# Patient Record
Sex: FEMALE | Race: WHITE | NOT HISPANIC OR LATINO | Employment: OTHER | URBAN - METROPOLITAN AREA
[De-identification: names, ages, dates, MRNs, and addresses within clinical notes are randomized per-mention and may not be internally consistent; named-entity substitution may affect disease eponyms.]

---

## 2017-01-04 ENCOUNTER — HOSPITAL ENCOUNTER (OUTPATIENT)
Dept: RADIOLOGY | Facility: MEDICAL CENTER | Age: 67
End: 2017-01-04
Attending: INTERNAL MEDICINE
Payer: MEDICARE

## 2017-01-04 DIAGNOSIS — R91.8 PULMONARY NODULES: ICD-10-CM

## 2017-01-04 PROCEDURE — 71250 CT THORAX DX C-: CPT

## 2017-01-05 DIAGNOSIS — I48.0 PAF (PAROXYSMAL ATRIAL FIBRILLATION) (HCC): ICD-10-CM

## 2017-01-05 DIAGNOSIS — E78.1 HYPERTRIGLYCERIDEMIA: ICD-10-CM

## 2017-01-05 RX ORDER — ATORVASTATIN CALCIUM 20 MG/1
20 TABLET, FILM COATED ORAL
Qty: 90 TAB | Refills: 1
Start: 2017-01-05 | End: 2017-10-23 | Stop reason: SDUPTHER

## 2017-01-05 RX ORDER — FLECAINIDE ACETATE 50 MG/1
50-100 TABLET ORAL 2 TIMES DAILY
Qty: 360 TAB | Refills: 2
Start: 2017-01-05 | End: 2017-10-23 | Stop reason: SDUPTHER

## 2017-01-05 RX ORDER — FLECAINIDE ACETATE 50 MG/1
50-100 TABLET ORAL 2 TIMES DAILY
Qty: 360 TAB | Refills: 3 | Status: CANCELLED | OUTPATIENT
Start: 2017-01-05

## 2017-01-05 RX ORDER — ATORVASTATIN CALCIUM 20 MG/1
20 TABLET, FILM COATED ORAL
Qty: 90 TAB | Refills: 3 | Status: CANCELLED | OUTPATIENT
Start: 2017-01-05

## 2017-01-09 ENCOUNTER — OFFICE VISIT (OUTPATIENT)
Dept: MEDICAL GROUP | Facility: MEDICAL CENTER | Age: 67
End: 2017-01-09
Payer: MEDICARE

## 2017-01-09 VITALS
SYSTOLIC BLOOD PRESSURE: 112 MMHG | TEMPERATURE: 97.9 F | DIASTOLIC BLOOD PRESSURE: 78 MMHG | HEART RATE: 60 BPM | WEIGHT: 189 LBS | BODY MASS INDEX: 32.27 KG/M2 | HEIGHT: 64 IN | RESPIRATION RATE: 14 BRPM | OXYGEN SATURATION: 96 %

## 2017-01-09 DIAGNOSIS — J01.00 ACUTE NON-RECURRENT MAXILLARY SINUSITIS: ICD-10-CM

## 2017-01-09 DIAGNOSIS — R91.8 PULMONARY NODULES/LESIONS, MULTIPLE: ICD-10-CM

## 2017-01-09 DIAGNOSIS — M72.2 PLANTAR FASCIITIS: ICD-10-CM

## 2017-01-09 DIAGNOSIS — K80.20 CALCULUS OF GALLBLADDER WITHOUT CHOLECYSTITIS WITHOUT OBSTRUCTION: ICD-10-CM

## 2017-01-09 PROCEDURE — 99214 OFFICE O/P EST MOD 30 MIN: CPT | Performed by: INTERNAL MEDICINE

## 2017-01-09 RX ORDER — AMOXICILLIN AND CLAVULANATE POTASSIUM 875; 125 MG/1; MG/1
1 TABLET, FILM COATED ORAL 2 TIMES DAILY
Qty: 20 TAB | Refills: 0 | Status: SHIPPED | OUTPATIENT
Start: 2017-01-09 | End: 2017-04-04

## 2017-01-09 ASSESSMENT — PATIENT HEALTH QUESTIONNAIRE - PHQ9: CLINICAL INTERPRETATION OF PHQ2 SCORE: 0

## 2017-01-09 NOTE — PROGRESS NOTES
Lucía is a 66 y.o. female who is here today because    Chief Complaint   Patient presents with   • Follow-Up     CT Scan results       The patient's current problem list and medication list is:    Patient Active Problem List    Diagnosis Date Noted   • PAF (paroxysmal atrial fibrillation) ([facility]) 12/27/2013     Priority: High   • Takotsubo cardiomyopathy 11/19/2014     Priority: Medium   • Hyperlipidemia 11/19/2014     Priority: Low   • Calculus of gallbladder without cholecystitis 01/09/2017   • Osteoarthritis of hands, bilateral 12/02/2016   • Pulmonary nodules/lesions, multiple 07/06/2016   • Hypotension (arterial) 07/06/2016   • Chest pain at rest 07/05/2016   • Peripheral neuropathy (HCC) 06/30/2015   • PATEL positive 06/04/2015   • Osteopenia 03/08/2015   • Glaucoma 12/02/2014       Current Outpatient Prescriptions   Medication Sig Dispense Refill   • amoxicillin-clavulanate (AUGMENTIN) 875-125 MG Tab Take 1 Tab by mouth 2 times a day. 20 Tab 0   • flecainide (TAMBOCOR) 50 MG tablet Take 1-2 Tabs by mouth 2 times a day. 360 Tab 2   • metoprolol (LOPRESSOR) 25 MG Tab Take 0.5 Tabs by mouth 2 times a day. 90 Tab 1   • atorvastatin (LIPITOR) 20 MG Tab Take 1 Tab by mouth every day. 90 Tab 1   • Omega-3 Fatty Acids (FISH OIL) 1000 MG Cap capsule Take 1 Cap by mouth 3 times a day, with meals. 90 Cap 3   • prednisoLONE acetate (PRED FORTE) 1 % Suspension Place 1 Drop in right eye 2 Times a Day. Right Eye - Implant     • aspirin (ASA) 325 MG TABS Take 325 mg by mouth every bedtime.     • Calcium Carbonate-Vitamin D (CALCIUM + D PO) Take 1 Tab by mouth every day. **OTC**     • Lutein-Zeaxanthin (LUTEIN) 15-0.7 MG CAPS Take 1 Tab by mouth every bedtime.     • multivitamin (THERAGRAN) TABS Take 1 Tab by mouth every bedtime.     • Coenzyme Q10 100 MG Tab Take 1 Tab by mouth every day.       No current facility-administered medications for this visit.     HPI  uLcía returns for 1 months follow up. Diagnoses of Calculus of  "gallbladder without cholecystitis without obstruction, Plantar fasciitis, Pulmonary nodules/lesions, multiple, and Acute non-recurrent maxillary sinusitis were pertinent to this visit.    1. Calculus of gallbladder without cholecystitis without obstruction  Gallstone noted on CT, no clear symptoms of recurrent pain attacks or cholecystitis. Brother had gall stones removed.     2. Plantar fasciitis  For 4 months has had pain in the right heel and plantar area. Has similar symptoms 5 years ago, treated with a cortisone injection.No known trauma. Does not go barefoot. Her walking has been significantly curtailed. She is doing some stretching exercises and using a cold bottle to roll under her foot.     3. Pulmonary nodules/lesions, multiple  Recent CT Chest was read as no change. She is asymptomatic. No history of unusual respiratory infections. Grew up in [place], then lived in [place].     4. Acute non-recurrent maxillary sinusitis  Prescription requested for upcoming cruise. Sinus infections are her most common infectious issue.       ROS Denies chest pain, shortness of breath, changes in bowel and bladder function, lower extremity edema.    Allergies as of 01/09/2017 - [place] as Reviewed 01/09/2017   Allergen Reaction Noted   • Neosporin [neomycin-polymyxin b] Rash 07/05/2016      /78 mmHg  Pulse 60  Temp(Src) 36.6 °C (97.9 °F)  Resp 14  Ht 1.626 m (5' 4.02\")  Wt 85.73 kg (189 lb)  BMI 32.43 kg/m2  SpO2 96%  LMP 12/28/2003    PHYSICAL EXAMINATION  Gen:         Alert and oriented, No apparent distress.Looks well. Mild obesity.   Neck:       No Jugular venous distension, Lymphadenopathy, Thyromegaly, Bruits.  Lungs:     Respiration quiet and unlabored.  CV:          Regular rate and rhythm.  Abd:         Soft, non distended. No tenderness.                  Ext:          No clubbing, cyanosis, edema. Right foot appears normal on inspection, circulation intact.Some tenderness in the plantar faciia area " and lateral sides of the heel. Achilles tendon n  on-tender.       ASSESSMENT AND PLAN     66 y.o. female     1. Calculus of gallbladder without cholecystitis, without obstruction  Asymptomatic    2. Plantar fasciitis, recurrent  - REFERRAL TO PODIATRY    3. Pulmonary nodules/lesions, multiple  Stable, symptomatic, consider rechecking in 1 year    4. Acute non-recurrent maxillary sinusitis  - amoxicillin-clavulanate (AUGMENTIN) 875-125 MG Tab; Take 1 Tab by mouth 2 times a day.  Dispense: 20 Tab; Refill: 0      Followup: 3 months

## 2017-01-09 NOTE — MR AVS SNAPSHOT
"        Lucía Haywood Rodriguez   2017 10:00 AM   Office Visit   MRN: [id number]    Department:  [address] [place] [address]   Dept Phone:  [phone]    Description:  Female : 1950   Provider:  Yomi Pathak M.D.           Reason for Visit     Follow-Up CT Scan results      Allergies as of 2017     Allergen Noted Reactions    Neosporin [Neomycin-Polymyxin B] 2016   Rash    Rxn = 2016      You were diagnosed with     Calculus of gallbladder without cholecystitis without obstruction   [029870]       Plantar fasciitis   [745847]       Acute non-recurrent maxillary sinusitis   [9447349]         Vital Signs     Blood Pressure Pulse Temperature Respirations Height Weight    112/78 mmHg 60 36.6 °C (97.9 °F) 14 1.626 m (5' 4.02\") 85.73 kg (189 lb)    Body Mass Index Oxygen Saturation Last Menstrual Period Smoking Status          32.43 kg/m2 96% 2003 Never Smoker         Basic Information     Date Of Birth Sex Race Ethnicity Preferred Language    1950 Female White Non- English      Your appointments     2017 10:20 AM   NEW TO YOU with Harvinder Bell M.D.   [facility] [address]    [address] [facility] [address]  [facility] [address] [phone]              Problem List              ICD-10-CM Priority Class Noted - Resolved    PAF (paroxysmal atrial fibrillation) (HCC) I48.0 High  2013 - Present    Hyperlipidemia E78.5 Low  2014 - Present    Takotsubo cardiomyopathy I51.81 Medium  2014 - Present    Glaucoma H40.9   2014 - Present    Osteopenia M85.80   3/8/2015 - Present    PATEL positive R76.8   2015 - Present    Peripheral neuropathy (HCC) G62.9   2015 - Present    Chest pain at rest R07.9   2016 - Present    Pulmonary nodules/lesions, multiple R91.8   2016 - Present    Hypotension (arterial) I95.9   2016 - Present    Osteoarthritis of hands, bilateral M19.041, M19.042   2016 - Present    Calculus of " gallbladder without cholecystitis K80.20   1/9/2017 - Present      Health Maintenance        Date Due Completion Dates    IMM ZOSTER VACCINE 11/20/2009 ---    MAMMOGRAM 11/4/2017 11/4/2016, 11/2/2015, 10/27/2015    BONE DENSITY 12/9/2019 12/9/2014    COLONOSCOPY 2/3/2024 2/3/2014 (Done)    Override on 2/3/2014: Done    IMM DTaP/Tdap/Td Vaccine (2 - Td) 6/26/2025 6/26/2015            Current Immunizations     13-VALENT PCV PREVNAR 12/2/2016    Influenza TIV (IM) 10/2/2014, 10/1/2013, 10/19/2011    Influenza Vaccine Adult HD 9/19/2015    Influenza Vaccine Quad Inj (Pf) 9/16/2016    Pneumococcal polysaccharide vaccine (PPSV-23) 12/2/2014, 12/28/2011    Tdap Vaccine 6/26/2015    Tuberculin Skin Test 4/28/2015 11:20 AM      Below and/or attached are the medications your provider expects you to take. Review all of your home medications and newly ordered medications with your provider and/or pharmacist. Follow medication instructions as directed by your provider and/or pharmacist. Please keep your medication list with you and share with your provider. Update the information when medications are discontinued, doses are changed, or new medications (including over-the-counter products) are added; and carry medication information at all times in the event of emergency situations     Allergies:  NEOSPORIN - Rash               Medications  Valid as of: January 09, 2017 - 10:40 AM    Generic Name Brand Name Tablet Size Instructions for use    Amoxicillin-Pot Clavulanate (Tab) AUGMENTIN 875-125 MG Take 1 Tab by mouth 2 times a day.        Aspirin (Tab)  MG Take 325 mg by mouth every bedtime.        Atorvastatin Calcium (Tab) LIPITOR 20 MG Take 1 Tab by mouth every day.        Calcium Citrate-Vitamin D   Take 1 Tab by mouth every day. **OTC**        Coenzyme Q10 (Tab) Coenzyme Q10 100 MG Take 1 Tab by mouth every day.        Flecainide Acetate (Tab) TAMBOCOR 50 MG Take 1-2 Tabs by mouth 2 times a day.         Lutein-Zeaxanthin (Cap) Lutein-Zeaxanthin 15-0.7 MG Take 1 Tab by mouth every bedtime.        Metoprolol Tartrate (Tab) LOPRESSOR 25 MG Take 0.5 Tabs by mouth 2 times a day.        Multiple Vitamin (Tab) THERAGRAN  Take 1 Tab by mouth every bedtime.        Omega-3 Fatty Acids (Cap) fish oil 1000 MG Take 1 Cap by mouth 3 times a day, with meals.        PrednisoLONE Acetate (Suspension) PRED FORTE 1 % Place 1 Drop in right eye 2 Times a Day. Right Eye - Implant        .                 Medicines prescribed today were sent to:     [facility] [address] [place] [address] (), [place] [address]    [address] () [place] [address]    Phone: [phone] Fax: [phone]    Open 24 Hours?: No      Medication refill instructions:       If your prescription bottle indicates you have medication refills left, it is not necessary to call your provider’s office. Please contact your pharmacy and they will refill your medication.    If your prescription bottle indicates you do not have any refills left, you may request refills at any time through one of the following ways: The online [employer] system (except Urgent Care), by calling your provider’s office, or by asking your pharmacy to contact your provider’s office with a refill request. Medication refills are processed only during regular business hours and may not be available until the next business day. Your provider may request additional information or to have a follow-up visit with you prior to refilling your medication.   *Please Note: Medication refills are assigned a new Rx number when refilled electronically. Your pharmacy may indicate that no refills were authorized even though a new prescription for the same medication is available at the pharmacy. Please request the medicine by name with the pharmacy before contacting your provider for a refill.        Referral     A referral request has been sent to our patient care coordination department. Please allow 3-5  business days for us to process this request and contact you either by phone or mail. If you do not hear from us by the 5th business day, please call us at [phone].           Wik[employer] Access Code: Activation code not generated  Current Wikibon Status: Active

## 2017-04-04 ENCOUNTER — OFFICE VISIT (OUTPATIENT)
Dept: MEDICAL GROUP | Facility: MEDICAL CENTER | Age: 67
End: 2017-04-04
Payer: MEDICARE

## 2017-04-04 ENCOUNTER — HOSPITAL ENCOUNTER (OUTPATIENT)
Dept: LAB | Facility: MEDICAL CENTER | Age: 67
End: 2017-04-04
Attending: INTERNAL MEDICINE
Payer: MEDICARE

## 2017-04-04 VITALS
DIASTOLIC BLOOD PRESSURE: 72 MMHG | HEART RATE: 78 BPM | HEIGHT: 64 IN | WEIGHT: 190 LBS | TEMPERATURE: 97.3 F | OXYGEN SATURATION: 94 % | SYSTOLIC BLOOD PRESSURE: 122 MMHG | RESPIRATION RATE: 14 BRPM | BODY MASS INDEX: 32.44 KG/M2

## 2017-04-04 DIAGNOSIS — M85.80 OSTEOPENIA: ICD-10-CM

## 2017-04-04 DIAGNOSIS — I48.0 PAF (PAROXYSMAL ATRIAL FIBRILLATION) (HCC): ICD-10-CM

## 2017-04-04 DIAGNOSIS — I51.81 TAKOTSUBO CARDIOMYOPATHY: ICD-10-CM

## 2017-04-04 DIAGNOSIS — E66.9 OBESITY (BMI 30-39.9): ICD-10-CM

## 2017-04-04 DIAGNOSIS — R91.8 PULMONARY NODULES/LESIONS, MULTIPLE: ICD-10-CM

## 2017-04-04 DIAGNOSIS — M19.042 PRIMARY OSTEOARTHRITIS OF BOTH HANDS: ICD-10-CM

## 2017-04-04 DIAGNOSIS — G62.89 OTHER POLYNEUROPATHY: ICD-10-CM

## 2017-04-04 DIAGNOSIS — E78.49 OTHER HYPERLIPIDEMIA: ICD-10-CM

## 2017-04-04 DIAGNOSIS — M19.041 PRIMARY OSTEOARTHRITIS OF BOTH HANDS: ICD-10-CM

## 2017-04-04 PROBLEM — K80.20 CALCULUS OF GALLBLADDER WITHOUT CHOLECYSTITIS: Status: RESOLVED | Noted: 2017-01-09 | Resolved: 2017-04-04

## 2017-04-04 LAB
ALBUMIN SERPL BCP-MCNC: 4.3 G/DL (ref 3.2–4.9)
ALBUMIN/GLOB SERPL: 1.3 G/DL
ALP SERPL-CCNC: 102 U/L (ref 30–99)
ALT SERPL-CCNC: 25 U/L (ref 2–50)
ANION GAP SERPL CALC-SCNC: 10 MMOL/L (ref 0–11.9)
AST SERPL-CCNC: 22 U/L (ref 12–45)
BILIRUB SERPL-MCNC: 0.6 MG/DL (ref 0.1–1.5)
BUN SERPL-MCNC: 16 MG/DL (ref 8–22)
CALCIUM SERPL-MCNC: 9.8 MG/DL (ref 8.5–10.5)
CHLORIDE SERPL-SCNC: 104 MMOL/L (ref 96–112)
CHOLEST SERPL-MCNC: 127 MG/DL (ref 100–199)
CO2 SERPL-SCNC: 24 MMOL/L (ref 20–33)
CREAT SERPL-MCNC: 0.93 MG/DL (ref 0.5–1.4)
GFR SERPL CREATININE-BSD FRML MDRD: >60 ML/MIN/1.73 M 2
GLOBULIN SER CALC-MCNC: 3.2 G/DL (ref 1.9–3.5)
GLUCOSE SERPL-MCNC: 100 MG/DL (ref 65–99)
HDLC SERPL-MCNC: 40 MG/DL
LDLC SERPL CALC-MCNC: 48 MG/DL
POTASSIUM SERPL-SCNC: 4.4 MMOL/L (ref 3.6–5.5)
PROT SERPL-MCNC: 7.5 G/DL (ref 6–8.2)
SODIUM SERPL-SCNC: 138 MMOL/L (ref 135–145)
TRIGL SERPL-MCNC: 197 MG/DL (ref 0–149)

## 2017-04-04 PROCEDURE — 4040F PNEUMOC VAC/ADMIN/RCVD: CPT | Performed by: FAMILY MEDICINE

## 2017-04-04 PROCEDURE — G8432 DEP SCR NOT DOC, RNG: HCPCS | Performed by: FAMILY MEDICINE

## 2017-04-04 PROCEDURE — 80061 LIPID PANEL: CPT

## 2017-04-04 PROCEDURE — 80053 COMPREHEN METABOLIC PANEL: CPT

## 2017-04-04 PROCEDURE — 1101F PT FALLS ASSESS-DOCD LE1/YR: CPT | Performed by: FAMILY MEDICINE

## 2017-04-04 PROCEDURE — G8419 CALC BMI OUT NRM PARAM NOF/U: HCPCS | Performed by: FAMILY MEDICINE

## 2017-04-04 PROCEDURE — 36415 COLL VENOUS BLD VENIPUNCTURE: CPT

## 2017-04-04 PROCEDURE — 3014F SCREEN MAMMO DOC REV: CPT | Performed by: FAMILY MEDICINE

## 2017-04-04 PROCEDURE — 1036F TOBACCO NON-USER: CPT | Performed by: FAMILY MEDICINE

## 2017-04-04 PROCEDURE — 99214 OFFICE O/P EST MOD 30 MIN: CPT | Performed by: FAMILY MEDICINE

## 2017-04-04 PROCEDURE — 3017F COLORECTAL CA SCREEN DOC REV: CPT | Performed by: FAMILY MEDICINE

## 2017-04-04 RX ORDER — ALENDRONATE SODIUM 35 MG/1
35 TABLET ORAL
Qty: 12 TAB | Refills: 3 | Status: SHIPPED | OUTPATIENT
Start: 2017-04-04 | End: 2018-11-12

## 2017-04-04 NOTE — MR AVS SNAPSHOT
"        Lucía Haywood Michael   2017 10:20 AM   Office Visit   MRN: [id number]    Department:  [address] [place] [address]   Dept Phone:  [phone]    Description:  Female : 1950   Provider:  Harvinder Bell M.D.           Reason for Visit     Hyperlipidemia           Allergies as of 2017     Allergen Noted Reactions    Neosporin [Neomycin-Polymyxin B] 2016   Rash    Rxn = 2016      You were diagnosed with     PAF (paroxysmal atrial fibrillation) ([facility])   [239695]       Takotsubo cardiomyopathy   [433287]       Other polyneuropathy (CMS-[facility])   [8020578]       Pulmonary nodules/lesions, multiple   [070996]       Other hyperlipidemia   [0209257]       Primary osteoarthritis of both hands   [2252742]       Osteopenia   [217241]         Vital Signs     Blood Pressure Pulse Temperature Respirations Height Weight    122/72 mmHg 78 36.3 °C (97.3 °F) 14 1.626 m (5' 4\") 86.183 kg (190 lb)    Body Mass Index Oxygen Saturation Last Menstrual Period Smoking Status          32.60 kg/m2 94% 2003 Never Smoker         Basic Information     Date Of Birth Sex Race Ethnicity Preferred Language    1950 Female White Non- English      Your appointments     2017  1:20 PM   FOLLOW UP with Ghulam Cook M.D.   [facility] (--)    [address] [facility] [facility] [address]  [place] [address]   [phone]            Oct 26, 2017 10:00 AM   Established Patient with Harvinder Bell M.D.   [facility] [address]    [address] [facility] [address]  [place] [address] [phone]           You will be receiving a confirmation call a few days before your appointment from our automated call confirmation system.              Problem List              ICD-10-CM Priority Class Noted - Resolved    PAF (paroxysmal atrial fibrillation) (CMS-[facility]) I48.0 High  2013 - Present    Hyperlipidemia E78.5 Low  2014 - Present    Takotsubo cardiomyopathy " I51.81 Medium  11/19/2014 - Present    Glaucoma H40.9   12/2/2014 - Present    Osteopenia M85.80   3/8/2015 - Present    PATEL positive R76.8   6/4/2015 - Present    Pulmonary nodules/lesions, multiple R91.8   7/6/2016 - Present    Osteoarthritis of hands, bilateral M19.041, M19.042   12/2/2016 - Present      Health Maintenance        Date Due Completion Dates    IMM ZOSTER VACCINE 11/20/2009 ---    MAMMOGRAM 11/4/2017 11/4/2016, 11/2/2015, 10/27/2015    BONE DENSITY 12/9/2019 12/9/2014    COLONOSCOPY 2/3/2024 2/3/2014 (Done)    Override on 2/3/2014: Done    IMM DTaP/Tdap/Td Vaccine (2 - Td) 6/26/2025 6/26/2015            Current Immunizations     13-VALENT PCV PREVNAR 12/2/2016    Influenza TIV (IM) 10/2/2014, 10/1/2013, 10/19/2011    Influenza Vaccine Adult HD 9/19/2015    Influenza Vaccine Quad Inj (Pf) 9/16/2016    Pneumococcal polysaccharide vaccine (PPSV-23) 12/2/2014, 12/28/2011    Tdap Vaccine 6/26/2015    Tuberculin Skin Test 4/28/2015 11:20 AM      Below and/or attached are the medications your provider expects you to take. Review all of your home medications and newly ordered medications with your provider and/or pharmacist. Follow medication instructions as directed by your provider and/or pharmacist. Please keep your medication list with you and share with your provider. Update the information when medications are discontinued, doses are changed, or new medications (including over-the-counter products) are added; and carry medication information at all times in the event of emergency situations     Allergies:  NEOSPORIN - Rash               Medications  Valid as of: April 04, 2017 - 10:44 AM    Generic Name Brand Name Tablet Size Instructions for use    Alendronate Sodium (Tab) FOSAMAX 35 MG Take 1 Tab by mouth every 7 days. Indications: prevention of osteoporosis        Aspirin (Tab)  MG Take 325 mg by mouth every bedtime.        Atorvastatin Calcium (Tab) LIPITOR 20 MG Take 1 Tab by mouth every  day.        Calcium Citrate-Vitamin D   Take 1 Tab by mouth every day. **OTC**        Flecainide Acetate (Tab) TAMBOCOR 50 MG Take 1-2 Tabs by mouth 2 times a day.        Lutein-Zeaxanthin (Cap) Lutein-Zeaxanthin 15-0.7 MG Take 1 Tab by mouth every bedtime.        Metoprolol Tartrate (Tab) LOPRESSOR 25 MG Take 0.5 Tabs by mouth 2 times a day.        Multiple Vitamin (Tab) THERAGRAN  Take 1 Tab by mouth every bedtime.        Omega-3 Fatty Acids (Cap) fish oil 1000 MG Take 1 Cap by mouth 3 times a day, with meals.        PrednisoLONE Acetate (Suspension) PRED FORTE 1 % Place 1 Drop in right eye 2 Times a Day. Right Eye - Implant        .                 Medicines prescribed today were sent to:     [facility] [address] [place] [address] (), [place] [id number] () [place] [address]    Phone: [phone] Fax: [phone]    Open 24 Hours?: No      Medication refill instructions:       If your prescription bottle indicates you have medication refills left, it is not necessary to call your provider’s office. Please contact your pharmacy and they will refill your medication.    If your prescription bottle indicates you do not have any refills left, you may request refills at any time through one of the following ways: The online [employer] system (except Urgent Care), by calling your provider’s office, or by asking your pharmacy to contact your provider’s office with a refill request. Medication refills are processed only during regular business hours and may not be available until the next business day. Your provider may request additional information or to have a follow-up visit with you prior to refilling your medication.   *Please Note: Medication refills are assigned a new Rx number when refilled electronically. Your pharmacy may indicate that no refills were authorized even though a new prescription for the same medication is available at the pharmacy. Please request the medicine by name with the  pharmacy before contacting your provider for a refill.           MyChart Access Code: Activation code not generated  Current MyChart Status: Active

## 2017-04-04 NOTE — PROGRESS NOTES
cc:  afib    Subjective:     Lucía Rodriguez is a 66 y.o. female presenting to establish care and to discuss the followin. Paroxysmal afib, takotsubo cardiomyopathy, HLD: is followed by cardiology. Is on metoprolol, flecainide, fish oil, lipitor, aspirin 325mg daily.  No issues.     2. Osteopenia: had dexa in . Taking calcium and vitamin d.  No falls. Hx of hysterectomy many years ago.    3. Peripheral neuropathy: has been present for years, is a burning sensation on bottoms of feet.    4. Pulm nodules: was found incidentally in 2016, repeat CT 2017 with stable nodules. Is asymptomatic. No cough, shortness of breath. Never a smoker.    Review of systems:  See above.          Current outpatient prescriptions:   •  alendronate (FOSAMAX) 35 MG tablet, Take 1 Tab by mouth every 7 days. Indications: prevention of osteoporosis, Disp: 12 Tab, Rfl: 3  •  flecainide (TAMBOCOR) 50 MG tablet, Take 1-2 Tabs by mouth 2 times a day., Disp: 360 Tab, Rfl: 2  •  metoprolol (LOPRESSOR) 25 MG Tab, Take 0.5 Tabs by mouth 2 times a day., Disp: 90 Tab, Rfl: 1  •  atorvastatin (LIPITOR) 20 MG Tab, Take 1 Tab by mouth every day., Disp: 90 Tab, Rfl: 1  •  Omega-3 Fatty Acids (FISH OIL) 1000 MG Cap capsule, Take 1 Cap by mouth 3 times a day, with meals. (Patient taking differently: Take 1,000 mg by mouth every day.), Disp: 90 Cap, Rfl: 3  •  prednisoLONE acetate (PRED FORTE) 1 % Suspension, Place 1 Drop in right eye 2 Times a Day. Right Eye - Implant, Disp: , Rfl:   •  aspirin (ASA) 325 MG TABS, Take 325 mg by mouth every bedtime., Disp: , Rfl:   •  Calcium Carbonate-Vitamin D (CALCIUM + D PO), Take 1 Tab by mouth every day. **OTC**, Disp: , Rfl:   •  Lutein-Zeaxanthin (LUTEIN) 15-0.7 MG CAPS, Take 1 Tab by mouth every bedtime., Disp: , Rfl:   •  multivitamin (THERAGRAN) TABS, Take 1 Tab by mouth every bedtime., Disp: , Rfl:     Allergies   Allergen Reactions   • Neosporin [Neomycin-Polymyxin B] Rash     Rxn = 2016  "      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 2011   • Glaucoma      right eye   • CATARACT      left eye   • Arrhythmia      Atrial fibrillation   • Hyperlipidemia    • Calculus of gallbladder without cholecystitis 1/9/2017     Past Surgical History   Procedure Laterality Date   • Gyn surgery       HYSTERECTOMY   • Other       eye surgery, right eye   • Other       cataract surgery, left eye     Family History   Problem Relation Age of Onset   • Heart Disease Father      triple bypass   • Cancer Father    • Cancer Mother      Social History     Social History   • Marital Status:      Spouse Name: N/A   • Number of Children: N/A   • Years of Education: N/A     Occupational History   • Not on file.     Social History Main Topics   • Smoking status: Never Smoker    • Smokeless tobacco: Never Used   • Alcohol Use: No   • Drug Use: No   • Sexual Activity:     Partners: Male     Other Topics Concern   • Not on file     Social History Narrative       Objective:     Vitals: /72 mmHg  Pulse 78  Temp(Src) 36.3 °C (97.3 °F)  Resp 14  Ht 1.626 m (5' 4\")  Wt 86.183 kg (190 lb)  BMI 32.60 kg/m2  SpO2 94%  LMP 12/28/2003  General: Alert, pleasant, NAD  HEENT: Normocephalic.  PERRL, EOMI, no icterus or pallor.  Conjunctivae and lids normal. External ears normal.  Neck supple.  No thyromegaly or masses palpated. No cervical or supraclavicular lymphadenopathy.  Heart: Regular rate and rhythm.  S1 and S2 normal.  No murmurs appreciated.  Respiratory: Normal respiratory effort.  Clear to auscultation bilaterally.    Abdomen: Non-distended, soft  Extremities: No leg edema.    Psych:  Affect/mood is normal, judgement is good, memory is intact, grooming is appropriate.    Assessment/Plan:     Lucía was seen today for hyperlipidemia.    Diagnoses and all orders for this visit:    PAF (paroxysmal atrial fibrillation) [facility])  Takotsubo " cardiomyopathy  Other hyperlipidemia  Stable, followed by cardiology. Continue meds. Is doing labs for her cardiologist today    Osteopenia  Discussed results of her dexa.  Her 10-yr risk of a major osteoporotic fx is 19.4%, very close to 20%. discussed pros/cons of medication options. We decided to try weekly fosamax, and then repeat dexa in 3 years and re-assess.  If she gets any side effects, will just stop meds and repeat dexa in 2019.  -     alendronate (FOSAMAX) 35 MG tablet; Take 1 Tab by mouth every 7 days. Indications: prevention of osteoporosis    Other polyneuropathy (CMS-HCC)  Stable.    Pulmonary nodules/lesions, multiple  Stable, will repeat CT 1/2018    Primary osteoarthritis of both hands  Stable    Obesity (BMI 30-39.9)  -     Patient identified as having weight management issue.  Appropriate orders and counseling given.      Return in about 6 months (around 10/4/2017) for Med check.

## 2017-04-18 ENCOUNTER — OFFICE VISIT (OUTPATIENT)
Dept: CARDIOLOGY | Facility: MEDICAL CENTER | Age: 67
End: 2017-04-18
Payer: MEDICARE

## 2017-04-18 VITALS
DIASTOLIC BLOOD PRESSURE: 54 MMHG | BODY MASS INDEX: 31.92 KG/M2 | HEART RATE: 70 BPM | WEIGHT: 187 LBS | HEIGHT: 64 IN | SYSTOLIC BLOOD PRESSURE: 110 MMHG | OXYGEN SATURATION: 94 %

## 2017-04-18 DIAGNOSIS — I48.0 PAF (PAROXYSMAL ATRIAL FIBRILLATION) (HCC): ICD-10-CM

## 2017-04-18 PROCEDURE — G8432 DEP SCR NOT DOC, RNG: HCPCS | Performed by: INTERNAL MEDICINE

## 2017-04-18 PROCEDURE — 1101F PT FALLS ASSESS-DOCD LE1/YR: CPT | Performed by: INTERNAL MEDICINE

## 2017-04-18 PROCEDURE — 1036F TOBACCO NON-USER: CPT | Performed by: INTERNAL MEDICINE

## 2017-04-18 PROCEDURE — 93000 ELECTROCARDIOGRAM COMPLETE: CPT | Performed by: INTERNAL MEDICINE

## 2017-04-18 PROCEDURE — 99214 OFFICE O/P EST MOD 30 MIN: CPT | Performed by: INTERNAL MEDICINE

## 2017-04-18 PROCEDURE — 3014F SCREEN MAMMO DOC REV: CPT | Performed by: INTERNAL MEDICINE

## 2017-04-18 PROCEDURE — 3017F COLORECTAL CA SCREEN DOC REV: CPT | Performed by: INTERNAL MEDICINE

## 2017-04-18 PROCEDURE — G8419 CALC BMI OUT NRM PARAM NOF/U: HCPCS | Performed by: INTERNAL MEDICINE

## 2017-04-18 PROCEDURE — 4040F PNEUMOC VAC/ADMIN/RCVD: CPT | Performed by: INTERNAL MEDICINE

## 2017-04-18 ASSESSMENT — ENCOUNTER SYMPTOMS: PALPITATIONS: 0

## 2017-04-18 NOTE — MR AVS SNAPSHOT
"        Lucía Haywood Rodriguez   2017 1:20 PM   Office Visit   MRN: [id number]    Department:  [facility]   Dept Phone:  [phone]    Description:  Female : 1950   Provider:  Ghulam Cook M.D.           Reason for Visit     Follow-Up           Allergies as of 2017     Allergen Noted Reactions    Neosporin [Neomycin-Polymyxin B] 2016   Rash    Rxn = 2016      You were diagnosed with     PAF (paroxysmal atrial fibrillation) (CMS-HCC)   [381720]         Vital Signs     Blood Pressure Pulse Height Weight Body Mass Index Oxygen Saturation    110/54 mmHg 70 1.626 m (5' 4.02\") 84.823 kg (187 lb) 32.08 kg/m2 94%    Last Menstrual Period Smoking Status                2003 Never Smoker           Basic Information     Date Of Birth Sex Race Ethnicity Preferred Language    1950 Female White Non- English      Your appointments     Oct 26, 2017 10:00 AM   Established Patient with Harvinder Bell M.D.   [facility] [address]    [address]  [address]  [facility] [address]   [phone]           You will be receiving a confirmation call a few days before your appointment from our automated call confirmation system.              Problem List              ICD-10-CM Priority Class Noted - Resolved    PAF (paroxysmal atrial fibrillation) (CMS-HCC) I48.0 High  2013 - Present    Hyperlipidemia E78.5 Low  2014 - Present    Takotsubo cardiomyopathy I51.81 Medium  2014 - Present    Glaucoma H40.9   2014 - Present    Osteopenia M85.80   3/8/2015 - Present    PATEL positive R76.8   2015 - Present    Pulmonary nodules/lesions, multiple R91.8   2016 - Present    Osteoarthritis of hands, bilateral M19.041, M19.042   2016 - Present    Obesity (BMI 30-39.9) E66.9   2017 - Present      Health Maintenance        Date Due Completion Dates    IMM ZOSTER VACCINE 2009 ---    MAMMOGRAM 2017, 10/27/2015    BONE " DENSITY 12/9/2019 12/9/2014    COLONOSCOPY 2/3/2024 2/3/2014 (Done)    Override on 2/3/2014: Done    IMM DTaP/Tdap/Td Vaccine (2 - Td) 6/26/2025 6/26/2015            Results       Current Immunizations     13-VALENT PCV PREVNAR 12/2/2016    Influenza TIV (IM) 10/2/2014, 10/1/2013, 10/19/2011    Influenza Vaccine Adult HD 9/19/2015    Influenza Vaccine Quad Inj (Pf) 9/16/2016    Pneumococcal polysaccharide vaccine (PPSV-23) 12/2/2014, 12/28/2011    Tdap Vaccine 6/26/2015    Tuberculin Skin Test 4/28/2015 11:20 AM      Below and/or attached are the medications your provider expects you to take. Review all of your home medications and newly ordered medications with your provider and/or pharmacist. Follow medication instructions as directed by your provider and/or pharmacist. Please keep your medication list with you and share with your provider. Update the information when medications are discontinued, doses are changed, or new medications (including over-the-counter products) are added; and carry medication information at all times in the event of emergency situations     Allergies:  NEOSPORIN - Rash               Medications  Valid as of: April 18, 2017 -  1:51 PM    Generic Name Brand Name Tablet Size Instructions for use    Alendronate Sodium (Tab) FOSAMAX 35 MG Take 1 Tab by mouth every 7 days. Indications: prevention of osteoporosis        Aspirin (Tab)  MG Take 325 mg by mouth every bedtime.        Atorvastatin Calcium (Tab) LIPITOR 20 MG Take 1 Tab by mouth every day.        Calcium Citrate-Vitamin D   Take 1 Tab by mouth every day. **OTC**        Flecainide Acetate (Tab) TAMBOCOR 50 MG Take 1-2 Tabs by mouth 2 times a day.        Lutein-Zeaxanthin (Cap) Lutein-Zeaxanthin 15-0.7 MG Take 1 Tab by mouth every bedtime.        Metoprolol Tartrate (Tab) LOPRESSOR 25 MG Take 0.5 Tabs by mouth 2 times a day.        Multiple Vitamin (Tab) THERAGRAN  Take 1 Tab by mouth every bedtime.        Omega-3 Fatty Acids  (Cap) fish oil 1000 MG Take 1 Cap by mouth 3 times a day, with meals.        PrednisoLONE Acetate (Suspension) PRED FORTE 1 % Place 1 Drop in right eye 2 Times a Day. Right Eye - Implant        .                 Medicines prescribed today were sent to:     [facility] [address] [place] [address] (), [place] - [address]    [address] () [place] [address]    Phone: [phone] Fax: [phone]    Open 24 Hours?: No      Medication refill instructions:       If your prescription bottle indicates you have medication refills left, it is not necessary to call your provider’s office. Please contact your pharmacy and they will refill your medication.    If your prescription bottle indicates you do not have any refills left, you may request refills at any time through one of the following ways: The online [employer] system (except Urgent Care), by calling your provider’s office, or by asking your pharmacy to contact your provider’s office with a refill request. Medication refills are processed only during regular business hours and may not be available until the next business day. Your provider may request additional information or to have a follow-up visit with you prior to refilling your medication.   *Please Note: Medication refills are assigned a new Rx number when refilled electronically. Your pharmacy may indicate that no refills were authorized even though a new prescription for the same medication is available at the pharmacy. Please request the medicine by name with the pharmacy before contacting your provider for a refill.           [employer] Access Code: Activation code not generated  Current [employer] Status: Active

## 2017-04-18 NOTE — PROGRESS NOTES
Subjective:   Lucía Rodriguez is a 66 y.o. female who presents today for follow up of atrial arrhythmia on flecainide    50 am 100 pm.  No visual changes or side effects of flecainde.  No dizzy spells.      No arrhythmia felt at all.      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 2011   • Glaucoma      right eye   • CATARACT      left eye   • Arrhythmia      Atrial fibrillation   • Hyperlipidemia    • Calculus of gallbladder without cholecystitis 1/9/2017     Past Surgical History   Procedure Laterality Date   • Gyn surgery       HYSTERECTOMY   • Other       eye surgery, right eye   • Other       cataract surgery, left eye     Family History   Problem Relation Age of Onset   • Heart Disease Father      triple bypass   • Cancer Father    • Cancer Mother      History   Smoking status   • Never Smoker    Smokeless tobacco   • Never Used     Allergies   Allergen Reactions   • Neosporin [Neomycin-Polymyxin B] Rash     Rxn = 6/2016     Outpatient Encounter Prescriptions as of 4/18/2017   Medication Sig Dispense Refill   • alendronate (FOSAMAX) 35 MG tablet Take 1 Tab by mouth every 7 days. Indications: prevention of osteoporosis 12 Tab 3   • flecainide (TAMBOCOR) 50 MG tablet Take 1-2 Tabs by mouth 2 times a day. 360 Tab 2   • metoprolol (LOPRESSOR) 25 MG Tab Take 0.5 Tabs by mouth 2 times a day. 90 Tab 1   • atorvastatin (LIPITOR) 20 MG Tab Take 1 Tab by mouth every day. 90 Tab 1   • Omega-3 Fatty Acids (FISH OIL) 1000 MG Cap capsule Take 1 Cap by mouth 3 times a day, with meals. (Patient taking differently: Take 1,000 mg by mouth every day.) 90 Cap 3   • prednisoLONE acetate (PRED FORTE) 1 % Suspension Place 1 Drop in right eye 2 Times a Day. Right Eye - Implant     • aspirin (ASA) 325 MG TABS Take 325 mg by mouth every bedtime.     • Calcium Carbonate-Vitamin D (CALCIUM + D PO) Take 1 Tab by mouth every day. **OTC**     • Lutein-Zeaxanthin  "(LUTEIN) 15-0.7 MG CAPS Take 1 Tab by mouth every bedtime.     • multivitamin (THERAGRAN) TABS Take 1 Tab by mouth every bedtime.       No facility-administered encounter medications on file as of 4/18/2017.     Review of Systems   Cardiovascular: Negative for palpitations.        Objective:   /54 mmHg  Pulse 70  Ht 1.626 m (5' 4.02\")  Wt 84.823 kg (187 lb)  BMI 32.08 kg/m2  SpO2 94%  LMP 12/28/2003    Physical Exam   Constitutional: She is oriented to person, place, and time. She appears well-developed and well-nourished. No distress.   HENT:   Head: Normocephalic and atraumatic.   Right Ear: External ear normal.   Left Ear: External ear normal.   Mouth/Throat: Oropharynx is clear and moist.   Eyes: Conjunctivae and EOM are normal. Right eye exhibits no discharge. Left eye exhibits no discharge. No scleral icterus.   Neck: Normal range of motion. Neck supple. No JVD present. No tracheal deviation present. No thyromegaly present.   Cardiovascular: Normal rate, regular rhythm, normal heart sounds and intact distal pulses.  Exam reveals no gallop and no friction rub.    No murmur heard.  Pulmonary/Chest: Effort normal and breath sounds normal. No stridor. No respiratory distress. She has no wheezes. She has no rales.   Abdominal: Soft. She exhibits no distension.   Musculoskeletal: She exhibits no edema or tenderness.   Neurological: She is alert and oriented to person, place, and time. No cranial nerve deficit. Coordination normal.   Skin: Skin is warm and dry. No rash noted. She is not diaphoretic. No erythema. No pallor.   Psychiatric: She has a normal mood and affect. Her behavior is normal. Judgment and thought content normal.   Vitals reviewed.      Assessment:     1. PAF (paroxysmal atrial fibrillation) (CMS-[facility])  EKG       Medical Decision Making:  Today's Assessment / Status / Plan:     paf- doing well on flecainide.    We discussed oac but she still prefers asa for now.    No evidence of " conduction delay on ecg or side effects.

## 2017-04-19 LAB — EKG IMPRESSION: NORMAL

## 2017-08-16 PROBLEM — D23.11 OTHER BENIGN NEOPLASM OF SKIN OF RIGHT EYELID, INCLUDING CANTHUS: Status: ACTIVE | Noted: 2017-08-16

## 2017-08-22 ENCOUNTER — HOSPITAL ENCOUNTER (OUTPATIENT)
Dept: RADIOLOGY | Facility: MEDICAL CENTER | Age: 67
End: 2017-08-22
Attending: NURSE PRACTITIONER
Payer: MEDICARE

## 2017-08-22 ENCOUNTER — OFFICE VISIT (OUTPATIENT)
Dept: URGENT CARE | Facility: CLINIC | Age: 67
End: 2017-08-22
Payer: MEDICARE

## 2017-08-22 VITALS
HEIGHT: 64 IN | HEART RATE: 76 BPM | DIASTOLIC BLOOD PRESSURE: 80 MMHG | SYSTOLIC BLOOD PRESSURE: 128 MMHG | TEMPERATURE: 98.8 F | OXYGEN SATURATION: 94 % | RESPIRATION RATE: 14 BRPM | BODY MASS INDEX: 31.58 KG/M2 | WEIGHT: 185 LBS

## 2017-08-22 DIAGNOSIS — M71.21 POPLITEAL CYST, UNRUPTURED, RIGHT: ICD-10-CM

## 2017-08-22 DIAGNOSIS — M79.661 RIGHT CALF PAIN: ICD-10-CM

## 2017-08-22 DIAGNOSIS — M79.671 RIGHT FOOT PAIN: ICD-10-CM

## 2017-08-22 PROCEDURE — 99214 OFFICE O/P EST MOD 30 MIN: CPT | Performed by: NURSE PRACTITIONER

## 2017-08-22 PROCEDURE — 93971 EXTREMITY STUDY: CPT | Mod: RT

## 2017-08-22 RX ORDER — IBUPROFEN 200 MG
200 TABLET ORAL EVERY 6 HOURS PRN
COMMUNITY
End: 2018-11-12

## 2017-08-22 ASSESSMENT — ENCOUNTER SYMPTOMS
HEADACHES: 0
COUGH: 0
CHILLS: 0
VOMITING: 0
SHORTNESS OF BREATH: 0
MYALGIAS: 0
NAUSEA: 0
FEVER: 0

## 2017-08-22 NOTE — MR AVS SNAPSHOT
"        Lucía Haywood Michael   2017 3:45 PM   Office Visit   MRN: [id number]    Department:  [facility]   Dept Phone:  [phone]    Description:  Female : 1950   Provider:  DAISY Kat           Reason for Visit     Foot Problem pt worried her plantar fasciatis is acting up on r leg x 1 month .      Allergies as of 2017     Allergen Noted Reactions    Neosporin [Neomycin-Polymyxin B] 2016   Rash    Rxn = 2016      You were diagnosed with     Right calf pain   [0710776]       Right foot pain   [[id number]]         Vital Signs     Blood Pressure Pulse Temperature Respirations Height Weight    128/80 mmHg 76 37.1 °C (98.8 °F) 14 1.626 m (5' 4\") 83.915 kg (185 lb)    Body Mass Index Oxygen Saturation Last Menstrual Period Smoking Status          31.74 kg/m2 94% 2003 Never Smoker         Basic Information     Date Of Birth Sex Race Ethnicity Preferred Language    1950 Female White Non- English      Your appointments     Aug 22, 2017  6:00 PM   US EXTREMITY (30) A with VISTA US 2   IMAGING VISTA ([place])    [address] [facility] [address] [phone]            Oct 26, 2017 10:00 AM   Established Patient with Harvinder Bell M.D.   [facility] [address]    [address]  [address]  [place] [address] [phone]           You will be receiving a confirmation call a few days before your appointment from our automated call confirmation system.            2017 12:40 PM   FOLLOW UP with Ghulam Cook M.D.   [facility] (--)    [address]  [place] [address] [phone]              Problem List              ICD-10-CM Priority Class Noted - Resolved    PAF (paroxysmal atrial fibrillation) (CMS-HCC) I48.0 High  2013 - Present    Hyperlipidemia E78.5 Low  2014 - Present    Takotsubo cardiomyopathy I51.81 Medium  2014 - Present    Glaucoma H40.9   " 12/2/2014 - Present    Osteopenia M85.80   3/8/2015 - Present    PATEL positive R76.8   6/4/2015 - Present    Pulmonary nodules/lesions, multiple R91.8   7/6/2016 - Present    Osteoarthritis of hands, bilateral M19.041, M19.042   12/2/2016 - Present    Obesity (BMI 30-39.9) E66.9   4/4/2017 - Present      Health Maintenance        Date Due Completion Dates    IMM ZOSTER VACCINE 11/20/2009 ---    IMM INFLUENZA (1) 9/1/2017 9/16/2016, 9/19/2015, 10/2/2014, 10/1/2013, 10/19/2011    MAMMOGRAM 11/4/2017 11/4/2016, 10/27/2015    BONE DENSITY 12/9/2019 12/9/2014    COLONOSCOPY 2/3/2024 2/3/2014 (Done)    Override on 2/3/2014: Done    IMM DTaP/Tdap/Td Vaccine (2 - Td) 6/26/2025 6/26/2015            Current Immunizations     13-VALENT PCV PREVNAR 12/2/2016    Influenza TIV (IM) 10/2/2014, 10/1/2013, 10/19/2011    Influenza Vaccine Adult HD 9/19/2015    Influenza Vaccine Quad Inj (Pf) 9/16/2016    Pneumococcal polysaccharide vaccine (PPSV-23) 12/2/2014, 12/28/2011    Tdap Vaccine 6/26/2015    Tuberculin Skin Test 4/28/2015 11:20 AM      Below and/or attached are the medications your provider expects you to take. Review all of your home medications and newly ordered medications with your provider and/or pharmacist. Follow medication instructions as directed by your provider and/or pharmacist. Please keep your medication list with you and share with your provider. Update the information when medications are discontinued, doses are changed, or new medications (including over-the-counter products) are added; and carry medication information at all times in the event of emergency situations     Allergies:  NEOSPORIN - Rash               Medications  Valid as of: August 22, 2017 -  5:02 PM    Generic Name Brand Name Tablet Size Instructions for use    Alendronate Sodium (Tab) FOSAMAX 35 MG Take 1 Tab by mouth every 7 days. Indications: prevention of osteoporosis        Aspirin (Tab)  MG Take 325 mg by mouth every bedtime.          Atorvastatin Calcium (Tab) LIPITOR 20 MG Take 1 Tab by mouth every day.        Calcium Citrate-Vitamin D   Take 1 Tab by mouth every day. **OTC**        Flecainide Acetate (Tab) TAMBOCOR 50 MG Take 1-2 Tabs by mouth 2 times a day.        Ibuprofen (Tab) MOTRIN 200 MG Take 200 mg by mouth every 6 hours as needed.        Lutein-Zeaxanthin (Cap) Lutein-Zeaxanthin 15-0.7 MG Take 1 Tab by mouth every bedtime.        Metoprolol Tartrate (Tab) LOPRESSOR 25 MG Take 0.5 Tabs by mouth 2 times a day.        Multiple Vitamin (Tab) THERAGRAN  Take 1 Tab by mouth every bedtime.        Omega-3 Fatty Acids (Cap) fish oil 1000 MG Take 1 Cap by mouth 3 times a day, with meals.        PrednisoLONE Acetate (Suspension) PRED FORTE 1 % Place 1 Drop in right eye 2 Times a Day. Right Eye - Implant        .                 Medicines prescribed today were sent to:     [address]), [place]  [address] () [place] [address]    Phone: [phone] Fax: [phone]    Open 24 Hours?: No      Medication refill instructions:       If your prescription bottle indicates you have medication refills left, it is not necessary to call your provider’s office. Please contact your pharmacy and they will refill your medication.    If your prescription bottle indicates you do not have any refills left, you may request refills at any time through one of the following ways: The online [employer] system (except Urgent Care), by calling your provider’s office, or by asking your pharmacy to contact your provider’s office with a refill request. Medication refills are processed only during regular business hours and may not be available until the next business day. Your provider may request additional information or to have a follow-up visit with you prior to refilling your medication.   *Please Note: Medication refills are assigned a new Rx number when refilled electronically. Your pharmacy may indicate that no  refills were authorized even though a new prescription for the same medication is available at the pharmacy. Please request the medicine by name with the pharmacy before contacting your provider for a refill.        Your To Do List     Future Labs/Procedures Complete By Expires    US-EXTREMITY VENOUS UNILATERAL-LOWER RIGHT  As directed 8/22/2018         [employer] Access Code: Activation code not generated  Current [employer] Status: Active

## 2017-08-22 NOTE — PROGRESS NOTES
"Subjective:      Lucía Rodriguez is a 66 y.o. female who presents with Foot Problem            HPI Comments: Medications, Allergies and Prior Medical Hx reviewed and updated in [facility].with patient/family today     Pt states onset of right lateral foot pain radiating up her calf to her knee. She denies any trauma or precipitating incident. She has a hx of plantar fascitis but states this pain is different.     Foot Problem  This is a new problem. The current episode started more than 1 month ago. The problem occurs constantly. The problem has been gradually worsening. Pertinent negatives include no chest pain, chills, congestion, coughing, fever, headaches, myalgias, nausea, rash or vomiting. The symptoms are aggravated by walking and bending. She has tried nothing for the symptoms. The treatment provided no relief.       Review of Systems   Constitutional: Negative for fever and chills.   HENT: Negative for congestion.    Respiratory: Negative for cough and shortness of breath.    Cardiovascular: Negative for chest pain.   Gastrointestinal: Negative for nausea and vomiting.   Musculoskeletal: Positive for joint pain. Negative for myalgias.   Skin: Negative for rash.   Neurological: Negative for headaches.          Objective:     /80 mmHg  Pulse 76  Temp(Src) 37.1 °C (98.8 °F)  Resp 14  Ht 1.626 m (5' 4\")  Wt 83.915 kg (185 lb)  BMI 31.74 kg/m2  SpO2 94%  LMP 12/28/2003     Physical Exam   Constitutional: She appears well-developed and well-nourished. No distress.   HENT:   Head: Normocephalic and atraumatic.   Eyes: Conjunctivae are normal. Pupils are equal, round, and reactive to light.   Neck: Neck supple.   Cardiovascular: Normal rate.    Pulmonary/Chest: Effort normal. No respiratory distress.   Musculoskeletal:        Right lower leg: She exhibits tenderness and swelling. She exhibits no bony tenderness, no edema, no deformity and no laceration.        Right foot: There is tenderness and bony " tenderness. There is normal range of motion, no swelling, normal capillary refill, no crepitus, no deformity and no laceration.        Feet:    Slight swelling, and firmness, no significant erythema.    Neurological: She is alert.   Awake, alert, answering questions appropriately, moving all extremeties   Skin: Skin is warm and dry. No erythema.   Psychiatric: She has a normal mood and affect. Her behavior is normal.   Vitals reviewed.              Assessment/Plan:     1. Right calf pain  US-EXTREMITY VENOUS UNILATERAL-LOWER RIGHT   2. Right foot pain     3. Popliteal cyst, unruptured, right           US Ext venous lower right   1.  No evidence of Right  lower extremity deep venous thrombosis.    2.  There is a small right popliteal cyst.         Pt called with results.   Pt will go to the ER or return here for worsening or changing symptoms as discussed,   Follow-up with your podiatrist at the next available appt  Discharge instructions discussed with pt/family who verbalize understanding and agreement with poc

## 2017-08-30 PROBLEM — D49.2 NEOPLASM OF UNSPECIFIED BEHAVIOR OF BONE, SOFT TISSUE, AND SKIN: Status: RESOLVED | Noted: 2017-08-16 | Resolved: 2017-08-30

## 2017-10-23 DIAGNOSIS — E78.1 HYPERTRIGLYCERIDEMIA: ICD-10-CM

## 2017-10-23 DIAGNOSIS — I48.0 PAF (PAROXYSMAL ATRIAL FIBRILLATION) (HCC): ICD-10-CM

## 2017-10-23 RX ORDER — FLECAINIDE ACETATE 50 MG/1
50-100 TABLET ORAL 2 TIMES DAILY
Qty: 360 TAB | Refills: 3 | OUTPATIENT
Start: 2017-10-23 | End: 2018-01-10 | Stop reason: SDUPTHER

## 2017-10-23 RX ORDER — ATORVASTATIN CALCIUM 20 MG/1
20 TABLET, FILM COATED ORAL
Qty: 90 TAB | Refills: 3 | OUTPATIENT
Start: 2017-10-23 | End: 2018-09-17 | Stop reason: SDUPTHER

## 2017-10-26 ENCOUNTER — HOSPITAL ENCOUNTER (OUTPATIENT)
Dept: RADIOLOGY | Facility: MEDICAL CENTER | Age: 67
End: 2017-10-26
Attending: FAMILY MEDICINE
Payer: MEDICARE

## 2017-10-26 ENCOUNTER — OFFICE VISIT (OUTPATIENT)
Dept: MEDICAL GROUP | Facility: MEDICAL CENTER | Age: 67
End: 2017-10-26
Payer: MEDICARE

## 2017-10-26 VITALS
HEART RATE: 74 BPM | DIASTOLIC BLOOD PRESSURE: 74 MMHG | SYSTOLIC BLOOD PRESSURE: 126 MMHG | BODY MASS INDEX: 31.58 KG/M2 | HEIGHT: 64 IN | WEIGHT: 185 LBS | RESPIRATION RATE: 16 BRPM | OXYGEN SATURATION: 95 % | TEMPERATURE: 98.6 F

## 2017-10-26 DIAGNOSIS — I48.0 PAF (PAROXYSMAL ATRIAL FIBRILLATION) (HCC): ICD-10-CM

## 2017-10-26 DIAGNOSIS — Z12.31 ENCOUNTER FOR SCREENING MAMMOGRAM FOR MALIGNANT NEOPLASM OF BREAST: ICD-10-CM

## 2017-10-26 DIAGNOSIS — M85.80 OSTEOPENIA, UNSPECIFIED LOCATION: ICD-10-CM

## 2017-10-26 DIAGNOSIS — M71.21 SYNOVIAL CYST OF RIGHT POPLITEAL SPACE: ICD-10-CM

## 2017-10-26 DIAGNOSIS — R91.8 PULMONARY NODULES/LESIONS, MULTIPLE: ICD-10-CM

## 2017-10-26 DIAGNOSIS — M25.561 ACUTE PAIN OF RIGHT KNEE: ICD-10-CM

## 2017-10-26 DIAGNOSIS — F41.9 ANXIETY: ICD-10-CM

## 2017-10-26 DIAGNOSIS — K80.20 CALCULUS OF GALLBLADDER WITHOUT CHOLECYSTITIS WITHOUT OBSTRUCTION: ICD-10-CM

## 2017-10-26 PROCEDURE — 73565 X-RAY EXAM OF KNEES: CPT

## 2017-10-26 PROCEDURE — 73562 X-RAY EXAM OF KNEE 3: CPT | Mod: RT

## 2017-10-26 PROCEDURE — 99214 OFFICE O/P EST MOD 30 MIN: CPT | Performed by: FAMILY MEDICINE

## 2017-10-26 RX ORDER — ALPRAZOLAM 0.25 MG/1
0.25 TABLET ORAL
Qty: 30 TAB | Refills: 0 | Status: SHIPPED | OUTPATIENT
Start: 2017-10-26 | End: 2018-11-12

## 2017-10-26 NOTE — PROGRESS NOTES
cc:  Knee pain    Subjective:     Lucía Rodriguez is a 66 y.o. female presenting for a follow up:      1. Paroxysmal afib, takotsubo cardiomyopathy, HLD: is followed by cardiology. Is on metoprolol, flecainide, lipitor, aspirin 325mg daily.  No issues. Last CMP and lipids were April 2017.     2. Osteopenia: had dexa in 2014. Taking calcium and vitamin d.  No falls. Hx of hysterectomy many years ago. Started on Fosamax several months ago. Denies any side effects.     3. Peripheral neuropathy: has been present for years, is a burning sensation on bottoms of feet. Has been stable     4. Pulm nodules: was found incidentally in 7/2016, repeat CT 1/2017 with stable nodules. Is asymptomatic. No cough, shortness of breath. Never a smoker.    5. Knee pain: Has been having great knee pain for the past 2 months. Has a dull ache that is constant and is occasionally sharp. Does not radiate. Also feels somewhat unstable, and notices popping and swelling occasionally. Recently had an ultrasound that showed a synovial cyst. Denies any numbness, tingling, weakness, redness. Has been using ice, Tylenol, Advil, rest. Seems to be improving with resting. She recently went on a 30 day cruise with lots of walking, seemed be worse then.      Review of systems:  See above.        Current Outpatient Prescriptions:   •  alprazolam (XANAX) 0.25 MG Tab, Take 1 Tab by mouth 1 time daily as needed for Anxiety (30 minutes prior to plane trip)., Disp: 30 Tab, Rfl: 0  •  Diclofenac Sodium (VOLTAREN) 1 % Gel, Apply 4 g of 1% gel to affected area 4 times daily as needed (maximum: 16 g per joint per day) for pain, Disp: 100 g, Rfl: 3  •  flecainide (TAMBOCOR) 50 MG tablet, Take 1-2 Tabs by mouth 2 times a day., Disp: 360 Tab, Rfl: 3  •  atorvastatin (LIPITOR) 20 MG Tab, Take 1 Tab by mouth every day., Disp: 90 Tab, Rfl: 3  •  metoprolol (LOPRESSOR) 25 MG Tab, Take 0.5 Tabs by mouth 2 times a day., Disp: 90 Tab, Rfl: 3  •  ibuprofen (MOTRIN) 200 MG  Tab, Take 200 mg by mouth every 6 hours as needed., Disp: , Rfl:   •  alendronate (FOSAMAX) 35 MG tablet, Take 1 Tab by mouth every 7 days. Indications: prevention of osteoporosis, Disp: 12 Tab, Rfl: 3  •  prednisoLONE acetate (PRED FORTE) 1 % Suspension, Place 1 Drop in right eye 2 Times a Day. Right Eye - Implant, Disp: , Rfl:   •  aspirin (ASA) 325 MG TABS, Take 325 mg by mouth every bedtime., Disp: , Rfl:   •  Calcium Carbonate-Vitamin D (CALCIUM + D PO), Take 1 Tab by mouth every day. **OTC**, Disp: , Rfl:   •  Lutein-Zeaxanthin (LUTEIN) 15-0.7 MG CAPS, Take 1 Tab by mouth every bedtime., Disp: , Rfl:   •  multivitamin (THERAGRAN) TABS, Take 1 Tab by mouth every bedtime., Disp: , Rfl:     Allergies   Allergen Reactions   • Neosporin [Neomycin-Polymyxin B] Rash     Rxn = 6/2016       Past Medical History:   Diagnosis Date   • Arrhythmia     Atrial fibrillation   • Broken heart syndrome August 2011   • Calculus of gallbladder without cholecystitis 1/9/2017   • CATARACT     left eye   • Glaucoma     right eye   • Hyperlipidemia    • Macular cyst, hole, or pseudohole of retina      Past Surgical History:   Procedure Laterality Date   • GYN SURGERY      HYSTERECTOMY   • OTHER      eye surgery, right eye   • OTHER      cataract surgery, left eye     Family History   Problem Relation Age of Onset   • Heart Disease Father      triple bypass   • Cancer Father    • Cancer Mother      Social History     Social History   • Marital status:      Spouse name: N/A   • Number of children: N/A   • Years of education: N/A     Occupational History   • Not on file.     Social History Main Topics   • Smoking status: Never Smoker   • Smokeless tobacco: Never Used   • Alcohol use No   • Drug use: No   • Sexual activity: Yes     Partners: Male     Other Topics Concern   • Not on file     Social History Narrative   • No narrative on file       Objective:     Vitals: /74   Pulse 74   Temp 37 °C (98.6 °F)   Resp 16   Ht  "1.626 m (5' 4\")   Wt 83.9 kg (185 lb)   LMP 12/28/2003   SpO2 95%   BMI 31.76 kg/m²   General: Alert, pleasant, NAD  HEENT: Normocephalic.   Psych:  Affect/mood is normal, judgement is good, memory is intact, grooming is appropriate.    Assessment/Plan:     Lucía was seen today for follow-up.    Diagnoses and all orders for this visit:    Acute pain of right knee  Synovial cyst of right popliteal space  Will check x-rays, possible arthritis. She can try Voltaren gel to the area as needed. Will let patient on my chart regarding results. She will schedule the ultrasound for a month from now if pain persists. If the synovial cyst has increased, will refer to interventional radiology for drainage  -     DX-KNEES-AP BILATERAL STANDING; Future  -     DX-KNEE 3 VIEWS RIGHT; Future  -     US-EXTREMITY VENOUS UNILATERAL-LOWER RIGHT; Future  -     Diclofenac Sodium (VOLTAREN) 1 % Gel; Apply 4 g of 1% gel to affected area 4 times daily as needed (maximum: 16 g per joint per day) for pain    Pulmonary nodules/lesions, multiple  Will be due for a CT scan in January.  -     CT-CHEST (THORAX) W/O; Future    PAF (paroxysmal atrial fibrillation) (CMS-HCC)  Stable, followed by cardiology, continue current medications    Osteopenia, unspecified location  Stable, continue Fosamax    Calculus of gallbladder without cholecystitis without obstruction  Stable, patient declines referral to general surgery at this time.    Encounter for screening mammogram for malignant neoplasm of breast  -     MA-MAMMO SCREENING BILAT W/HOWARD W/CAD; Future    Anxiety  Gets anxiety before flights, xanax seems to help  -     alprazolam (XANAX) 0.25 MG Tab; Take 1 Tab by mouth 1 time daily as needed for Anxiety (30 minutes prior to plane trip).        Return in about 6 months (around 4/26/2018) for routine follow up.  "

## 2017-11-07 ENCOUNTER — OFFICE VISIT (OUTPATIENT)
Dept: CARDIOLOGY | Facility: MEDICAL CENTER | Age: 67
End: 2017-11-07
Payer: MEDICARE

## 2017-11-07 VITALS
SYSTOLIC BLOOD PRESSURE: 118 MMHG | BODY MASS INDEX: 31.58 KG/M2 | OXYGEN SATURATION: 96 % | DIASTOLIC BLOOD PRESSURE: 72 MMHG | HEART RATE: 66 BPM | WEIGHT: 185 LBS | HEIGHT: 64 IN

## 2017-11-07 DIAGNOSIS — I48.0 PAF (PAROXYSMAL ATRIAL FIBRILLATION) (HCC): ICD-10-CM

## 2017-11-07 LAB — EKG IMPRESSION: NORMAL

## 2017-11-07 PROCEDURE — 99214 OFFICE O/P EST MOD 30 MIN: CPT | Performed by: INTERNAL MEDICINE

## 2017-11-07 PROCEDURE — 93000 ELECTROCARDIOGRAM COMPLETE: CPT | Performed by: INTERNAL MEDICINE

## 2017-11-07 ASSESSMENT — ENCOUNTER SYMPTOMS
LOSS OF CONSCIOUSNESS: 0
PALPITATIONS: 0
CHILLS: 0
MYALGIAS: 0
WHEEZING: 0
DEPRESSION: 0
FEVER: 0
EYE DISCHARGE: 0
ABDOMINAL PAIN: 0
SPEECH CHANGE: 0
HEMOPTYSIS: 0
EYE PAIN: 0
NERVOUS/ANXIOUS: 0
BRUISES/BLEEDS EASILY: 0
BLURRED VISION: 0
COUGH: 0
NAUSEA: 0
VOMITING: 0

## 2017-11-07 NOTE — PROGRESS NOTES
Subjective:   Lucía Rodriguez is a 66 y.o. female who presents today for follow up of atrial arrhythmia on flecainide    No arrhythmia  No side effects of flecainide.  No dizzy or lh.    Did a vacation in [place] and did walking but aggravated plantar fasciitis and baker cyst.        Past Medical History:   Diagnosis Date   • Arrhythmia     Atrial fibrillation   • Broken heart syndrome August 2011   • Calculus of gallbladder without cholecystitis 1/9/2017   • CATARACT     left eye   • Glaucoma     right eye   • Hyperlipidemia    • Macular cyst, hole, or pseudohole of retina      Past Surgical History:   Procedure Laterality Date   • GYN SURGERY      HYSTERECTOMY   • OTHER      eye surgery, right eye   • OTHER      cataract surgery, left eye     Family History   Problem Relation Age of Onset   • Heart Disease Father      triple bypass   • Cancer Father    • Cancer Mother      History   Smoking Status   • Never Smoker   Smokeless Tobacco   • Never Used     Allergies   Allergen Reactions   • Neosporin [Neomycin-Polymyxin B] Rash     Rxn = 6/2016     Outpatient Encounter Prescriptions as of 11/7/2017   Medication Sig Dispense Refill   • alprazolam (XANAX) 0.25 MG Tab Take 1 Tab by mouth 1 time daily as needed for Anxiety (30 minutes prior to plane trip). 30 Tab 0   • Diclofenac Sodium (VOLTAREN) 1 % Gel Apply 4 g of 1% gel to affected area 4 times daily as needed (maximum: 16 g per joint per day) for pain 100 g 3   • flecainide (TAMBOCOR) 50 MG tablet Take 1-2 Tabs by mouth 2 times a day. 360 Tab 3   • atorvastatin (LIPITOR) 20 MG Tab Take 1 Tab by mouth every day. 90 Tab 3   • metoprolol (LOPRESSOR) 25 MG Tab Take 0.5 Tabs by mouth 2 times a day. 90 Tab 3   • ibuprofen (MOTRIN) 200 MG Tab Take 200 mg by mouth every 6 hours as needed.     • alendronate (FOSAMAX) 35 MG tablet Take 1 Tab by mouth every 7 days. Indications: prevention of osteoporosis 12 Tab 3   • prednisoLONE acetate (PRED FORTE) 1 % Suspension Place 1  "Drop in right eye 2 Times a Day. Right Eye - Implant     • aspirin (ASA) 325 MG TABS Take 325 mg by mouth every bedtime.     • Calcium Carbonate-Vitamin D (CALCIUM + D PO) Take 1 Tab by mouth every day. **OTC**     • Lutein-Zeaxanthin (LUTEIN) 15-0.7 MG CAPS Take 1 Tab by mouth every bedtime.     • multivitamin (THERAGRAN) TABS Take 1 Tab by mouth every bedtime.       No facility-administered encounter medications on file as of 11/7/2017.      Review of Systems   Constitutional: Negative for chills and fever.   HENT: Negative for congestion.    Eyes: Negative for blurred vision, pain and discharge.   Respiratory: Negative for cough, hemoptysis and wheezing.    Cardiovascular: Negative for chest pain and palpitations.   Gastrointestinal: Negative for abdominal pain, nausea and vomiting.   Musculoskeletal: Positive for joint pain. Negative for myalgias.        Plantar fasciitis and bakers cyst   Skin: Negative for itching and rash.   Neurological: Negative for speech change and loss of consciousness.   Endo/Heme/Allergies: Does not bruise/bleed easily.   Psychiatric/Behavioral: Negative for depression. The patient is not nervous/anxious.    All other systems reviewed and are negative.       Objective:   /72   Pulse 66   Ht 1.626 m (5' 4.02\")   Wt 83.9 kg (185 lb)   LMP 12/28/2003   SpO2 96%   BMI 31.74 kg/m²     Physical Exam   Constitutional: She is oriented to person, place, and time. She appears well-developed and well-nourished. No distress.   HENT:   Head: Normocephalic and atraumatic.   Right Ear: External ear normal.   Left Ear: External ear normal.   Mouth/Throat: Oropharynx is clear and moist.   Eyes: Conjunctivae and EOM are normal. Right eye exhibits no discharge. Left eye exhibits no discharge. No scleral icterus.   Neck: Normal range of motion. Neck supple. No JVD present. No tracheal deviation present. No thyromegaly present.   Cardiovascular: Normal rate, regular rhythm, normal heart sounds " and intact distal pulses.  Exam reveals no gallop and no friction rub.    No murmur heard.  Pulmonary/Chest: Effort normal and breath sounds normal. No stridor. No respiratory distress. She has no wheezes. She has no rales.   Abdominal: Soft. She exhibits no distension.   Musculoskeletal: She exhibits no edema or tenderness.   Neurological: She is alert and oriented to person, place, and time. No cranial nerve deficit. Coordination normal.   Skin: Skin is warm and dry. No rash noted. She is not diaphoretic. No erythema. No pallor.   Psychiatric: She has a normal mood and affect. Her behavior is normal. Judgment and thought content normal.   Vitals reviewed.      Assessment:     1. PAF (paroxysmal atrial fibrillation) (CMS-HCC)  EKG       Medical Decision Making:  Today's Assessment / Status / Plan:   paf well controlled on flecainide.  She prefers asa to oac still.  chads 0 but chadsvasc 2 with age and gender.    No conduction delay on the flec  She is doing well.  Will have see ep garcía in 6 months and me in a year.

## 2017-11-28 ENCOUNTER — HOSPITAL ENCOUNTER (OUTPATIENT)
Dept: RADIOLOGY | Facility: MEDICAL CENTER | Age: 67
End: 2017-11-28
Attending: FAMILY MEDICINE
Payer: MEDICARE

## 2017-11-28 DIAGNOSIS — R91.8 PULMONARY NODULES/LESIONS, MULTIPLE: ICD-10-CM

## 2017-11-28 DIAGNOSIS — M71.21 SYNOVIAL CYST OF RIGHT POPLITEAL SPACE: ICD-10-CM

## 2017-11-28 DIAGNOSIS — M25.561 ACUTE PAIN OF RIGHT KNEE: ICD-10-CM

## 2017-11-28 PROCEDURE — 71250 CT THORAX DX C-: CPT

## 2017-11-28 PROCEDURE — 93971 EXTREMITY STUDY: CPT | Mod: RT

## 2018-03-01 ENCOUNTER — HOSPITAL ENCOUNTER (OUTPATIENT)
Dept: HOSPITAL 8 - CFH | Age: 68
Discharge: HOME | End: 2018-03-01
Attending: INTERNAL MEDICINE
Payer: MEDICARE

## 2018-03-01 DIAGNOSIS — M23.241: Primary | ICD-10-CM

## 2018-03-01 DIAGNOSIS — M25.461: ICD-10-CM

## 2018-03-01 DIAGNOSIS — M70.41: ICD-10-CM

## 2018-07-11 ENCOUNTER — APPOINTMENT (RX ONLY)
Dept: URBAN - METROPOLITAN AREA CLINIC 4 | Facility: CLINIC | Age: 68
Setting detail: DERMATOLOGY
End: 2018-07-11

## 2018-07-11 DIAGNOSIS — L82.1 OTHER SEBORRHEIC KERATOSIS: ICD-10-CM

## 2018-07-11 DIAGNOSIS — D18.0 HEMANGIOMA: ICD-10-CM

## 2018-07-11 DIAGNOSIS — D22 MELANOCYTIC NEVI: ICD-10-CM

## 2018-07-11 DIAGNOSIS — L57.0 ACTINIC KERATOSIS: ICD-10-CM

## 2018-07-11 DIAGNOSIS — Z71.89 OTHER SPECIFIED COUNSELING: ICD-10-CM

## 2018-07-11 DIAGNOSIS — L81.4 OTHER MELANIN HYPERPIGMENTATION: ICD-10-CM

## 2018-07-11 PROBLEM — D22.72 MELANOCYTIC NEVI OF LEFT LOWER LIMB, INCLUDING HIP: Status: ACTIVE | Noted: 2018-07-11

## 2018-07-11 PROBLEM — D22.5 MELANOCYTIC NEVI OF TRUNK: Status: ACTIVE | Noted: 2018-07-11

## 2018-07-11 PROBLEM — D48.5 NEOPLASM OF UNCERTAIN BEHAVIOR OF SKIN: Status: ACTIVE | Noted: 2018-07-11

## 2018-07-11 PROBLEM — D18.01 HEMANGIOMA OF SKIN AND SUBCUTANEOUS TISSUE: Status: ACTIVE | Noted: 2018-07-11

## 2018-07-11 PROCEDURE — 17000 DESTRUCT PREMALG LESION: CPT

## 2018-07-11 PROCEDURE — 11101: CPT

## 2018-07-11 PROCEDURE — ? LIQUID NITROGEN

## 2018-07-11 PROCEDURE — ? OBSERVATION AND MEASURE

## 2018-07-11 PROCEDURE — 17003 DESTRUCT PREMALG LES 2-14: CPT

## 2018-07-11 PROCEDURE — 99213 OFFICE O/P EST LOW 20 MIN: CPT | Mod: 25

## 2018-07-11 PROCEDURE — ? BIOPSY BY SHAVE METHOD

## 2018-07-11 PROCEDURE — 11100: CPT | Mod: 59

## 2018-07-11 PROCEDURE — ? COUNSELING

## 2018-07-11 ASSESSMENT — LOCATION ZONE DERM
LOCATION ZONE: TRUNK
LOCATION ZONE: LEG
LOCATION ZONE: SCALP
LOCATION ZONE: TOE

## 2018-07-11 ASSESSMENT — LOCATION SIMPLE DESCRIPTION DERM
LOCATION SIMPLE: LEFT UPPER BACK
LOCATION SIMPLE: LEFT 4TH TOE
LOCATION SIMPLE: ABDOMEN
LOCATION SIMPLE: RIGHT SCALP
LOCATION SIMPLE: RIGHT PRETIBIAL REGION

## 2018-07-11 ASSESSMENT — LOCATION DETAILED DESCRIPTION DERM
LOCATION DETAILED: LEFT RIB CAGE
LOCATION DETAILED: LEFT LATERAL 4TH TOE
LOCATION DETAILED: LEFT MEDIAL UPPER BACK
LOCATION DETAILED: EPIGASTRIC SKIN
LOCATION DETAILED: RIGHT MEDIAL FRONTAL SCALP
LOCATION DETAILED: RIGHT PROXIMAL PRETIBIAL REGION

## 2018-07-11 NOTE — HPI: FULL BODY SKIN EXAMINATION
What Is The Reason For Today's Visit?: Full Body Skin Examination
What Is The Reason For Today's Visit? (Being Monitored For X): concerning skin lesions on an annual basis
Additional History: Patient has not noticed any changes with her moles. She has not noticed any tender or bleeding spots.

## 2018-07-11 NOTE — PROCEDURE: BIOPSY BY SHAVE METHOD
Depth Of Biopsy: dermis
Consent: Written consent was obtained and risks were reviewed including but not limited to scarring, infection, bleeding, scabbing, incomplete removal, nerve damage and allergy to anesthesia.
Biopsy Method: Personna blade
Silver Nitrate Text: The wound bed was treated with silver nitrate after the biopsy was performed.
Biopsy Type: H and E
Anesthesia Type: 1% lidocaine with epinephrine
Electrodesiccation Text: The wound bed was treated with electrodesiccation after the biopsy was performed.
Render Post-Care Instructions In Note?: yes
Additional Anesthesia Volume In Cc (Will Not Render If 0): 0
Post-Care Instructions: I reviewed with the patient in detail post-care instructions. Patient is to keep the biopsy site dry overnight, and then apply vaseline twice daily until healed.
Lab Facility: 
Detail Level: Detailed
Size Of Lesion In Cm: 0.6
Billing Type: Third-Party Bill
Hemostasis: Drysol and Electrocautery
Type Of Destruction Used: Curettage
Anesthesia Volume In Cc: 0.5
Cryotherapy Text: The wound bed was treated with cryotherapy after the biopsy was performed.
Notification Instructions: Patient will be notified of biopsy results. However, patient instructed to call the office if not contacted within 2 weeks.
Destruction After The Procedure: No
Wound Care: Vaseline
Electrodesiccation And Curettage Text: The wound bed was treated with electrodesiccation and curettage after the biopsy was performed.
Dressing: bandage
Curettage Text: The wound bed was treated with curettage after the biopsy was performed.
Lab: [id number]

## 2018-07-11 NOTE — HPI: SKIN LESION (BASAL CELL CARCINOMA)
Is This A New Presentation, Or A Follow-Up?: Follow Up Basal Cell Carcinoma
Additional History: Was tx in 2016 by 
How Severe Is Your Skin Cancer?: mild
Is This A New Presentation, Or A Follow-Up?: Non-healing Lesion
Additional History: Per pt think it has gotten better in the last month.

## 2018-08-08 ENCOUNTER — APPOINTMENT (RX ONLY)
Dept: URBAN - METROPOLITAN AREA CLINIC 4 | Facility: CLINIC | Age: 68
Setting detail: DERMATOLOGY
End: 2018-08-08

## 2018-08-08 PROBLEM — C44.712 BASAL CELL CARCINOMA OF SKIN OF RIGHT LOWER LIMB, INCLUDING HIP: Status: ACTIVE | Noted: 2018-08-08

## 2018-08-08 PROCEDURE — 17262 DSTRJ MAL LES T/A/L 1.1-2.0: CPT

## 2018-08-08 PROCEDURE — ? CURETTAGE AND DESTRUCTION

## 2018-08-08 NOTE — PROCEDURE: CURETTAGE AND DESTRUCTION
Add Intralesional Injection: No
Post-Care Instructions: I reviewed with the patient in detail post-care instructions. Patient is to keep the area dry for 48 hours, and not to engage in any swimming until the area is healed. Should the patient develop any fevers, chills, bleeding, severe pain patient will contact the office immediately.
Additional Information: (Optional): The wound was cleaned, and a pressure dressing was applied.  The patient received detailed post-op instructions.
Size Of Lesion In Cm: 0.6
Cautery Type: electrodesiccation
Anesthesia Type: 1% lidocaine with epinephrine
Detail Level: Detailed
Consent was obtained from the patient. The risks, benefits and alternatives to therapy were discussed in detail. Specifically, the risks of infection, scarring, bleeding, prolonged wound healing, nerve injury, incomplete removal, allergy to anesthesia and recurrence were addressed. Alternatives to ED&C, such as: surgical removal and XRT were also discussed.  Prior to the procedure, the treatment site was clearly identified and confirmed by the patient. All components of Universal Protocol/PAUSE Rule completed.
Number Of Curettages: 3
Total Volume (Ccs): 1
Render Post-Care Instructions In Note?: yes
What Was Performed First?: Curettage
Bill As A Line Item Or As Units: Line Item
Size Of Lesion After Curettage: 1.2
Anesthesia Volume In Cc: 0.2

## 2018-08-09 ENCOUNTER — TELEPHONE (OUTPATIENT)
Dept: CARDIOLOGY | Facility: MEDICAL CENTER | Age: 68
End: 2018-08-09

## 2018-09-06 ENCOUNTER — HOSPITAL ENCOUNTER (OUTPATIENT)
Dept: RADIOLOGY | Facility: MEDICAL CENTER | Age: 68
End: 2018-09-06
Attending: NURSE PRACTITIONER
Payer: MEDICARE

## 2018-09-06 DIAGNOSIS — M79.671 RIGHT FOOT PAIN: ICD-10-CM

## 2018-09-06 DIAGNOSIS — M12.871 ARTHROPATHY, TRANSIENT, ANKLE OR FOOT, RIGHT: ICD-10-CM

## 2018-09-06 PROCEDURE — 700117 HCHG RX CONTRAST REV CODE 255: Performed by: NURSE PRACTITIONER

## 2018-09-06 PROCEDURE — 700111 HCHG RX REV CODE 636 W/ 250 OVERRIDE (IP): Mod: JG | Performed by: NURSE PRACTITIONER

## 2018-09-06 PROCEDURE — 77002 NEEDLE LOCALIZATION BY XRAY: CPT

## 2018-09-06 PROCEDURE — 700101 HCHG RX REV CODE 250: Performed by: NURSE PRACTITIONER

## 2018-09-06 PROCEDURE — 20605 DRAIN/INJ JOINT/BURSA W/O US: CPT | Mod: RT

## 2018-09-06 RX ORDER — BUPIVACAINE HYDROCHLORIDE 5 MG/ML
10 INJECTION, SOLUTION EPIDURAL; INTRACAUDAL ONCE
Status: COMPLETED | OUTPATIENT
Start: 2018-09-06 | End: 2018-09-06

## 2018-09-06 RX ORDER — LIDOCAINE HYDROCHLORIDE 10 MG/ML
20 INJECTION, SOLUTION INFILTRATION; PERINEURAL ONCE
Status: COMPLETED | OUTPATIENT
Start: 2018-09-06 | End: 2018-09-06

## 2018-09-06 RX ORDER — TRIAMCINOLONE ACETONIDE 40 MG/ML
40 INJECTION, SUSPENSION INTRA-ARTICULAR; INTRAMUSCULAR ONCE
Status: COMPLETED | OUTPATIENT
Start: 2018-09-06 | End: 2018-09-06

## 2018-09-06 RX ADMIN — IOHEXOL 5 ML: 300 INJECTION, SOLUTION INTRAVENOUS at 13:35

## 2018-09-06 RX ADMIN — TRIAMCINOLONE ACETONIDE 40 MG: 40 INJECTION, SUSPENSION INTRA-ARTICULAR; INTRAMUSCULAR at 13:35

## 2018-09-06 RX ADMIN — LIDOCAINE HYDROCHLORIDE 1 ML: 10 INJECTION, SOLUTION INFILTRATION; PERINEURAL at 13:35

## 2018-09-06 RX ADMIN — BUPIVACAINE HYDROCHLORIDE 1 ML: 5 INJECTION, SOLUTION EPIDURAL; INTRACAUDAL; PERINEURAL at 13:35

## 2018-09-17 DIAGNOSIS — E78.1 HYPERTRIGLYCERIDEMIA: ICD-10-CM

## 2018-09-17 DIAGNOSIS — I48.0 PAF (PAROXYSMAL ATRIAL FIBRILLATION) (HCC): ICD-10-CM

## 2018-09-17 RX ORDER — ATORVASTATIN CALCIUM 20 MG/1
TABLET, FILM COATED ORAL
Qty: 90 TAB | Refills: 0 | Status: SHIPPED | OUTPATIENT
Start: 2018-09-17 | End: 2019-03-13 | Stop reason: SDUPTHER

## 2018-11-12 ENCOUNTER — OFFICE VISIT (OUTPATIENT)
Dept: MEDICAL GROUP | Facility: MEDICAL CENTER | Age: 68
End: 2018-11-12
Payer: MEDICARE

## 2018-11-12 VITALS
BODY MASS INDEX: 30.53 KG/M2 | HEIGHT: 66 IN | HEART RATE: 74 BPM | RESPIRATION RATE: 16 BRPM | SYSTOLIC BLOOD PRESSURE: 118 MMHG | DIASTOLIC BLOOD PRESSURE: 70 MMHG | TEMPERATURE: 98.6 F | OXYGEN SATURATION: 94 % | WEIGHT: 190 LBS

## 2018-11-12 DIAGNOSIS — I51.81 TAKOTSUBO CARDIOMYOPATHY: ICD-10-CM

## 2018-11-12 DIAGNOSIS — Z12.31 ENCOUNTER FOR SCREENING MAMMOGRAM FOR MALIGNANT NEOPLASM OF BREAST: ICD-10-CM

## 2018-11-12 DIAGNOSIS — M79.645 PAIN OF FINGER OF LEFT HAND: ICD-10-CM

## 2018-11-12 DIAGNOSIS — E78.49 OTHER HYPERLIPIDEMIA: ICD-10-CM

## 2018-11-12 DIAGNOSIS — I48.0 PAF (PAROXYSMAL ATRIAL FIBRILLATION) (HCC): ICD-10-CM

## 2018-11-12 DIAGNOSIS — Z78.0 MENOPAUSE: ICD-10-CM

## 2018-11-12 DIAGNOSIS — Z11.59 NEED FOR HEPATITIS C SCREENING TEST: ICD-10-CM

## 2018-11-12 DIAGNOSIS — E66.9 OBESITY (BMI 30-39.9): ICD-10-CM

## 2018-11-12 DIAGNOSIS — M85.80 OSTEOPENIA, UNSPECIFIED LOCATION: ICD-10-CM

## 2018-11-12 PROBLEM — K80.20 CHOLELITHIASIS: Status: RESOLVED | Noted: 2017-10-26 | Resolved: 2018-11-12

## 2018-11-12 PROCEDURE — 99214 OFFICE O/P EST MOD 30 MIN: CPT | Performed by: FAMILY MEDICINE

## 2018-11-12 ASSESSMENT — PATIENT HEALTH QUESTIONNAIRE - PHQ9: CLINICAL INTERPRETATION OF PHQ2 SCORE: 0

## 2018-11-12 NOTE — PROGRESS NOTES
"cc: Osteopenia    Subjective:     Lucía Rodriguez is a 67 y.o. female presenting for:    1.  Osteopenia: had dexa in 2014 with high risk of fractures. Taking calcium and vitamin d.  fosamax was started 4/2017 but she stopped it in August 2018 for no particular reason.  No recent falls    2.  Finger pain: Has a growth on her left index finger that has been stable for the past several years.  Is occasionally painful and pink.  Denies any swelling, fevers, lesions elsewhere.  She was told it was arthritis in the past.  Is wondering if she can do anything for this    3. Paroxysmal afib, takotsubo cardiomyopathy, HLD: is followed by cardiology. Is on metoprolol, flecainide, lipitor, aspirin 325mg daily.  No issues.  Denies any chest pain, shortness of breath, lightheadedness, dizziness, leg swelling.       Review of systems:  See above.       Current Outpatient Prescriptions:   •  metoprolol (LOPRESSOR) 25 MG Tab, TAKE 1/2 TAB BY MOUTH TWICE A DAY, Disp: 45 Tab, Rfl: 0  •  atorvastatin (LIPITOR) 20 MG Tab, TAKE 1 TAB BY MOUTH AT BEDTIME, Disp: 90 Tab, Rfl: 0  •  flecainide (TAMBOCOR) 50 MG tablet, TAKE 1 TO 2 TABLETS BY MOUTH TWICE DAILY, Disp: 360 Tab, Rfl: 2  •  prednisoLONE acetate (PRED FORTE) 1 % Suspension, Place 1 Drop in right eye 2 Times a Day. Right Eye - Implant, Disp: , Rfl:   •  aspirin (ASA) 325 MG TABS, Take 325 mg by mouth every bedtime., Disp: , Rfl:   •  Calcium Carbonate-Vitamin D (CALCIUM + D PO), Take 1 Tab by mouth every day. **OTC**, Disp: , Rfl:   •  Lutein-Zeaxanthin (LUTEIN) 15-0.7 MG CAPS, Take 1 Tab by mouth every bedtime., Disp: , Rfl:   •  multivitamin (THERAGRAN) TABS, Take 1 Tab by mouth every bedtime., Disp: , Rfl:     Allergies, past medical history, past surgical history, family history, social history reviewed and updated    Objective:     Vitals: /70 (BP Location: Right arm, Patient Position: Sitting)   Pulse 74   Temp 37 °C (98.6 °F)   Resp 16   Ht 1.676 m (5' 6\")   Wt " 86.2 kg (190 lb)   LMP 12/28/2003   SpO2 94%   BMI 30.67 kg/m²   General: Alert, pleasant, NAD  HEENT: Normocephalic.  EOMI, no icterus or pallor.  Conjunctivae and lids normal. External ears normal. Oropharynx non-erythematous, mucous membranes moist.  Neck supple.  No thyromegaly or masses palpated. No cervical or supraclavicular lymphadenopathy.  Heart: Regular rate and rhythm.  S1 and S2 normal.  No murmurs appreciated.  Respiratory: Normal respiratory effort.  Clear to auscultation bilaterally.  Abdomen: Non-distended, soft  Skin: Warm, dry, no rashes.  Musculoskeletal: Gait is normal.  Moves all extremities well.  Extremities: No leg edema.  Cyst at the DIP joint of the left second digit on the radial/dorsal aspect  Psych:  Affect/mood is normal, judgement is good, memory is intact, grooming is appropriate.    Assessment/Plan:     Diagnoses and all orders for this visit:    Osteopenia, unspecified location  Menopause  Presumably stable.  Will recheck a DEXA as she declines taking a bisphosphonate for now.  We will also check the following labs.  -     CBC WITHOUT DIFFERENTIAL; Future  -     BASIC METABOLIC PANEL; Future  -     MAGNESIUM; Future  -     VITAMIN D,25 HYDROXY; Future  -     TSH WITH REFLEX TO FT4; Future  -     DS-BONE DENSITY STUDY (DEXA); Future    Pain of finger of left hand  Discussed likely arthritis.  Recommended topical over-the-counter products, Tylenol or ibuprofen as needed.  We will also check finger x-rays.  Offered referral to hand/Orth O, she declined for now.  -     DX-FINGER(S) 2+ LEFT; Future    PAF (paroxysmal atrial fibrillation) (HCC)  Takotsubo cardiomyopathy  Stable, followed by cardiology.  Continue current medications.  -     CBC WITHOUT DIFFERENTIAL; Future  -     BASIC METABOLIC PANEL; Future  -     MAGNESIUM; Future  -     TSH WITH REFLEX TO FT4; Future    Other hyperlipidemia  Stable, continue atorvastatin  -     Lipid Profile; Future    Encounter for screening  mammogram for malignant neoplasm of breast  Patient already has a mammogram scheduled at [facility].  -     MA-SCREENING MAMMO BILAT W/TOMOSYNTHESIS W/CAD; Future    Obesity (BMI 30-39.9)  -     Patient identified as having weight management issue.  Appropriate orders and counseling given.    Need for hepatitis C screening test  -     HEP C VIRUS ANTIBODY; Future        Return in about 1 year (around 11/12/2019) for routine follow up.

## 2018-11-17 ENCOUNTER — HOSPITAL ENCOUNTER (OUTPATIENT)
Dept: LAB | Facility: MEDICAL CENTER | Age: 68
End: 2018-11-17
Attending: FAMILY MEDICINE
Payer: MEDICARE

## 2018-11-17 ENCOUNTER — HOSPITAL ENCOUNTER (OUTPATIENT)
Dept: RADIOLOGY | Facility: MEDICAL CENTER | Age: 68
End: 2018-11-17
Attending: FAMILY MEDICINE
Payer: MEDICARE

## 2018-11-17 DIAGNOSIS — E78.49 OTHER HYPERLIPIDEMIA: ICD-10-CM

## 2018-11-17 DIAGNOSIS — M79.645 PAIN OF FINGER OF LEFT HAND: ICD-10-CM

## 2018-11-17 DIAGNOSIS — I48.0 PAF (PAROXYSMAL ATRIAL FIBRILLATION) (HCC): ICD-10-CM

## 2018-11-17 DIAGNOSIS — Z11.59 NEED FOR HEPATITIS C SCREENING TEST: ICD-10-CM

## 2018-11-17 DIAGNOSIS — M85.80 OSTEOPENIA, UNSPECIFIED LOCATION: ICD-10-CM

## 2018-11-17 LAB
25(OH)D3 SERPL-MCNC: 20 NG/ML (ref 30–100)
ANION GAP SERPL CALC-SCNC: 11 MMOL/L (ref 0–11.9)
BUN SERPL-MCNC: 22 MG/DL (ref 8–22)
CALCIUM SERPL-MCNC: 9.7 MG/DL (ref 8.5–10.5)
CHLORIDE SERPL-SCNC: 106 MMOL/L (ref 96–112)
CHOLEST SERPL-MCNC: 128 MG/DL (ref 100–199)
CO2 SERPL-SCNC: 25 MMOL/L (ref 20–33)
CREAT SERPL-MCNC: 0.97 MG/DL (ref 0.5–1.4)
ERYTHROCYTE [DISTWIDTH] IN BLOOD BY AUTOMATED COUNT: 51.2 FL (ref 35.9–50)
FASTING STATUS PATIENT QL REPORTED: NORMAL
GLUCOSE SERPL-MCNC: 92 MG/DL (ref 65–99)
HCT VFR BLD AUTO: 43.6 % (ref 37–47)
HCV AB SER QL: NEGATIVE
HDLC SERPL-MCNC: 44 MG/DL
HGB BLD-MCNC: 13.7 G/DL (ref 12–16)
LDLC SERPL CALC-MCNC: 42 MG/DL
MAGNESIUM SERPL-MCNC: 2.2 MG/DL (ref 1.5–2.5)
MCH RBC QN AUTO: 30.6 PG (ref 27–33)
MCHC RBC AUTO-ENTMCNC: 31.4 G/DL (ref 33.6–35)
MCV RBC AUTO: 97.5 FL (ref 81.4–97.8)
PLATELET # BLD AUTO: 249 K/UL (ref 164–446)
PMV BLD AUTO: 11.4 FL (ref 9–12.9)
POTASSIUM SERPL-SCNC: 4.1 MMOL/L (ref 3.6–5.5)
RBC # BLD AUTO: 4.47 M/UL (ref 4.2–5.4)
SODIUM SERPL-SCNC: 142 MMOL/L (ref 135–145)
TRIGL SERPL-MCNC: 209 MG/DL (ref 0–149)
TSH SERPL DL<=0.005 MIU/L-ACNC: 1.82 UIU/ML (ref 0.38–5.33)
WBC # BLD AUTO: 6.8 K/UL (ref 4.8–10.8)

## 2018-11-17 PROCEDURE — 85027 COMPLETE CBC AUTOMATED: CPT

## 2018-11-17 PROCEDURE — 73140 X-RAY EXAM OF FINGER(S): CPT | Mod: LT

## 2018-11-17 PROCEDURE — 80048 BASIC METABOLIC PNL TOTAL CA: CPT

## 2018-11-17 PROCEDURE — 82306 VITAMIN D 25 HYDROXY: CPT

## 2018-11-17 PROCEDURE — 86803 HEPATITIS C AB TEST: CPT

## 2018-11-17 PROCEDURE — 80061 LIPID PANEL: CPT

## 2018-11-17 PROCEDURE — 83735 ASSAY OF MAGNESIUM: CPT

## 2018-11-17 PROCEDURE — 84443 ASSAY THYROID STIM HORMONE: CPT

## 2018-11-17 PROCEDURE — 36415 COLL VENOUS BLD VENIPUNCTURE: CPT

## 2018-11-26 ENCOUNTER — HOSPITAL ENCOUNTER (OUTPATIENT)
Dept: RADIOLOGY | Facility: MEDICAL CENTER | Age: 68
End: 2018-11-26
Attending: FAMILY MEDICINE
Payer: MEDICARE

## 2018-11-26 DIAGNOSIS — Z78.0 MENOPAUSE: ICD-10-CM

## 2018-11-26 DIAGNOSIS — M85.80 OSTEOPENIA, UNSPECIFIED LOCATION: ICD-10-CM

## 2018-11-26 PROCEDURE — 77080 DXA BONE DENSITY AXIAL: CPT

## 2018-12-04 ENCOUNTER — OFFICE VISIT (OUTPATIENT)
Dept: MEDICAL GROUP | Facility: MEDICAL CENTER | Age: 68
End: 2018-12-04
Payer: MEDICARE

## 2018-12-04 VITALS
SYSTOLIC BLOOD PRESSURE: 120 MMHG | HEIGHT: 66 IN | OXYGEN SATURATION: 96 % | HEART RATE: 76 BPM | TEMPERATURE: 98.6 F | RESPIRATION RATE: 14 BRPM | BODY MASS INDEX: 30.53 KG/M2 | DIASTOLIC BLOOD PRESSURE: 74 MMHG | WEIGHT: 190 LBS

## 2018-12-04 DIAGNOSIS — M19.041 PRIMARY OSTEOARTHRITIS OF BOTH HANDS: ICD-10-CM

## 2018-12-04 DIAGNOSIS — D48.5 NEOPLASM OF UNCERTAIN BEHAVIOR OF SKIN: ICD-10-CM

## 2018-12-04 DIAGNOSIS — M67.449 DIGITAL MUCINOUS CYST OF FINGER: ICD-10-CM

## 2018-12-04 DIAGNOSIS — M19.042 PRIMARY OSTEOARTHRITIS OF BOTH HANDS: ICD-10-CM

## 2018-12-04 PROCEDURE — 99213 OFFICE O/P EST LOW 20 MIN: CPT | Performed by: FAMILY MEDICINE

## 2018-12-04 NOTE — PROGRESS NOTES
"cc: Finger cyst    Subjective:     Lucía Rodriguez is a 68 y.o. female presenting to discuss her finger cyst/lesion.  Has a growth on her left index finger that has been present for years.  However, for the past several weeks, it has grown somewhat and is become quite tender.  Denies any redness, fevers, lesions elsewhere.  She had a finger x-ray that showed arthritis.  She has an appointment with her dermatologist in about a month and a half, plans to ask about removal if possible    Review of systems:  See above.       Current Outpatient Prescriptions:   •  metoprolol (LOPRESSOR) 25 MG Tab, TAKE 1/2 TAB BY MOUTH TWICE A DAY, Disp: 45 Tab, Rfl: 0  •  atorvastatin (LIPITOR) 20 MG Tab, TAKE 1 TAB BY MOUTH AT BEDTIME, Disp: 90 Tab, Rfl: 0  •  flecainide (TAMBOCOR) 50 MG tablet, TAKE 1 TO 2 TABLETS BY MOUTH TWICE DAILY, Disp: 360 Tab, Rfl: 2  •  prednisoLONE acetate (PRED FORTE) 1 % Suspension, Place 1 Drop in right eye 2 Times a Day. Right Eye - Implant, Disp: , Rfl:   •  aspirin (ASA) 325 MG TABS, Take 325 mg by mouth every bedtime., Disp: , Rfl:   •  Calcium Carbonate-Vitamin D (CALCIUM + D PO), Take 1 Tab by mouth every day. **OTC**, Disp: , Rfl:   •  Lutein-Zeaxanthin (LUTEIN) 15-0.7 MG CAPS, Take 1 Tab by mouth every bedtime., Disp: , Rfl:   •  multivitamin (THERAGRAN) TABS, Take 1 Tab by mouth every bedtime., Disp: , Rfl:     Allergies, past medical history, past surgical history, family history, social history reviewed and updated    Objective:     Vitals: /74 (BP Location: Right arm, Patient Position: Sitting)   Pulse 76   Temp 37 °C (98.6 °F) (Temporal)   Resp 14   Ht 1.676 m (5' 6\")   Wt 86.2 kg (190 lb)   LMP 12/28/2003   SpO2 96%   BMI 30.67 kg/m²   General: Alert, pleasant, NAD  HEENT: Normocephalic.   Skin: Warm, dry, no rashes.  Musculoskeletal: Gait is normal.  Moves all extremities well.  Extremities: Approximately 7 millimeter in diameter cyst/papule at the DIP joint of the left " second digit on the radial/dorsal aspect.  Psych:  Affect/mood is normal, judgement is good, memory is intact, grooming is appropriate.    Assessment/Plan:     Lucía was seen today for warts.    Diagnoses and all orders for this visit:    Digital mucinous cyst of finger  Neoplasm of uncertain behavior of skin  Primary osteoarthritis of both hands  She plans to ask her dermatologist to remove the lesion, but will also place a referral to hand surgery in case her dermatologist is unable to remove it.  -     REFERRAL TO HAND SURGERY

## 2018-12-18 DIAGNOSIS — I48.0 PAF (PAROXYSMAL ATRIAL FIBRILLATION) (HCC): ICD-10-CM

## 2018-12-18 RX ORDER — FLECAINIDE ACETATE 50 MG/1
TABLET ORAL
Qty: 360 TAB | Refills: 2 | Status: SHIPPED | OUTPATIENT
Start: 2018-12-18 | End: 2019-02-15 | Stop reason: SDUPTHER

## 2018-12-19 DIAGNOSIS — I48.0 PAF (PAROXYSMAL ATRIAL FIBRILLATION) (HCC): ICD-10-CM

## 2019-01-02 ENCOUNTER — OFFICE VISIT (OUTPATIENT)
Dept: CARDIOLOGY | Facility: MEDICAL CENTER | Age: 69
End: 2019-01-02
Payer: MEDICARE

## 2019-01-02 VITALS
OXYGEN SATURATION: 96 % | SYSTOLIC BLOOD PRESSURE: 120 MMHG | WEIGHT: 188 LBS | HEART RATE: 76 BPM | BODY MASS INDEX: 30.22 KG/M2 | DIASTOLIC BLOOD PRESSURE: 70 MMHG | HEIGHT: 66 IN

## 2019-01-02 DIAGNOSIS — E66.9 OBESITY (BMI 30-39.9): ICD-10-CM

## 2019-01-02 DIAGNOSIS — I48.0 PAF (PAROXYSMAL ATRIAL FIBRILLATION) (HCC): ICD-10-CM

## 2019-01-02 DIAGNOSIS — I48.91 ATRIAL FIBRILLATION, UNSPECIFIED TYPE (HCC): ICD-10-CM

## 2019-01-02 DIAGNOSIS — I51.81 TAKOTSUBO CARDIOMYOPATHY: ICD-10-CM

## 2019-01-02 LAB — EKG IMPRESSION: NORMAL

## 2019-01-02 PROCEDURE — 93000 ELECTROCARDIOGRAM COMPLETE: CPT | Performed by: INTERNAL MEDICINE

## 2019-01-02 PROCEDURE — 99214 OFFICE O/P EST MOD 30 MIN: CPT | Performed by: INTERNAL MEDICINE

## 2019-01-02 NOTE — LETTER
[facility]   [address] [place] [address]  [place] [address]  Phone: [phone]  Fax: [phone]              Lucía Rodriguez  1950    Encounter Date: 1/2/2019    Jose Thakkar M.D.          PROGRESS NOTE:  Chief Complaint   Patient presents with   • Atrial Fibrillation     follow up       Subjective:   Lucía Rodriguez is a 68 y.o. female who presents today with history of paroxysmal atrial for ablation currently on flecainide 50/100.  Minimal breakthrough one episode back in May when he was on a cruise with a flulike illness.  Patient's chads vasc score is 2.  Has not had a sleep study.  No alcohol minimal coffee.    Past Medical History:   Diagnosis Date   • Arrhythmia     Atrial fibrillation   • Broken heart syndrome August 2011   • Calculus of gallbladder without cholecystitis 1/9/2017   • CATARACT     left eye   • Glaucoma     right eye   • Hyperlipidemia    • Macular cyst, hole, or pseudohole of retina    • Pulmonary nodules/lesions, multiple 07/06/2016    stable on CTs, no further work up needed     Past Surgical History:   Procedure Laterality Date   • GYN SURGERY      HYSTERECTOMY   • MENISCUS REPAIR Right    • OTHER      eye surgery, right eye   • OTHER      cataract surgery, left eye     Family History   Problem Relation Age of Onset   • Heart Disease Father         triple bypass   • Cancer Father    • Cancer Mother      Social History     Social History   • Marital status:      Spouse name: N/A   • Number of children: N/A   • Years of education: N/A     Occupational History   • Not on file.     Social History Main Topics   • Smoking status: Never Smoker   • Smokeless tobacco: Never Used   • Alcohol use No   • Drug use: No   • Sexual activity: Yes     Partners: Male     Other Topics Concern   • Not on file     Social History Narrative   • No narrative on file     Allergies   Allergen Reactions   • Neosporin [Neomycin-Polymyxin B] Rash    "Rxn = 6/2016     Outpatient Encounter Prescriptions as of 1/2/2019   Medication Sig Dispense Refill   • metoprolol (LOPRESSOR) 25 MG Tab Take 0.5 Tabs by mouth 2 times a day. 90 Tab 0   • flecainide (TAMBOCOR) 50 MG tablet TAKE 1 TO 2 TABLETS BY MOUTH TWICE DAILY 360 Tab 2   • atorvastatin (LIPITOR) 20 MG Tab TAKE 1 TAB BY MOUTH AT BEDTIME 90 Tab 0   • prednisoLONE acetate (PRED FORTE) 1 % Suspension Place 1 Drop in right eye 2 Times a Day. Right Eye - Implant     • aspirin (ASA) 325 MG TABS Take 325 mg by mouth every bedtime.     • Calcium Carbonate-Vitamin D (CALCIUM + D PO) Take 1 Tab by mouth every day. **OTC**     • Lutein-Zeaxanthin (LUTEIN) 15-0.7 MG CAPS Take 1 Tab by mouth every bedtime.     • multivitamin (THERAGRAN) TABS Take 1 Tab by mouth every bedtime.       No facility-administered encounter medications on file as of 1/2/2019.      ROS     Objective:   /70 (BP Location: Left arm, Patient Position: Sitting, BP Cuff Size: Adult)   Pulse 76   Ht 1.676 m (5' 6\")   Wt 85.3 kg (188 lb)   LMP 12/28/2003   SpO2 96%   BMI 30.34 kg/m²      Physical Exam   Constitutional: She is oriented to person, place, and time. She appears well-developed and well-nourished.   HENT:   Head: Normocephalic and atraumatic.   Eyes: Pupils are equal, round, and reactive to light. EOM are normal.   Neck: Normal range of motion. Neck supple.   Cardiovascular: Normal rate, regular rhythm, normal heart sounds and intact distal pulses.  Exam reveals no gallop and no friction rub.    No murmur heard.  Pulmonary/Chest: Effort normal and breath sounds normal.   Abdominal: Soft. Bowel sounds are normal.   Musculoskeletal: Normal range of motion. She exhibits no edema.   Neurological: She is alert and oriented to person, place, and time.   Skin: Skin is warm.   Psychiatric: She has a normal mood and affect. Her behavior is normal. Judgment and thought content normal.       Assessment:     1. Atrial fibrillation, unspecified type " ([facility])  EKG   2. PAF (paroxysmal atrial fibrillation) ([facility])     3. Obesity (BMI 30-39.9)     4. Takotsubo cardiomyopathy         Medical Decision Making:  Today's Assessment / Status / Plan:   1.  Paroxysmal atrial fibrillation continue flecainide.  2.  Anticoagulation discussed indication.  Patient deferred.  3.  Offered a sleep study she deferred.  4.  Asked her to get a Kardia.  5.  Follow-up in 1 year.      Harvinder Bell M.D.  [address] [place] [id number]  VIA In Basket

## 2019-01-02 NOTE — PROGRESS NOTES
Chief Complaint   Patient presents with   • Atrial Fibrillation     follow up       Subjective:   Lucía Rodriguez is a 68 y.o. female who presents today with history of paroxysmal atrial for ablation currently on flecainide 50/100.  Minimal breakthrough one episode back in May when he was on a cruise with a flulike illness.  Patient's chads vasc score is 2.  Has not had a sleep study.  No alcohol minimal coffee.    Past Medical History:   Diagnosis Date   • Arrhythmia     Atrial fibrillation   • Broken heart syndrome August 2011   • Calculus of gallbladder without cholecystitis 1/9/2017   • CATARACT     left eye   • Glaucoma     right eye   • Hyperlipidemia    • Macular cyst, hole, or pseudohole of retina    • Pulmonary nodules/lesions, multiple 07/06/2016    stable on CTs, no further work up needed     Past Surgical History:   Procedure Laterality Date   • GYN SURGERY      HYSTERECTOMY   • MENISCUS REPAIR Right    • OTHER      eye surgery, right eye   • OTHER      cataract surgery, left eye     Family History   Problem Relation Age of Onset   • Heart Disease Father         triple bypass   • Cancer Father    • Cancer Mother      Social History     Social History   • Marital status:      Spouse name: N/A   • Number of children: N/A   • Years of education: N/A     Occupational History   • Not on file.     Social History Main Topics   • Smoking status: Never Smoker   • Smokeless tobacco: Never Used   • Alcohol use No   • Drug use: No   • Sexual activity: Yes     Partners: Male     Other Topics Concern   • Not on file     Social History Narrative   • No narrative on file     Allergies   Allergen Reactions   • Neosporin [Neomycin-Polymyxin B] Rash     Rxn = 6/2016     Outpatient Encounter Prescriptions as of 1/2/2019   Medication Sig Dispense Refill   • metoprolol (LOPRESSOR) 25 MG Tab Take 0.5 Tabs by mouth 2 times a day. 90 Tab 0   • flecainide (TAMBOCOR) 50 MG tablet TAKE 1 TO 2 TABLETS BY MOUTH TWICE DAILY  "360 Tab 2   • atorvastatin (LIPITOR) 20 MG Tab TAKE 1 TAB BY MOUTH AT BEDTIME 90 Tab 0   • prednisoLONE acetate (PRED FORTE) 1 % Suspension Place 1 Drop in right eye 2 Times a Day. Right Eye - Implant     • aspirin (ASA) 325 MG TABS Take 325 mg by mouth every bedtime.     • Calcium Carbonate-Vitamin D (CALCIUM + D PO) Take 1 Tab by mouth every day. **OTC**     • Lutein-Zeaxanthin (LUTEIN) 15-0.7 MG CAPS Take 1 Tab by mouth every bedtime.     • multivitamin (THERAGRAN) TABS Take 1 Tab by mouth every bedtime.       No facility-administered encounter medications on file as of 1/2/2019.      ROS     Objective:   /70 (BP Location: Left arm, Patient Position: Sitting, BP Cuff Size: Adult)   Pulse 76   Ht 1.676 m (5' 6\")   Wt 85.3 kg (188 lb)   LMP 12/28/2003   SpO2 96%   BMI 30.34 kg/m²     Physical Exam   Constitutional: She is oriented to person, place, and time. She appears well-developed and well-nourished.   HENT:   Head: Normocephalic and atraumatic.   Eyes: Pupils are equal, round, and reactive to light. EOM are normal.   Neck: Normal range of motion. Neck supple.   Cardiovascular: Normal rate, regular rhythm, normal heart sounds and intact distal pulses.  Exam reveals no gallop and no friction rub.    No murmur heard.  Pulmonary/Chest: Effort normal and breath sounds normal.   Abdominal: Soft. Bowel sounds are normal.   Musculoskeletal: Normal range of motion. She exhibits no edema.   Neurological: She is alert and oriented to person, place, and time.   Skin: Skin is warm.   Psychiatric: She has a normal mood and affect. Her behavior is normal. Judgment and thought content normal.       Assessment:     1. Atrial fibrillation, unspecified type ([facility])  EKG   2. PAF (paroxysmal atrial fibrillation) ([facility])     3. Obesity (BMI 30-39.9)     4. Takotsubo cardiomyopathy         Medical Decision Making:  Today's Assessment / Status / Plan:   1.  Paroxysmal atrial fibrillation continue flecainide.  2.  " Anticoagulation discussed indication.  Patient deferred.  3.  Offered a sleep study she deferred.  4.  Asked her to get a Kardia.  5.  Follow-up in 1 year.

## 2019-01-14 ENCOUNTER — APPOINTMENT (OUTPATIENT)
Dept: RADIOLOGY | Facility: MEDICAL CENTER | Age: 69
End: 2019-01-14
Attending: EMERGENCY MEDICINE
Payer: MEDICARE

## 2019-01-14 ENCOUNTER — TELEPHONE (OUTPATIENT)
Dept: CARDIOLOGY | Facility: MEDICAL CENTER | Age: 69
End: 2019-01-14

## 2019-01-14 ENCOUNTER — HOSPITAL ENCOUNTER (EMERGENCY)
Facility: MEDICAL CENTER | Age: 69
End: 2019-01-14
Attending: EMERGENCY MEDICINE
Payer: MEDICARE

## 2019-01-14 ENCOUNTER — OFFICE VISIT (OUTPATIENT)
Dept: URGENT CARE | Facility: CLINIC | Age: 69
End: 2019-01-14
Payer: MEDICARE

## 2019-01-14 VITALS
RESPIRATION RATE: 16 BRPM | DIASTOLIC BLOOD PRESSURE: 64 MMHG | TEMPERATURE: 97.2 F | BODY MASS INDEX: 30.7 KG/M2 | WEIGHT: 191 LBS | OXYGEN SATURATION: 98 % | SYSTOLIC BLOOD PRESSURE: 82 MMHG | HEART RATE: 62 BPM | HEIGHT: 66 IN

## 2019-01-14 VITALS
DIASTOLIC BLOOD PRESSURE: 60 MMHG | SYSTOLIC BLOOD PRESSURE: 97 MMHG | OXYGEN SATURATION: 93 % | BODY MASS INDEX: 30.58 KG/M2 | RESPIRATION RATE: 17 BRPM | TEMPERATURE: 97.4 F | HEIGHT: 66 IN | HEART RATE: 63 BPM | WEIGHT: 190.26 LBS

## 2019-01-14 DIAGNOSIS — R42 DIZZINESS: ICD-10-CM

## 2019-01-14 DIAGNOSIS — I95.9 HYPOTENSION, UNSPECIFIED HYPOTENSION TYPE: Primary | ICD-10-CM

## 2019-01-14 DIAGNOSIS — I95.89 OTHER SPECIFIED HYPOTENSION: ICD-10-CM

## 2019-01-14 DIAGNOSIS — Z86.79 HISTORY OF ATRIAL FIBRILLATION: ICD-10-CM

## 2019-01-14 DIAGNOSIS — R42 LIGHTHEADEDNESS: ICD-10-CM

## 2019-01-14 LAB
ALBUMIN SERPL BCP-MCNC: 4.3 G/DL (ref 3.2–4.9)
ALBUMIN/GLOB SERPL: 1.7 G/DL
ALP SERPL-CCNC: 89 U/L (ref 30–99)
ALT SERPL-CCNC: 20 U/L (ref 2–50)
ANION GAP SERPL CALC-SCNC: 7 MMOL/L (ref 0–11.9)
APPEARANCE UR: CLEAR
APTT PPP: 28.1 SEC (ref 24.7–36)
AST SERPL-CCNC: 15 U/L (ref 12–45)
BASOPHILS # BLD AUTO: 0.4 % (ref 0–1.8)
BASOPHILS # BLD: 0.06 K/UL (ref 0–0.12)
BILIRUB SERPL-MCNC: 0.7 MG/DL (ref 0.1–1.5)
BILIRUB UR QL STRIP.AUTO: NEGATIVE
BNP SERPL-MCNC: 62 PG/ML (ref 0–100)
BUN SERPL-MCNC: 23 MG/DL (ref 8–22)
CALCIUM SERPL-MCNC: 10 MG/DL (ref 8.5–10.5)
CHLORIDE SERPL-SCNC: 102 MMOL/L (ref 96–112)
CO2 SERPL-SCNC: 26 MMOL/L (ref 20–33)
COLOR UR: YELLOW
CREAT SERPL-MCNC: 1.18 MG/DL (ref 0.5–1.4)
EKG IMPRESSION: NORMAL
EOSINOPHIL # BLD AUTO: 0.04 K/UL (ref 0–0.51)
EOSINOPHIL NFR BLD: 0.3 % (ref 0–6.9)
ERYTHROCYTE [DISTWIDTH] IN BLOOD BY AUTOMATED COUNT: 46.5 FL (ref 35.9–50)
FLUAV RNA SPEC QL NAA+PROBE: NEGATIVE
FLUBV RNA SPEC QL NAA+PROBE: NEGATIVE
GLOBULIN SER CALC-MCNC: 2.5 G/DL (ref 1.9–3.5)
GLUCOSE SERPL-MCNC: 110 MG/DL (ref 65–99)
GLUCOSE UR STRIP.AUTO-MCNC: NEGATIVE MG/DL
HCT VFR BLD AUTO: 42.1 % (ref 37–47)
HGB BLD-MCNC: 13.8 G/DL (ref 12–16)
IMM GRANULOCYTES # BLD AUTO: 0.06 K/UL (ref 0–0.11)
IMM GRANULOCYTES NFR BLD AUTO: 0.4 % (ref 0–0.9)
INR PPP: 1.03 (ref 0.87–1.13)
KETONES UR STRIP.AUTO-MCNC: NEGATIVE MG/DL
LEUKOCYTE ESTERASE UR QL STRIP.AUTO: NEGATIVE
LIPASE SERPL-CCNC: 15 U/L (ref 11–82)
LYMPHOCYTES # BLD AUTO: 2.55 K/UL (ref 1–4.8)
LYMPHOCYTES NFR BLD: 16.3 % (ref 22–41)
MCH RBC QN AUTO: 30.6 PG (ref 27–33)
MCHC RBC AUTO-ENTMCNC: 32.8 G/DL (ref 33.6–35)
MCV RBC AUTO: 93.3 FL (ref 81.4–97.8)
MICRO URNS: NORMAL
MONOCYTES # BLD AUTO: 0.77 K/UL (ref 0–0.85)
MONOCYTES NFR BLD AUTO: 4.9 % (ref 0–13.4)
NEUTROPHILS # BLD AUTO: 12.18 K/UL (ref 2–7.15)
NEUTROPHILS NFR BLD: 77.7 % (ref 44–72)
NITRITE UR QL STRIP.AUTO: NEGATIVE
NRBC # BLD AUTO: 0 K/UL
NRBC BLD-RTO: 0 /100 WBC
PH UR STRIP.AUTO: 6 [PH]
PLATELET # BLD AUTO: 233 K/UL (ref 164–446)
PMV BLD AUTO: 10.8 FL (ref 9–12.9)
POTASSIUM SERPL-SCNC: 4.2 MMOL/L (ref 3.6–5.5)
PROT SERPL-MCNC: 6.8 G/DL (ref 6–8.2)
PROT UR QL STRIP: NEGATIVE MG/DL
PROTHROMBIN TIME: 13.6 SEC (ref 12–14.6)
RBC # BLD AUTO: 4.51 M/UL (ref 4.2–5.4)
RBC UR QL AUTO: NEGATIVE
SODIUM SERPL-SCNC: 135 MMOL/L (ref 135–145)
SP GR UR STRIP.AUTO: 1.01
TROPONIN I SERPL-MCNC: <0.01 NG/ML (ref 0–0.04)
UROBILINOGEN UR STRIP.AUTO-MCNC: 0.2 MG/DL
WBC # BLD AUTO: 15.7 K/UL (ref 4.8–10.8)

## 2019-01-14 PROCEDURE — 83880 ASSAY OF NATRIURETIC PEPTIDE: CPT

## 2019-01-14 PROCEDURE — 93005 ELECTROCARDIOGRAM TRACING: CPT | Performed by: EMERGENCY MEDICINE

## 2019-01-14 PROCEDURE — 700102 HCHG RX REV CODE 250 W/ 637 OVERRIDE(OP): Performed by: EMERGENCY MEDICINE

## 2019-01-14 PROCEDURE — 99214 OFFICE O/P EST MOD 30 MIN: CPT | Performed by: FAMILY MEDICINE

## 2019-01-14 PROCEDURE — A9270 NON-COVERED ITEM OR SERVICE: HCPCS | Performed by: EMERGENCY MEDICINE

## 2019-01-14 PROCEDURE — 700117 HCHG RX CONTRAST REV CODE 255: Performed by: EMERGENCY MEDICINE

## 2019-01-14 PROCEDURE — 80053 COMPREHEN METABOLIC PANEL: CPT

## 2019-01-14 PROCEDURE — 71045 X-RAY EXAM CHEST 1 VIEW: CPT

## 2019-01-14 PROCEDURE — 81003 URINALYSIS AUTO W/O SCOPE: CPT

## 2019-01-14 PROCEDURE — 85730 THROMBOPLASTIN TIME PARTIAL: CPT

## 2019-01-14 PROCEDURE — 36415 COLL VENOUS BLD VENIPUNCTURE: CPT

## 2019-01-14 PROCEDURE — 71275 CT ANGIOGRAPHY CHEST: CPT

## 2019-01-14 PROCEDURE — 85610 PROTHROMBIN TIME: CPT

## 2019-01-14 PROCEDURE — 99285 EMERGENCY DEPT VISIT HI MDM: CPT

## 2019-01-14 PROCEDURE — 83690 ASSAY OF LIPASE: CPT

## 2019-01-14 PROCEDURE — 85025 COMPLETE CBC W/AUTO DIFF WBC: CPT

## 2019-01-14 PROCEDURE — 87502 INFLUENZA DNA AMP PROBE: CPT

## 2019-01-14 PROCEDURE — 93005 ELECTROCARDIOGRAM TRACING: CPT

## 2019-01-14 PROCEDURE — 84484 ASSAY OF TROPONIN QUANT: CPT

## 2019-01-14 RX ORDER — GABAPENTIN 300 MG/1
300 CAPSULE ORAL
Refills: 2 | COMMUNITY
Start: 2018-12-18 | End: 2020-09-21

## 2019-01-14 RX ORDER — DIGOXIN 250 MCG
125 TABLET ORAL ONCE
Status: COMPLETED | OUTPATIENT
Start: 2019-01-14 | End: 2019-01-14

## 2019-01-14 RX ORDER — DIGOXIN 125 MCG
125 TABLET ORAL DAILY
Qty: 30 TAB | Refills: 0 | Status: SHIPPED | OUTPATIENT
Start: 2019-01-14 | End: 2019-01-14

## 2019-01-14 RX ORDER — DIGOXIN 125 MCG
125 TABLET ORAL DAILY
Qty: 30 TAB | Refills: 0 | Status: SHIPPED | OUTPATIENT
Start: 2019-01-14 | End: 2019-01-31

## 2019-01-14 RX ADMIN — IOHEXOL 100 ML: 350 INJECTION, SOLUTION INTRAVENOUS at 16:57

## 2019-01-14 RX ADMIN — DIGOXIN 125 MCG: 250 TABLET ORAL at 18:20

## 2019-01-14 ASSESSMENT — ENCOUNTER SYMPTOMS
WEAKNESS: 1
EYES NEGATIVE: 1
FOCAL WEAKNESS: 0
SENSORY CHANGE: 0
MYALGIAS: 1
CARDIOVASCULAR NEGATIVE: 1
PALPITATIONS: 0
DIZZINESS: 1
BLURRED VISION: 0
VISUAL CHANGE: 0
SHORTNESS OF BREATH: 0
PSYCHIATRIC NEGATIVE: 1
GASTROINTESTINAL NEGATIVE: 1
TINGLING: 0
COUGH: 0
FEVER: 0

## 2019-01-14 NOTE — ED TRIAGE NOTES
Pt sent from . Pt c/o low bp at home systolic 70's. Pt c/o intermittent dizziness and SOB. ekg paged.

## 2019-01-14 NOTE — PROGRESS NOTES
Subjective:     Lucía Rodriguez is a 68 y.o. female who presents for Blood Pressure Problem (x 1 wk, low blood pressure, dizziness, this am BP was 72/54)       Dizziness   This is a new problem. Episode onset: 1 week ago. Progression since onset: occurred once 1 week ago, went away, then occurred again last night. Associated symptoms include myalgias and weakness. Pertinent negatives include no chest pain, coughing, fever or visual change. Associated symptoms comments: Denies SOB.   Pt reports history of afib. Currently on metorpolol and flecainide. Recently saw cardiologist. Pt denies recent changes in medications. Pt states her BP at home today was 72/54. She states her BP normally runs low but systolic is usually around 109.    PMH:  has a past medical history of Arrhythmia; Broken heart syndrome (August 2011); Calculus of gallbladder without cholecystitis (1/9/2017); CATARACT; Glaucoma; Hyperlipidemia; Macular cyst, hole, or pseudohole of retina; and Pulmonary nodules/lesions, multiple (07/06/2016).    MEDS:   Current Outpatient Prescriptions:   •  BABY ASPIRIN PO, Take  by mouth., Disp: , Rfl:   •  metoprolol (LOPRESSOR) 25 MG Tab, Take 0.5 Tabs by mouth 2 times a day., Disp: 90 Tab, Rfl: 0  •  flecainide (TAMBOCOR) 50 MG tablet, TAKE 1 TO 2 TABLETS BY MOUTH TWICE DAILY, Disp: 360 Tab, Rfl: 2  •  atorvastatin (LIPITOR) 20 MG Tab, TAKE 1 TAB BY MOUTH AT BEDTIME, Disp: 90 Tab, Rfl: 0  •  prednisoLONE acetate (PRED FORTE) 1 % Suspension, Place 1 Drop in right eye 2 Times a Day. Right Eye - Implant, Disp: , Rfl:   •  Calcium Carbonate-Vitamin D (CALCIUM + D PO), Take 1 Tab by mouth every day. **OTC**, Disp: , Rfl:   •  Lutein-Zeaxanthin (LUTEIN) 15-0.7 MG CAPS, Take 1 Tab by mouth every bedtime., Disp: , Rfl:   •  multivitamin (THERAGRAN) TABS, Take 1 Tab by mouth every bedtime., Disp: , Rfl:   •  gabapentin (NEURONTIN) 300 MG Cap, Take 300 mg by mouth., Disp: , Rfl: 2  •  aspirin (ASA) 325 MG TABS, Take 325 mg  "by mouth every bedtime., Disp: , Rfl:     ALLERGIES:   Allergies   Allergen Reactions   • Neosporin [Neomycin-Polymyxin B] Rash     Rxn = 6/2016     SURGHX:   Past Surgical History:   Procedure Laterality Date   • GYN SURGERY      HYSTERECTOMY   • MENISCUS REPAIR Right    • OTHER      eye surgery, right eye   • OTHER      cataract surgery, left eye     SOCHX:  reports that she has never smoked. She has never used smokeless tobacco. She reports that she does not drink alcohol or use drugs.     FH: Reviewed with patient, not pertinent to this visit.     Review of Systems   Constitutional: Negative for fever and malaise/fatigue.   HENT: Negative.    Eyes: Negative.  Negative for blurred vision.   Respiratory: Negative for cough and shortness of breath.    Cardiovascular: Negative.  Negative for chest pain and palpitations.   Gastrointestinal: Negative.    Genitourinary: Negative.    Musculoskeletal: Positive for myalgias.   Skin: Negative.    Neurological: Positive for dizziness and weakness. Negative for tingling, sensory change and focal weakness.   Psychiatric/Behavioral: Negative.    All other systems reviewed and are negative.    Objective:     BP (!) 82/64 (BP Location: Left arm, Patient Position: Sitting, BP Cuff Size: Large adult)   Pulse 62   Temp 36.2 °C (97.2 °F) (Temporal)   Resp 16   Ht 1.676 m (5' 6\")   Wt 86.6 kg (191 lb)   LMP 12/28/2003   SpO2 98%   BMI 30.83 kg/m²     Physical Exam   Constitutional: She is oriented to person, place, and time. She appears well-developed and well-nourished. She is cooperative. No distress.   HENT:   Head: Normocephalic.   Right Ear: External ear normal.   Left Ear: External ear normal.   Nose: Nose normal.   Eyes: Conjunctivae and EOM are normal. Right pupil is not round and not reactive (hx of cataract surgery).   Neck: Normal range of motion.   Cardiovascular: Normal rate, regular rhythm, normal heart sounds and normal pulses.    Pulmonary/Chest: Effort normal " and breath sounds normal. No respiratory distress.   Abdominal: Soft. Bowel sounds are normal.   Musculoskeletal: Normal range of motion. She exhibits no deformity.   Neurological: She is alert and oriented to person, place, and time. She has normal strength. No sensory deficit.   Skin: Skin is warm and dry. Capillary refill takes less than 2 seconds.   Psychiatric: She has a normal mood and affect.   Vitals reviewed.       Assessment/Plan:     1. Hypotension, unspecified hypotension type    2. Dizziness    3. History of atrial fibrillation    Pt advised to go the ER for further evaluation of low blood pressures and dizziness. Pt will be taken to the [facility] ER by her . Telephone report given to ER physician there.    Patient advised to: Return for 1) Symptoms don't improve or worsen, or go to ER, 2) Follow up with primary care in 7-10 days.    Differential diagnosis, natural history, supportive care, and indications for immediate follow-up discussed. All questions answered. Patient agrees with the plan of care.  The case was discussed and reviewed with Dr Ramos during Bello ARMENDARIZ's training period.

## 2019-01-14 NOTE — ED PROVIDER NOTES
ED Provider Note    Scribed for Antoine Holloway D.O. by Zeferino Canales. 1/14/2019  2:07 PM    Primary care provider: Harvinder Bell M.D.  Means of arrival: Walk in  History obtained from: Patient  History limited by: None    CHIEF COMPLAINT  Chief Complaint   Patient presents with   • Dizziness   • Shortness of Breath       HPI  Lucía Rodriguez is a 68 y.o. female who presents to the Emergency Department complaining of dizziness and low blood pressure that first began last week and occurred again last night. A week ago patient woke up in the middle of the night with chills and dizziness. She manually checked her blood pressure and it was lower than usual. Her usual systolic blood pressure is around 105. Those symptoms went away and then returned again this morning. She had chills, dizziness, and had weakness in her upper extremities. She is also complaining of a new headache that she describes as a pressure. She took another manual blood pressure this morning which was 72/58. Patient denies any fever, cough, dysuria, rash, hematochezia, melena, abdominal pain, loss of sensation, or sore throat. She also had rhinorrhea this morning. Lucía did get the flu shot this year. She has a history of atrial fibrillation, which she takes 25mg metoprolol daily for. She does not take anticoagulants besides aspirin. Her cardiologist is Dr. Mike Thakkar.      REVIEW OF SYSTEMS  Pertinent positives include chills, dizziness, weakness, low blood pressure, headache, rhinorrhea. Pertinent negatives include no  fever, cough, dysuria, rash, hematochezia, melena, abdominal pain, loss of sensation, or sore throat.  All other systems reviewed and negative.    PAST MEDICAL HISTORY  Past Medical History:   Diagnosis Date   • Arrhythmia     Atrial fibrillation   • Broken heart syndrome August 2011   • Calculus of gallbladder without cholecystitis 1/9/2017   • CATARACT     left eye   • Glaucoma     right eye   • Hyperlipidemia   "  • Macular cyst, hole, or pseudohole of retina    • Pulmonary nodules/lesions, multiple 07/06/2016    stable on CTs, no further work up needed       SURGICAL HISTORY  Past Surgical History:   Procedure Laterality Date   • GYN SURGERY      HYSTERECTOMY   • MENISCUS REPAIR Right    • OTHER      eye surgery, right eye   • OTHER      cataract surgery, left eye        SOCIAL HISTORY  Social History   Substance Use Topics   • Smoking status: Never Smoker   • Smokeless tobacco: Never Used   • Alcohol use No      History   Drug Use No       FAMILY HISTORY  Family History   Problem Relation Age of Onset   • Heart Disease Father         triple bypass   • Cancer Father    • Cancer Mother        CURRENT MEDICATIONS  No current facility-administered medications on file prior to encounter.      Current Outpatient Prescriptions on File Prior to Encounter   Medication Sig Dispense Refill   • BABY ASPIRIN PO Take  by mouth.     • gabapentin (NEURONTIN) 300 MG Cap Take 300 mg by mouth.  2   • metoprolol (LOPRESSOR) 25 MG Tab Take 0.5 Tabs by mouth 2 times a day. 90 Tab 0   • flecainide (TAMBOCOR) 50 MG tablet TAKE 1 TO 2 TABLETS BY MOUTH TWICE DAILY 360 Tab 2   • atorvastatin (LIPITOR) 20 MG Tab TAKE 1 TAB BY MOUTH AT BEDTIME 90 Tab 0   • prednisoLONE acetate (PRED FORTE) 1 % Suspension Place 1 Drop in right eye 2 Times a Day. Right Eye - Implant     • aspirin (ASA) 325 MG TABS Take 325 mg by mouth every bedtime.     • Calcium Carbonate-Vitamin D (CALCIUM + D PO) Take 1 Tab by mouth every day. **OTC**     • Lutein-Zeaxanthin (LUTEIN) 15-0.7 MG CAPS Take 1 Tab by mouth every bedtime.     • multivitamin (THERAGRAN) TABS Take 1 Tab by mouth every bedtime.        ALLERGIES  Allergies   Allergen Reactions   • Neosporin [Neomycin-Polymyxin B] Rash     Rxn = 6/2016       PHYSICAL EXAM  VITAL SIGNS: BP (!) 97/60   Pulse 64   Temp 36.1 °C (97 °F) (Temporal)   Resp 16   Ht 1.676 m (5' 6\")   Wt 86.3 kg (190 lb 4.1 oz)   LMP 12/28/2003   " SpO2 95%   BMI 30.71 kg/m²     Nursing notes and vitals reviewed.  Constitutional: Well developed, Well nourished, No acute distress, Non-toxic appearance.   Eyes: PERRLA, EOMI, Conjunctiva normal, No discharge.   Cardiovascular: Normal heart rate, Normal rhythm, No murmurs, No rubs, No gallops.   Thorax & Lungs: No respiratory distress, No rales, No rhonchi, No wheezing, No chest tenderness.   Abdomen: Bowel sounds normal, Soft, No tenderness, No guarding, No rebound, No masses, No pulsatile masses.   Skin: Warm, Dry, No erythema, No rash.   Musculoskeletal: Intact distal pulses, No edema, No cyanosis, No clubbing. Good range of motion in all major joints. No tenderness to palpation or major deformities noted, no CVA tenderness, no midline back tenderness.   Neurologic: Alert & oriented to month and age, Normal cognition, Cranial nerves II-XII are intact, No slurred speech, Negative finger to nose bilaterally, No pronator drift bilaterally,   strength 5/5 bilaterally, Leg raise strength 5/5 bilaterally, Plantarflexion strength 5/5 bilaterally, Dorsiflexion strength 5/5 bilaterally, Deep tendon reflexes 2/4 upper and lower extremities bilaterally, Sensation intact throughout, No Nystagmus.  Psychiatric: Affect normal for clinical presentation.      DIAGNOSTIC STUDIES/PROCEDURES    LABS  Results for orders placed or performed during the hospital encounter of 01/14/19   Troponin   Result Value Ref Range    Troponin I <0.01 0.00 - 0.04 ng/mL   Btype Natriuretic Peptide   Result Value Ref Range    B Natriuretic Peptide 62 0 - 100 pg/mL   CBC with Differential   Result Value Ref Range    WBC 15.7 (H) 4.8 - 10.8 K/uL    RBC 4.51 4.20 - 5.40 M/uL    Hemoglobin 13.8 12.0 - 16.0 g/dL    Hematocrit 42.1 37.0 - 47.0 %    MCV 93.3 81.4 - 97.8 fL    MCH 30.6 27.0 - 33.0 pg    MCHC 32.8 (L) 33.6 - 35.0 g/dL    RDW 46.5 35.9 - 50.0 fL    Platelet Count 233 164 - 446 K/uL    MPV 10.8 9.0 - 12.9 fL    Neutrophils-Polys 77.70  (H) 44.00 - 72.00 %    Lymphocytes 16.30 (L) 22.00 - 41.00 %    Monocytes 4.90 0.00 - 13.40 %    Eosinophils 0.30 0.00 - 6.90 %    Basophils 0.40 0.00 - 1.80 %    Immature Granulocytes 0.40 0.00 - 0.90 %    Nucleated RBC 0.00 /100 WBC    Neutrophils (Absolute) 12.18 (H) 2.00 - 7.15 K/uL    Lymphs (Absolute) 2.55 1.00 - 4.80 K/uL    Monos (Absolute) 0.77 0.00 - 0.85 K/uL    Eos (Absolute) 0.04 0.00 - 0.51 K/uL    Baso (Absolute) 0.06 0.00 - 0.12 K/uL    Immature Granulocytes (abs) 0.06 0.00 - 0.11 K/uL    NRBC (Absolute) 0.00 K/uL   Complete Metabolic Panel (CMP)   Result Value Ref Range    Sodium 135 135 - 145 mmol/L    Potassium 4.2 3.6 - 5.5 mmol/L    Chloride 102 96 - 112 mmol/L    Co2 26 20 - 33 mmol/L    Anion Gap 7.0 0.0 - 11.9    Glucose 110 (H) 65 - 99 mg/dL    Bun 23 (H) 8 - 22 mg/dL    Creatinine 1.18 0.50 - 1.40 mg/dL    Calcium 10.0 8.5 - 10.5 mg/dL    AST(SGOT) 15 12 - 45 U/L    ALT(SGPT) 20 2 - 50 U/L    Alkaline Phosphatase 89 30 - 99 U/L    Total Bilirubin 0.7 0.1 - 1.5 mg/dL    Albumin 4.3 3.2 - 4.9 g/dL    Total Protein 6.8 6.0 - 8.2 g/dL    Globulin 2.5 1.9 - 3.5 g/dL    A-G Ratio 1.7 g/dL   Prothrombin Time   Result Value Ref Range    PT 13.6 12.0 - 14.6 sec    INR 1.03 0.87 - 1.13   APTT   Result Value Ref Range    APTT 28.1 24.7 - 36.0 sec   Lipase   Result Value Ref Range    Lipase 15 11 - 82 U/L   Influenza A/B By PCR   Result Value Ref Range    Influenza virus A RNA Negative Negative    Influenza virus B, PCR Negative Negative   URINALYSIS,CULTURE IF INDICATED   Result Value Ref Range    Color Yellow     Character Clear     Specific Gravity 1.008 <1.035    Ph 6.0 5.0 - 8.0    Glucose Negative Negative mg/dL    Ketones Negative Negative mg/dL    Protein Negative Negative mg/dL    Bilirubin Negative Negative    Urobilinogen, Urine 0.2 Negative    Nitrite Negative Negative    Leukocyte Esterase Negative Negative    Occult Blood Negative Negative    Micro Urine Req see below    ESTIMATED GFR    Result Value Ref Range    GFR If  55 (A) >60 mL/min/1.73 m 2    GFR If Non African American 45 (A) >60 mL/min/1.73 m 2   EKG   Result Value Ref Range    Report       [facility] Emergency Dept.    Test Date:  2019  Pt Name:    JOSEPH SRIVASTAVA                Department: ER  MRN:        [id number]                      Room:  Gender:     Female                       Technician: [id number]  :        [id number]                   Requested By:ER TRIAGE PROTOCOL  Order #:    [id number]                    Reading MD: GABINO HERNANDEZ DO    Measurements  Intervals                                Axis  Rate:       60                           P:          52  MI:         236                          QRS:        -20  QRSD:       92                           T:          14  QT:         480  QTc:        480    Interpretive Statements  SINUS RHYTHM  FIRST DEGREE AV BLOCK  BORDERLINE LEFT AXIS DEVIATION  BASELINE WANDER IN LEAD(S) II,III,aVR,aVL,aVF  Compared to ECG 2019 13:20:19  T-wave abnormality no longer present    Electronically Signed On 2019 14:18:50 PST by GABINO HERNANDEZ DO        All labs reviewed by me.        RADIOLOGY  CT-CTA CHEST PULMONARY ARTERY W/ RECONS   Final Result      No evidence pulmonary emboli.   Multiple small nodules within both lungs appear unchanged. A 2 mm nodule right lower lobe image 134 appears to be new.   Dependent atelectasis both lungs. No pleural effusions.   Hepatic steatosis.   Possible gallstones.         Multiple nodules less than 6 mm:   Low Risk: No routine follow-up      High Risk: Optional CT at 12 months      Comments: Use most suspicious nodule as guide to management. Follow-up intervals may vary according to size and risk.      Low Risk - Minimal or absent history of smoking and of other known risk factors.      High Risk - History of smoking or of other known risk factors.      Note: These recommendations do not apply to lung  cancer screening, patients with immunosuppression, or patients with known primary cancer.      Fleischner Society 2017 Guidelines for Management of Incidentally Detected Pulmonary Nodules in Adults      DX-CHEST-LIMITED (1 VIEW)   Final Result      Right basilar atelectasis, mild        The radiologist's interpretation of all radiological studies have been reviewed by me.    COURSE & MEDICAL DECISION MAKING  Pertinent Labs & Imaging studies reviewed. (See chart for details)    2:07 PM - Patient seen and examined at bedside. Ordered chest xray, influenza AB, UA, troponin, BNP, CBC, CMP, INR, APTT, lipase, estimated GFR, EKG to evaluate her symptoms.     5:45 PM - Paged cardiology.    5:50 PM - Spoke with Dr. Mirza, Cardiology, who advises to switch her blood pressure and arrhythmia medication to digoxin.    6:10 PM - Patient reevaluated at bedside. I informed her that all of her lab work and imaging did not show any acute processes at this time and that she is stable enough to be discharged. I informed her that we will be switching her medication to digoxin, and I answered her other questions. Patient understands and agrees to the discharge plan.    This is a charming 68 y.o. female that presents with hypotension and dizziness.  Here in the emergency department, she has no evidence of infection, negative urinalysis for infection.  She does have a slight leukocytosis of 15,000, influenza is negative, chest x-ray was equivocal of possible atelectasis.  This reason, CT pulmonary angiogram was completed for possible pulmonary embolism or occult pneumonia.  CT pulmonary gram was negative for these above things.  Reevaluation, the patient is not hypotensive, she has a normal blood pressure, she is amply without difficulty, and acting appropriately.  I did discuss her situation/condition with Dr. Mirza and agrees with the patient to go home off metoprolol and to change to digoxin.  She received 1 dose of digoxin 125  mcg here in the emergency department prescription for the same once daily at home.  She is to follow-up with cardiology for further evaluation and strict return precautions have been given..    The patient will return for new or worsening symptoms and is stable at the time of discharge.      DISPOSITION:  Patient will be discharged home in stable condition.    FOLLOW UP:  [facility], Emergency Dept  [address]  [facility] [id number] [phone]    If symptoms worsen    Jose Thakkar M.D.  [address]  [address]  [facility] [address]  [phone]    Schedule an appointment as soon as possible for a visit in 1 week        OUTPATIENT MEDICATIONS:  Discharge Medication List as of 1/14/2019  6:22 PM            FINAL IMPRESSION  1. Dizziness Active   2. Lightheadedness Active   3. Other specified hypotension Active         Zeferino HOWELL (Scribe), am scribing for, and in the presence of, Antoine Holloway D.O    Electronically signed by: Zeferino Canales (Scribe), 1/14/2019    IAntoine D.O. personally performed the services described in this documentation, as scribed by Zeferino Canales in my presence, and it is both accurate and complete. C.    The note accurately reflects work and decisions made by me.  Antoine Holloway  1/14/2019  10:19 PM

## 2019-01-15 ENCOUNTER — TELEPHONE (OUTPATIENT)
Dept: CARDIOLOGY | Facility: MEDICAL CENTER | Age: 69
End: 2019-01-15

## 2019-01-15 NOTE — DISCHARGE INSTRUCTIONS
Please hold off on taking your Metoporolol and start the Digoxin. Return to the ER if you have increasing symptoms.

## 2019-01-15 NOTE — TELEPHONE ENCOUNTER
I spoke with the emergency room physician who evaluated this patient when presented a bit hypotensive in the setting of low-dose metoprolol use in conjunction with flecainide for rhythm control strategy of her atrial fibrillation.  Her blood pressure was 70 mmHg systolic approximately, and the emergency room physician proposed holding her beta-blocker, with which I agree.  I did recommend initiation of digoxin 125 mcg p.o. daily.  Workup demonstrated a small elevation in white count though negative urinalysis, no no pneumonia on chest x-ray or CT chest.  There is no evidence of occult infection by a reasonable workup.  I do think outpatient follow-up is appropriate in this situation.    I will route this note back to our clinic staff to establish hospital follow-up.    Jose Roberto Mirza MD  Cardiologist, [facility]

## 2019-01-15 NOTE — TELEPHONE ENCOUNTER
----- Message from Delmy Collins sent at 1/15/2019  3:01 PM PST -----  Regarding: Problem with rapid heart beats after change in medication  VENICE/Елена    Patient went to [facility] ER yesterday with Hypotension. She was taken off Metoprolol and put on Digoxin and about 30 minutes ago, she started having rapid heart beats. She wants a call back at [phone] to find out what she should do.

## 2019-01-15 NOTE — TELEPHONE ENCOUNTER
Pt went to ER yesterday for low BP 78/54. Metop Dc'd and dig started. She is due to take dig tonight as she got it at 5pm yesterday. Extra palps today so she took an extra flec 25mg. BP today 139/79.     Please advise if reg dosing changes needed. To DS

## 2019-01-15 NOTE — ED NOTES
Pt discharged home with s/o pt to follow up with cardiology. Given prescription x 1 with indication. Pt understands to return to ed with any worsening symptoms cp and syncope.

## 2019-01-16 ENCOUNTER — APPOINTMENT (RX ONLY)
Dept: URBAN - METROPOLITAN AREA CLINIC 4 | Facility: CLINIC | Age: 69
Setting detail: DERMATOLOGY
End: 2019-01-16

## 2019-01-16 DIAGNOSIS — Z87.2 PERSONAL HISTORY OF DISEASES OF THE SKIN AND SUBCUTANEOUS TISSUE: ICD-10-CM

## 2019-01-16 DIAGNOSIS — Z86.007 PERSONAL HISTORY OF IN-SITU NEOPLASM OF SKIN: ICD-10-CM

## 2019-01-16 DIAGNOSIS — M71 OTHER BURSOPATHIES: ICD-10-CM

## 2019-01-16 PROBLEM — L57.0 ACTINIC KERATOSIS: Status: ACTIVE | Noted: 2019-01-16

## 2019-01-16 PROBLEM — M71.342 OTHER BURSAL CYST, LEFT HAND: Status: ACTIVE | Noted: 2019-01-16

## 2019-01-16 PROBLEM — D48.5 NEOPLASM OF UNCERTAIN BEHAVIOR OF SKIN: Status: ACTIVE | Noted: 2019-01-16

## 2019-01-16 PROBLEM — Z85.828 PERSONAL HISTORY OF OTHER MALIGNANT NEOPLASM OF SKIN: Status: ACTIVE | Noted: 2019-01-16

## 2019-01-16 PROCEDURE — ? COUNSELING

## 2019-01-16 PROCEDURE — ? OBSERVATION AND MEASURE

## 2019-01-16 PROCEDURE — ? ADDITIONAL NOTES

## 2019-01-16 PROCEDURE — 99213 OFFICE O/P EST LOW 20 MIN: CPT | Mod: 25

## 2019-01-16 PROCEDURE — 11102 TANGNTL BX SKIN SINGLE LES: CPT

## 2019-01-16 PROCEDURE — ? BIOPSY BY SHAVE METHOD

## 2019-01-16 ASSESSMENT — LOCATION DETAILED DESCRIPTION DERM
LOCATION DETAILED: LEFT DISTAL DORSAL INDEX FINGER
LOCATION DETAILED: RIGHT DISTAL PRETIBIAL REGION
LOCATION DETAILED: RIGHT PROXIMAL PRETIBIAL REGION

## 2019-01-16 ASSESSMENT — LOCATION SIMPLE DESCRIPTION DERM
LOCATION SIMPLE: RIGHT PRETIBIAL REGION
LOCATION SIMPLE: LEFT INDEX FINGER

## 2019-01-16 ASSESSMENT — LOCATION ZONE DERM
LOCATION ZONE: FINGER
LOCATION ZONE: LEG

## 2019-01-16 NOTE — TELEPHONE ENCOUNTER
Can go back on low dose beta blockers and stop dig if bp has improved. Otherwise just stay on the low dose dig

## 2019-01-16 NOTE — PROCEDURE: BIOPSY BY SHAVE METHOD
Lab: [id number]
Curettage Text: The wound bed was treated with curettage after the biopsy was performed.
Depth Of Biopsy: dermis
Consent: Written consent was obtained and risks were reviewed including but not limited to scarring, infection, bleeding, scabbing, incomplete removal, nerve damage and allergy to anesthesia.
Destruction After The Procedure: No
X Size Of Lesion In Cm: 0
Anesthesia Type: 1% lidocaine with epinephrine
Cryotherapy Text: The wound bed was treated with cryotherapy after the biopsy was performed.
Biopsy Type: H and E
Render Post-Care Instructions In Note?: yes
Lab Facility: 
Wound Care: Vaseline
Electrodesiccation Text: The wound bed was treated with electrodesiccation after the biopsy was performed.
Billing Type: Third-Party Bill
Type Of Destruction Used: Curettage
Anesthesia Volume In Cc: 0.5
Biopsy Method: Personna blade
Dressing: bandage
Electrodesiccation And Curettage Text: The wound bed was treated with electrodesiccation and curettage after the biopsy was performed.
Detail Level: Detailed
Post-Care Instructions: I reviewed with the patient in detail post-care instructions. Patient is to keep the biopsy site dry overnight, and then apply vaseline twice daily until healed.
Hemostasis: Drysol and Electrocautery
Silver Nitrate Text: The wound bed was treated with silver nitrate after the biopsy was performed.
Notification Instructions: Patient will be notified of biopsy results. However, patient instructed to call the office if not contacted within 2 weeks.

## 2019-01-16 NOTE — TELEPHONE ENCOUNTER
Pt given these options. She will try dig in am first, if no help, she will try switching back to metop if no help she will call for long acting metop succ, she will also hydrate. She will let us know what works.

## 2019-01-16 NOTE — PROCEDURE: ADDITIONAL NOTES
Additional Notes: Recommended for the patient to see a hand surgeon, she already has an appt.
Detail Level: Simple

## 2019-01-23 ENCOUNTER — APPOINTMENT (RX ONLY)
Dept: URBAN - METROPOLITAN AREA CLINIC 31 | Facility: CLINIC | Age: 69
Setting detail: DERMATOLOGY
End: 2019-01-23

## 2019-01-23 PROBLEM — C44.91 BASAL CELL CARCINOMA OF SKIN, UNSPECIFIED: Status: ACTIVE | Noted: 2019-01-23

## 2019-01-23 PROCEDURE — ? MOHS SURGERY PHONE CONSULTATION

## 2019-01-23 NOTE — PROCEDURE: MOHS SURGERY PHONE CONSULTATION
Does The Patient Have A Living Will (Optional)?: No
Has The Pathology Report Been Received?: Yes
Pathology Report Dx If Different Than Diagnosis Selected: BASAL CELL CARCINOMA, SUPERFICIAL, MULTIFOCAL
Detail Level: Simple
Patient's Insurance: Medicare
Additional Information?: Allergic to neosporin and bacitracin
Referring Provider: Amy Schoening
If Yes- What Blood Thinners Do They Take?: Aspirin 81mg daily
Patient Reported Location: Right Medial Frontal Scalp
Pathology Accession #: [id number]
Date Of Mohs Surgery: 02/25/2019
Which Antibiotic Do They Take For Surgical Prophylaxis?: Amoxicillin (2 grams)
Office Location Of Mohs Surgery: [facility]

## 2019-01-31 ENCOUNTER — OFFICE VISIT (OUTPATIENT)
Dept: CARDIOLOGY | Facility: MEDICAL CENTER | Age: 69
End: 2019-01-31
Payer: MEDICARE

## 2019-01-31 VITALS
OXYGEN SATURATION: 93 % | WEIGHT: 192 LBS | BODY MASS INDEX: 30.86 KG/M2 | HEART RATE: 65 BPM | HEIGHT: 66 IN | DIASTOLIC BLOOD PRESSURE: 76 MMHG | SYSTOLIC BLOOD PRESSURE: 124 MMHG

## 2019-01-31 DIAGNOSIS — I48.0 PAF (PAROXYSMAL ATRIAL FIBRILLATION) (HCC): ICD-10-CM

## 2019-01-31 DIAGNOSIS — I51.81 TAKOTSUBO CARDIOMYOPATHY: ICD-10-CM

## 2019-01-31 DIAGNOSIS — E78.49 OTHER HYPERLIPIDEMIA: ICD-10-CM

## 2019-01-31 PROCEDURE — 99214 OFFICE O/P EST MOD 30 MIN: CPT | Performed by: NURSE PRACTITIONER

## 2019-01-31 PROCEDURE — 93000 ELECTROCARDIOGRAM COMPLETE: CPT | Performed by: INTERNAL MEDICINE

## 2019-01-31 RX ORDER — FLUTICASONE PROPIONATE 50 MCG
SPRAY, SUSPENSION (ML) NASAL
Refills: 0 | COMMUNITY
Start: 2019-01-17 | End: 2019-02-07

## 2019-01-31 ASSESSMENT — ENCOUNTER SYMPTOMS
MYALGIAS: 0
FEVER: 0
FOCAL WEAKNESS: 0
DOUBLE VISION: 0
BLURRED VISION: 0
BLOOD IN STOOL: 0
CHILLS: 0
SORE THROAT: 0
BRUISES/BLEEDS EASILY: 0
HEARTBURN: 0
LOSS OF CONSCIOUSNESS: 0
PSYCHIATRIC NEGATIVE: 1
SPEECH CHANGE: 0
PND: 0
HEADACHES: 1
PALPITATIONS: 0
COUGH: 0
DIZZINESS: 1
SHORTNESS OF BREATH: 0
NAUSEA: 0
CLAUDICATION: 0
WHEEZING: 0
ORTHOPNEA: 0
VOMITING: 0
ABDOMINAL PAIN: 0

## 2019-01-31 NOTE — PROGRESS NOTES
Chief Complaint   Patient presents with   • Atrial Fibrillation     PP DX:PAF       Subjective:   Lucía Rodriguez is a 68 y.o. female who presents today for review of her cardiac status.  She was seen on 1/14/19 with the following HPI:  Lucía Rodriguez is a 68 y.o. female who presents to the Emergency Department complaining of dizziness and low blood pressure that first began last week and occurred again last night. A week ago patient woke up in the middle of the night with chills and dizziness. She manually checked her blood pressure and it was lower than usual. Her usual systolic blood pressure is around 105. Those symptoms went away and then returned again this morning. She had chills, dizziness, and had weakness in her upper extremities. She is also complaining of a new headache that she describes as a pressure. She took another manual blood pressure this morning which was 72/58. Patient denies any fever, cough, dysuria, rash, hematochezia, melena, abdominal pain, loss of sensation, or sore throat. She also had rhinorrhea this morning.  Interestingly, her WBC was at 15.9 with a left shift. UA negative.  CT of her chest revealed known multiple nodules with one new 2mm nodule RLL.  Known gallstones and Hepatic steatosis.  She has had no further shaking chills since the ER visit.  ROS positive only for headache on the top of her head and Post nasal drip.  Her BP has normalized and with recurrence of palpitations she had restarted her Metoprolol and stopped her digoxin. Arrhythmias have improved.  Etiology of her chills and hypotension are ill defined.  Past Medical History:   Diagnosis Date   • Arrhythmia     Atrial fibrillation   • Broken heart syndrome August 2011   • Calculus of gallbladder without cholecystitis 1/9/2017   • CATARACT     left eye   • Glaucoma     right eye   • Hyperlipidemia    • Macular cyst, hole, or pseudohole of retina    • Pulmonary nodules/lesions, multiple 07/06/2016    stable on  CTs, no further work up needed     Past Surgical History:   Procedure Laterality Date   • GYN SURGERY      HYSTERECTOMY   • MENISCUS REPAIR Right    • OTHER      eye surgery, right eye   • OTHER      cataract surgery, left eye     Family History   Problem Relation Age of Onset   • Heart Disease Father         triple bypass   • Cancer Father    • Cancer Mother      Social History     Social History   • Marital status:      Spouse name: N/A   • Number of children: N/A   • Years of education: N/A     Occupational History   • Not on file.     Social History Main Topics   • Smoking status: Never Smoker   • Smokeless tobacco: Never Used   • Alcohol use No   • Drug use: No   • Sexual activity: Yes     Partners: Male     Other Topics Concern   • Not on file     Social History Narrative   • No narrative on file     Allergies   Allergen Reactions   • Neosporin [Neomycin-Polymyxin B] Rash     Rxn = 6/2016     Outpatient Encounter Prescriptions as of 1/31/2019   Medication Sig Dispense Refill   • fluticasone (FLONASE) 50 MCG/ACT nasal spray SPRAY 1 - 2 SPRAY BY INTRANASAL ROUTE EVERY DAY IN EACH NOSTRIL AS NEEDED  0   • BABY ASPIRIN PO Take  by mouth.     • gabapentin (NEURONTIN) 300 MG Cap Take 300 mg by mouth.  2   • metoprolol (LOPRESSOR) 25 MG Tab Take 0.5 Tabs by mouth 2 times a day. 90 Tab 0   • flecainide (TAMBOCOR) 50 MG tablet TAKE 1 TO 2 TABLETS BY MOUTH TWICE DAILY 360 Tab 2   • atorvastatin (LIPITOR) 20 MG Tab TAKE 1 TAB BY MOUTH AT BEDTIME 90 Tab 0   • prednisoLONE acetate (PRED FORTE) 1 % Suspension Place 1 Drop in right eye 2 Times a Day. Right Eye - Implant     • Calcium Carbonate-Vitamin D (CALCIUM + D PO) Take 1 Tab by mouth every day. **OTC**     • Lutein-Zeaxanthin (LUTEIN) 15-0.7 MG CAPS Take 1 Tab by mouth every bedtime.     • multivitamin (THERAGRAN) TABS Take 1 Tab by mouth every bedtime.     • [DISCONTINUED] digoxin (LANOXIN) 125 MCG Tab Take 1 Tab by mouth every day. (Patient not taking:  "Reported on 1/31/2019) 30 Tab 0   • [DISCONTINUED] aspirin (ASA) 325 MG TABS Take 325 mg by mouth every bedtime.       No facility-administered encounter medications on file as of 1/31/2019.      Review of Systems   Constitutional: Negative for chills and fever.   HENT: Negative for sore throat.         No difficulty swallowing   Eyes: Negative for blurred vision and double vision.   Respiratory: Negative for cough, shortness of breath and wheezing.    Cardiovascular: Negative for chest pain, palpitations, orthopnea, claudication, leg swelling and PND.   Gastrointestinal: Negative for abdominal pain, blood in stool, heartburn, nausea and vomiting.   Genitourinary: Negative for dysuria, frequency, hematuria and urgency.   Musculoskeletal: Negative for myalgias.   Skin: Negative.    Neurological: Positive for dizziness (resolved.) and headaches (top of head). Negative for speech change, focal weakness and loss of consciousness.   Endo/Heme/Allergies: Does not bruise/bleed easily.   Psychiatric/Behavioral: Negative.         Objective:   /76 (BP Location: Left arm, Patient Position: Sitting, BP Cuff Size: Adult)   Pulse 65   Ht 1.676 m (5' 6\")   Wt 87.1 kg (192 lb)   LMP 12/28/2003   SpO2 93%   BMI 30.99 kg/m²     Physical Exam   Constitutional: She is oriented to person, place, and time. She appears well-developed and well-nourished.   HENT:   Head: Normocephalic and atraumatic.   Throat and nares atraumatic. No overt drainage noted.   Eyes: Pupils are equal, round, and reactive to light.   Neck: Normal range of motion. Neck supple.   Cardiovascular: Normal rate and regular rhythm.    Pulmonary/Chest: Effort normal and breath sounds normal.   Abdominal: Soft. Bowel sounds are normal.   Musculoskeletal: Normal range of motion.   Neurological: She is alert and oriented to person, place, and time.   Skin: Skin is warm and dry.   Psychiatric: She has a normal mood and affect.       Assessment:     1. PAF " (paroxysmal atrial fibrillation) (HCC)  EKG   2. Takotsubo cardiomyopathy     3. Other hyperlipidemia         Medical Decision Making:  Today's Assessment / Status / Plan:     1. PAF improved control back on combination of Flecainide and Metoprolol.  2. CM resolved.  3. Hyperlipidemia on statin.  RTC as planned in one year.    Collaborating MD  Field

## 2019-02-04 LAB — EKG IMPRESSION: NORMAL

## 2019-02-07 ENCOUNTER — OFFICE VISIT (OUTPATIENT)
Dept: MEDICAL GROUP | Facility: MEDICAL CENTER | Age: 69
End: 2019-02-07
Payer: MEDICARE

## 2019-02-07 ENCOUNTER — HOSPITAL ENCOUNTER (OUTPATIENT)
Dept: LAB | Facility: MEDICAL CENTER | Age: 69
End: 2019-02-07
Attending: FAMILY MEDICINE
Payer: MEDICARE

## 2019-02-07 VITALS
HEIGHT: 66 IN | BODY MASS INDEX: 30.75 KG/M2 | RESPIRATION RATE: 16 BRPM | SYSTOLIC BLOOD PRESSURE: 122 MMHG | DIASTOLIC BLOOD PRESSURE: 72 MMHG | OXYGEN SATURATION: 95 % | TEMPERATURE: 97.2 F | WEIGHT: 191.36 LBS | HEART RATE: 64 BPM

## 2019-02-07 DIAGNOSIS — I95.9 HYPOTENSION, UNSPECIFIED HYPOTENSION TYPE: ICD-10-CM

## 2019-02-07 DIAGNOSIS — I48.0 PAF (PAROXYSMAL ATRIAL FIBRILLATION) (HCC): ICD-10-CM

## 2019-02-07 DIAGNOSIS — D72.829 LEUKOCYTOSIS, UNSPECIFIED TYPE: ICD-10-CM

## 2019-02-07 DIAGNOSIS — R91.1 PULMONARY NODULE: ICD-10-CM

## 2019-02-07 LAB
BASOPHILS # BLD AUTO: 0.9 % (ref 0–1.8)
BASOPHILS # BLD: 0.08 K/UL (ref 0–0.12)
EOSINOPHIL # BLD AUTO: 0.17 K/UL (ref 0–0.51)
EOSINOPHIL NFR BLD: 2 % (ref 0–6.9)
ERYTHROCYTE [DISTWIDTH] IN BLOOD BY AUTOMATED COUNT: 48.9 FL (ref 35.9–50)
ERYTHROCYTE [SEDIMENTATION RATE] IN BLOOD BY WESTERGREN METHOD: 16 MM/HOUR (ref 0–30)
HCT VFR BLD AUTO: 46.5 % (ref 37–47)
HGB BLD-MCNC: 14.6 G/DL (ref 12–16)
IMM GRANULOCYTES # BLD AUTO: 0.02 K/UL (ref 0–0.11)
IMM GRANULOCYTES NFR BLD AUTO: 0.2 % (ref 0–0.9)
LYMPHOCYTES # BLD AUTO: 2.57 K/UL (ref 1–4.8)
LYMPHOCYTES NFR BLD: 30.3 % (ref 22–41)
MCH RBC QN AUTO: 30.5 PG (ref 27–33)
MCHC RBC AUTO-ENTMCNC: 31.4 G/DL (ref 33.6–35)
MCV RBC AUTO: 97.1 FL (ref 81.4–97.8)
MONOCYTES # BLD AUTO: 0.62 K/UL (ref 0–0.85)
MONOCYTES NFR BLD AUTO: 7.3 % (ref 0–13.4)
NEUTROPHILS # BLD AUTO: 5.01 K/UL (ref 2–7.15)
NEUTROPHILS NFR BLD: 59.3 % (ref 44–72)
NRBC # BLD AUTO: 0 K/UL
NRBC BLD-RTO: 0 /100 WBC
PLATELET # BLD AUTO: 246 K/UL (ref 164–446)
PMV BLD AUTO: 11.6 FL (ref 9–12.9)
RBC # BLD AUTO: 4.79 M/UL (ref 4.2–5.4)
WBC # BLD AUTO: 8.5 K/UL (ref 4.8–10.8)

## 2019-02-07 PROCEDURE — 85025 COMPLETE CBC W/AUTO DIFF WBC: CPT

## 2019-02-07 PROCEDURE — 85652 RBC SED RATE AUTOMATED: CPT

## 2019-02-07 PROCEDURE — 36415 COLL VENOUS BLD VENIPUNCTURE: CPT

## 2019-02-07 PROCEDURE — 99214 OFFICE O/P EST MOD 30 MIN: CPT | Performed by: FAMILY MEDICINE

## 2019-02-07 ASSESSMENT — PATIENT HEALTH QUESTIONNAIRE - PHQ9: CLINICAL INTERPRETATION OF PHQ2 SCORE: 0

## 2019-02-07 NOTE — PROGRESS NOTES
cc: Hypertension    Subjective:     Lucía Rodriguez is a 68 y.o. female presenting for follow-up of an ER visit for hypotension.  About 3 weeks ago, she suddenly developed chills and hypotension.  Associated with mild headache.  Denies having any fevers or other symptoms.  Symptoms resolved after the next day, but returned a week later.  She went to the ED 1/14.  Workup was notable for leukocytosis and a new pulmonary nodule.  her symptoms resolved that day and has been asymptomatic since.  She saw her cardiologist.  She continues to take flecainide 50 mg in the morning, 100 mg night, atorvastatin 20 mg daily, metoprolol 12.5 mg twice a day, baby aspirin.  She denies any more hypotensive episodes, lightheadedness, dizziness, chills, fevers, nausea, vomiting, abdominal pain, diarrhea, constipation, chest pain, palpitations, shortness of breath, cough, blood in the urine or stools, urinary symptoms, rashes.  She does have mild head pressure and ear feels muffled, minimal nasal drainage.  She continues to have a cyst on her left second digit, will be seeing a hand surgeon in the near future.  Denies any recent swelling, pain or redness in the finger.    Review of systems:  See above.       Current Outpatient Prescriptions:   •  BABY ASPIRIN PO, Take  by mouth., Disp: , Rfl:   •  gabapentin (NEURONTIN) 300 MG Cap, Take 300 mg by mouth., Disp: , Rfl: 2  •  metoprolol (LOPRESSOR) 25 MG Tab, Take 0.5 Tabs by mouth 2 times a day., Disp: 90 Tab, Rfl: 0  •  flecainide (TAMBOCOR) 50 MG tablet, TAKE 1 TO 2 TABLETS BY MOUTH TWICE DAILY, Disp: 360 Tab, Rfl: 2  •  atorvastatin (LIPITOR) 20 MG Tab, TAKE 1 TAB BY MOUTH AT BEDTIME, Disp: 90 Tab, Rfl: 0  •  prednisoLONE acetate (PRED FORTE) 1 % Suspension, Place 1 Drop in right eye 2 Times a Day. Right Eye - Implant, Disp: , Rfl:   •  Calcium Carbonate-Vitamin D (CALCIUM + D PO), Take 1 Tab by mouth every day. **OTC**, Disp: , Rfl:   •  Lutein-Zeaxanthin (LUTEIN) 15-0.7 MG CAPS,  "Take 1 Tab by mouth every bedtime., Disp: , Rfl:   •  multivitamin (THERAGRAN) TABS, Take 1 Tab by mouth every bedtime., Disp: , Rfl:     Allergies, past medical history, past surgical history, family history, social history reviewed and updated    Objective:     Vitals: /72 (BP Location: Right arm, Patient Position: Sitting, BP Cuff Size: Adult)   Pulse 64   Temp 36.2 °C (97.2 °F) (Temporal)   Resp 16   Ht 1.676 m (5' 6\")   Wt 86.8 kg (191 lb 5.8 oz)   LMP 12/28/2003   SpO2 95%   BMI 30.89 kg/m²   General: Alert, pleasant, NAD  HEENT: Normocephalic.  EOMI, no icterus or pallor.  Conjunctivae and lids normal. External ears normal. Oropharynx non-erythematous, mucous membranes moist.  Neck supple.  No thyromegaly or masses palpated. No cervical or supraclavicular lymphadenopathy.  Heart: Regular rate and rhythm.  S1 and S2 normal.  No murmurs appreciated.  Respiratory: Normal respiratory effort.  Clear to auscultation bilaterally.  Abdomen: Non-distended, soft  Skin: Warm, dry, no rashes.  Cyst at left second digit at PIP joint  Musculoskeletal: Gait is normal.  Moves all extremities well.  Extremities: No leg edema.    Psych:  Affect/mood is normal, judgement is good, memory is intact, grooming is appropriate.    Assessment/Plan:     Lucía was seen today for follow-up.    Diagnoses and all orders for this visit:    Hypotension, unspecified hypotension type  Appears to resolved.  Discussed monitoring for now, reasons to go to the emergency department    PAF (paroxysmal atrial fibrillation) (HCC)  Stable, managed by cardiology.  Continue current medications.    Leukocytosis, unspecified type  New issue, will recheck the following labs  -     CBC WITH DIFFERENTIAL; Future  -     WESTERGREN SED RATE; Future    Pulmonary nodule  In reviewing her chart, her CT showed a new 2 mm nodule.  Her previous nodules are unchanged.  As she is low risk and given the size of these nodules, no further workup " needed.      Return if symptoms worsen or fail to improve.

## 2019-02-15 DIAGNOSIS — I48.0 PAF (PAROXYSMAL ATRIAL FIBRILLATION) (HCC): ICD-10-CM

## 2019-02-19 RX ORDER — FLECAINIDE ACETATE 50 MG/1
TABLET ORAL
Qty: 360 TAB | Refills: 2 | Status: SHIPPED | OUTPATIENT
Start: 2019-02-19 | End: 2020-02-24

## 2019-02-25 ENCOUNTER — APPOINTMENT (RX ONLY)
Dept: URBAN - METROPOLITAN AREA CLINIC 36 | Facility: CLINIC | Age: 69
Setting detail: DERMATOLOGY
End: 2019-02-25

## 2019-02-25 PROBLEM — C44.329 SQUAMOUS CELL CARCINOMA OF SKIN OF OTHER PARTS OF FACE: Status: ACTIVE | Noted: 2019-02-25

## 2019-02-25 PROCEDURE — 13132 CMPLX RPR F/C/C/M/N/AX/G/H/F: CPT

## 2019-02-25 PROCEDURE — 17311 MOHS 1 STAGE H/N/HF/G: CPT

## 2019-02-25 PROCEDURE — ? MOHS SURGERY

## 2019-02-25 NOTE — PROCEDURE: MOHS SURGERY
Show Additional Anesthesia Variables In The Stage Tabs: Yes
Surgical Defect Length In Cm (Optional): 1.2
Quadrants Reporting?: 0
Ear Star Wedge Flap Text: The defect edges were debeveled with a #15 blade scalpel.  Given the location of the defect and the proximity to free margins (helical rim) an ear star wedge flap was deemed most appropriate.  Using a sterile surgical marker, the appropriate flap was drawn incorporating the defect and placing the expected incisions between the helical rim and antihelix where possible.  The area thus outlined was incised through and through with a #15 scalpel blade.
Dressing (No Sutures): dry sterile dressing
Tissue Cultured Epidermal Autograft Text: The defect edges were debeveled with a #15 scalpel blade.  Given the location of the defect, shape of the defect and the proximity to free margins a tissue cultured epidermal autograft was deemed most appropriate.  The graft was then trimmed to fit the size of the defect.  The graft was then placed in the primary defect and oriented appropriately.
Lazy S Complex Repair Preamble Text (Leave Blank If You Do Not Want): Extensive wide undermining was performed at least 2 cm in all directions.
H Plasty Text: Given the location of the defect, shape of the defect and the proximity to free margins a H-plasty was deemed most appropriate for repair.  Using a sterile surgical marker, the appropriate advancement arms of the H-plasty were drawn incorporating the defect and placing the expected incisions within the relaxed skin tension lines where possible. The area thus outlined was incised deep to adipose tissue with a #15 scalpel blade. The skin margins were undermined to an appropriate distance in all directions utilizing iris scissors.  The opposing advancement arms were then advanced into place in opposite direction and anchored with interrupted buried subcutaneous sutures.
Anesthesia Volume In Cc: 6
Unique Flap 4 Text: The defect edges were debeveled with a #15 scalpel blade.  Given the location of the defect and the proximity to free margins a Banner transposition flap was deemed most appropriate.  Using a sterile surgical marker, an appropriate Banner transposition flap was drawn incorporating the defect.    The area thus outlined was incised deep to adipose tissue with a #15 scalpel blade.  The skin margins were undermined to an appropriate distance in all directions utilizing iris scissors.
Stage 8: Additional Anesthesia Type: 1% lidocaine with epinephrine
A-T Advancement Flap Text: The defect edges were debeveled with a #15 scalpel blade.  Given the location of the defect, shape of the defect and the proximity to free margins an A-T advancement flap was deemed most appropriate.  Using a sterile surgical marker, an appropriate advancement flap was drawn incorporating the defect and placing the expected incisions within the relaxed skin tension lines where possible.    The area thus outlined was incised deep to adipose tissue with a #15 scalpel blade.  The skin margins were undermined to an appropriate distance in all directions utilizing iris scissors.
Rhomboid Transposition Flap Text: The defect edges were debeveled with a #15 scalpel blade.  Given the location of the defect and the proximity to free margins a rhomboid transposition flap was deemed most appropriate.  Using a sterile surgical marker, an appropriate rhomboid flap was drawn incorporating the defect.    The area thus outlined was incised deep to adipose tissue with a #15 scalpel blade.  The skin margins were undermined to an appropriate distance in all directions utilizing iris scissors.
Display The Frozen Section And Histology As A Separate Paragraph: No
Consent (Scalp)/Introductory Paragraph: The rationale for Mohs was explained to the patient and consent was obtained. The risks, benefits and alternatives to therapy were discussed in detail. Specifically, the risks of changes in hair growth pattern secondary to repair, infection, scarring, bleeding, prolonged wound healing, incomplete removal, allergy to anesthesia, nerve injury and recurrence were addressed. Prior to the procedure, the treatment site was clearly identified and confirmed by the patient. All components of Universal Protocol/PAUSE Rule completed.
Composite Graft Text: The defect edges were debeveled with a #15 scalpel blade.  Given the location of the defect, shape of the defect, the proximity to free margins and the fact the defect was full thickness a composite graft was deemed most appropriate.  The defect was outline and then transferred to the donor site.  A full thickness graft was then excised from the donor site. The graft was then placed in the primary defect, oriented appropriately and then sutured into place.  The secondary defect was then repaired using a primary closure.
Mid-Level Procedure Text (F): After obtaining clear surgical margins the patient was sent to a mid-level provider for surgical repair.  The patient understands they will receive post-surgical care and follow-up from the mid-level provider.
Initial Size Of Lesion: 0.5
Lazy S Intermediate Repair Preamble Text (Leave Blank If You Do Not Want): Undermining was performed with blunt dissection.
O-Z Plasty Text: The defect edges were debeveled with a #15 scalpel blade.  Given the location of the defect, shape of the defect and the proximity to free margins an O-Z plasty (double transposition flap) was deemed most appropriate.  Using a sterile surgical marker, the appropriate transposition flaps were drawn incorporating the defect and placing the expected incisions within the relaxed skin tension lines where possible.    The area thus outlined was incised deep to adipose tissue with a #15 scalpel blade.  The skin margins were undermined to an appropriate distance in all directions utilizing iris scissors.  Hemostasis was achieved with electrocautery.  The flaps were then transposed into place, one clockwise and the other counterclockwise, and anchored with interrupted buried subcutaneous sutures.
Unique Flap 4 Name: Banner Flap
Plastic Surgeon Procedure Text (C): After obtaining clear surgical margins the patient was sent to plastics for surgical repair.  The patient understands they will receive post-surgical care and follow-up from the referring physician's office.
Oculoplastic Surgeon Procedure Text (C): After obtaining clear surgical margins the patient was sent to oculoplastics for surgical repair.  The patient understands they will receive post-surgical care and follow-up from the referring physician's office.
Advancement-Rotation Flap Text: The defect edges were debeveled with a #15 scalpel blade.  Given the location of the defect, shape of the defect and the proximity to free margins an advancement-rotation flap was deemed most appropriate.  Using a sterile surgical marker, an appropriate flap was drawn incorporating the defect and placing the expected incisions within the relaxed skin tension lines where possible. The area thus outlined was incised deep to adipose tissue with a #15 scalpel blade.  The skin margins were undermined to an appropriate distance in all directions utilizing iris scissors.
Area M Indication Text: Tumors in this location are included in Area M (cheek, forehead, scalp, neck, jawline and pretibial skin).  Mohs surgery is indicated for tumors in these anatomic locations.
Stage 3: Additional Anesthesia Type: 1% lidocaine with 1:100,000 epinephrine and 408mcg clindamycin/ml and a 1:10 solution of 8.4% sodium bicarbonate
Consent (Near Eyelid Margin)/Introductory Paragraph: The rationale for Mohs was explained to the patient and consent was obtained. The risks, benefits and alternatives to therapy were discussed in detail. Specifically, the risks of ectropion or eyelid deformity, infection, scarring, bleeding, prolonged wound healing, incomplete removal, allergy to anesthesia, nerve injury and recurrence were addressed. Prior to the procedure, the treatment site was clearly identified and confirmed by the patient. All components of Universal Protocol/PAUSE Rule completed.
Full Thickness Lip Wedge Repair (Flap) Text: Given the location of the defect and the proximity to free margins a full thickness wedge repair was deemed most appropriate.  Using a sterile surgical marker, the appropriate repair was drawn incorporating the defect and placing the expected incisions perpendicular to the vermilion border.  The vermilion border was also meticulously outlined to ensure appropriate reapproximation during the repair.  The area thus outlined was incised through and through with a #15 scalpel blade.  The muscularis and dermis were reaproximated with deep sutures following hemostasis. Care was taken to realign the vermilion border before proceeding with the superficial closure.  Once the vermilion was realigned the superfical and mucosal closure was finished.
Bcc Histology Text: There were numerous aggregates of basaloid cells.
Asc Procedure Text (D): After obtaining clear surgical margins the patient was sent to an ASC for surgical repair.  The patient understands they will receive post-surgical care and follow-up from the ASC physician.
Alar Island Pedicle Flap Text: The defect edges were debeveled with a #15 scalpel blade.  Given the location of the defect, shape of the defect and the proximity to the alar rim an island pedicle advancement flap was deemed most appropriate.  Using a sterile surgical marker, an appropriate advancement flap was drawn incorporating the defect, outlining the appropriate donor tissue and placing the expected incisions within the nasal ala running parallel to the alar rim. The area thus outlined was incised with a #15 scalpel blade.  The skin margins were undermined minimally to an appropriate distance in all directions around the primary defect and laterally outward around the island pedicle utilizing iris scissors.  There was minimal undermining beneath the pedicle flap.
Complex Repair And Flap Additional Text (Will Appearing After The Standard Complex Repair Text): The complex repair was not sufficient to completely close the primary defect. The remaining additional defect was repaired with the flap mentioned below.
Secondary Intention Text (Leave Blank If You Do Not Want): The defect will heal with secondary intention.
Rotation Flap Text: The defect edges were debeveled with a #15 scalpel blade.  Given the location of the defect, shape of the defect and the proximity to free margins a rotation flap was deemed most appropriate.  Using a sterile surgical marker, an appropriate rotation flap was drawn incorporating the defect and placing the expected incisions within the relaxed skin tension lines where possible.    The area thus outlined was incised deep to adipose tissue with a #15 scalpel blade.  The skin margins were undermined to an appropriate distance in all directions utilizing iris scissors.
Mohs Histo Method Verbiage: Each section was then chromacoded and processed in the Mohs lab using the Mohs protocol and submitted for frozen section.
Posterior Auricular Interpolation Flap Text: A decision was made to reconstruct the defect utilizing an interpolation axial flap and a staged reconstruction.  A telfa template was made of the defect.  This telfa template was then used to outline the posterior auricular interpolation flap.  The donor area for the pedicle flap was then injected with anesthesia.  The flap was excised through the skin and subcutaneous tissue down to the layer of the underlying musculature.  The pedicle flap was carefully excised within this deep plane to maintain its blood supply.  The edges of the donor site were undermined.   The donor site was closed in a primary fashion.  The pedicle was then rotated into position and sutured.  Once the tube was sutured into place, adequate blood supply was confirmed with blanching and refill.  The pedicle was then wrapped with xeroform gauze and dressed appropriately with a telfa and gauze bandage to ensure continued blood supply and protect the attached pedicle.
Consent 2/Introductory Paragraph: Mohs surgery was explained to the patient and consent was obtained. The risks, benefits and alternatives to therapy were discussed in detail. Specifically, the risks of infection, scarring, bleeding, prolonged wound healing, incomplete removal, allergy to anesthesia, nerve injury and recurrence were addressed. Prior to the procedure, the treatment site was clearly identified and confirmed by the patient. All components of Universal Protocol/PAUSE Rule completed.
Unna Boot Text: An Unna boot was placed to help immobilize the limb and facilitate more rapid healing.
Provider Procedure Text (A): After obtaining clear surgical margins the defect was repaired by another provider.
Otolaryngologist Procedure Text (E): After obtaining clear surgical margins the patient was sent to otolaryngology for surgical repair.  The patient understands they will receive post-surgical care and follow-up from the referring physician's office.
Closure 4 Information: This tab is for additional flaps and grafts above and beyond our usual structured repairs.  Please note if you enter information here it will not currently bill and you will need to add the billing information manually.
Trilobed Flap Text: The defect edges were debeveled with a #15 scalpel blade.  Given the location of the defect and the proximity to free margins a trilobed flap was deemed most appropriate.  Using a sterile surgical marker, an appropriate trilobed flap drawn around the defect.    The area thus outlined was incised deep to adipose tissue with a #15 scalpel blade.  The skin margins were undermined to an appropriate distance in all directions utilizing iris scissors.
Partial Purse String (Simple) Text: Given the location of the defect and the characteristics of the surrounding skin a simple purse string closure was deemed most appropriate.  Undermining was performed circumfirentially around the surgical defect.  A purse string suture was then placed and tightened. Wound tension only allowed a partial closure of the circular defect.
Postop Diagnosis: same
Muscle Hinge Flap Text: The defect edges were debeveled with a #15 scalpel blade.  Given the size, depth and location of the defect and the proximity to free margins a muscle hinge flap was deemed most appropriate.  Using a sterile surgical marker, an appropriate hinge flap was drawn incorporating the defect. The area thus outlined was incised with a #15 scalpel blade.  The skin margins were undermined to an appropriate distance in all directions utilizing iris scissors.
Mohs Rapid Report Verbiage: The area of clinically evident tumor was marked with skin marking ink and appropriately hatched.  The initial incision was made following the Mohs approach through the skin.  The specimen was taken to the lab, divided into the necessary number of pieces, chromacoded and processed according to the Mohs protocol.  This was repeated in successive stages until a tumor free defect was achieved.
Post-Care Instructions: I reviewed with the patient in detail post-care instructions. Patient is not to engage in any heavy lifting, exercise, or swimming for the next 14 days. Should the patient develop any fevers, chills, bleeding, severe pain patient will contact the office immediately.
Cheek-To-Nose Interpolation Flap Text: A decision was made to reconstruct the defect utilizing an interpolation axial flap and a staged reconstruction.  A telfa template was made of the defect.  This telfa template was then used to outline the Cheek-To-Nose Interpolation flap.  The donor area for the pedicle flap was then injected with anesthesia.  The flap was excised through the skin and subcutaneous tissue down to the layer of the underlying musculature.  The interpolation flap was carefully excised within this deep plane to maintain its blood supply.  The edges of the donor site were undermined.   The donor site was closed in a primary fashion.  The pedicle was then rotated into position and sutured.  Once the tube was sutured into place, adequate blood supply was confirmed with blanching and refill.  The pedicle was then wrapped with xeroform gauze and dressed appropriately with a telfa and gauze bandage to ensure continued blood supply and protect the attached pedicle.
Hemostasis: Electrocautery
Mohs Case Number: 
Advancement Flap (Single) Text: The defect edges were debeveled with a #15 scalpel blade.  Given the location of the defect and the proximity to free margins a single advancement flap was deemed most appropriate.  Using a sterile surgical marker, an appropriate advancement flap was drawn incorporating the defect and placing the expected incisions within the relaxed skin tension lines where possible.    The area thus outlined was incised deep to adipose tissue with a #15 scalpel blade.  The skin margins were undermined to an appropriate distance in all directions utilizing iris scissors.
Repair Type: Complex Repair
Advancement Flap (Double) Text: The defect edges were debeveled with a #15 scalpel blade.  Given the location of the defect and the proximity to free margins a double advancement flap was deemed most appropriate.  Using a sterile surgical marker, the appropriate advancement flaps were drawn incorporating the defect and placing the expected incisions within the relaxed skin tension lines where possible.    The area thus outlined was incised deep to adipose tissue with a #15 scalpel blade.  The skin margins were undermined to an appropriate distance in all directions utilizing iris scissors.
W Plasty Text: The lesion was extirpated to the level of the fat with a #15 scalpel blade.  Given the location of the defect, shape of the defect and the proximity to free margins a W-plasty was deemed most appropriate for repair.  Using a sterile surgical marker, the appropriate transposition arms of the W-plasty were drawn incorporating the defect and placing the expected incisions within the relaxed skin tension lines where possible.    The area thus outlined was incised deep to adipose tissue with a #15 scalpel blade.  The skin margins were undermined to an appropriate distance in all directions utilizing iris scissors.  The opposing transposition arms were then transposed into place in opposite direction and anchored with interrupted buried subcutaneous sutures.
Manual Repair Warning Statement: We plan on removing the manually selected variable below in favor of our much easier automatic structured text blocks found in the previous tab. We decided to do this to help make the flow better and give you the full power of structured data. Manual selection is never going to be ideal in our platform and I would encourage you to avoid using manual selection from this point on, especially since I will be sunsetting this feature. It is important that you do one of two things with the customized text below. First, you can save all of the text in a word file so you can have it for future reference. Second, transfer the text to the appropriate area in the Library tab. Lastly, if there is a flap or graft type which we do not have you need to let us know right away so I can add it in before the variable is hidden. No need to panic, we plan to give you roughly 6 months to make the change.
Graft Basting Suture (Optional): 5-0 Fast Absorbing Gut
Stage 1: Number Of Blocks?: 1
M-Plasty Complex Repair Preamble Text (Leave Blank If You Do Not Want): Extensive wide undermining was performed.
Mauc Instructions: By selecting yes to the question below the MAUC number will be added into the note.  This will be calculated automatically based on the diagnosis chosen, the size entered, the body zone selected (H,M,L) and the specific indications you chose. You will also have the option to override the Mohs AUC if you disagree with the automatically calculated number and this option is found in the Case Summary tab.
Bi-Rhombic Flap Text: The defect edges were debeveled with a #15 scalpel blade.  Given the location of the defect and the proximity to free margins a bi-rhombic flap was deemed most appropriate.  Using a sterile surgical marker, an appropriate rhombic flap was drawn incorporating the defect. The area thus outlined was incised deep to adipose tissue with a #15 scalpel blade.  The skin margins were undermined to an appropriate distance in all directions utilizing iris scissors.
Detail Level: Detailed
Epidermal Autograft Text: The defect edges were debeveled with a #15 scalpel blade.  Given the location of the defect, shape of the defect and the proximity to free margins an epidermal autograft was deemed most appropriate.  Using a sterile surgical marker, the primary defect shape was transferred to the donor site. The epidermal graft was then harvested.  The skin graft was then placed in the primary defect and oriented appropriately.
Deep Sutures: 5-0 Vicryl
X Size Of Lesion In Cm (Optional): 0.4
O-T Advancement Flap Text: The defect edges were debeveled with a #15 scalpel blade.  Given the location of the defect, shape of the defect and the proximity to free margins an O-T advancement flap was deemed most appropriate.  Using a sterile surgical marker, an appropriate advancement flap was drawn incorporating the defect and placing the expected incisions within the relaxed skin tension lines where possible.    The area thus outlined was incised deep to adipose tissue with a #15 scalpel blade.  The skin margins were undermined to an appropriate distance in all directions utilizing iris scissors.
Double O-Z Plasty Text: The defect edges were debeveled with a #15 scalpel blade.  Given the location of the defect, shape of the defect and the proximity to free margins a Double O-Z plasty (double transposition flap) was deemed most appropriate.  Using a sterile surgical marker, the appropriate transposition flaps were drawn incorporating the defect and placing the expected incisions within the relaxed skin tension lines where possible. The area thus outlined was incised deep to adipose tissue with a #15 scalpel blade.  The skin margins were undermined to an appropriate distance in all directions utilizing iris scissors.  Hemostasis was achieved with electrocautery.  The flaps were then transposed into place, one clockwise and the other counterclockwise, and anchored with interrupted buried subcutaneous sutures.
Unique Flap 1 Text: A decision was made to reconstruct the defect utilizing a myocutaneous Island pedicle Flap based on the levator labii superioris muscle.  A telfa template was made of the defect.  This telfa template was then used to outline the myocutaneous flap, based along the meilolabial fold.  The donor area for the pedicle flap was then injected with anesthesia.  The flap was excised through the skin and subcutaneous tissue down to the layer of the underlying musculature.  The myocutaneous flap was carefully excised within this deep plane to maintain its blood supply. Based on the muscle. The edges of the donor site were undermined.   The donor site was closed in a primary fashion to the point of transposition.  The pedicle was then transposed into position and sutured.  Once the flap was sutured into place, adequate blood supply was confirmed with blanching and refill.
Information: Selecting Yes will display possible errors in your note based on the variables you have selected. This validation is only offered as a suggestion for you. PLEASE NOTE THAT THE VALIDATION TEXT WILL BE REMOVED WHEN YOU FINALIZE YOUR NOTE. IF YOU WANT TO FAX A PRELIMINARY NOTE YOU WILL NEED TO TOGGLE THIS TO 'NO' IF YOU DO NOT WANT IT IN YOUR FAXED NOTE.
Consent (Ear)/Introductory Paragraph: The rationale for Mohs was explained to the patient and consent was obtained. The risks, benefits and alternatives to therapy were discussed in detail. Specifically, the risks of ear deformity, infection, scarring, bleeding, prolonged wound healing, incomplete removal, allergy to anesthesia, nerve injury and recurrence were addressed. Prior to the procedure, the treatment site was clearly identified and confirmed by the patient. All components of Universal Protocol/PAUSE Rule completed.
Ftsg Text: The defect edges were debeveled with a #15 scalpel blade.  Given the location of the defect, shape of the defect and the proximity to free margins a full thickness skin graft was deemed most appropriate.  Using a sterile surgical marker, the primary defect shape was transferred to the donor site. The area thus outlined was incised deep to adipose tissue with a #15 scalpel blade.  The harvested graft was then trimmed of adipose tissue until only dermis and epidermis was left.  The skin margins of the secondary defect were undermined to an appropriate distance in all directions utilizing iris scissors.  The secondary defect was closed with interrupted buried subcutaneous sutures.  The skin edges were then re-apposed with running  sutures.  The skin graft was then placed in the primary defect and oriented appropriately.
Bcc Infiltrative Histology Text: There were numerous aggregates of basaloid cells demonstrating an infiltrative pattern.
Closure 2 Information: This tab is for additional flaps and grafts, including complex repair and grafts and complex repair and flaps. You can also specify a different location for the additional defect, if the location is the same you do not need to select a new one. We will insert the automated text for the repair you select below just as we do for solitary flaps and grafts. Please note that at this time if you select a location with a different insurance zone you will need to override the ICD10 and CPT if appropriate.
Mercedes Flap Text: The defect edges were debeveled with a #15 scalpel blade.  Given the location of the defect, shape of the defect and the proximity to free margins a Mercedes flap was deemed most appropriate.  Using a sterile surgical marker, an appropriate advancement flap was drawn incorporating the defect and placing the expected incisions within the relaxed skin tension lines where possible. The area thus outlined was incised deep to adipose tissue with a #15 scalpel blade.  The skin margins were undermined to an appropriate distance in all directions utilizing iris scissors.
Island Pedicle Flap-Requiring Vessel Identification Text: The defect edges were debeveled with a #15 scalpel blade.  Given the location of the defect, shape of the defect and the proximity to free margins an island pedicle advancement flap was deemed most appropriate.  Using a sterile surgical marker, an appropriate advancement flap was drawn, based on the axial vessel mentioned above, incorporating the defect, outlining the appropriate donor tissue and placing the expected incisions within the relaxed skin tension lines where possible.    The area thus outlined was incised deep to adipose tissue with a #15 scalpel blade.  The skin margins were undermined to an appropriate distance in all directions around the primary defect and laterally outward around the island pedicle utilizing iris scissors.  There was minimal undermining beneath the pedicle flap.
Area L Indication Text: Tumors in this location are included in Area L (trunk and extremities).  Mohs surgery is indicated for larger tumors, or tumors with aggressive histologic features, in these anatomic locations.
Unique Flap 1 Name: Myocutaneous Island pedicle Flap
Modified Advancement Flap Text: The defect edges were debeveled with a #15 scalpel blade.  Given the location of the defect, shape of the defect and the proximity to free margins a modified advancement flap was deemed most appropriate.  Using a sterile surgical marker, an appropriate advancement flap was drawn incorporating the defect and placing the expected incisions within the relaxed skin tension lines where possible.    The area thus outlined was incised deep to adipose tissue with a #15 scalpel blade.  The skin margins were undermined to an appropriate distance in all directions utilizing iris scissors.
Double Island Pedicle Flap Text: The defect edges were debeveled with a #15 scalpel blade.  Given the location of the defect, shape of the defect and the proximity to free margins a double island pedicle advancement flap was deemed most appropriate.  Using a sterile surgical marker, an appropriate advancement flap was drawn incorporating the defect, outlining the appropriate donor tissue and placing the expected incisions within the relaxed skin tension lines where possible.    The area thus outlined was incised deep to adipose tissue with a #15 scalpel blade.  The skin margins were undermined to an appropriate distance in all directions around the primary defect and laterally outward around the island pedicle utilizing iris scissors.  There was minimal undermining beneath the pedicle flap.
Eye Protection Verbiage: Before proceeding with the stage, a plastic scleral shield was inserted. The globe was anesthetized with a few drops of 1% lidocaine with 1:100,000 epinephrine. Then, an appropriate sized scleral shield was chosen and coated with lacrilube ointment. The shield was gently inserted and left in place for the duration of each stage. After the stage was completed, the shield was gently removed.
Complex Repair And Graft Additional Text (Will Appearing After The Standard Complex Repair Text): The complex repair was not sufficient to completely close the primary defect. The remaining additional defect was repaired with the graft mentioned below.
Epidermal Closure: running cuticular
No Repair - Repaired With Adjacent Surgical Defect Text (Leave Blank If You Do Not Want): After obtaining clear surgical margins the defect was repaired concurrently with another surgical defect which was in close approximation.
Consent 3/Introductory Paragraph: I gave the patient a chance to ask questions they had about the procedure.  Following this I explained the Mohs procedure and consent was obtained. The risks, benefits and alternatives to therapy were discussed in detail. Specifically, the risks of infection, scarring, bleeding, prolonged wound healing, incomplete removal, allergy to anesthesia, nerve injury and recurrence were addressed. Prior to the procedure, the treatment site was clearly identified and confirmed by the patient. All components of Universal Protocol/PAUSE Rule completed.
Paramedian Forehead Flap Text: A decision was made to reconstruct the defect utilizing an interpolation axial flap and a staged reconstruction.  A telfa template was made of the defect.  This telfa template was then used to outline the paramedian forehead pedicle flap.  The donor area for the pedicle flap was then injected with anesthesia.  The flap was excised through the skin and subcutaneous tissue down to the layer of the underlying musculature.  The pedicle flap was carefully excised within this deep plane to maintain its blood supply.  The edges of the donor site were undermined.   The donor site was closed in a primary fashion.  The pedicle was then rotated into position and sutured.  Once the tube was sutured into place, adequate blood supply was confirmed with blanching and refill.  The pedicle was then wrapped with xeroform gauze and dressed appropriately with a telfa and gauze bandage to ensure continued blood supply and protect the attached pedicle.
Wound Care: Aquaphor
Epidermal Closure Graft Donor Site (Optional): simple interrupted
Spiral Flap Text: The defect edges were debeveled with a #15 scalpel blade.  Given the location of the defect, shape of the defect and the proximity to free margins a spiral flap was deemed most appropriate.  Using a sterile surgical marker, an appropriate rotation flap was drawn incorporating the defect and placing the expected incisions within the relaxed skin tension lines where possible. The area thus outlined was incised deep to adipose tissue with a #15 scalpel blade.  The skin margins were undermined to an appropriate distance in all directions utilizing iris scissors.
Dorsal Nasal Flap Text: The defect edges were debeveled with a #15 scalpel blade.  Given the location of the defect and the proximity to free margins a dorsal nasal flap,based upon the glabellar folds, was deemed most appropriate.  Using a sterile surgical marker, an appropriate dorsal nasal flap was drawn around the defect.    The area thus outlined was incised deep to adipose tissue with a #15 scalpel blade.  The skin margins were undermined to an appropriate distance in all directions utilizing iris scissors.
Partial Purse String (Intermediate) Text: Given the location of the defect and the characteristics of the surrounding skin an intermediate purse string closure was deemed most appropriate.  Undermining was performed circumfirentially around the surgical defect.  A purse string suture was then placed and tightened. Wound tension only allowed a partial closure of the circular defect.
Home Suture Removal Text: Patient was provided instructions on removing sutures and will remove their sutures at home.  If they have any questions or difficulties they will call the office.
Location Indication Override (Is Already Calculated Based On Selected Body Location): Area M
Interpolation Flap Text: A decision was made to reconstruct the defect utilizing an interpolation axial flap and a staged reconstruction.  A telfa template was made of the defect.  This telfa template was then used to outline the interpolation flap.  The donor area for the pedicle flap was then injected with anesthesia.  The flap was excised through the skin and subcutaneous tissue down to the layer of the underlying musculature.  The interpolation flap was carefully excised within this deep plane to maintain its blood supply.  The edges of the donor site were undermined.   The donor site was closed in a primary fashion.  The pedicle was then rotated into position and sutured.  Once the tube was sutured into place, adequate blood supply was confirmed with blanching and refill.  The pedicle was then wrapped with xeroform gauze and dressed appropriately with a telfa and gauze bandage to ensure continued blood supply and protect the attached pedicle.
Medical Necessity Statement: Based on my medical judgement, Mohs surgery is the most appropriate treatment for this cancer compared to other treatments.
Same Histology In Subsequent Stages Text: The pattern and morphology of the tumor is as described in the first stage.
Melolabial Transposition Flap Text: The defect edges were debeveled with a #15 scalpel blade.  Given the location of the defect and the proximity to free margins a melolabial flap was deemed most appropriate.  Using a sterile surgical marker, an appropriate melolabial transposition flap was drawn incorporating the defect.    The area thus outlined was incised deep to adipose tissue with a #15 scalpel blade.  The skin margins were undermined to an appropriate distance in all directions utilizing iris scissors.
Banner Transposition Flap Text: The defect edges were debeveled with a #15 scalpel blade.  Given the location of the defect and the proximity to free margins a Banner transposition flap was deemed most appropriate.  Using a sterile surgical marker, an appropriate flap drawn around the defect. The area thus outlined was incised deep to adipose tissue with a #15 scalpel blade.  The skin margins were undermined to an appropriate distance in all directions utilizing iris scissors.
Xenograft Text: The defect edges were debeveled with a #15 scalpel blade.  Given the location of the defect, shape of the defect and the proximity to free margins a xenograft was deemed most appropriate.  The graft was then trimmed to fit the size of the defect.  The graft was then placed in the primary defect and oriented appropriately.
Z Plasty Text: The lesion was extirpated to the level of the fat with a #15 scalpel blade.  Given the location of the defect, shape of the defect and the proximity to free margins a Z-plasty was deemed most appropriate for repair.  Using a sterile surgical marker, the appropriate transposition arms of the Z-plasty were drawn incorporating the defect and placing the expected incisions within the relaxed skin tension lines where possible.    The area thus outlined was incised deep to adipose tissue with a #15 scalpel blade.  The skin margins were undermined to an appropriate distance in all directions utilizing iris scissors.  The opposing transposition arms were then transposed into place in opposite direction and anchored with interrupted buried subcutaneous sutures.
Burow's Advancement Flap Text: The defect edges were debeveled with a #15 scalpel blade.  Given the location of the defect and the proximity to free margins a Burow's advancement flap was deemed most appropriate.  Using a sterile surgical marker, the appropriate advancement flap was drawn incorporating the defect and placing the expected incisions within the relaxed skin tension lines where possible.    The area thus outlined was incised deep to adipose tissue with a #15 scalpel blade.  The skin margins were undermined to an appropriate distance in all directions utilizing iris scissors.
Helical Rim Advancement Flap Text: The defect edges were debeveled with a #15 blade scalpel.  Given the location of the defect and the proximity to free margins (helical rim) a double helical rim advancement flap was deemed most appropriate.  Using a sterile surgical marker, the appropriate advancement flaps were drawn incorporating the defect and placing the expected incisions between the helical rim and antihelix where possible.  The area thus outlined was incised through and through with a #15 scalpel blade.  With a skin hook and iris scissors, the flaps were gently and sharply undermined and freed up.
Dermal Autograft Text: The defect edges were debeveled with a #15 scalpel blade.  Given the location of the defect, shape of the defect and the proximity to free margins a dermal autograft was deemed most appropriate.  Using a sterile surgical marker, the primary defect shape was transferred to the donor site. The area thus outlined was incised deep to adipose tissue with a #15 scalpel blade.  The harvested graft was then trimmed of adipose and epidermal tissue until only dermis was left.  The skin graft was then placed in the primary defect and oriented appropriately.
S Plasty Text: Given the location and shape of the defect, and the orientation of relaxed skin tension lines, an S-plasty was deemed most appropriate for repair.  Using a sterile surgical marker, the appropriate outline of the S-plasty was drawn, incorporating the defect and placing the expected incisions within the relaxed skin tension lines where possible.  The area thus outlined was incised deep to adipose tissue with a #15 scalpel blade.  The skin margins were undermined to an appropriate distance in all directions utilizing iris scissors. The skin flaps were advanced over the defect.  The opposing margins were then approximated with interrupted buried subcutaneous sutures.
Unique Flap 2 Text: A decision was made to reconstruct the defect utilizing a Peng Flap (Bilateral Advancement Rotation Flap). Given the location of the defect and the proximity to free margins, this flap was deemed most appropriate.  Using a sterile surgical marker, the appropriate rotation flaps were drawn incorporating the defect and placing the expected incisions within the relaxed skin tension lines where possible.    The area thus outlined was incised deep to adipose tissue with a #15 scalpel blade.  The skin margins were undermined to an appropriate distance in all directions utilizing iris scissors.
Anesthesia Type: 1% lidocaine with 1:100,000 epinephrine and a 1:10 solution of 8.4% sodium bicarbonate
Donor Site Anesthesia Type: same as repair anesthesia
O-L Flap Text: The defect edges were debeveled with a #15 scalpel blade.  Given the location of the defect, shape of the defect and the proximity to free margins an O-L flap was deemed most appropriate.  Using a sterile surgical marker, an appropriate advancement flap was drawn incorporating the defect and placing the expected incisions within the relaxed skin tension lines where possible.    The area thus outlined was incised deep to adipose tissue with a #15 scalpel blade.  The skin margins were undermined to an appropriate distance in all directions utilizing iris scissors.
Split-Thickness Skin Graft Text: The defect edges were debeveled with a #15 scalpel blade.  Given the location of the defect, shape of the defect and the proximity to free margins a split thickness skin graft was deemed most appropriate.  Using a sterile surgical marker, the primary defect shape was transferred to the donor site. The split thickness graft was then harvested.  The skin graft was then placed in the primary defect and oriented appropriately.
Consent (Nose)/Introductory Paragraph: The rationale for Mohs was explained to the patient and consent was obtained. The risks, benefits and alternatives to therapy were discussed in detail. Specifically, the risks of nasal deformity, changes in the flow of air through the nose, infection, scarring, bleeding, prolonged wound healing, incomplete removal, allergy to anesthesia, nerve injury and recurrence were addressed. Prior to the procedure, the treatment site was clearly identified and confirmed by the patient. All components of Universal Protocol/PAUSE Rule completed.
Keystone Flap Text: The defect edges were debeveled with a #15 scalpel blade.  Given the location of the defect, shape of the defect a keystone flap was deemed most appropriate.  Using a sterile surgical marker, an appropriate keystone flap was drawn incorporating the defect, outlining the appropriate donor tissue and placing the expected incisions within the relaxed skin tension lines where possible. The area thus outlined was incised deep to adipose tissue with a #15 scalpel blade.  The skin margins were undermined to an appropriate distance in all directions around the primary defect and laterally outward around the flap utilizing iris scissors.
Unique Flap 2 Name: Peng Flap
Mucosal Advancement Flap Text: Given the location of the defect, shape of the defect and the proximity to free margins a mucosal advancement flap was deemed most appropriate. Incisions were made with a 15 blade scalpel in the appropriate fashion along the cutaneous vermilion border and the mucosal lip. The remaining actinically damaged mucosal tissue was excised.  The mucosal advancement flap was then elevated to the gingival sulcus with care taken to preserve the neurovascular structures and advanced into the primary defect. Care was taken to ensure that precise realignment of the vermilion border was achieved.
Mohs Method Verbiage: An incision at a 45 degree angle following the standard Mohs approach was done and the specimen was harvested as a microscopic controlled layer.
Consent (Temporal Branch)/Introductory Paragraph: The rationale for Mohs was explained to the patient and consent was obtained. The risks, benefits and alternatives to therapy were discussed in detail. Specifically, the risks of damage to the temporal branch of the facial nerve, infection, scarring, bleeding, prolonged wound healing, incomplete removal, allergy to anesthesia, and recurrence were addressed. Prior to the procedure, the treatment site was clearly identified and confirmed by the patient. All components of Universal Protocol/PAUSE Rule completed.
Cheiloplasty (Less Than 50%) Text: A decision was made to reconstruct the defect with a  cheiloplasty.  The defect was undermined extensively.  Additional obicularis oris muscle was excised with a 15 blade scalpel.  The defect was converted into a full thickness wedge, of less than 50% of the vertical height of the lip, to facilite a better cosmetic result.  Small vessels were then tied off with 5-0 monocyrl. The obicularis oris, superficial fascia, adipose and dermis were then reapproximated.  After the deeper layers were approximated the epidermis was reapproximated with particular care given to realign the vermilion border.
Island Pedicle Flap Text: The defect edges were debeveled with a #15 scalpel blade.  Given the location of the defect, shape of the defect and the proximity to free margins an island pedicle advancement flap was deemed most appropriate.  Using a sterile surgical marker, an appropriate advancement flap was drawn incorporating the defect, outlining the appropriate donor tissue and placing the expected incisions within the relaxed skin tension lines where possible.    The area thus outlined was incised deep to adipose tissue with a #15 scalpel blade.  The skin margins were undermined to an appropriate distance in all directions around the primary defect and laterally outward around the island pedicle utilizing iris scissors.  There was minimal undermining beneath the pedicle flap.
Consent Type: Consent 1 (Standard)
Localized Dermabrasion With Wire Brush Text: The patient was draped in routine manner.  Localized dermabrasion using 3 x 17 mm wire brush was performed in routine manner to papillary dermis. This spot dermabrasion is being performed to complete skin cancer reconstruction. It also will eliminate the other sun damaged precancerous cells that are known to be part of the regional effect of a lifetime's worth of sun exposure. This localized dermabrasion is therapeutic and should not be considered cosmetic in any regard.
Star Wedge Flap Text: The defect edges were debeveled with a #15 scalpel blade.  Given the location of the defect, shape of the defect and the proximity to free margins a star wedge flap was deemed most appropriate.  Using a sterile surgical marker, an appropriate rotation flap was drawn incorporating the defect and placing the expected incisions within the relaxed skin tension lines where possible. The area thus outlined was incised deep to adipose tissue with a #15 scalpel blade.  The skin margins were undermined to an appropriate distance in all directions utilizing iris scissors.
Melolabial Interpolation Flap Text: A decision was made to reconstruct the defect utilizing an interpolation axial flap and a staged reconstruction.  A telfa template was made of the defect.  This telfa template was then used to outline the melolabial interpolation flap.  The donor area for the pedicle flap was then injected with anesthesia.  The flap was excised through the skin and subcutaneous tissue down to the layer of the underlying musculature.  The pedicle flap was carefully excised within this deep plane to maintain its blood supply.  The edges of the donor site were undermined.   The donor site was closed in a primary fashion.  The pedicle was then rotated into position and sutured.  Once the tube was sutured into place, adequate blood supply was confirmed with blanching and refill.  The pedicle was then wrapped with xeroform gauze and dressed appropriately with a telfa and gauze bandage to ensure continued blood supply and protect the attached pedicle.
Alternatives Discussed Intro (Do Not Add Period): I discussed alternative treatments to Mohs surgery and specifically discussed the risks and benefits of
Estimated Blood Loss (Cc): less than 5 cc
No Residual Tumor Seen Histology Text: There were no malignant cells seen in the sections examined.
Previous Accession (Optional): [id number]
Bilobed Flap Text: The defect edges were debeveled with a #15 scalpel blade.  Given the location of the defect and the proximity to free margins a bilobe flap was deemed most appropriate.  Using a sterile surgical marker, an appropriate bilobe flap drawn around the defect.    The area thus outlined was incised deep to adipose tissue with a #15 scalpel blade.  The skin margins were undermined to an appropriate distance in all directions utilizing iris scissors.
Purse String (Simple) Text: Given the location of the defect and the characteristics of the surrounding skin a purse string closure was deemed most appropriate.  Undermining was performed circumfirentially around the surgical defect.  A purse string suture was then placed and tightened.
Wound Care (No Sutures): Petrolatum
Rhombic Flap Text: The defect edges were debeveled with a #15 scalpel blade.  Given the location of the defect and the proximity to free margins a rhombic flap was deemed most appropriate.  Using a sterile surgical marker, an appropriate rhombic flap was drawn incorporating the defect.    The area thus outlined was incised deep to adipose tissue with a #15 scalpel blade.  The skin margins were undermined to an appropriate distance in all directions utilizing iris scissors.
Bilateral Helical Rim Advancement Flap Text: The defect edges were debeveled with a #15 blade scalpel.  Given the location of the defect and the proximity to free margins (helical rim) a bilateral helical rim advancement flap was deemed most appropriate.  Using a sterile surgical marker, the appropriate advancement flaps were drawn incorporating the defect and placing the expected incisions between the helical rim and antihelix where possible.  The area thus outlined was incised through and through with a #15 scalpel blade.  With a skin hook and iris scissors, the flaps were gently and sharply undermined and freed up.
Skin Substitute Text: The defect edges were debeveled with a #15 scalpel blade.  Given the location of the defect, shape of the defect and the proximity to free margins a skin substitute graft was deemed most appropriate.  The graft material was trimmed to fit the size of the defect. The graft was then placed in the primary defect and oriented appropriately.
Crescentic Advancement Flap Text: The defect edges were debeveled with a #15 scalpel blade.  Given the location of the defect and the proximity to free margins a crescentic advancement flap was deemed most appropriate.  Using a sterile surgical marker, the appropriate advancement flap was drawn incorporating the defect and placing the expected incisions within the relaxed skin tension lines where possible.    The area thus outlined was incised deep to adipose tissue with a #15 scalpel blade.  The skin margins were undermined to an appropriate distance in all directions utilizing iris scissors.
V-Y Plasty Text: The defect edges were debeveled with a #15 scalpel blade.  Given the location of the defect, shape of the defect and the proximity to free margins an V-Y advancement flap was deemed most appropriate.  Using a sterile surgical marker, an appropriate advancement flap was drawn incorporating the defect and placing the expected incisions within the relaxed skin tension lines where possible.    The area thus outlined was incised deep to adipose tissue with a #15 scalpel blade.  The skin margins were undermined to an appropriate distance in all directions utilizing iris scissors.
Unique Flap 3 Text: The defect edges were debeveled with a #15 scalpel blade.  Given the location of the defect, shape of the defect and the proximity to free margins a Mercedes (double advancement flap) was deemed most appropriate.  Using a sterile surgical marker, the appropriate transposition flaps were drawn incorporating the defect and placing the expected incisions within the relaxed skin tension lines where possible.    The area thus outlined was incised deep to adipose tissue with a #15 scalpel blade.  The skin margins were undermined to an appropriate distance in all directions utilizing iris scissors.  Hemostasis was achieved with electrocautery.  The flaps were then advanced into the defect and anchored with interrupted buried subcutaneous sutures.
Consent (Lip)/Introductory Paragraph: The rationale for Mohs was explained to the patient and consent was obtained. The risks, benefits and alternatives to therapy were discussed in detail. Specifically, the risks of lip deformity, changes in the oral aperture, infection, scarring, bleeding, prolonged wound healing, incomplete removal, allergy to anesthesia, nerve injury and recurrence were addressed. Prior to the procedure, the treatment site was clearly identified and confirmed by the patient. All components of Universal Protocol/PAUSE Rule completed.
Cartilage Graft Text: The defect edges were debeveled with a #15 scalpel blade.  Given the location of the defect, shape of the defect, the fact the defect involved a full thickness cartilage defect a cartilage graft was deemed most appropriate.  An appropriate donor site was identified, cleansed, and anesthetized. The cartilage graft was then harvested and transferred to the recipient site, oriented appropriately and then sutured into place.  The secondary defect was then repaired using a primary closure.
Surgeon Performing Repair (Optional): Anastasiia
V-Y Flap Text: The defect edges were debeveled with a #15 scalpel blade.  Given the location of the defect, shape of the defect and the proximity to free margins a V-Y flap was deemed most appropriate.  Using a sterile surgical marker, an appropriate advancement flap was drawn incorporating the defect and placing the expected incisions within the relaxed skin tension lines where possible.    The area thus outlined was incised deep to adipose tissue with a #15 scalpel blade.  The skin margins were undermined to an appropriate distance in all directions utilizing iris scissors.
O-T Plasty Text: The defect edges were debeveled with a #15 scalpel blade.  Given the location of the defect, shape of the defect and the proximity to free margins an O-T plasty was deemed most appropriate.  Using a sterile surgical marker, an appropriate O-T plasty was drawn incorporating the defect and placing the expected incisions within the relaxed skin tension lines where possible.    The area thus outlined was incised deep to adipose tissue with a #15 scalpel blade.  The skin margins were undermined to an appropriate distance in all directions utilizing iris scissors.
Area H Indication Text: Tumors in this location are included in Area H (eyelids, eyebrows, nose, lips, chin, ear, pre-auricular, post-auricular, temple, genitalia, hands, feet, ankles and areola).  Tissue conservation is critical in these anatomic locations.
Unique Flap 3 Name: Mercedes Flap
Suture Removal: 7 days
Non-Graft Cartilage Fenestration Text: The cartilage was fenestrated with a 2mm punch biopsy to help facilitate healing.
Consent (Spinal Accessory)/Introductory Paragraph: The rationale for Mohs was explained to the patient and consent was obtained. The risks, benefits and alternatives to therapy were discussed in detail. Specifically, the risks of damage to the spinal accessory nerve, infection, scarring, bleeding, prolonged wound healing, incomplete removal, allergy to anesthesia, and recurrence were addressed. Prior to the procedure, the treatment site was clearly identified and confirmed by the patient. All components of Universal Protocol/PAUSE Rule completed.
Ear Wedge Repair Text: A wedge excision was completed by carrying down an excision through the full thickness of the ear and cartilage with an inward facing Burow's triangle. The wound was then closed in a layered fashion.
Graft Cartilage Fenestration Text: The cartilage was fenestrated with a 2mm punch biopsy to help facilitate graft survival and healing.
Cheiloplasty (Complex) Text: A decision was made to reconstruct the defect with a  cheiloplasty.  The defect was undermined extensively.  Additional obicularis oris muscle was excised with a 15 blade scalpel.  The defect was converted into a full thickness wedge to facilite a better cosmetic result.  Small vessels were then tied off with 5-0 monocyrl. The obicularis oris, superficial fascia, adipose and dermis were then reapproximated.  After the deeper layers were approximated the epidermis was reapproximated with particular care given to realign the vermilion border.
Consent (Marginal Mandibular)/Introductory Paragraph: The rationale for Mohs was explained to the patient and consent was obtained. The risks, benefits and alternatives to therapy were discussed in detail. Specifically, the risks of damage to the marginal mandibular branch of the facial nerve, infection, scarring, bleeding, prolonged wound healing, incomplete removal, allergy to anesthesia, and recurrence were addressed. Prior to the procedure, the treatment site was clearly identified and confirmed by the patient. All components of Universal Protocol/PAUSE Rule completed.
Graft Donor Site Bandage (Optional-Leave Blank If You Don't Want In Note): Aquaphor and telefa placed on wound. Pressure dressing applied to donor site
Island Pedicle Flap With Canthal Suspension Text: The defect edges were debeveled with a #15 scalpel blade.  Given the location of the defect, shape of the defect and the proximity to free margins an island pedicle advancement flap was deemed most appropriate.  Using a sterile surgical marker, an appropriate advancement flap was drawn incorporating the defect, outlining the appropriate donor tissue and placing the expected incisions within the relaxed skin tension lines where possible. The area thus outlined was incised deep to adipose tissue with a #15 scalpel blade.  The skin margins were undermined to an appropriate distance in all directions around the primary defect and laterally outward around the island pedicle utilizing iris scissors.  There was minimal undermining beneath the pedicle flap. A suspension suture was placed in the canthal tendon to prevent tension and prevent ectropion.
Hatchet Flap Text: The defect edges were debeveled with a #15 scalpel blade.  Given the location of the defect, shape of the defect and the proximity to free margins a hatchet flap based from the glabella was deemed most appropriate.  Using a sterile surgical marker, an appropriate glabellar hatchet flap was drawn incorporating the defect and placing the expected incisions within the relaxed skin tension lines where possible.    The area thus outlined was incised deep to adipose tissue with a #15 scalpel blade.  The skin margins were undermined to an appropriate distance in all directions utilizing iris scissors.
Surgeon/Pathologist Verbiage (Will Incorporate Name Of Surgeon From Intro If Not Blank): operated in two distinct and integrated capacities as the surgeon and pathologist.
Tarsorrhaphy Text: A tarsorrhaphy was performed using Frost sutures.
Simple / Intermediate / Complex Repair - Final Wound Length In Cm: 3.4
Consent 1/Introductory Paragraph: The rationale for Mohs was explained to the patient and consent was obtained. The risks, benefits and alternatives to therapy were discussed in detail. Specifically, the risks of infection, scarring, bleeding, prolonged wound healing, incomplete removal, allergy to anesthesia, nerve injury and recurrence were addressed. Prior to the procedure, the treatment site was clearly identified and confirmed by the patient. All components of Universal Protocol/PAUSE Rule completed.
Mastoid Interpolation Flap Text: A decision was made to reconstruct the defect utilizing an interpolation axial flap and a staged reconstruction.  A telfa template was made of the defect.  This telfa template was then used to outline the mastoid interpolation flap.  The donor area for the pedicle flap was then injected with anesthesia.  The flap was excised through the skin and subcutaneous tissue down to the layer of the underlying musculature.  The pedicle flap was carefully excised within this deep plane to maintain its blood supply.  The edges of the donor site were undermined.   The donor site was closed in a primary fashion.  The pedicle was then rotated into position and sutured.  Once the tube was sutured into place, adequate blood supply was confirmed with blanching and refill.  The pedicle was then wrapped with xeroform gauze and dressed appropriately with a telfa and gauze bandage to ensure continued blood supply and protect the attached pedicle.
Inflammation Suggestive Of Cancer Camouflage Histology Text: There was a dense lymphocytic infiltrate which prevented adequate histologic evaluation of adjacent structures.
Repair Anesthesia Method: local infiltration
Graft Donor Site Epidermal Sutures (Optional): 5-0 Ethibond
Transposition Flap Text: The defect edges were debeveled with a #15 scalpel blade.  Given the location of the defect and the proximity to free margins a transposition flap was deemed most appropriate.  Using a sterile surgical marker, an appropriate transposition flap was drawn incorporating the defect.    The area thus outlined was incised deep to adipose tissue with a #15 scalpel blade.  The skin margins were undermined to an appropriate distance in all directions utilizing iris scissors.
Subsequent Stages Histo Method Verbiage: Using a similar technique to that described above, a thin layer of tissue was removed from all areas where tumor was visible on the previous stage.  The tissue was again oriented, mapped, dyed, and processed as above.
Purse String (Intermediate) Text: Given the location of the defect and the characteristics of the surrounding skin a purse string intermediate closure was deemed most appropriate.  Undermining was performed circumfirentially around the surgical defect.  A purse string suture was then placed and tightened.
Bilobed Transposition Flap Text: The defect edges were debeveled with a #15 scalpel blade.  Given the location of the defect and the proximity to free margins a bilobed transposition flap was deemed most appropriate.  Using a sterile surgical marker, an appropriate bilobe flap drawn around the defect.    The area thus outlined was incised deep to adipose tissue with a #15 scalpel blade.  The skin margins were undermined to an appropriate distance in all directions utilizing iris scissors.
Epidermal Sutures: 5-0 Ethilon
Cheek Interpolation Flap Text: A decision was made to reconstruct the defect utilizing an interpolation axial flap and a staged reconstruction.  A telfa template was made of the defect.  This telfa template was then used to outline the Cheek Interpolation flap.  The donor area for the pedicle flap was then injected with anesthesia.  The flap was excised through the skin and subcutaneous tissue down to the layer of the underlying musculature.  The interpolation flap was carefully excised within this deep plane to maintain its blood supply.  The edges of the donor site were undermined.   The donor site was closed in a primary fashion.  The pedicle was then rotated into position and sutured.  Once the tube was sutured into place, adequate blood supply was confirmed with blanching and refill.  The pedicle was then wrapped with xeroform gauze and dressed appropriately with a telfa and gauze bandage to ensure continued blood supply and protect the attached pedicle.

## 2019-03-04 ENCOUNTER — APPOINTMENT (RX ONLY)
Dept: URBAN - METROPOLITAN AREA CLINIC 36 | Facility: CLINIC | Age: 69
Setting detail: DERMATOLOGY
End: 2019-03-04

## 2019-03-04 DIAGNOSIS — Z48.02 ENCOUNTER FOR REMOVAL OF SUTURES: ICD-10-CM

## 2019-03-04 PROCEDURE — 99024 POSTOP FOLLOW-UP VISIT: CPT

## 2019-03-04 PROCEDURE — ? SUTURE REMOVAL (GLOBAL PERIOD)

## 2019-03-04 ASSESSMENT — LOCATION SIMPLE DESCRIPTION DERM: LOCATION SIMPLE: RIGHT FOREHEAD

## 2019-03-04 ASSESSMENT — LOCATION DETAILED DESCRIPTION DERM: LOCATION DETAILED: RIGHT SUPERIOR FOREHEAD

## 2019-03-04 ASSESSMENT — LOCATION ZONE DERM: LOCATION ZONE: FACE

## 2019-03-04 NOTE — PROCEDURE: SUTURE REMOVAL (GLOBAL PERIOD)
Add 23253 Cpt? (Important Note: In 2017 The Use Of 33029 Is Being Tracked By Cms To Determine Future Global Period Reimbursement For Global Periods): yes
Detail Level: Detailed

## 2019-03-13 DIAGNOSIS — E78.1 HYPERTRIGLYCERIDEMIA: ICD-10-CM

## 2019-03-13 RX ORDER — ATORVASTATIN CALCIUM 20 MG/1
TABLET, FILM COATED ORAL
Qty: 90 TAB | Refills: 1 | Status: SHIPPED | OUTPATIENT
Start: 2019-03-13 | End: 2019-08-23 | Stop reason: SDUPTHER

## 2019-03-25 DIAGNOSIS — I48.0 PAF (PAROXYSMAL ATRIAL FIBRILLATION) (HCC): ICD-10-CM

## 2019-05-06 ENCOUNTER — APPOINTMENT (RX ONLY)
Dept: URBAN - METROPOLITAN AREA CLINIC 36 | Facility: CLINIC | Age: 69
Setting detail: DERMATOLOGY
End: 2019-05-06

## 2019-05-06 DIAGNOSIS — L90.5 SCAR CONDITIONS AND FIBROSIS OF SKIN: ICD-10-CM

## 2019-05-06 PROCEDURE — ? ADDITIONAL NOTES

## 2019-05-06 PROCEDURE — 99024 POSTOP FOLLOW-UP VISIT: CPT

## 2019-05-06 NOTE — PROCEDURE: ADDITIONAL NOTES
Additional Notes: Subcision performed under scar to elevate depression using Nokor needle. 3 cc local placed prior. Tolerated well\\n2-3 mo for repeat prn

## 2019-08-13 ENCOUNTER — APPOINTMENT (RX ONLY)
Dept: URBAN - METROPOLITAN AREA CLINIC 31 | Facility: CLINIC | Age: 69
Setting detail: DERMATOLOGY
End: 2019-08-13

## 2019-08-13 DIAGNOSIS — L81.4 OTHER MELANIN HYPERPIGMENTATION: ICD-10-CM

## 2019-08-13 DIAGNOSIS — D18.0 HEMANGIOMA: ICD-10-CM

## 2019-08-13 DIAGNOSIS — Z71.89 OTHER SPECIFIED COUNSELING: ICD-10-CM

## 2019-08-13 DIAGNOSIS — L73.8 OTHER SPECIFIED FOLLICULAR DISORDERS: ICD-10-CM

## 2019-08-13 DIAGNOSIS — D22 MELANOCYTIC NEVI: ICD-10-CM

## 2019-08-13 DIAGNOSIS — Z86.007 PERSONAL HISTORY OF IN-SITU NEOPLASM OF SKIN: ICD-10-CM

## 2019-08-13 DIAGNOSIS — L72.0 EPIDERMAL CYST: ICD-10-CM

## 2019-08-13 DIAGNOSIS — L82.1 OTHER SEBORRHEIC KERATOSIS: ICD-10-CM

## 2019-08-13 PROBLEM — D22.5 MELANOCYTIC NEVI OF TRUNK: Status: ACTIVE | Noted: 2019-08-13

## 2019-08-13 PROBLEM — Z85.828 PERSONAL HISTORY OF OTHER MALIGNANT NEOPLASM OF SKIN: Status: ACTIVE | Noted: 2019-08-13

## 2019-08-13 PROBLEM — D18.01 HEMANGIOMA OF SKIN AND SUBCUTANEOUS TISSUE: Status: ACTIVE | Noted: 2019-08-13

## 2019-08-13 PROCEDURE — ? COUNSELING

## 2019-08-13 PROCEDURE — 99213 OFFICE O/P EST LOW 20 MIN: CPT

## 2019-08-13 PROCEDURE — ? OBSERVATION AND MEASURE

## 2019-08-13 ASSESSMENT — LOCATION DETAILED DESCRIPTION DERM
LOCATION DETAILED: LEFT RIB CAGE
LOCATION DETAILED: RIGHT DISTAL PRETIBIAL REGION
LOCATION DETAILED: RIGHT SUPERIOR FOREHEAD
LOCATION DETAILED: LEFT MEDIAL UPPER BACK
LOCATION DETAILED: EPIGASTRIC SKIN
LOCATION DETAILED: RIGHT CENTRAL TEMPLE
LOCATION DETAILED: RIGHT MEDIAL MALAR CHEEK

## 2019-08-13 ASSESSMENT — LOCATION SIMPLE DESCRIPTION DERM
LOCATION SIMPLE: ABDOMEN
LOCATION SIMPLE: RIGHT PRETIBIAL REGION
LOCATION SIMPLE: RIGHT FOREHEAD
LOCATION SIMPLE: RIGHT TEMPLE
LOCATION SIMPLE: RIGHT CHEEK
LOCATION SIMPLE: LEFT UPPER BACK

## 2019-08-13 ASSESSMENT — LOCATION ZONE DERM
LOCATION ZONE: FACE
LOCATION ZONE: LEG
LOCATION ZONE: TRUNK

## 2019-08-13 NOTE — HPI: FULL BODY SKIN EXAMINATION
How Severe Are Your Spot(S)?: mild
What Is The Reason For Today's Visit?: Full Body Skin Examination with No Concerns
What Is The Reason For Today's Visit? (Being Monitored For X): concerning skin lesions on an annual basis
Additional History: The patient has not noticed any changing moles or any tender or bleeding spots.

## 2019-08-23 DIAGNOSIS — E78.1 HYPERTRIGLYCERIDEMIA: ICD-10-CM

## 2019-08-23 RX ORDER — ATORVASTATIN CALCIUM 20 MG/1
20 TABLET, FILM COATED ORAL DAILY
Qty: 90 TAB | Refills: 3 | Status: SHIPPED | OUTPATIENT
Start: 2019-08-23 | End: 2020-08-14

## 2019-11-22 ENCOUNTER — HOSPITAL ENCOUNTER (OUTPATIENT)
Dept: RADIOLOGY | Facility: MEDICAL CENTER | Age: 69
End: 2019-11-22
Attending: NURSE PRACTITIONER
Payer: MEDICARE

## 2019-11-22 DIAGNOSIS — M79.671 RIGHT FOOT PAIN: ICD-10-CM

## 2019-11-22 PROCEDURE — 700101 HCHG RX REV CODE 250: Performed by: NURSE PRACTITIONER

## 2019-11-22 PROCEDURE — 700111 HCHG RX REV CODE 636 W/ 250 OVERRIDE (IP): Mod: JG | Performed by: NURSE PRACTITIONER

## 2019-11-22 PROCEDURE — 700117 HCHG RX CONTRAST REV CODE 255: Performed by: NURSE PRACTITIONER

## 2019-11-22 PROCEDURE — 20605 DRAIN/INJ JOINT/BURSA W/O US: CPT | Mod: RT

## 2019-11-22 PROCEDURE — 77002 NEEDLE LOCALIZATION BY XRAY: CPT

## 2019-11-22 RX ORDER — TRIAMCINOLONE ACETONIDE 40 MG/ML
40 INJECTION, SUSPENSION INTRA-ARTICULAR; INTRAMUSCULAR ONCE
Status: COMPLETED | OUTPATIENT
Start: 2019-11-22 | End: 2019-11-22

## 2019-11-22 RX ORDER — BUPIVACAINE HYDROCHLORIDE 5 MG/ML
10 INJECTION, SOLUTION EPIDURAL; INTRACAUDAL ONCE
Status: COMPLETED | OUTPATIENT
Start: 2019-11-22 | End: 2019-11-22

## 2019-11-22 RX ADMIN — LIDOCAINE HYDROCHLORIDE 1.5 ML: 10 INJECTION, SOLUTION INFILTRATION; PERINEURAL at 13:30

## 2019-11-22 RX ADMIN — BUPIVACAINE HYDROCHLORIDE 1.5 ML: 5 INJECTION, SOLUTION EPIDURAL; INTRACAUDAL; PERINEURAL at 13:30

## 2019-11-22 RX ADMIN — TRIAMCINOLONE ACETONIDE 40 MG: 40 INJECTION, SUSPENSION INTRA-ARTICULAR; INTRAMUSCULAR at 13:30

## 2019-11-22 RX ADMIN — IOHEXOL 3 ML: 300 INJECTION, SOLUTION INTRAVENOUS at 13:30

## 2019-12-04 ENCOUNTER — OFFICE VISIT (OUTPATIENT)
Dept: CARDIOLOGY | Facility: MEDICAL CENTER | Age: 69
End: 2019-12-04
Payer: MEDICARE

## 2019-12-04 VITALS
HEART RATE: 64 BPM | SYSTOLIC BLOOD PRESSURE: 116 MMHG | DIASTOLIC BLOOD PRESSURE: 74 MMHG | BODY MASS INDEX: 30.22 KG/M2 | HEIGHT: 66 IN | OXYGEN SATURATION: 98 % | WEIGHT: 188 LBS

## 2019-12-04 DIAGNOSIS — E78.2 MIXED HYPERLIPIDEMIA: ICD-10-CM

## 2019-12-04 DIAGNOSIS — I51.81 TAKOTSUBO CARDIOMYOPATHY: ICD-10-CM

## 2019-12-04 DIAGNOSIS — I48.0 PAF (PAROXYSMAL ATRIAL FIBRILLATION) (HCC): ICD-10-CM

## 2019-12-04 DIAGNOSIS — E66.9 OBESITY (BMI 30-39.9): ICD-10-CM

## 2019-12-04 LAB — EKG IMPRESSION: NORMAL

## 2019-12-04 PROCEDURE — 99214 OFFICE O/P EST MOD 30 MIN: CPT | Performed by: INTERNAL MEDICINE

## 2019-12-04 PROCEDURE — 93000 ELECTROCARDIOGRAM COMPLETE: CPT | Performed by: INTERNAL MEDICINE

## 2019-12-04 RX ORDER — ZOSTER VACCINE RECOMBINANT, ADJUVANTED 50 MCG/0.5
KIT INTRAMUSCULAR
COMMUNITY
Start: 2019-11-21 | End: 2019-12-04

## 2019-12-04 NOTE — PROGRESS NOTES
Chief Complaint   Patient presents with   • Atrial Fibrillation     F/V DX:PAF       Subjective:   Lucía Rodriguez is a 69 y.o. female who presents today with history of paroxysmal atrial fibrillation with good control on flecainide 50 mg / 100 mg.  Previous Takosubo resolved.  Denies palpitations.  Denies any chest pain or shortness of breath.  Chads Vasc 2.  Has not had a sleep study.  Hyperlipidemia on statins followed by primary    Past Medical History:   Diagnosis Date   • Arrhythmia     Atrial fibrillation   • Calculus of gallbladder without cholecystitis 1/9/2017   • CATARACT     left eye   • Glaucoma     right eye   • Hyperlipidemia    • Macular cyst, hole, or pseudohole of retina    • Pulmonary nodules/lesions, multiple 07/06/2016    stable on CTs, no further work up needed     Past Surgical History:   Procedure Laterality Date   • GYN SURGERY      HYSTERECTOMY   • MENISCUS REPAIR Right    • OTHER      eye surgery, right eye   • OTHER      cataract surgery, left eye     Family History   Problem Relation Age of Onset   • Heart Disease Father         triple bypass   • Cancer Father    • Cancer Mother      Social History     Socioeconomic History   • Marital status:      Spouse name: Not on file   • Number of children: Not on file   • Years of education: Not on file   • Highest education level: Not on file   Occupational History   • Not on file   Social Needs   • Financial resource strain: Not on file   • Food insecurity:     Worry: Not on file     Inability: Not on file   • Transportation needs:     Medical: Not on file     Non-medical: Not on file   Tobacco Use   • Smoking status: Never Smoker   • Smokeless tobacco: Never Used   Substance and Sexual Activity   • Alcohol use: No   • Drug use: No   • Sexual activity: Yes     Partners: Male   Lifestyle   • Physical activity:     Days per week: Not on file     Minutes per session: Not on file   • Stress: Not on file   Relationships   • Social  "connections:     Talks on phone: Not on file     Gets together: Not on file     Attends Restorationist service: Not on file     Active member of club or organization: Not on file     Attends meetings of clubs or organizations: Not on file     Relationship status: Not on file   • Intimate partner violence:     Fear of current or ex partner: Not on file     Emotionally abused: Not on file     Physically abused: Not on file     Forced sexual activity: Not on file   Other Topics Concern   • Not on file   Social History Narrative   • Not on file     Allergies   Allergen Reactions   • Neosporin [Neomycin-Polymyxin B] Rash     Rxn = 6/2016     Outpatient Encounter Medications as of 12/4/2019   Medication Sig Dispense Refill   • Multiple Vitamins-Minerals (MULTIVITAMIN ADULT PO) Take  by mouth.     • atorvastatin (LIPITOR) 20 MG Tab Take 1 Tab by mouth every day. 90 Tab 3   • metoprolol (LOPRESSOR) 25 MG Tab Take 0.5 Tabs by mouth 2 times a day. 90 Tab 3   • flecainide (TAMBOCOR) 50 MG tablet TAKE 1 TO 2 TABLETS BY MOUTH TWICE DAILY 360 Tab 2   • BABY ASPIRIN PO Take  by mouth.     • gabapentin (NEURONTIN) 300 MG Cap Take 300 mg by mouth.  2   • prednisoLONE acetate (PRED FORTE) 1 % Suspension Place 1 Drop in right eye 2 Times a Day. Right Eye - Implant     • Calcium Carbonate-Vitamin D (CALCIUM + D PO) Take 1 Tab by mouth every day. **OTC**     • Lutein-Zeaxanthin (LUTEIN) 15-0.7 MG CAPS Take 1 Tab by mouth every bedtime.     • [DISCONTINUED] SHINGRIX 50 MCG/0.5ML Recon Susp      • [DISCONTINUED] multivitamin (THERAGRAN) TABS Take 1 Tab by mouth every bedtime.       No facility-administered encounter medications on file as of 12/4/2019.      ROS     Objective:   /74 (BP Location: Left arm, Patient Position: Sitting, BP Cuff Size: Adult)   Pulse 64   Ht 1.676 m (5' 6\")   Wt 85.3 kg (188 lb)   LMP 12/28/2003   SpO2 98%   BMI 30.34 kg/m²     Physical Exam   Constitutional: She is oriented to person, place, and time. " She appears well-developed and well-nourished.   HENT:   Head: Normocephalic and atraumatic.   Eyes: Pupils are equal, round, and reactive to light. EOM are normal.   Neck: Normal range of motion. Neck supple.   Cardiovascular: Normal rate, regular rhythm, normal heart sounds and intact distal pulses. Exam reveals no gallop and no friction rub.   No murmur heard.  Pulmonary/Chest: Effort normal and breath sounds normal.   Abdominal: Soft. Bowel sounds are normal.   Musculoskeletal: Normal range of motion.         General: No edema.   Neurological: She is alert and oriented to person, place, and time. No cranial nerve deficit.   Skin: Skin is warm.   Psychiatric: She has a normal mood and affect. Her behavior is normal. Judgment and thought content normal.       Assessment:     1. PAF (paroxysmal atrial fibrillation) ([facility])  EKG   2. Mixed hyperlipidemia     3. Takotsubo cardiomyopathy     4. Obesity (BMI 30-39.9)         Medical Decision Making:  Today's Assessment / Status / Plan:   1.  Paroxysmal atrial fibrillation continue flecainide.  2.  Possible sleep apnea.  Patient did not want to have testing.  3.  Takotsubo cardiomyopathy resolved.  4.  Follow-up with me in 1 year.

## 2019-12-10 ENCOUNTER — OFFICE VISIT (OUTPATIENT)
Dept: MEDICAL GROUP | Facility: MEDICAL CENTER | Age: 69
End: 2019-12-10
Payer: MEDICARE

## 2019-12-10 VITALS
SYSTOLIC BLOOD PRESSURE: 126 MMHG | WEIGHT: 188 LBS | OXYGEN SATURATION: 97 % | TEMPERATURE: 98.7 F | HEART RATE: 62 BPM | BODY MASS INDEX: 28.49 KG/M2 | HEIGHT: 68 IN | RESPIRATION RATE: 16 BRPM | DIASTOLIC BLOOD PRESSURE: 74 MMHG

## 2019-12-10 DIAGNOSIS — I48.0 PAF (PAROXYSMAL ATRIAL FIBRILLATION) (HCC): ICD-10-CM

## 2019-12-10 DIAGNOSIS — R10.11 RUQ PAIN: ICD-10-CM

## 2019-12-10 DIAGNOSIS — E78.2 MIXED HYPERLIPIDEMIA: ICD-10-CM

## 2019-12-10 PROCEDURE — 99214 OFFICE O/P EST MOD 30 MIN: CPT | Performed by: FAMILY MEDICINE

## 2019-12-10 NOTE — PROGRESS NOTES
cc:  ruq pain    Subjective:     Lucía Rodriguez is a 69 y.o. female presenting for:    1.  Right upper quadrant pain: In October, she developed right upper quadrant pain that radiated to her backside.  It was quite sharp and painful, lasted for 4 days.  Symptoms have mostly resolved, but she continues to feel some tightness in the area.  She was feeling the pain mostly at night, occasionally during the day.  Symptoms were worse when she laid down.  No associated symptoms.  Denies any nausea, vomiting, fevers, diarrhea, constipation, urinary symptoms, blood in the urine or stools.  She tried nothing for this.  She was traveling at the time, was carrying heavy luggage, and attributed her symptoms to a muscle strain.    2. Paroxysmal afib, resolved takotsubo cardiomyopathy, HLD: is followed by cardiology. Is on metoprolol, flecainide, lipitor, aspirin 325mg daily.  No issues.  Denies any chest pain, shortness of breath, lightheadedness, dizziness, leg swelling.      Review of systems:  See above.       Current Outpatient Medications:   •  Multiple Vitamins-Minerals (MULTIVITAMIN ADULT PO), Take  by mouth., Disp: , Rfl:   •  atorvastatin (LIPITOR) 20 MG Tab, Take 1 Tab by mouth every day., Disp: 90 Tab, Rfl: 3  •  metoprolol (LOPRESSOR) 25 MG Tab, Take 0.5 Tabs by mouth 2 times a day., Disp: 90 Tab, Rfl: 3  •  flecainide (TAMBOCOR) 50 MG tablet, TAKE 1 TO 2 TABLETS BY MOUTH TWICE DAILY, Disp: 360 Tab, Rfl: 2  •  BABY ASPIRIN PO, Take  by mouth., Disp: , Rfl:   •  gabapentin (NEURONTIN) 300 MG Cap, Take 300 mg by mouth., Disp: , Rfl: 2  •  prednisoLONE acetate (PRED FORTE) 1 % Suspension, Place 1 Drop in right eye 2 Times a Day. Right Eye - Implant, Disp: , Rfl:   •  Calcium Carbonate-Vitamin D (CALCIUM + D PO), Take 1 Tab by mouth every day. **OTC**, Disp: , Rfl:   •  Lutein-Zeaxanthin (LUTEIN) 15-0.7 MG CAPS, Take 1 Tab by mouth every bedtime., Disp: , Rfl:     Allergies, past medical history, past surgical  "history, family history, social history reviewed and updated    Objective:     Vitals: /74 (BP Location: Left arm)   Pulse 62   Temp 37.1 °C (98.7 °F)   Resp 16   Ht 1.727 m (5' 8\")   Wt 85.3 kg (188 lb)   LMP 12/28/2003   SpO2 97%   BMI 28.59 kg/m²   General: Alert, pleasant, NAD  HEENT: Normocephalic.  EOMI, no icterus or pallor.  Conjunctivae and lids normal. External ears normal. Oropharynx non-erythematous, mucous membranes moist.  Neck supple.  No thyromegaly or masses palpated. No cervical or supraclavicular lymphadenopathy.  Heart: Regular rate and rhythm.  S1 and S2 normal.  No murmurs appreciated.  Respiratory: Normal respiratory effort.  Clear to auscultation bilaterally.  Abdomen: Non-distended, soft. non-tender, no guarding/rebound. Bowel sounds present.  No hepatosplenomegaly.  No hernias.  Skin: Warm, dry, no rashes.  Musculoskeletal: Gait is normal.  Moves all extremities well.  Extremities: No leg edema.  Psych:  Affect/mood is normal, judgement is good, memory is intact, grooming is appropriate.    Assessment/Plan:     Lucía was seen today for lab results.    Diagnoses and all orders for this visit:    RUQ pain  New problem, symptoms have mostly resolved.  Discussed potential etiologies including gallstone, gallbladder dysfunction, kidney stones, etc.  We will check the following labs, upper quadrant ultrasound as well.  If normal, we discussed ordering a HIDA scan in the future.  -     US-RUQ; Future  -     LIPASE; Future    Mixed hyperlipidemia  Stable, continue atorvastatin.  Is due for labs  -     Comp Metabolic Panel; Future  -     Lipid Profile; Future    PAF (paroxysmal atrial fibrillation) (HCC)  Stable, managed by cardiology.  Continue current medications.  Will check labs  -     CBC WITHOUT DIFFERENTIAL; Future  -     TSH WITH REFLEX TO FT4; Future      Return in about 1 year (around 12/10/2020) for routine follow up.    "

## 2019-12-17 ENCOUNTER — HOSPITAL ENCOUNTER (OUTPATIENT)
Dept: LAB | Facility: MEDICAL CENTER | Age: 69
End: 2019-12-17
Attending: FAMILY MEDICINE
Payer: MEDICARE

## 2019-12-17 ENCOUNTER — HOSPITAL ENCOUNTER (OUTPATIENT)
Dept: RADIOLOGY | Facility: MEDICAL CENTER | Age: 69
End: 2019-12-17
Attending: FAMILY MEDICINE
Payer: MEDICARE

## 2019-12-17 DIAGNOSIS — E78.2 MIXED HYPERLIPIDEMIA: ICD-10-CM

## 2019-12-17 DIAGNOSIS — R10.11 RUQ PAIN: ICD-10-CM

## 2019-12-17 DIAGNOSIS — K80.20 CALCULUS OF GALLBLADDER WITHOUT CHOLECYSTITIS WITHOUT OBSTRUCTION: ICD-10-CM

## 2019-12-17 DIAGNOSIS — I48.0 PAF (PAROXYSMAL ATRIAL FIBRILLATION) (HCC): ICD-10-CM

## 2019-12-17 LAB
ALBUMIN SERPL BCP-MCNC: 4.6 G/DL (ref 3.2–4.9)
ALBUMIN/GLOB SERPL: 2 G/DL
ALP SERPL-CCNC: 90 U/L (ref 30–99)
ALT SERPL-CCNC: 30 U/L (ref 2–50)
ANION GAP SERPL CALC-SCNC: 7 MMOL/L (ref 0–11.9)
AST SERPL-CCNC: 19 U/L (ref 12–45)
BILIRUB SERPL-MCNC: 0.6 MG/DL (ref 0.1–1.5)
BUN SERPL-MCNC: 23 MG/DL (ref 8–22)
CALCIUM SERPL-MCNC: 9.7 MG/DL (ref 8.5–10.5)
CHLORIDE SERPL-SCNC: 106 MMOL/L (ref 96–112)
CHOLEST SERPL-MCNC: 144 MG/DL (ref 100–199)
CO2 SERPL-SCNC: 29 MMOL/L (ref 20–33)
CREAT SERPL-MCNC: 0.98 MG/DL (ref 0.5–1.4)
ERYTHROCYTE [DISTWIDTH] IN BLOOD BY AUTOMATED COUNT: 51.7 FL (ref 35.9–50)
FASTING STATUS PATIENT QL REPORTED: NORMAL
GLOBULIN SER CALC-MCNC: 2.3 G/DL (ref 1.9–3.5)
GLUCOSE SERPL-MCNC: 92 MG/DL (ref 65–99)
HCT VFR BLD AUTO: 46.7 % (ref 37–47)
HDLC SERPL-MCNC: 59 MG/DL
HGB BLD-MCNC: 14.6 G/DL (ref 12–16)
LDLC SERPL CALC-MCNC: 48 MG/DL
LIPASE SERPL-CCNC: 22 U/L (ref 11–82)
MCH RBC QN AUTO: 30.5 PG (ref 27–33)
MCHC RBC AUTO-ENTMCNC: 31.3 G/DL (ref 33.6–35)
MCV RBC AUTO: 97.7 FL (ref 81.4–97.8)
PLATELET # BLD AUTO: 223 K/UL (ref 164–446)
PMV BLD AUTO: 11 FL (ref 9–12.9)
POTASSIUM SERPL-SCNC: 4.9 MMOL/L (ref 3.6–5.5)
PROT SERPL-MCNC: 6.9 G/DL (ref 6–8.2)
RBC # BLD AUTO: 4.78 M/UL (ref 4.2–5.4)
SODIUM SERPL-SCNC: 142 MMOL/L (ref 135–145)
TRIGL SERPL-MCNC: 184 MG/DL (ref 0–149)
TSH SERPL DL<=0.005 MIU/L-ACNC: 2.04 UIU/ML (ref 0.38–5.33)
WBC # BLD AUTO: 7.7 K/UL (ref 4.8–10.8)

## 2019-12-17 PROCEDURE — 85027 COMPLETE CBC AUTOMATED: CPT

## 2019-12-17 PROCEDURE — 80061 LIPID PANEL: CPT

## 2019-12-17 PROCEDURE — 76705 ECHO EXAM OF ABDOMEN: CPT

## 2019-12-17 PROCEDURE — 84443 ASSAY THYROID STIM HORMONE: CPT

## 2019-12-17 PROCEDURE — 36415 COLL VENOUS BLD VENIPUNCTURE: CPT

## 2019-12-17 PROCEDURE — 83690 ASSAY OF LIPASE: CPT

## 2019-12-17 PROCEDURE — 80053 COMPREHEN METABOLIC PANEL: CPT

## 2020-01-14 ENCOUNTER — OFFICE VISIT (OUTPATIENT)
Dept: MEDICAL GROUP | Facility: MEDICAL CENTER | Age: 70
End: 2020-01-14
Payer: MEDICARE

## 2020-01-14 VITALS
OXYGEN SATURATION: 92 % | WEIGHT: 189 LBS | DIASTOLIC BLOOD PRESSURE: 76 MMHG | BODY MASS INDEX: 28.64 KG/M2 | RESPIRATION RATE: 16 BRPM | HEART RATE: 77 BPM | TEMPERATURE: 98.7 F | SYSTOLIC BLOOD PRESSURE: 122 MMHG | HEIGHT: 68 IN

## 2020-01-14 DIAGNOSIS — F41.9 ANXIETY: ICD-10-CM

## 2020-01-14 DIAGNOSIS — J01.00 ACUTE NON-RECURRENT MAXILLARY SINUSITIS: ICD-10-CM

## 2020-01-14 PROCEDURE — 99214 OFFICE O/P EST MOD 30 MIN: CPT | Performed by: FAMILY MEDICINE

## 2020-01-14 RX ORDER — ALPRAZOLAM 0.25 MG/1
0.25 TABLET ORAL
Qty: 30 TAB | Refills: 0 | Status: SHIPPED | OUTPATIENT
Start: 2020-01-14 | End: 2020-02-13

## 2020-01-14 RX ORDER — AMOXICILLIN AND CLAVULANATE POTASSIUM 875; 125 MG/1; MG/1
1 TABLET, FILM COATED ORAL 2 TIMES DAILY
Qty: 14 TAB | Refills: 0 | Status: SHIPPED | OUTPATIENT
Start: 2020-01-14 | End: 2020-01-21

## 2020-01-14 NOTE — PROGRESS NOTES
cc:  congestion    Subjective:     Lucía Rodriguez is a 69 y.o. female presenting to discuss nasal congestion.  She woke up over a week ago with congestion, headaches, ear pain, diminished hearing in the left ear.  Denies any cough, sore throat, fevers, chills, chest pain, shortness of breath, abdominal pain, diarrhea, sick contacts, recent travel.  Has tried benadryl, flonase.  Symptoms have been overall stable.    Of note, she needs refills of her alprazolam.  She takes this when she goes on plane flights.  She will be traveling again in a couple months.  Denies any side effects.  Her last prescription for 30 tablets have lasted her almost 3 years    Review of systems:  See above.       Current Outpatient Medications:   •  amoxicillin-clavulanate (AUGMENTIN) 875-125 MG Tab, Take 1 Tab by mouth 2 times a day for 7 days., Disp: 14 Tab, Rfl: 0  •  ALPRAZolam (XANAX) 0.25 MG Tab, Take 1 Tab by mouth 1 time daily as needed for Anxiety (30 minutes prior to plane trip) for up to 30 days., Disp: 30 Tab, Rfl: 0  •  Multiple Vitamins-Minerals (MULTIVITAMIN ADULT PO), Take  by mouth., Disp: , Rfl:   •  atorvastatin (LIPITOR) 20 MG Tab, Take 1 Tab by mouth every day., Disp: 90 Tab, Rfl: 3  •  metoprolol (LOPRESSOR) 25 MG Tab, Take 0.5 Tabs by mouth 2 times a day., Disp: 90 Tab, Rfl: 3  •  flecainide (TAMBOCOR) 50 MG tablet, TAKE 1 TO 2 TABLETS BY MOUTH TWICE DAILY, Disp: 360 Tab, Rfl: 2  •  BABY ASPIRIN PO, Take  by mouth., Disp: , Rfl:   •  gabapentin (NEURONTIN) 300 MG Cap, Take 300 mg by mouth., Disp: , Rfl: 2  •  prednisoLONE acetate (PRED FORTE) 1 % Suspension, Place 1 Drop in right eye 2 Times a Day. Right Eye - Implant, Disp: , Rfl:   •  Calcium Carbonate-Vitamin D (CALCIUM + D PO), Take 1 Tab by mouth every day. **OTC**, Disp: , Rfl:   •  Lutein-Zeaxanthin (LUTEIN) 15-0.7 MG CAPS, Take 1 Tab by mouth every bedtime., Disp: , Rfl:     Allergies, past medical history, past surgical history, family history, social  "history reviewed and updated    Objective:     Vitals: /76 (BP Location: Left arm)   Pulse 77   Temp 37.1 °C (98.7 °F)   Resp 16   Ht 1.727 m (5' 8\")   Wt 85.7 kg (189 lb)   LMP 12/28/2003   SpO2 92%   BMI 28.74 kg/m²   General: Alert, pleasant, NAD  HEENT: Normocephalic.  EOMI, no icterus or pallor.  Conjunctivae and lids normal. External ears normal.  Tympanic membranes pearly, opaque bilaterally.  Nares patent, mucosa pink, drainage present.  Maxillary sinus tenderness. Oropharynx non-erythematous, mucous membranes moist.  Neck supple.  No thyromegaly or masses palpated. No cervical or supraclavicular lymphadenopathy.  Heart: Regular rate and rhythm.  S1 and S2 normal.  No murmurs appreciated.  Respiratory: Normal respiratory effort.  Clear to auscultation bilaterally.  Abdomen: Non-distended, soft  Skin: Warm, dry, no rashes.  Musculoskeletal: Gait is normal.  Moves all extremities well.  Extremities: No leg edema.  Psych:  Affect/mood is normal, judgement is good, memory is intact, grooming is appropriate.    Assessment/Plan:     Lucía was seen today for sinus problem.    Diagnoses and all orders for this visit:    Acute non-recurrent maxillary sinusitis  New problem, discussed possible sinusitis.  Recommended continue with the nasal sprays, over-the-counter products.  We will also send in a course of Augmentin.  Patient will let us know on MyChart if symptoms do not improve or if she has issues with the antibiotic  -     amoxicillin-clavulanate (AUGMENTIN) 875-125 MG Tab; Take 1 Tab by mouth 2 times a day for 7 days.    Anxiety  Stable, rare use.  [facility] checked.  Discussed side effects, risks.  -     ALPRAZolam (XANAX) 0.25 MG Tab; Take 1 Tab by mouth 1 time daily as needed for Anxiety (30 minutes prior to plane trip) for up to 30 days.  #30        Return in about 6 months (around 7/14/2020) for routine follow up.      "

## 2020-01-29 ENCOUNTER — HOSPITAL ENCOUNTER (EMERGENCY)
Facility: MEDICAL CENTER | Age: 70
End: 2020-01-29
Attending: EMERGENCY MEDICINE
Payer: MEDICARE

## 2020-01-29 ENCOUNTER — APPOINTMENT (OUTPATIENT)
Dept: RADIOLOGY | Facility: MEDICAL CENTER | Age: 70
End: 2020-01-29
Attending: EMERGENCY MEDICINE
Payer: MEDICARE

## 2020-01-29 VITALS
RESPIRATION RATE: 16 BRPM | SYSTOLIC BLOOD PRESSURE: 160 MMHG | HEIGHT: 66 IN | BODY MASS INDEX: 30.58 KG/M2 | TEMPERATURE: 97.5 F | OXYGEN SATURATION: 96 % | HEART RATE: 94 BPM | DIASTOLIC BLOOD PRESSURE: 83 MMHG | WEIGHT: 190.26 LBS

## 2020-01-29 DIAGNOSIS — M79.621 PAIN IN RIGHT UPPER ARM: ICD-10-CM

## 2020-01-29 PROCEDURE — 99284 EMERGENCY DEPT VISIT MOD MDM: CPT

## 2020-01-29 PROCEDURE — 700111 HCHG RX REV CODE 636 W/ 250 OVERRIDE (IP): Mod: JW | Performed by: EMERGENCY MEDICINE

## 2020-01-29 PROCEDURE — 700102 HCHG RX REV CODE 250 W/ 637 OVERRIDE(OP): Performed by: EMERGENCY MEDICINE

## 2020-01-29 PROCEDURE — 93971 EXTREMITY STUDY: CPT | Mod: RT

## 2020-01-29 PROCEDURE — 96372 THER/PROPH/DIAG INJ SC/IM: CPT

## 2020-01-29 PROCEDURE — A9270 NON-COVERED ITEM OR SERVICE: HCPCS | Performed by: EMERGENCY MEDICINE

## 2020-01-29 RX ORDER — HYDROCODONE BITARTRATE AND ACETAMINOPHEN 5; 325 MG/1; MG/1
1 TABLET ORAL ONCE
Status: COMPLETED | OUTPATIENT
Start: 2020-01-29 | End: 2020-01-29

## 2020-01-29 RX ORDER — KETOROLAC TROMETHAMINE 30 MG/ML
15 INJECTION, SOLUTION INTRAMUSCULAR; INTRAVENOUS ONCE
Status: COMPLETED | OUTPATIENT
Start: 2020-01-29 | End: 2020-01-29

## 2020-01-29 RX ADMIN — HYDROCODONE BITARTRATE AND ACETAMINOPHEN 1 TABLET: 5; 325 TABLET ORAL at 20:59

## 2020-01-29 RX ADMIN — KETOROLAC TROMETHAMINE 15 MG: 30 INJECTION, SOLUTION INTRAMUSCULAR; INTRAVENOUS at 22:59

## 2020-01-29 ASSESSMENT — PAIN DESCRIPTION - DESCRIPTORS: DESCRIPTORS: SHOOTING;THROBBING

## 2020-01-30 NOTE — ED PROVIDER NOTES
ED Provider Note    CHIEF COMPLAINT  Chief Complaint   Patient presents with   • Arm Pain     Pt reports pain in right bicep that started today, pain rating 10/10. Unable to move arm. Recent injury to right wrist/arm- last Friday when helping move family member.         [facility]    Primary care provider: Harvinder Bell M.D.   History obtained from: Patient and   History limited by: None     Lucía Rodriguez is a 69 y.o. female who presents to the ED with  complaining of pain in her right bicep area that started earlier today without any known injury or trauma.  She reports that she was helping to move some heavy boxes 5 days ago and fell backwards hitting her head but denies any other injuries.  She denies loss of consciousness.  She is not on any blood thinners.  She started having right knee pain after the fall but the pain resolved by the next day after applying heat and taking Aleve.  Then the following day she started having right wrist pain that also resolved by the next day after applying heat and taking Aleve.  Today she started having pain in the right bicep area without any particular inciting event.  The pain is worse with any movement.  She has not taken any medication for her pain today.  Patient is concerned about having a blood clot in her arm and is requesting ultrasound evaluation.  No prior history of blood clots.  She does have history of atrial fibrillation but is usually in sinus rhythm controlled by taking her medications.  She denies pain anywhere else or any other symptoms.  She is right-hand dominant.    REVIEW OF SYSTEMS  Please see HPI for pertinent positives/negatives.     PAST MEDICAL HISTORY  Past Medical History:   Diagnosis Date   • Calculus of gallbladder without cholecystitis 1/9/2017   • Pulmonary nodules/lesions, multiple 07/06/2016    stable on CTs, no further work up needed   • Arrhythmia     Atrial fibrillation   • Atrial fibrillation (HCC)    • CATARACT     left  "eye   • Glaucoma     right eye   • Hyperlipidemia    • Macular cyst, hole, or pseudohole of retina         SURGICAL HISTORY  Past Surgical History:   Procedure Laterality Date   • GYN SURGERY      HYSTERECTOMY   • MENISCUS REPAIR Right    • OTHER      eye surgery, right eye   • OTHER      cataract surgery, left eye        SOCIAL HISTORY  Social History     Tobacco Use   • Smoking status: Never Smoker   • Smokeless tobacco: Never Used   Substance and Sexual Activity   • Alcohol use: No   • Drug use: No   • Sexual activity: Yes     Partners: Male        FAMILY HISTORY  Family History   Problem Relation Age of Onset   • Heart Disease Father         triple bypass   • Cancer Father    • Cancer Mother         CURRENT MEDICATIONS  Home Medications    **Home medications have not yet been reviewed for this encounter**          ALLERGIES  Allergies   Allergen Reactions   • Neosporin [Neomycin-Polymyxin B] Rash     Rxn = 6/2016        PHYSICAL EXAM  VITAL SIGNS: /83   Pulse 94   Temp 36.4 °C (97.5 °F) (Oral)   Resp 16   Ht 1.676 m (5' 6\")   Wt 86.3 kg (190 lb 4.1 oz)   LMP 12/28/2003   SpO2 96%   BMI 30.71 kg/m²  @HANANE[[id number]::@     Pulse ox interpretation: 96% I interpret this pulse ox as normal     Constitutional: Well developed, well nourished, alert in no apparent distress, nontoxic appearance    HENT: No external signs of trauma, normocephalic, oropharynx moist and clear, nose normal    Eyes: PERRL, conjunctiva without erythema, no discharge, no icterus    Neck: Soft and supple, trachea midline, no stridor, no tenderness, no LAD, no JVD, good ROM    Cardiovascular: Regular rate and rhythm, no murmurs/rubs/gallops, strong distal pulses and good perfusion    Thorax & Lungs: No respiratory distress, CTAB   Abdomen: Soft, nontender, nondistended, no guarding, no rebound, normal BS    Back: No CVAT    Extremities: No cyanosis, no edema, no gross deformity, tenderness along right bicep with limited range of motion " due to pain, sensation intact to touch throughout, distal 5/5 strength, intact distal pulses with brisk cap refill    Skin: Warm, dry, no pallor/cyanosis, no rash noted    Lymphatic: No lymphadenopathy noted    Neuro: A/O times 3, no focal deficits noted    Psychiatric: Cooperative, slightly anxious      DIAGNOSTIC STUDIES / PROCEDURES        LABS  All labs reviewed by me.     Results for orders placed or performed during the hospital encounter of 12/17/19   TSH WITH REFLEX TO FT4   Result Value Ref Range    TSH 2.040 0.380 - 5.330 uIU/mL   LIPASE   Result Value Ref Range    Lipase 22 11 - 82 U/L   Lipid Profile   Result Value Ref Range    Cholesterol,Tot 144 100 - 199 mg/dL    Triglycerides 184 (H) 0 - 149 mg/dL    HDL 59 >=40 mg/dL    LDL 48 <100 mg/dL   Comp Metabolic Panel   Result Value Ref Range    Sodium 142 135 - 145 mmol/L    Potassium 4.9 3.6 - 5.5 mmol/L    Chloride 106 96 - 112 mmol/L    Co2 29 20 - 33 mmol/L    Anion Gap 7.0 0.0 - 11.9    Glucose 92 65 - 99 mg/dL    Bun 23 (H) 8 - 22 mg/dL    Creatinine 0.98 0.50 - 1.40 mg/dL    Calcium 9.7 8.5 - 10.5 mg/dL    AST(SGOT) 19 12 - 45 U/L    ALT(SGPT) 30 2 - 50 U/L    Alkaline Phosphatase 90 30 - 99 U/L    Total Bilirubin 0.6 0.1 - 1.5 mg/dL    Albumin 4.6 3.2 - 4.9 g/dL    Total Protein 6.9 6.0 - 8.2 g/dL    Globulin 2.3 1.9 - 3.5 g/dL    A-G Ratio 2.0 g/dL   CBC WITHOUT DIFFERENTIAL   Result Value Ref Range    WBC 7.7 4.8 - 10.8 K/uL    RBC 4.78 4.20 - 5.40 M/uL    Hemoglobin 14.6 12.0 - 16.0 g/dL    Hematocrit 46.7 37.0 - 47.0 %    MCV 97.7 81.4 - 97.8 fL    MCH 30.5 27.0 - 33.0 pg    MCHC 31.3 (L) 33.6 - 35.0 g/dL    RDW 51.7 (H) 35.9 - 50.0 fL    Platelet Count 223 164 - 446 K/uL    MPV 11.0 9.0 - 12.9 fL   FASTING STATUS   Result Value Ref Range    Fasting Status Fasting    ESTIMATED GFR   Result Value Ref Range    GFR If African American >60 >60 mL/min/1.73 m 2    GFR If Non  56 (A) >60 mL/min/1.73 m 2        RADIOLOGY  The  radiologist's interpretation of all radiological studies have been reviewed by me.     US-EXTREMITY VENOUS UPPER UNILAT RIGHT                COURSE & MEDICAL DECISION MAKING  Nursing notes, VS, PMSFHx reviewed in chart.     Review of past medical records shows the patient was last seen in this ED January 14, 2019 for dizziness and shortness of breath.      Differential diagnoses considered include but are not limited to: Arthritis, bursitis, tendonitis, DVT/vascular occlusion, strain/sprain       History and physical exam as above.  Ultrasound of right upper extremity without evidence for DVT.  Findings discussed with the patient and .  Unknown etiology at this time for her right upper arm pain.  Patient declined x-rays which I think is reasonable given the low clinical suspicion for acute bony injury.  No evidence for infectious etiology.  No findings for significant neurovascular injury.  Patient was treated with Norco and subsequently Toradol in the ED with slight improvement of her pain.  She was advised on outpatient follow-up for further evaluation and given return to ED precautions.  Patient also noted to have elevated blood pressure and will need outpatient follow-up for further management.  Patient and  verbalized understanding and agreed with plan of care with no further questions or concerns.      The patient is referred to a primary physician for blood pressure management, diabetic screening, and for all other preventative health concerns.       FINAL IMPRESSION  1. Pain in right upper arm Acute          DISPOSITION  Patient will be discharged home in stable condition.       FOLLOW UP  Harvinder Bell M.D.  [address]  [facility] [address]  [facility] [id number] [phone]    Call in 1 day      [facility], Emergency Dept  [address] [facility] [address] [phone]    If symptoms worsen         OUTPATIENT MEDICATIONS  Discharge Medication List as of 1/29/2020 10:39  PM             Electronically signed by: Darrick Townsend D.O., 1/29/2020 9:01 PM      Portions of this record were made with voice recognition software.  Despite my review, spelling/grammar/context errors may still remain.  Interpretation of this chart should be taken in this context.

## 2020-01-30 NOTE — ED TRIAGE NOTES
Lucía Haywood Rodriguez  69 y.o. female  Chief Complaint   Patient presents with   • Arm Pain     Pt reports pain in right bicep that started today, pain rating 10/10. Unable to move arm. Recent injury to right wrist/arm- last Friday when helping move family member.        Pt amb to triage with steady gait for above complaint, pt fell last Friday- initially right wrist pain that resolved, then arm pain (deltoid area) started today, pt guarding arm. No obvious deformity. CMS intact.   Pt is alert and oriented, speaking in full sentences, follows commands and responds appropriately to questions. Resp are even and unlabored. No behavioral indicators of pain.   Pt placed in lobby. Pt educated on triage process. Pt encouraged to alert staff for any changes.

## 2020-02-20 DIAGNOSIS — I48.0 PAF (PAROXYSMAL ATRIAL FIBRILLATION) (HCC): ICD-10-CM

## 2020-02-24 DIAGNOSIS — I48.0 PAF (PAROXYSMAL ATRIAL FIBRILLATION) (HCC): ICD-10-CM

## 2020-02-25 RX ORDER — FLECAINIDE ACETATE 50 MG/1
TABLET ORAL
Qty: 360 TAB | Refills: 3 | Status: ON HOLD | OUTPATIENT
Start: 2020-02-25 | End: 2021-08-05

## 2020-08-14 DIAGNOSIS — E78.1 HYPERTRIGLYCERIDEMIA: ICD-10-CM

## 2020-08-14 RX ORDER — ATORVASTATIN CALCIUM 20 MG/1
20 TABLET, FILM COATED ORAL DAILY
Qty: 90 TAB | Refills: 3 | Status: SHIPPED | OUTPATIENT
Start: 2020-08-14 | End: 2021-06-21

## 2020-09-21 ENCOUNTER — OFFICE VISIT (OUTPATIENT)
Dept: MEDICAL GROUP | Facility: MEDICAL CENTER | Age: 70
End: 2020-09-21
Payer: MEDICARE

## 2020-09-21 VITALS
HEART RATE: 64 BPM | SYSTOLIC BLOOD PRESSURE: 118 MMHG | TEMPERATURE: 97.6 F | WEIGHT: 183 LBS | BODY MASS INDEX: 27.74 KG/M2 | OXYGEN SATURATION: 94 % | HEIGHT: 68 IN | DIASTOLIC BLOOD PRESSURE: 76 MMHG | RESPIRATION RATE: 16 BRPM

## 2020-09-21 DIAGNOSIS — R10.2 VAGINAL PAIN: ICD-10-CM

## 2020-09-21 DIAGNOSIS — M25.552 LATERAL PAIN OF LEFT HIP: ICD-10-CM

## 2020-09-21 PROBLEM — N18.30 CKD (CHRONIC KIDNEY DISEASE) STAGE 3, GFR 30-59 ML/MIN: Status: ACTIVE | Noted: 2020-09-21

## 2020-09-21 PROCEDURE — 99213 OFFICE O/P EST LOW 20 MIN: CPT | Performed by: FAMILY MEDICINE

## 2020-09-21 RX ORDER — GABAPENTIN 300 MG/1
300 CAPSULE ORAL 2 TIMES DAILY
COMMUNITY
Start: 2020-09-21 | End: 2022-07-13

## 2020-09-21 ASSESSMENT — FIBROSIS 4 INDEX: FIB4 SCORE: 1.09

## 2020-09-21 ASSESSMENT — PATIENT HEALTH QUESTIONNAIRE - PHQ9: CLINICAL INTERPRETATION OF PHQ2 SCORE: 0

## 2020-09-21 NOTE — PROGRESS NOTES
"cc: Hip pain    Subjective:     Lucía Rodriguez is a 69 y.o. female presenting to discuss lateral hip pain on the left.  Started about 3 months ago.  Describes a dull, achy sensation that is quite constant.  Will often keep her awake at night.  It occasionally radiates to her left buttock.  Denies any changes in activities.  Symptoms are worse with nothing, better with nothing.  Has tried Aleve, heating pads, ointment, topical CBD/marijuana with minimal improvement.  She does take gabapentin for neuropathy and arthritis in her feet as recommended by her podiatrist.      Review of systems:  See above.  She also reports some vaginal discomfort.  She reports that she saw gynecology in the past who told her that she has small blood blisters in the area, is wondering if she can get this evaluated again      Current Outpatient Medications:   •  gabapentin (NEURONTIN) 300 MG Cap, Take 1 Cap by mouth 2 Times a Day., Disp: , Rfl:   •  atorvastatin (LIPITOR) 20 MG Tab, Take 1 Tab by mouth every day., Disp: 90 Tab, Rfl: 3  •  flecainide (TAMBOCOR) 50 MG tablet, TAKE 1 TO 2 TABLETS BY MOUTH TWICE DAILY, Disp: 360 Tab, Rfl: 3  •  metoprolol (LOPRESSOR) 25 MG Tab, TAKE 1/2 TABLET BY MOUTH TWICE DAILY, Disp: 90 Tab, Rfl: 3  •  Multiple Vitamins-Minerals (MULTIVITAMIN ADULT PO), Take  by mouth., Disp: , Rfl:   •  BABY ASPIRIN PO, Take  by mouth., Disp: , Rfl:   •  prednisoLONE acetate (PRED FORTE) 1 % Suspension, Place 1 Drop in right eye 2 Times a Day. Right Eye - Implant, Disp: , Rfl:   •  Calcium Carbonate-Vitamin D (CALCIUM + D PO), Take 1 Tab by mouth every day. **OTC**, Disp: , Rfl:   •  Lutein-Zeaxanthin (LUTEIN) 15-0.7 MG CAPS, Take 1 Tab by mouth every bedtime., Disp: , Rfl:     Allergies, past medical history, past surgical history, family history, social history reviewed and updated    Objective:     Vitals: /76   Pulse 64   Temp 36.4 °C (97.6 °F)   Resp 16   Ht 1.727 m (5' 8\")   Wt 83 kg (183 lb)   LMP " 12/28/2003   SpO2 94%   BMI 27.83 kg/m²   General: Alert, pleasant, NAD  HEENT: Normocephalic.    Skin: Warm, dry, no rashes.  Musculoskeletal: Gait is normal.  Moves all extremities well.  No spinal tenderness, normal spine range of motion.  Mildly tender to palpation over the left greater trochanter.  Extremities: No leg edema.  Psych:  Affect/mood is normal, judgement is good, memory is intact, grooming is appropriate.    Assessment/Plan:     Lucía was seen today for follow-up.    Diagnoses and all orders for this visit:    Lateral pain of left hip  New problem, we discussed various possibilities including bursitis, IT band issues, arthritis.  Will refer to physical therapy.  If no improvement, she will let us know, will refer to sports medicine next.  Discussed over-the-counter products that she can try for pain management.  Follow-up in 3 months.  -     REFERRAL TO PHYSICAL THERAPY Reason for Therapy: Eval/Treat/Report    Vaginal pain  -     REFERRAL TO OB/GYN          Return in about 3 months (around 12/21/2020) for routine follow up.

## 2020-10-13 ENCOUNTER — APPOINTMENT (RX ONLY)
Dept: URBAN - METROPOLITAN AREA CLINIC 4 | Facility: CLINIC | Age: 70
Setting detail: DERMATOLOGY
End: 2020-10-13

## 2020-10-13 DIAGNOSIS — L82.0 INFLAMED SEBORRHEIC KERATOSIS: ICD-10-CM

## 2020-10-13 DIAGNOSIS — Z85.828 PERSONAL HISTORY OF OTHER MALIGNANT NEOPLASM OF SKIN: ICD-10-CM

## 2020-10-13 DIAGNOSIS — L57.8 OTHER SKIN CHANGES DUE TO CHRONIC EXPOSURE TO NONIONIZING RADIATION: ICD-10-CM

## 2020-10-13 DIAGNOSIS — D22 MELANOCYTIC NEVI: ICD-10-CM

## 2020-10-13 DIAGNOSIS — L81.4 OTHER MELANIN HYPERPIGMENTATION: ICD-10-CM

## 2020-10-13 DIAGNOSIS — D18.0 HEMANGIOMA: ICD-10-CM

## 2020-10-13 DIAGNOSIS — L82.1 OTHER SEBORRHEIC KERATOSIS: ICD-10-CM

## 2020-10-13 PROBLEM — D22.5 MELANOCYTIC NEVI OF TRUNK: Status: ACTIVE | Noted: 2020-10-13

## 2020-10-13 PROBLEM — D18.01 HEMANGIOMA OF SKIN AND SUBCUTANEOUS TISSUE: Status: ACTIVE | Noted: 2020-10-13

## 2020-10-13 PROBLEM — D48.5 NEOPLASM OF UNCERTAIN BEHAVIOR OF SKIN: Status: ACTIVE | Noted: 2020-10-13

## 2020-10-13 PROCEDURE — ? BIOPSY BY SHAVE METHOD

## 2020-10-13 PROCEDURE — ? COUNSELING

## 2020-10-13 PROCEDURE — 99214 OFFICE O/P EST MOD 30 MIN: CPT | Mod: 25

## 2020-10-13 PROCEDURE — 11102 TANGNTL BX SKIN SINGLE LES: CPT | Mod: 59

## 2020-10-13 PROCEDURE — 17110 DESTRUCTION B9 LES UP TO 14: CPT

## 2020-10-13 PROCEDURE — ? LIQUID NITROGEN

## 2020-10-13 PROCEDURE — 11103 TANGNTL BX SKIN EA SEP/ADDL: CPT | Mod: 59

## 2020-10-13 ASSESSMENT — LOCATION DETAILED DESCRIPTION DERM
LOCATION DETAILED: LEFT PROXIMAL DORSAL FOREARM
LOCATION DETAILED: RIGHT INFERIOR CENTRAL MALAR CHEEK
LOCATION DETAILED: LEFT POPLITEAL SKIN
LOCATION DETAILED: RIGHT LATERAL ZYGOMA
LOCATION DETAILED: LEFT SUPERIOR UPPER BACK
LOCATION DETAILED: RIGHT SUPERIOR UPPER BACK
LOCATION DETAILED: RIGHT INFERIOR MEDIAL UPPER BACK
LOCATION DETAILED: LEFT DISTAL DORSAL FOREARM
LOCATION DETAILED: RIGHT DISTAL DORSAL FOREARM
LOCATION DETAILED: RIGHT INFERIOR ANTERIOR NECK
LOCATION DETAILED: RIGHT PROXIMAL DORSAL FOREARM
LOCATION DETAILED: RIGHT RADIAL DORSAL HAND
LOCATION DETAILED: LEFT RADIAL DORSAL HAND
LOCATION DETAILED: LEFT ANTERIOR DISTAL THIGH

## 2020-10-13 ASSESSMENT — LOCATION SIMPLE DESCRIPTION DERM
LOCATION SIMPLE: LEFT POPLITEAL SKIN
LOCATION SIMPLE: RIGHT ZYGOMA
LOCATION SIMPLE: RIGHT FOREARM
LOCATION SIMPLE: LEFT FOREARM
LOCATION SIMPLE: LEFT THIGH
LOCATION SIMPLE: LEFT UPPER BACK
LOCATION SIMPLE: RIGHT UPPER BACK
LOCATION SIMPLE: RIGHT CHEEK
LOCATION SIMPLE: RIGHT HAND
LOCATION SIMPLE: LEFT HAND
LOCATION SIMPLE: RIGHT ANTERIOR NECK

## 2020-10-13 ASSESSMENT — LOCATION ZONE DERM
LOCATION ZONE: HAND
LOCATION ZONE: LEG
LOCATION ZONE: NECK
LOCATION ZONE: FACE
LOCATION ZONE: TRUNK
LOCATION ZONE: ARM

## 2020-10-13 NOTE — PROCEDURE: LIQUID NITROGEN
Detail Level: Detailed
Consent: The patient's consent was obtained including but not limited to risks of crusting, scabbing, blistering, scarring, darker or lighter pigmentary change, recurrence, incomplete removal and infection.
Include Z78.9 (Other Specified Conditions Influencing Health Status) As An Associated Diagnosis?: No
Post-Care Instructions: I reviewed with the patient in detail post-care instructions. Patient is to wear sunprotection, and avoid picking at any of the treated lesions. Pt may apply Vaseline to crusted or scabbing areas.
Aperture Size (Optional): C
Medical Necessity Clause: This procedure was medically necessary because the lesions that were treated were:
Duration Of Freeze Thaw-Cycle (Seconds): 3
Number Of Freeze-Thaw Cycles: 1 freeze-thaw cycle
Medical Necessity Information: It is in your best interest to select a reason for this procedure from the list below. All of these items fulfill various CMS LCD requirements except the new and changing color options.

## 2020-10-19 ENCOUNTER — APPOINTMENT (RX ONLY)
Dept: URBAN - METROPOLITAN AREA CLINIC 4 | Facility: CLINIC | Age: 70
Setting detail: DERMATOLOGY
End: 2020-10-19

## 2020-10-19 PROBLEM — C44.91 BASAL CELL CARCINOMA OF SKIN, UNSPECIFIED: Status: ACTIVE | Noted: 2020-10-19

## 2020-10-19 PROCEDURE — ? MOHS SURGERY PHONE CONSULTATION

## 2020-10-19 NOTE — PROCEDURE: MOHS SURGERY PHONE CONSULTATION
Has The Patient Ever Had A Heart Attack Or Stroke?: No
Time Of Mohs Surgery: 12:00
Detail Level: Simple
Has The Pathology Report Been Received?: Yes
Date Of Mohs Surgery: 11/18/2020
Patient Reported Location: right superior nasal cheek
Referring Provider: Dr. Kapadia
Patient's Insurance: Medicare
Pathology Accession #: [id number]
Office Location Of Mohs Surgery: she
Which Antibiotic Do They Take For Surgical Prophylaxis?: Amoxicillin (2 grams)

## 2020-11-18 ENCOUNTER — APPOINTMENT (RX ONLY)
Dept: URBAN - METROPOLITAN AREA CLINIC 36 | Facility: CLINIC | Age: 70
Setting detail: DERMATOLOGY
End: 2020-11-18

## 2020-11-18 PROBLEM — C44.91 BASAL CELL CARCINOMA OF SKIN, UNSPECIFIED: Status: ACTIVE | Noted: 2020-11-18

## 2020-11-18 PROCEDURE — 11900 INJECT SKIN LESIONS </W 7: CPT

## 2020-11-18 PROCEDURE — ? INJECTION

## 2020-11-18 NOTE — PROCEDURE: INJECTION
Consent: The risks of the medication was reviewed with the patient.
Hide Second Medication?: No
Medication (1) And Associated J-Code Units: Bleomycin, 15 units
Treatment Number: 1
Render J-Code Information In Note?: yes
Post-Care Instructions: I reviewed with the patient in detail post-care instructions. Patient understands to keep the injection sites clean and call the clinic if there is any redness, swelling or pain.
Detail Level: None
units
Dose Administered (Numbers Only - Mg, G, Mcg, Units, Cc): 0
Total Volume Injected In Cc (Will Not Affected Billing): .2
Route: IL
Dose Administered (Numbers Only - Mg, G, Mcg, Units, Cc): 0.2
Procedure Information: Please note that the numeric value listed in the Medication (1) and associated J-code units and Medication (2) and associated J-code units variables are j-code amounts and do not represent either the concentration or the total amount of the medications injected.  I strongly recommend selecting no to the Render J-code information in note question. This will allow your note to be more clear. If you are billing j-codes with your injection codes you need to document the total amount of the medication injected. This amount should match the j-code units. For example, if you are injecting Triamcinolone 40mg as an intramuscular injection you would select 40 for the dose field and mg for the units. This would allow you to document  with 4 units (40mg = 10mg x 4). The total volume is not used to calculate j-codes only the amount of the medication administered.

## 2020-12-09 ENCOUNTER — APPOINTMENT (RX ONLY)
Dept: URBAN - METROPOLITAN AREA CLINIC 36 | Facility: CLINIC | Age: 70
Setting detail: DERMATOLOGY
End: 2020-12-09

## 2020-12-09 PROBLEM — C44.91 BASAL CELL CARCINOMA OF SKIN, UNSPECIFIED: Status: ACTIVE | Noted: 2020-12-09

## 2020-12-09 PROCEDURE — 11900 INJECT SKIN LESIONS </W 7: CPT

## 2020-12-09 PROCEDURE — ? INJECTION

## 2020-12-09 NOTE — PROCEDURE: INJECTION
Hide Second Medication?: No
Units: mg
Medication (1) And Associated J-Code Units: Bleomycin, 15 units
Consent: The risks of the medication was reviewed with the patient.
Dose Administered (Numbers Only - Mg, G, Mcg, Units, Cc): 0.2
Procedure Information: Please note that the numeric value listed in the Medication (1) and associated J-code units and Medication (2) and associated J-code units variables are j-code amounts and do not represent either the concentration or the total amount of the medications injected.  I strongly recommend selecting no to the Render J-code information in note question. This will allow your note to be more clear. If you are billing j-codes with your injection codes you need to document the total amount of the medication injected. This amount should match the j-code units. For example, if you are injecting Triamcinolone 40mg as an intramuscular injection you would select 40 for the dose field and mg for the units. This would allow you to document  with 4 units (40mg = 10mg x 4). The total volume is not used to calculate j-codes only the amount of the medication administered.
Treatment Number: 2
units
Post-Care Instructions: I reviewed with the patient in detail post-care instructions. Patient understands to keep the injection sites clean and call the clinic if there is any redness, swelling or pain.
Bill J-Code: yes
Dose Administered (Numbers Only - Mg, G, Mcg, Units, Cc): 0
Total Volume Injected In Cc (Will Not Affected Billing): .2
Detail Level: None
Route: IL

## 2021-01-07 ENCOUNTER — OFFICE VISIT (OUTPATIENT)
Dept: MEDICAL GROUP | Facility: MEDICAL CENTER | Age: 71
End: 2021-01-07
Payer: MEDICARE

## 2021-01-07 ENCOUNTER — HOSPITAL ENCOUNTER (OUTPATIENT)
Dept: LAB | Facility: MEDICAL CENTER | Age: 71
End: 2021-01-07
Attending: FAMILY MEDICINE
Payer: MEDICARE

## 2021-01-07 VITALS
HEART RATE: 74 BPM | TEMPERATURE: 97.6 F | DIASTOLIC BLOOD PRESSURE: 70 MMHG | HEIGHT: 68 IN | WEIGHT: 178 LBS | BODY MASS INDEX: 26.98 KG/M2 | RESPIRATION RATE: 16 BRPM | OXYGEN SATURATION: 96 % | SYSTOLIC BLOOD PRESSURE: 124 MMHG

## 2021-01-07 DIAGNOSIS — E78.2 MIXED HYPERLIPIDEMIA: ICD-10-CM

## 2021-01-07 DIAGNOSIS — N18.30 STAGE 3 CHRONIC KIDNEY DISEASE, UNSPECIFIED WHETHER STAGE 3A OR 3B CKD: ICD-10-CM

## 2021-01-07 DIAGNOSIS — I48.0 PAF (PAROXYSMAL ATRIAL FIBRILLATION) (HCC): ICD-10-CM

## 2021-01-07 DIAGNOSIS — M85.80 OSTEOPENIA, UNSPECIFIED LOCATION: ICD-10-CM

## 2021-01-07 DIAGNOSIS — G62.9 PERIPHERAL POLYNEUROPATHY: ICD-10-CM

## 2021-01-07 DIAGNOSIS — Z78.0 POST-MENOPAUSAL: ICD-10-CM

## 2021-01-07 DIAGNOSIS — I51.81 TAKOTSUBO CARDIOMYOPATHY: ICD-10-CM

## 2021-01-07 DIAGNOSIS — M79.601 PAIN OF RIGHT UPPER EXTREMITY: ICD-10-CM

## 2021-01-07 DIAGNOSIS — M79.604 RIGHT LEG PAIN: ICD-10-CM

## 2021-01-07 PROBLEM — E66.9 OBESITY (BMI 30-39.9): Status: RESOLVED | Noted: 2017-04-04 | Resolved: 2021-01-07

## 2021-01-07 LAB
ALBUMIN SERPL BCP-MCNC: 4.3 G/DL (ref 3.2–4.9)
ALBUMIN/GLOB SERPL: 1.7 G/DL
ALP SERPL-CCNC: 76 U/L (ref 30–99)
ALT SERPL-CCNC: 40 U/L (ref 2–50)
ANION GAP SERPL CALC-SCNC: 11 MMOL/L (ref 7–16)
AST SERPL-CCNC: 20 U/L (ref 12–45)
BILIRUB SERPL-MCNC: 0.6 MG/DL (ref 0.1–1.5)
BUN SERPL-MCNC: 29 MG/DL (ref 8–22)
CALCIUM SERPL-MCNC: 9.8 MG/DL (ref 8.5–10.5)
CHLORIDE SERPL-SCNC: 105 MMOL/L (ref 96–112)
CHOLEST SERPL-MCNC: 159 MG/DL (ref 100–199)
CO2 SERPL-SCNC: 25 MMOL/L (ref 20–33)
CREAT SERPL-MCNC: 0.87 MG/DL (ref 0.5–1.4)
ERYTHROCYTE [DISTWIDTH] IN BLOOD BY AUTOMATED COUNT: 52.6 FL (ref 35.9–50)
FASTING STATUS PATIENT QL REPORTED: NORMAL
GLOBULIN SER CALC-MCNC: 2.5 G/DL (ref 1.9–3.5)
GLUCOSE SERPL-MCNC: 86 MG/DL (ref 65–99)
HCT VFR BLD AUTO: 46.7 % (ref 37–47)
HDLC SERPL-MCNC: 63 MG/DL
HGB BLD-MCNC: 14.9 G/DL (ref 12–16)
LDLC SERPL CALC-MCNC: 59 MG/DL
MCH RBC QN AUTO: 31.2 PG (ref 27–33)
MCHC RBC AUTO-ENTMCNC: 31.9 G/DL (ref 33.6–35)
MCV RBC AUTO: 97.9 FL (ref 81.4–97.8)
PLATELET # BLD AUTO: 252 K/UL (ref 164–446)
PMV BLD AUTO: 10.5 FL (ref 9–12.9)
POTASSIUM SERPL-SCNC: 4.8 MMOL/L (ref 3.6–5.5)
PROT SERPL-MCNC: 6.8 G/DL (ref 6–8.2)
RBC # BLD AUTO: 4.77 M/UL (ref 4.2–5.4)
SODIUM SERPL-SCNC: 141 MMOL/L (ref 135–145)
TRIGL SERPL-MCNC: 185 MG/DL (ref 0–149)
TSH SERPL DL<=0.005 MIU/L-ACNC: 2.45 UIU/ML (ref 0.38–5.33)
WBC # BLD AUTO: 12.4 K/UL (ref 4.8–10.8)

## 2021-01-07 PROCEDURE — 80061 LIPID PANEL: CPT

## 2021-01-07 PROCEDURE — 99214 OFFICE O/P EST MOD 30 MIN: CPT | Performed by: FAMILY MEDICINE

## 2021-01-07 PROCEDURE — 84443 ASSAY THYROID STIM HORMONE: CPT

## 2021-01-07 PROCEDURE — 36415 COLL VENOUS BLD VENIPUNCTURE: CPT

## 2021-01-07 PROCEDURE — 85027 COMPLETE CBC AUTOMATED: CPT

## 2021-01-07 PROCEDURE — 80053 COMPREHEN METABOLIC PANEL: CPT

## 2021-01-07 RX ORDER — TIZANIDINE 4 MG/1
4 TABLET ORAL EVERY 6 HOURS PRN
Qty: 90 TAB | Refills: 1 | Status: SHIPPED | OUTPATIENT
Start: 2021-01-07 | End: 2021-01-21

## 2021-01-07 ASSESSMENT — PATIENT HEALTH QUESTIONNAIRE - PHQ9: CLINICAL INTERPRETATION OF PHQ2 SCORE: 0

## 2021-01-07 ASSESSMENT — FIBROSIS 4 INDEX: FIB4 SCORE: 1.1

## 2021-01-07 NOTE — PROGRESS NOTES
"cc: Leg pain    Subjective:     Lucía Rodriguez is a 70 y.o. female presenting to discuss right foot and leg pain.  Started about a year ago.  Describes muscle spasms in her foot and calf that can last 10 minutes to couple hours.  Is intermittent.  She has also noticed symptoms in her right arm.  She reports when in the emergency department in the past for this.  States that an ultrasound was normal.  Symptoms are worse with activity.  Denies any redness, swelling.  She does have numbness and tingling in her arms and feet bilaterally, has peripheral neuropathy.  She takes gabapentin for this, does not seem to be helpful.  She does have varicose veins, seems to be stable.      Review of systems:  See above.       Current Outpatient Medications:   •  tizanidine (ZANAFLEX) 4 MG Tab, Take 1 Tab by mouth every 6 hours as needed (muscle spasms)., Disp: 90 Tab, Rfl: 1  •  gabapentin (NEURONTIN) 300 MG Cap, Take 1 Cap by mouth 2 Times a Day., Disp: , Rfl:   •  atorvastatin (LIPITOR) 20 MG Tab, Take 1 Tab by mouth every day., Disp: 90 Tab, Rfl: 3  •  flecainide (TAMBOCOR) 50 MG tablet, TAKE 1 TO 2 TABLETS BY MOUTH TWICE DAILY, Disp: 360 Tab, Rfl: 3  •  metoprolol (LOPRESSOR) 25 MG Tab, TAKE 1/2 TABLET BY MOUTH TWICE DAILY, Disp: 90 Tab, Rfl: 3  •  Multiple Vitamins-Minerals (MULTIVITAMIN ADULT PO), Take  by mouth., Disp: , Rfl:   •  BABY ASPIRIN PO, Take  by mouth., Disp: , Rfl:   •  prednisoLONE acetate (PRED FORTE) 1 % Suspension, Place 1 Drop in right eye 2 Times a Day. Right Eye - Implant, Disp: , Rfl:   •  Calcium Carbonate-Vitamin D (CALCIUM + D PO), Take 1 Tab by mouth every day. **OTC**, Disp: , Rfl:   •  Lutein-Zeaxanthin (LUTEIN) 15-0.7 MG CAPS, Take 1 Tab by mouth every bedtime., Disp: , Rfl:     Allergies, past medical history, past surgical history, family history, social history reviewed and updated    Objective:     Vitals: /70   Pulse 74   Temp 36.4 °C (97.6 °F)   Resp 16   Ht 1.727 m (5' 8\")   " Wt 80.7 kg (178 lb)   LMP 12/28/2003   SpO2 96%   BMI 27.06 kg/m²   General: Alert, pleasant, NAD  HEENT: Normocephalic.    Heart: Regular rate and rhythm.  S1 and S2 normal.  No murmurs appreciated.  Respiratory: Normal respiratory effort.  Clear to auscultation bilaterally.  Abdomen: Non-distended, soft  Skin: Warm, dry, no rashes.  Musculoskeletal: Gait is normal.  Moves all extremities well.  Extremities: No leg edema.  Patellar reflexes absent bilaterally  Psych:  Affect/mood is normal, judgement is good, memory is intact, grooming is appropriate.    Assessment/Plan:     Lucía was seen today for follow-up.    Diagnoses and all orders for this visit:    Right leg pain  New problem, will check the following.  If normal, we discussed a referral to vein [place]  -     US-EXTREMITY ARTERY LOWER UNILAT W/ALY (COMBO) RIGHT; Future  -     MR-LUMBAR SPINE-W/O; Future    Pain of right upper extremity  Peripheral polyneuropathy  New problem, will check an MRI  -     MR-CERVICAL SPINE-W/O; Future    PAF (paroxysmal atrial fibrillation) (HCC)  Stable, sees cardiology  -     CBC WITHOUT DIFFERENTIAL; Future  -     Comp Metabolic Panel; Future  -     TSH WITH REFLEX TO FT4; Future    Takotsubo cardiomyopathy  Stable, sees cardiology  -     TSH WITH REFLEX TO FT4; Future    Mixed hyperlipidemia  Stable, continue atorvastatin  -     Comp Metabolic Panel; Future  -     Lipid Profile; Future    Stage 3 chronic kidney disease, unspecified whether stage 3a or 3b CKD  Stable, continue to monitor  -     CBC WITHOUT DIFFERENTIAL; Future  -     Comp Metabolic Panel; Future    Osteopenia, unspecified location  Post-menopausal  Table, continue supplementation, is due for a DEXA  -     DS-BONE DENSITY STUDY (DEXA); Future    Other orders  -     tizanidine (ZANAFLEX) 4 MG Tab; Take 1 Tab by mouth every 6 hours as needed (muscle spasms).          Return in about 1 year (around 1/7/2022) for Med check.

## 2021-01-13 ENCOUNTER — HOSPITAL ENCOUNTER (OUTPATIENT)
Dept: RADIOLOGY | Facility: MEDICAL CENTER | Age: 71
End: 2021-01-13
Attending: FAMILY MEDICINE
Payer: MEDICARE

## 2021-01-13 DIAGNOSIS — M79.604 RIGHT LEG PAIN: ICD-10-CM

## 2021-01-13 PROCEDURE — 93922 UPR/L XTREMITY ART 2 LEVELS: CPT | Mod: 26 | Performed by: INTERNAL MEDICINE

## 2021-01-13 PROCEDURE — 93922 UPR/L XTREMITY ART 2 LEVELS: CPT

## 2021-01-15 ENCOUNTER — HOSPITAL ENCOUNTER (EMERGENCY)
Facility: MEDICAL CENTER | Age: 71
End: 2021-01-15
Attending: EMERGENCY MEDICINE | Admitting: EMERGENCY MEDICINE
Payer: MEDICARE

## 2021-01-15 ENCOUNTER — APPOINTMENT (OUTPATIENT)
Dept: RADIOLOGY | Facility: MEDICAL CENTER | Age: 71
End: 2021-01-15
Attending: EMERGENCY MEDICINE
Payer: MEDICARE

## 2021-01-15 ENCOUNTER — NURSE TRIAGE (OUTPATIENT)
Dept: HEALTH INFORMATION MANAGEMENT | Facility: OTHER | Age: 71
End: 2021-01-15

## 2021-01-15 VITALS
HEIGHT: 66 IN | DIASTOLIC BLOOD PRESSURE: 72 MMHG | WEIGHT: 182.32 LBS | OXYGEN SATURATION: 98 % | BODY MASS INDEX: 29.3 KG/M2 | TEMPERATURE: 97.9 F | HEART RATE: 59 BPM | SYSTOLIC BLOOD PRESSURE: 160 MMHG | RESPIRATION RATE: 12 BRPM

## 2021-01-15 DIAGNOSIS — Z23 NEED FOR VACCINATION: ICD-10-CM

## 2021-01-15 DIAGNOSIS — R55 NEAR SYNCOPE: ICD-10-CM

## 2021-01-15 LAB
ALBUMIN SERPL BCP-MCNC: 4.7 G/DL (ref 3.2–4.9)
ALBUMIN/GLOB SERPL: 1.8 G/DL
ALP SERPL-CCNC: 83 U/L (ref 30–99)
ALT SERPL-CCNC: 50 U/L (ref 2–50)
ANION GAP SERPL CALC-SCNC: 12 MMOL/L (ref 7–16)
AST SERPL-CCNC: 28 U/L (ref 12–45)
BASOPHILS # BLD AUTO: 0.3 % (ref 0–1.8)
BASOPHILS # BLD: 0.03 K/UL (ref 0–0.12)
BILIRUB SERPL-MCNC: 0.5 MG/DL (ref 0.1–1.5)
BUN SERPL-MCNC: 23 MG/DL (ref 8–22)
CALCIUM SERPL-MCNC: 9.8 MG/DL (ref 8.5–10.5)
CHLORIDE SERPL-SCNC: 104 MMOL/L (ref 96–112)
CO2 SERPL-SCNC: 23 MMOL/L (ref 20–33)
CREAT SERPL-MCNC: 0.91 MG/DL (ref 0.5–1.4)
EKG IMPRESSION: NORMAL
EOSINOPHIL # BLD AUTO: 0.06 K/UL (ref 0–0.51)
EOSINOPHIL NFR BLD: 0.6 % (ref 0–6.9)
ERYTHROCYTE [DISTWIDTH] IN BLOOD BY AUTOMATED COUNT: 52.2 FL (ref 35.9–50)
GLOBULIN SER CALC-MCNC: 2.6 G/DL (ref 1.9–3.5)
GLUCOSE SERPL-MCNC: 82 MG/DL (ref 65–99)
HCT VFR BLD AUTO: 46.3 % (ref 37–47)
HGB BLD-MCNC: 14.6 G/DL (ref 12–16)
IMM GRANULOCYTES # BLD AUTO: 0.07 K/UL (ref 0–0.11)
IMM GRANULOCYTES NFR BLD AUTO: 0.7 % (ref 0–0.9)
LYMPHOCYTES # BLD AUTO: 2.18 K/UL (ref 1–4.8)
LYMPHOCYTES NFR BLD: 22.3 % (ref 22–41)
MCH RBC QN AUTO: 31 PG (ref 27–33)
MCHC RBC AUTO-ENTMCNC: 31.5 G/DL (ref 33.6–35)
MCV RBC AUTO: 98.3 FL (ref 81.4–97.8)
MONOCYTES # BLD AUTO: 0.73 K/UL (ref 0–0.85)
MONOCYTES NFR BLD AUTO: 7.5 % (ref 0–13.4)
NEUTROPHILS # BLD AUTO: 6.7 K/UL (ref 2–7.15)
NEUTROPHILS NFR BLD: 68.6 % (ref 44–72)
NRBC # BLD AUTO: 0 K/UL
NRBC BLD-RTO: 0 /100 WBC
PLATELET # BLD AUTO: 241 K/UL (ref 164–446)
PMV BLD AUTO: 10.6 FL (ref 9–12.9)
POTASSIUM SERPL-SCNC: 4.2 MMOL/L (ref 3.6–5.5)
PROT SERPL-MCNC: 7.3 G/DL (ref 6–8.2)
RBC # BLD AUTO: 4.71 M/UL (ref 4.2–5.4)
SODIUM SERPL-SCNC: 139 MMOL/L (ref 135–145)
TROPONIN T SERPL-MCNC: 8 NG/L (ref 6–19)
WBC # BLD AUTO: 9.8 K/UL (ref 4.8–10.8)

## 2021-01-15 PROCEDURE — 99284 EMERGENCY DEPT VISIT MOD MDM: CPT

## 2021-01-15 PROCEDURE — 71045 X-RAY EXAM CHEST 1 VIEW: CPT

## 2021-01-15 PROCEDURE — 85025 COMPLETE CBC W/AUTO DIFF WBC: CPT

## 2021-01-15 PROCEDURE — 80053 COMPREHEN METABOLIC PANEL: CPT

## 2021-01-15 PROCEDURE — 93005 ELECTROCARDIOGRAM TRACING: CPT

## 2021-01-15 PROCEDURE — 84484 ASSAY OF TROPONIN QUANT: CPT

## 2021-01-15 PROCEDURE — 70450 CT HEAD/BRAIN W/O DYE: CPT

## 2021-01-15 PROCEDURE — 93005 ELECTROCARDIOGRAM TRACING: CPT | Performed by: EMERGENCY MEDICINE

## 2021-01-15 RX ORDER — ACETAMINOPHEN 500 MG
500 TABLET ORAL EVERY 6 HOURS PRN
COMMUNITY
End: 2021-01-21

## 2021-01-15 ASSESSMENT — FIBROSIS 4 INDEX: FIB4 SCORE: 0.89

## 2021-01-15 NOTE — TELEPHONE ENCOUNTER
"Pt fell 3 days ago and again today. Pt states she does not remember falling, she just remembers waking up on the ground. Unsure how long unconscious. Pt does have history of a-fib. Advised to go to ED per protocol. Pt's  to take pt in.    Reason for Disposition  • History of heart problems or congestive heart failure    Additional Information  • Negative: Still unconscious  • Negative: Still feels dizzy or lightheaded  • Negative: Difficult to awaken or acting confused (e.g., disoriented, slurred speech)  • Negative: Difficulty breathing  • Negative: Bluish (or gray) lips or face  • Negative: Shock suspected (e.g., cold/pale/clammy skin, too weak to stand, low BP, rapid pulse)  • Negative: Bleeding (e.g., vomiting blood, rectal bleeding or tarry stools, severe vaginal bleeding)  • Negative: Chest pain  • Negative: Extra heart beats or heart is beating fast (i.e., palpitations)  • Negative: Heart beating < 50 beats per minute OR > 140 beats per minute  • Negative: Fainted suddenly after medicine, allergic food or bee sting  • Negative: Sounds like a life-threatening emergency to the triager  • Negative: Has diabetes (diabetes mellitus) and fainting from low blood sugar (i.e., < 70 mg/dL or 3.9 mmol/L)  • Negative: Seizure suspected (e.g., muscle jerking or shaking followed by confusion)  • Negative: Heat exhaustion suspected (i.e., dehydration from heat exposure)  • Negative: Fainted > 15 minutes ago and still looks pale (pale skin, pallor)  • Negative: Fainted > 15 minutes ago and still feels weak or dizzy    Answer Assessment - Initial Assessment Questions  1. ONSET: \"How long were you unconscious?\" (minutes) \"When did it happen?\"      unknown  2. CONTENT: \"What happened during period of unconsciousness?\" (e.g., seizure activity)       unknown  3. MENTAL STATUS: \"Alert and oriented now?\" (oriented x 3 = name, month, location)       No  4. TRIGGER: \"What do you think caused the fainting?\" \"What were you doing " "just before you fainted?\"  (e.g., exercise, sudden standing up, prolonged standing)      Prolonged standing?  5. RECURRENT SYMPTOM: \"Have you ever passed out before?\" If so, ask: \"When was the last time?\" and \"What happened that time?\"       No  6. INJURY: \"Did you sustain any injury during the fall?\"       No  7. CARDIAC SYMPTOMS: \"Have you had any of the following symptoms: chest pain, difficulty breathing, palpitations?\"      No  8. NEUROLOGIC SYMPTOMS: \"Have you had any of the following symptoms: headache, numbness, vertigo, weakness?\"      No, pressure in head  9. GI SYMPTOMS: \"Have you had any of the following symptoms: abdominal pain, vomiting, diarrhea, blood in stools?\"      No  10. OTHER SYMPTOMS: \"Do you have any other symptoms?\"        No  11. PREGNANCY: \"Is there any chance you are pregnant?\" \"When was your last menstrual period?\"        NA    Protocols used: [id number]-[id number]      "

## 2021-01-15 NOTE — ED NOTES
Pt ambulatory to [facility]. Pt changed into gown and placed on monitor.  Agree with triage note.   Chart up and ready for ERP now.

## 2021-01-15 NOTE — ED TRIAGE NOTES
"Chief Complaint   Patient presents with   • Syncope     Monday patient \"just fell\" while walking in her house and states she didn't trip. Patient didn't think anything of it and was on a walk today when she \"just fell\" again. Patient denies dizziness. States her head feels \"full\". Patient doesn't think she hit her head. Patient states she has been having trouble with her right leg and her MD has been working her up for that. Patient has an MRI scheduled for tomorrow for her back and neck.    \/73   Pulse 76   Temp 36.6 °C (97.9 °F) (Temporal)   Resp 16   Ht 1.676 m (5' 6\")   Wt 82.7 kg (182 lb 5.1 oz)   LMP 12/28/2003   SpO2 96%   BMI 29.43 kg/m²      Patient arrived to ED for the above complaint. Patient placed in lobby. EKG ordered.   "

## 2021-01-16 ENCOUNTER — HOSPITAL ENCOUNTER (OUTPATIENT)
Dept: RADIOLOGY | Facility: MEDICAL CENTER | Age: 71
End: 2021-01-16
Attending: FAMILY MEDICINE
Payer: MEDICARE

## 2021-01-16 DIAGNOSIS — M79.604 RIGHT LEG PAIN: ICD-10-CM

## 2021-01-16 DIAGNOSIS — G62.9 PERIPHERAL POLYNEUROPATHY: ICD-10-CM

## 2021-01-16 DIAGNOSIS — M79.601 PAIN OF RIGHT UPPER EXTREMITY: ICD-10-CM

## 2021-01-16 PROCEDURE — 72148 MRI LUMBAR SPINE W/O DYE: CPT

## 2021-01-16 PROCEDURE — 72141 MRI NECK SPINE W/O DYE: CPT

## 2021-01-16 NOTE — ED NOTES
"Pt discharged home. Pt in possession of belongings. Pt provided discharge education and information pertaining to medications and follow up appointments. Pt received copy of discharge instructions and verbalized understanding. /72   Pulse (!) 59   Temp 36.6 °C (97.9 °F) (Temporal)   Resp 12   Ht 1.676 m (5' 6\")   Wt 82.7 kg (182 lb 5.1 oz)   LMP 12/28/2003   SpO2 98%   BMI 29.43 kg/m²     "

## 2021-01-16 NOTE — ED PROVIDER NOTES
ED Provider Note    Patient signed out from Dr. Poon with CT head pending.  Please see his note for the initial evaluation and management.  CT head returned without evidence of acute findings.  I discussed the findings with the patient.  This is an alert and pleasant patient in no acute distress and nontoxic in appearance.  Vital signs are unremarkable except for elevated blood pressure for which she can follow-up on outpatient basis for further management.  I discussed with patient keeping a blood pressure journal for outpatient follow-up.  Return to ED precautions were given.  She is aware of the recommendation on following up with Dr. Thakkar, cardiologist.  She verbalized understanding and agreed with plan of care with no further questions or concerns.

## 2021-01-16 NOTE — ED PROVIDER NOTES
"ED Provider Note    Scribed for Efren Poon M.D. by Dillon Rondon. 1/15/2021  4:10 PM    Primary care provider: Harvinder Bell M.D.  Means of arrival: Walk-in  History obtained from: Patient  History limited by: None    CHIEF COMPLAINT  Chief Complaint   Patient presents with   • Syncope     Monday patient \"just fell\" while walking in her house and states she didn't trip. Patient didn't think anything of it and was on a walk today when she \"just fell\" again. Patient denies dizziness. States her head feels \"full\". Patient doesn't think she hit her head. Patient states she has been having trouble with her right leg and her MD has been working her up for that. Patient has an MRI scheduled for tomorrow for her back and neck.        HPI  Lucía Rodriguez is a 70 y.o. female with a history of atrial fibrillation and arrhythmia who presents to the Emergency Department for a possible episode of syncope 4 days ago. Patient states that she \"just fell\" while walking in her house and adds that she didn't trip. She states that today she was walking again and had another fall. Patient states that she is currently experiencing a sensation where her head feels \"full\". She adds that she has been having trouble with her right leg which her MD is aware of. Patient has an MRI scheduled for tomorrow for her neck and back. Patient denies any head trauma, headache, abdominal pain, decreased appetite, or dizziness at this time. She notes that her vision has been changing for approximately the last year and will periodically become blurred and clear. Patient notes that this possible episode of syncope did not occur immediately after standing up. Patient notes that she has been staying hydrated. Patient denies any history of cancer.    PPE Note: I personally donned full PPE for all patient encounters during this visit, including being clean-shaven with an N95 respirator mask and gloves.    Scribe remained outside the patient's room " and did not have any contact with the patient for the duration of patient encounter.     REVIEW OF SYSTEMS  Pertinent negatives include no dizziness, headache, abdominal pain, decreased appetite, or head trauma. As above, all other systems reviewed and are negative.   See HPI for further details.     PAST MEDICAL HISTORY   has a past medical history of Arrhythmia, Atrial fibrillation (HCC), Calculus of gallbladder without cholecystitis (1/9/2017), CATARACT, Glaucoma, Hyperlipidemia, Macular cyst, hole, or pseudohole of retina, and Pulmonary nodules/lesions, multiple (07/06/2016).    SURGICAL HISTORY   has a past surgical history that includes gyn surgery; other; other; and meniscus repair (Right).    SOCIAL HISTORY  Social History     Tobacco Use   • Smoking status: Never Smoker   • Smokeless tobacco: Never Used   Substance Use Topics   • Alcohol use: No   • Drug use: No      Social History     Substance and Sexual Activity   Drug Use No       FAMILY HISTORY  Family History   Problem Relation Age of Onset   • Heart Disease Father         triple bypass   • Cancer Father    • Cancer Mother        CURRENT MEDICATIONS  Home Medications     Reviewed by Madalyn Sarmiento R.N. (Registered Nurse) on 01/15/21 at 1426  Med List Status: Not Addressed   Medication Last Dose Status   atorvastatin (LIPITOR) 20 MG Tab  Active   BABY ASPIRIN PO  Active   Calcium Carbonate-Vitamin D (CALCIUM + D PO)  Active   flecainide (TAMBOCOR) 50 MG tablet  Active   gabapentin (NEURONTIN) 300 MG Cap  Active   Lutein-Zeaxanthin (LUTEIN) 15-0.7 MG CAPS  Active   metoprolol (LOPRESSOR) 25 MG Tab  Active   Multiple Vitamins-Minerals (MULTIVITAMIN ADULT PO)  Active   prednisoLONE acetate (PRED FORTE) 1 % Suspension  Active   tizanidine (ZANAFLEX) 4 MG Tab  Active                ALLERGIES  Allergies   Allergen Reactions   • Neosporin [Neomycin-Polymyxin B] Rash     Rxn = 6/2016       PHYSICAL EXAM  VITAL SIGNS: /73   Pulse 76   Temp 36.6 °C  "(97.9 °F) (Temporal)   Resp 16   Ht 1.676 m (5' 6\")   Wt 82.7 kg (182 lb 5.1 oz)   LMP 12/28/2003   SpO2 96%   BMI 29.43 kg/m²   Constitutional: Well developed, Well nourished, No acute distress, Non-toxic appearance.   HENT: Normocephalic, Atraumatic, Bilateral external ears normal, Oropharynx is clear mucous membranes are moist. No oral exudates or nasal discharge.   Eyes: Pupils are equal round and reactive, EOMI, Conjunctiva normal, No discharge.   Neck: Normal range of motion, No tenderness, Supple, No stridor. No meningismus.  Lymphatic: No lymphadenopathy noted.   Cardiovascular: Regular rate and rhythm without murmur rub or gallop.  Thorax & Lungs: Clear breath sounds bilaterally without wheezes, rhonchi or rales. There is no chest wall tenderness.   Abdomen: Soft non-tender non-distended. There is no rebound or guarding. No organomegaly is appreciated. Bowel sounds are normal.  Skin: Normal without rash.   Back: No CVA or spinal tenderness.   Extremities: Intact distal pulses, No edema, No tenderness, No cyanosis, No clubbing. Capillary refill is less than 2 seconds.  Musculoskeletal: Good range of motion in all major joints. No tenderness to palpation or major deformities noted.   Neurologic: Alert & oriented x 3, Normal motor function, Normal sensory function, No focal deficits noted. Reflexes are normal.  Psychiatric: Affect normal, Judgment normal, Mood normal. There is no suicidal ideation or patient reported hallucinations.     DIAGNOSTIC STUDIES / PROCEDURES    LABS  Labs Reviewed   CBC WITH DIFFERENTIAL - Abnormal; Notable for the following components:       Result Value    MCV 98.3 (*)     MCHC 31.5 (*)     RDW 52.2 (*)     All other components within normal limits   COMP METABOLIC PANEL - Abnormal; Notable for the following components:    Bun 23 (*)     All other components within normal limits   TROPONIN   ESTIMATED GFR      All labs reviewed by me.    EKG Interpretation:  Results for " orders placed or performed during the hospital encounter of 01/15/21   EKG   Result Value Ref Range    Report       [facility] Emergency [facility].    Test Date:  2021-01-15  Pt Name:    JOSEPH SRIVASTAVA                Department: ER  MRN:        [id number]                      Room:  Gender:     Female                       Technician: [id number]  :        [id number]                   Requested By:ER TRIAGE PROTOCOL  Order #:    [id number]                    Reading MD:    Measurements  Intervals                                Axis  Rate:       61                           P:          51  NH:         224                          QRS:        -29  QRSD:       96                           T:          19  QT:         444  QTc:        448    Interpretive Statements  SINUS RHYTHM  FIRST DEGREE AV BLOCK  BORDERLINE LEFT AXIS DEVIATION  Compared to ECG 2019 12:58:33  No significant changes     Interpreted and electronically signed by Dr. Efren Poon.    RADIOLOGY  DX-CHEST-PORTABLE (1 VIEW)   Final Result      1.  There is probably linear left lower lobe atelectasis.      CT-HEAD W/O    (Results Pending)     The radiologist's interpretation of all radiological studies have been reviewed by me.    COURSE & MEDICAL DECISION MAKING  Nursing notes, VS, PMSFHx reviewed in chart.    4:10 PM Patient seen and examined at bedside. Ordered for labs and imaging to evaluate.    Patient seems to have recurrent near syncope and I am concerned about cardiac etiology and less likely metabolic.    Chest x-ray shows no evidence of acute airspace disease, pneumothorax or cardiomegaly.    Laboratory evaluation reveals laboratory evaluation reveals no leukocytosis, shift, anemia, electrolyte derangements or acidosis and troponin is unremarkable.    Patient has had high blood pressure while in the emergency department, felt likely secondary to medical condition. Counseled patient to monitor blood pressure at home and follow up with  primary care physician.     CT scan of the head is currently pending.  If that study is negative patient can go home with follow-up to Dr. Thakkar, cardiology and likely will need an event recorder/Holter monitor as this still could be a cardiac dysrhythmia but EKG today is unremarkable     I signed out the patient's ultimate disposition to Dr. Townsend who can discharge her if her head CT does not show any significant abnormalities    the patient will return for new or worsening symptoms and is stable at the time of discharge.    DISPOSITION:  Patient will be discharged home in stable condition.  Patient is comfortable this plan of care    FINAL IMPRESSION  1. Near syncope          Dillon HOWELL (Hal[place]), am scribing for, and in the presence of, Efren Poon M.D..    Electronically signed by: Dillon Rondon ([place]), 1/15/2021    Efren HOWELL M.D. personally performed the services described in this documentation, as scribed by Dillon Rondon in my presence, and it is both accurate and complete. C    The note accurately reflects work and decisions made by me.  Efren Poon M.D.  1/15/2021  5:58 PM

## 2021-01-18 DIAGNOSIS — M79.604 RIGHT LEG PAIN: ICD-10-CM

## 2021-01-18 DIAGNOSIS — G62.9 PERIPHERAL POLYNEUROPATHY: ICD-10-CM

## 2021-01-18 DIAGNOSIS — M48.00 CENTRAL STENOSIS OF SPINAL CANAL: ICD-10-CM

## 2021-01-18 DIAGNOSIS — M79.601 PAIN OF RIGHT UPPER EXTREMITY: ICD-10-CM

## 2021-01-21 ENCOUNTER — OFFICE VISIT (OUTPATIENT)
Dept: CARDIOLOGY | Facility: MEDICAL CENTER | Age: 71
End: 2021-01-21
Payer: MEDICARE

## 2021-01-21 VITALS
HEART RATE: 76 BPM | SYSTOLIC BLOOD PRESSURE: 130 MMHG | OXYGEN SATURATION: 96 % | RESPIRATION RATE: 16 BRPM | BODY MASS INDEX: 27.77 KG/M2 | HEIGHT: 68 IN | DIASTOLIC BLOOD PRESSURE: 70 MMHG | WEIGHT: 183.2 LBS

## 2021-01-21 DIAGNOSIS — R55 SYNCOPE AND COLLAPSE: ICD-10-CM

## 2021-01-21 DIAGNOSIS — I48.0 PAF (PAROXYSMAL ATRIAL FIBRILLATION) (HCC): ICD-10-CM

## 2021-01-21 PROCEDURE — 99214 OFFICE O/P EST MOD 30 MIN: CPT | Performed by: NURSE PRACTITIONER

## 2021-01-21 RX ORDER — APIXABAN 5 MG/1
TABLET, FILM COATED ORAL
COMMUNITY
Start: 2021-01-20 | End: 2021-01-21

## 2021-01-21 ASSESSMENT — ENCOUNTER SYMPTOMS
SPUTUM PRODUCTION: 0
PALPITATIONS: 0
NERVOUS/ANXIOUS: 0
NAUSEA: 0
DIZZINESS: 0
ORTHOPNEA: 0
SHORTNESS OF BREATH: 0
WEAKNESS: 0
COUGH: 0
VOMITING: 0
BLURRED VISION: 1
WHEEZING: 0
HEMOPTYSIS: 0
CLAUDICATION: 0
PND: 0

## 2021-01-21 ASSESSMENT — FIBROSIS 4 INDEX: FIB4 SCORE: 1.17

## 2021-01-21 NOTE — PROGRESS NOTES
Chief Complaint   Patient presents with   • Atrial Fibrillation     F/V Dx: PAF (paroxysmal atrial fibrillation) (HCC)   • Cardiomyopathy (Non-ischemic)     F/V Dx: Takotsubo cardiomyopathy   • Hyperlipidemia       Subjective:   Lucía Rodriguez is a 70 y.o. female who presents today for ED visit after syncope.    She is followed by Dr. Thakkar in our clinic, history of atrial fibrillation on flecainide and history of takotusbo cardiomyopathy.    Last seen 12/2019 with Dr. Thakkar.     Interim events:  Patient called yesterday stating she had syncopal episode 1/11 and 1/15.    Yesterday at [facility] urgent care, diagnosed with DVT on right leg.     Patient did not have any prodromal symptoms prior to syncope. She did have LOC and regained within seconds.     Past Medical History:   Diagnosis Date   • Arrhythmia     Atrial fibrillation   • Atrial fibrillation (HCC)    • Calculus of gallbladder without cholecystitis 1/9/2017   • CATARACT     left eye   • Glaucoma     right eye   • Hyperlipidemia    • Macular cyst, hole, or pseudohole of retina    • Pulmonary nodules/lesions, multiple 07/06/2016    stable on CTs, no further work up needed     Past Surgical History:   Procedure Laterality Date   • GYN SURGERY      HYSTERECTOMY   • MENISCUS REPAIR Right    • OTHER      eye surgery, right eye   • OTHER      cataract surgery, left eye     Family History   Problem Relation Age of Onset   • Heart Disease Father         triple bypass   • Cancer Father    • Cancer Mother      Social History     Socioeconomic History   • Marital status:      Spouse name: Not on file   • Number of children: Not on file   • Years of education: Not on file   • Highest education level: Not on file   Occupational History   • Not on file   Social Needs   • Financial resource strain: Not on file   • Food insecurity     Worry: Not on file     Inability: Not on file   • Transportation needs     Medical: Not on file     Non-medical: Not on file   Tobacco  Use   • Smoking status: Never Smoker   • Smokeless tobacco: Never Used   Substance and Sexual Activity   • Alcohol use: No   • Drug use: No   • Sexual activity: Yes     Partners: Male   Lifestyle   • Physical activity     Days per week: Not on file     Minutes per session: Not on file   • Stress: Not on file   Relationships   • Social connections     Talks on phone: Not on file     Gets together: Not on file     Attends Anglican service: Not on file     Active member of club or organization: Not on file     Attends meetings of clubs or organizations: Not on file     Relationship status: Not on file   • Intimate partner violence     Fear of current or ex partner: Not on file     Emotionally abused: Not on file     Physically abused: Not on file     Forced sexual activity: Not on file   Other Topics Concern   • Not on file   Social History Narrative   • Not on file     Allergies   Allergen Reactions   • Neosporin [Neomycin-Polymyxin B] Rash     Rxn = 6/2016     Outpatient Encounter Medications as of 1/21/2021   Medication Sig Dispense Refill   • apixaban (ELIQUIS) 5mg Tab Take 10 mg by mouth 2 Times a Day. 2 in the morning 2 at night     • gabapentin (NEURONTIN) 300 MG Cap Take 1 Cap by mouth 2 Times a Day.     • atorvastatin (LIPITOR) 20 MG Tab Take 1 Tab by mouth every day. 90 Tab 3   • flecainide (TAMBOCOR) 50 MG tablet TAKE 1 TO 2 TABLETS BY MOUTH TWICE DAILY (Patient taking differently: Take  mg by mouth 2 times a day. TAKE 1 TAB QAM AND 2 TAB QPM) 360 Tab 3   • metoprolol (LOPRESSOR) 25 MG Tab TAKE 1/2 TABLET BY MOUTH TWICE DAILY 90 Tab 3   • Multiple Vitamins-Minerals (MULTIVITAMIN ADULT PO) Take  by mouth.     • prednisoLONE acetate (PRED FORTE) 1 % Suspension Place 1 Drop in right eye 2 Times a Day. Right Eye - Implant     • Calcium Carbonate-Vitamin D (CALCIUM + D PO) Take 1 Tab by mouth every day. **OTC**     • Lutein-Zeaxanthin (LUTEIN) 15-0.7 MG CAPS Take 1 Tab by mouth every bedtime.     •  "[DISCONTINUED] ELIQUIS 5 MG Tab      • [DISCONTINUED] acetaminophen (TYLENOL) 500 MG Tab Take 500 mg by mouth every 6 hours as needed for Mild Pain.     • [DISCONTINUED] tizanidine (ZANAFLEX) 4 MG Tab Take 1 Tab by mouth every 6 hours as needed (muscle spasms). (Patient not taking: Reported on 1/15/2021) 90 Tab 1   • [DISCONTINUED] BABY ASPIRIN PO Take  by mouth.       No facility-administered encounter medications on file as of 1/21/2021.      Review of Systems   Constitutional: Negative for malaise/fatigue.   Eyes: Positive for blurred vision (blind in the right eye ).   Respiratory: Negative for cough, hemoptysis, sputum production, shortness of breath and wheezing.    Cardiovascular: Negative for chest pain, palpitations, orthopnea, claudication, leg swelling and PND.   Gastrointestinal: Negative for nausea and vomiting.   Neurological: Negative for dizziness and weakness.   Psychiatric/Behavioral: The patient is not nervous/anxious.         Objective:   /70 (BP Location: Left arm, Patient Position: Sitting, BP Cuff Size: Adult)   Pulse 76   Resp 16   Ht 1.727 m (5' 8\")   Wt 83.1 kg (183 lb 3.2 oz)   LMP 12/28/2003   SpO2 96%   BMI 27.86 kg/m²     Physical Exam   Constitutional: She is oriented to person, place, and time. She appears well-developed and well-nourished. No distress.   HENT:   Head: Normocephalic and atraumatic.   Eyes: Pupils are equal, round, and reactive to light.   Neck: No JVD present. Carotid bruit is not present.   Cardiovascular: Normal rate, regular rhythm and normal heart sounds.   No murmur heard.  Pulmonary/Chest: Effort normal and breath sounds normal. No respiratory distress.   Abdominal: Soft. Bowel sounds are normal. She exhibits no distension.   Musculoskeletal:         General: No edema.   Neurological: She is alert and oriented to person, place, and time.   Skin: Skin is warm and dry. She is not diaphoretic.   Psychiatric: She has a normal mood and affect. Her " behavior is normal. Judgment and thought content normal.        CARDIAC CATH AUG 2011  Procedure Summary  Normal epicardial coronary arteries.  The left ventricular systolic function is moderately  compromised. There is severe hypokinesis involving all the  segments except basal anterior and basal inferior segments  Visually estimated EF is 35%.  The present study is consistent with Takotsubo syndrome or  transient apical ballooning.  Patient tolerated procedure well.     Echocardiography Laboratory  11/7/2016  1. Left ventricular ejection fraction is visually estimated to be 65%.       2. No significant valvular disease.     3. Unable to estimate pulmonary artery pressure due to an inadequate   tricuspid regurgitant jet.     Lab Results   Component Value Date/Time    CHOLSTRLTOT 159 01/07/2021 08:08 AM    LDL 59 01/07/2021 08:08 AM    HDL 63 01/07/2021 08:08 AM    TRIGLYCERIDE 185 (H) 01/07/2021 08:08 AM       Lab Results   Component Value Date/Time    SODIUM 139 01/15/2021 02:52 PM    POTASSIUM 4.2 01/15/2021 02:52 PM    CHLORIDE 104 01/15/2021 02:52 PM    CO2 23 01/15/2021 02:52 PM    GLUCOSE 82 01/15/2021 02:52 PM    BUN 23 (H) 01/15/2021 02:52 PM    CREATININE 0.91 01/15/2021 02:52 PM    CREATININE 1.0 10/13/2008 04:30 AM     Lab Results   Component Value Date/Time    ALKPHOSPHAT 83 01/15/2021 02:52 PM    ASTSGOT 28 01/15/2021 02:52 PM    ALTSGPT 50 01/15/2021 02:52 PM    TBILIRUBIN 0.5 01/15/2021 02:52 PM        Assessment:     1. PAF (paroxysmal atrial fibrillation) ([facility])  Holter Monitor Study   2. Syncope and collapse  Holter Monitor Study       Medical Decision Making:  Today's Assessment / Status / Plan:     1. PAF:  - pulse is regular today   - continue flecainide 50mg BID   - [id number] (CHF/CM, HTN, Age, DM, CVA, Vascular disease, Gender) = 3. Continue eliquis 5mg BID     2. DVT in the right leg:  - DVT eliquis dosage     3. syncope:  - no carotid bruit noted   - 30 day biotel monitor     Follow up as  planned with Dr Thakkar.

## 2021-01-25 ENCOUNTER — TELEPHONE (OUTPATIENT)
Dept: CARDIOLOGY | Facility: MEDICAL CENTER | Age: 71
End: 2021-01-25

## 2021-01-25 ENCOUNTER — NON-PROVIDER VISIT (OUTPATIENT)
Dept: CARDIOLOGY | Facility: MEDICAL CENTER | Age: 71
End: 2021-01-25
Payer: MEDICARE

## 2021-01-25 DIAGNOSIS — I49.3 PVC (PREMATURE VENTRICULAR CONTRACTION): ICD-10-CM

## 2021-01-25 DIAGNOSIS — R55 SYNCOPE, UNSPECIFIED SYNCOPE TYPE: ICD-10-CM

## 2021-01-25 PROCEDURE — 93268 ECG RECORD/REVIEW: CPT | Performed by: INTERNAL MEDICINE

## 2021-01-25 NOTE — TELEPHONE ENCOUNTER
VENICE    Pt was sent to Dr. Alexander at [facility]. Pt is has had a couple episodes off loss of consciousness. Pt has a history of Afib. Pt doesn't have a follow up till 3/21. Dr. Alexander states MRI of the cervical spine show spinal cord compression. Dr. Alexander is worried about pt falling again and is wondering if we can get pt an appt sooner. Dr. Alexander states is Dr MILLARD can give him a call back to further discuss.    Thank you     Dr. Alexander is specifically asking for Dr MILLARD

## 2021-01-25 NOTE — TELEPHONE ENCOUNTER
Patient enrolled in the 30 day [employer] [facility] Heart monitoring program per MICHELLE Pina.  >In office hookup with Baseline transmitted.  >Pending EOS.

## 2021-01-27 ENCOUNTER — APPOINTMENT (OUTPATIENT)
Dept: FAMILY PLANNING/WOMEN'S HEALTH CLINIC | Facility: IMMUNIZATION CENTER | Age: 71
End: 2021-01-27
Attending: INTERNAL MEDICINE
Payer: MEDICARE

## 2021-01-27 ENCOUNTER — TELEPHONE (OUTPATIENT)
Dept: MEDICAL GROUP | Facility: MEDICAL CENTER | Age: 71
End: 2021-01-27

## 2021-01-27 DIAGNOSIS — Z23 NEED FOR VACCINATION: ICD-10-CM

## 2021-01-27 DIAGNOSIS — I82.4Y9 DEEP VEIN THROMBOSIS (DVT) OF PROXIMAL LOWER EXTREMITY, UNSPECIFIED CHRONICITY, UNSPECIFIED LATERALITY (HCC): ICD-10-CM

## 2021-01-27 PROCEDURE — 91301 MODERNA SARS-COV-2 VACCINE: CPT

## 2021-01-27 PROCEDURE — 0011A MODERNA SARS-COV-2 VACCINE: CPT

## 2021-01-27 NOTE — TELEPHONE ENCOUNTER
Pt was started on eliquis when she was seen at [facility] for a possible blood clot, Blood clot was located in her left leg, on her calf. pt is suppose to have surgery on her neck, and wanted to know if she should get another ultrasound to make sure the blood clot is gone.  Pt is wanting to know what next steps she needs to do before surgery, which is up in the air on being scheduled in March.

## 2021-01-28 ENCOUNTER — IMMUNIZATION (OUTPATIENT)
Dept: FAMILY PLANNING/WOMEN'S HEALTH CLINIC | Facility: IMMUNIZATION CENTER | Age: 71
End: 2021-01-28
Payer: MEDICARE

## 2021-01-28 DIAGNOSIS — Z23 ENCOUNTER FOR VACCINATION: Primary | ICD-10-CM

## 2021-01-28 NOTE — TELEPHONE ENCOUNTER
Please get records and I have placed a referral to vascular/anticoag clinic to determine duration of treatment and surgical recommendations.  I think her surgery is important to do soon, but I haven't gotten records from neurosurgery yet.

## 2021-02-02 DIAGNOSIS — I48.0 PAF (PAROXYSMAL ATRIAL FIBRILLATION) (HCC): ICD-10-CM

## 2021-02-03 ENCOUNTER — TELEPHONE (OUTPATIENT)
Dept: VASCULAR LAB | Facility: MEDICAL CENTER | Age: 71
End: 2021-02-03

## 2021-02-09 ENCOUNTER — HOSPITAL ENCOUNTER (OUTPATIENT)
Dept: RADIOLOGY | Facility: MEDICAL CENTER | Age: 71
End: 2021-02-09
Attending: FAMILY MEDICINE
Payer: MEDICARE

## 2021-02-09 DIAGNOSIS — M85.80 OSTEOPENIA, UNSPECIFIED LOCATION: ICD-10-CM

## 2021-02-09 DIAGNOSIS — Z78.0 POST-MENOPAUSAL: ICD-10-CM

## 2021-02-09 PROCEDURE — 77080 DXA BONE DENSITY AXIAL: CPT

## 2021-02-25 ENCOUNTER — IMMUNIZATION (OUTPATIENT)
Dept: FAMILY PLANNING/WOMEN'S HEALTH CLINIC | Facility: IMMUNIZATION CENTER | Age: 71
End: 2021-02-25
Attending: INTERNAL MEDICINE
Payer: MEDICARE

## 2021-02-25 DIAGNOSIS — Z23 ENCOUNTER FOR VACCINATION: Primary | ICD-10-CM

## 2021-02-25 PROCEDURE — 0012A MODERNA SARS-COV-2 VACCINE: CPT

## 2021-02-25 PROCEDURE — 91301 MODERNA SARS-COV-2 VACCINE: CPT

## 2021-03-09 ENCOUNTER — TELEPHONE (OUTPATIENT)
Dept: CARDIOLOGY | Facility: MEDICAL CENTER | Age: 71
End: 2021-03-09

## 2021-03-09 ENCOUNTER — OFFICE VISIT (OUTPATIENT)
Dept: CARDIOLOGY | Facility: MEDICAL CENTER | Age: 71
End: 2021-03-09
Payer: MEDICARE

## 2021-03-09 VITALS
WEIGHT: 185 LBS | HEART RATE: 64 BPM | OXYGEN SATURATION: 95 % | HEIGHT: 68 IN | DIASTOLIC BLOOD PRESSURE: 76 MMHG | BODY MASS INDEX: 28.04 KG/M2 | SYSTOLIC BLOOD PRESSURE: 118 MMHG

## 2021-03-09 DIAGNOSIS — R55 SYNCOPE AND COLLAPSE: ICD-10-CM

## 2021-03-09 DIAGNOSIS — O22.30 DVT (DEEP VEIN THROMBOSIS) IN PREGNANCY: ICD-10-CM

## 2021-03-09 DIAGNOSIS — M48.00 CENTRAL STENOSIS OF SPINAL CANAL: ICD-10-CM

## 2021-03-09 DIAGNOSIS — Z79.01 ANTICOAGULATED: ICD-10-CM

## 2021-03-09 DIAGNOSIS — I51.81 TAKOTSUBO CARDIOMYOPATHY: ICD-10-CM

## 2021-03-09 DIAGNOSIS — E78.2 MIXED HYPERLIPIDEMIA: ICD-10-CM

## 2021-03-09 DIAGNOSIS — I48.0 PAF (PAROXYSMAL ATRIAL FIBRILLATION) (HCC): ICD-10-CM

## 2021-03-09 LAB — EKG IMPRESSION: NORMAL

## 2021-03-09 PROCEDURE — 99214 OFFICE O/P EST MOD 30 MIN: CPT | Performed by: INTERNAL MEDICINE

## 2021-03-09 PROCEDURE — 93000 ELECTROCARDIOGRAM COMPLETE: CPT | Performed by: INTERNAL MEDICINE

## 2021-03-09 RX ORDER — CYCLOBENZAPRINE HCL 10 MG
TABLET ORAL
COMMUNITY
Start: 2021-01-20 | End: 2021-04-20

## 2021-03-09 ASSESSMENT — FIBROSIS 4 INDEX: FIB4 SCORE: 1.17

## 2021-03-09 NOTE — TELEPHONE ENCOUNTER
Called patient to schedule ILR. She would like to think about it a little bit more. She will call me back if she decides to move forward in scheduling the ILR. I gave the patient my contact information.

## 2021-03-09 NOTE — TELEPHONE ENCOUNTER
Pt called back stating she has a couple more questions. Please call Pt back at [phone].    Thank you

## 2021-03-09 NOTE — PROGRESS NOTES
Chief Complaint   Patient presents with   • Atrial Fibrillation     F/V DX:PAF       Subjective:   Lucía Rodriguez is a 70 y.o. female who presents today with recent episode of syncope with unknown etiology.  Seen in the ER.  Unremarkable work-up.  Event recorder unremarkable.  Currently on flecainide 50/100 mg daily.  Needs spinal surgery secondary to spinal stenosis.  Recent DVT no shortness of breath.  Placed on Eliquis.  No CT scan was performed.  No no atrial fibrillation seen on event recorder.    Past Medical History:   Diagnosis Date   • Arrhythmia     Atrial fibrillation   • Atrial fibrillation (HCC)    • Calculus of gallbladder without cholecystitis 1/9/2017   • CATARACT     left eye   • Glaucoma     right eye   • Hyperlipidemia    • Macular cyst, hole, or pseudohole of retina    • Pulmonary nodules/lesions, multiple 07/06/2016    stable on CTs, no further work up needed     Past Surgical History:   Procedure Laterality Date   • GYN SURGERY      HYSTERECTOMY   • MENISCUS REPAIR Right    • OTHER      eye surgery, right eye   • OTHER      cataract surgery, left eye     Family History   Problem Relation Age of Onset   • Heart Disease Father         triple bypass   • Cancer Father    • Cancer Mother      Social History     Socioeconomic History   • Marital status:      Spouse name: Not on file   • Number of children: Not on file   • Years of education: Not on file   • Highest education level: Not on file   Occupational History   • Not on file   Tobacco Use   • Smoking status: Never Smoker   • Smokeless tobacco: Never Used   Substance and Sexual Activity   • Alcohol use: No   • Drug use: No   • Sexual activity: Yes     Partners: Male   Other Topics Concern   • Not on file   Social History Narrative   • Not on file     Social Determinants of Health     Financial Resource Strain:    • Difficulty of Paying Living Expenses:    Food Insecurity:    • Worried About Running Out of Food in the Last Year:    •  Ran Out of Food in the Last Year:    Transportation Needs:    • Lack of Transportation (Medical):    • Lack of Transportation (Non-Medical):    Physical Activity:    • Days of Exercise per Week:    • Minutes of Exercise per Session:    Stress:    • Feeling of Stress :    Social Connections:    • Frequency of Communication with Friends and Family:    • Frequency of Social Gatherings with Friends and Family:    • Attends Hinduism Services:    • Active Member of Clubs or Organizations:    • Attends Club or Organization Meetings:    • Marital Status:    Intimate Partner Violence:    • Fear of Current or Ex-Partner:    • Emotionally Abused:    • Physically Abused:    • Sexually Abused:      Allergies   Allergen Reactions   • Neosporin [Neomycin-Polymyxin B] Rash     Rxn = 6/2016     Outpatient Encounter Medications as of 3/9/2021   Medication Sig Dispense Refill   • apixaban (ELIQUIS) 5mg Tab Take 1 tablet by mouth 2 Times a Day. 180 tablet 3   • metoprolol tartrate (LOPRESSOR) 25 MG Tab TAKE 1/2 TABLET BY MOUTH TWICE A DAY 90 Tab 3   • gabapentin (NEURONTIN) 300 MG Cap Take 1 Cap by mouth 2 Times a Day.     • atorvastatin (LIPITOR) 20 MG Tab Take 1 Tab by mouth every day. 90 Tab 3   • flecainide (TAMBOCOR) 50 MG tablet TAKE 1 TO 2 TABLETS BY MOUTH TWICE DAILY (Patient taking differently: Take  mg by mouth 2 times a day. TAKE 1 TAB QAM AND 2 TAB QPM) 360 Tab 3   • Multiple Vitamins-Minerals (MULTIVITAMIN ADULT PO) Take  by mouth.     • Calcium Carbonate-Vitamin D (CALCIUM + D PO) Take 1 Tab by mouth every day. **OTC**     • Lutein-Zeaxanthin (LUTEIN) 15-0.7 MG CAPS Take 1 Tab by mouth every bedtime.     • cyclobenzaprine (FLEXERIL) 10 mg Tab TAKE 1 TABLET BY MOUTH THREE TIMES DAILY FOR 3 DAYS     • [DISCONTINUED] apixaban (ELIQUIS) 5mg Tab Take 10 mg by mouth 2 Times a Day. 2 in the morning 2 at night     • [DISCONTINUED] prednisoLONE acetate (PRED FORTE) 1 % Suspension Place 1 Drop in right eye 2 Times a Day.  "Right Eye - Implant       No facility-administered encounter medications on file as of 3/9/2021.     ROS     Objective:   /76 (BP Location: Right arm, Patient Position: Sitting, BP Cuff Size: Adult)   Pulse 64   Ht 1.727 m (5' 8\")   Wt 83.9 kg (185 lb)   LMP 12/28/2003   SpO2 95%   BMI 28.13 kg/m²     Physical Exam   Constitutional: She is oriented to person, place, and time. She appears well-developed and well-nourished.   HENT:   Head: Normocephalic and atraumatic.   Eyes: Pupils are equal, round, and reactive to light. EOM are normal.   Cardiovascular: Normal rate, regular rhythm, normal heart sounds and intact distal pulses. Exam reveals no gallop and no friction rub.   No murmur heard.  Pulmonary/Chest: Effort normal and breath sounds normal.   Abdominal: Soft. Bowel sounds are normal.   Musculoskeletal:         General: No edema. Normal range of motion.      Cervical back: Normal range of motion and neck supple.   Neurological: She is alert and oriented to person, place, and time. No cranial nerve deficit.   Skin: Skin is warm.   Psychiatric: She has a normal mood and affect. Her behavior is normal. Judgment and thought content normal.       Assessment:     1. PAF (paroxysmal atrial fibrillation) ([facility])  EKG   2. Takotsubo cardiomyopathy     3. Anticoagulated     4. Mixed hyperlipidemia     5. Central stenosis of spinal canal     6. Syncope and collapse     7. DVT (deep vein thrombosis) in pregnancy         Medical Decision Making:  Today's Assessment / Status / Plan:   1.  PAF.  Continue low-dose flecainide.  Place an ILR.  Cut beta-blockers down to 1 tablet a day.  2.  DVT on Eliquis refilled.  3.  Spinal stenosis.  If surgery is required prior to coming off Eliquis she may need a filter.  4.  Follow-up with me in 2 months.  "

## 2021-03-10 ENCOUNTER — APPOINTMENT (RX ONLY)
Dept: URBAN - METROPOLITAN AREA CLINIC 36 | Facility: CLINIC | Age: 71
Setting detail: DERMATOLOGY
End: 2021-03-10

## 2021-03-10 DIAGNOSIS — U07.1 COVID-19: ICD-10-CM

## 2021-03-10 PROBLEM — C44.319 BASAL CELL CARCINOMA OF SKIN OF OTHER PARTS OF FACE: Status: ACTIVE | Noted: 2021-03-10

## 2021-03-10 PROCEDURE — ? COUNSELING: TOPICAL STEROIDS

## 2021-03-10 PROCEDURE — ? PRESCRIPTION

## 2021-03-10 PROCEDURE — ? OBSERVATION

## 2021-03-10 PROCEDURE — 99213 OFFICE O/P EST LOW 20 MIN: CPT

## 2021-03-10 RX ORDER — FLUOCINONIDE 0.5 MG/G
1 CREAM TOPICAL
Qty: 1 | Refills: 0 | Status: ERX | COMMUNITY
Start: 2021-03-10

## 2021-03-10 RX ADMIN — FLUOCINONIDE 1: 0.5 CREAM TOPICAL at 00:00

## 2021-03-10 NOTE — TELEPHONE ENCOUNTER
Patient scheduled for loop insert on 3-17-21 with Dr. Thkakar. Patient has been instructed to check in at 7:30 for 9:30 case time. Message sent to josselyn Ornelas with [employer] notified.

## 2021-03-12 ENCOUNTER — HOSPITAL ENCOUNTER (OUTPATIENT)
Facility: MEDICAL CENTER | Age: 71
End: 2021-03-12
Attending: INTERNAL MEDICINE | Admitting: INTERNAL MEDICINE
Payer: MEDICARE

## 2021-03-15 NOTE — TELEPHONE ENCOUNTER
Recv call from patient - she would like to cancel this loop insert. Cancelled case with Grace in cath lab schedule and Chris with [employer]. Patient will call back to reschedule.

## 2021-03-16 ENCOUNTER — APPOINTMENT (OUTPATIENT)
Dept: ADMISSIONS | Facility: MEDICAL CENTER | Age: 71
End: 2021-03-16
Payer: MEDICARE

## 2021-03-17 ENCOUNTER — APPOINTMENT (OUTPATIENT)
Dept: CARDIOLOGY | Facility: MEDICAL CENTER | Age: 71
End: 2021-03-17
Attending: INTERNAL MEDICINE
Payer: MEDICARE

## 2021-03-25 ENCOUNTER — TELEPHONE (OUTPATIENT)
Dept: CARDIOLOGY | Facility: MEDICAL CENTER | Age: 71
End: 2021-03-25

## 2021-03-25 NOTE — TELEPHONE ENCOUNTER
DS    Pt called stating she is having spinal surgery with Dr. Heri Greene in about a month. Pt is on eliquis and wants to know if she will need a filter. Please call Pt back at [phone].    Thank you

## 2021-03-26 NOTE — TELEPHONE ENCOUNTER
Patient called back and has a few more questions regarding this. She would like a call back at [phone] .    Thank you,    Aminah BUENO

## 2021-03-29 ENCOUNTER — TELEPHONE (OUTPATIENT)
Dept: MEDICAL GROUP | Facility: MEDICAL CENTER | Age: 71
End: 2021-03-29

## 2021-03-29 NOTE — TELEPHONE ENCOUNTER
Pt called stating she will be having surgery and needs a heart filter. I looked back at the telephone visit to her cardiologist and they advised her to speak to you about it.  Please advise

## 2021-03-29 NOTE — TELEPHONE ENCOUNTER
Pt called and questions answered. She states she will likely have spinal stenosis surgery in April, advised will need to discuss filter with PCP for that surgery. Pt agreed to make 2 mo fu with DS in May as she had cancelled it and loop. Discussed reasons DS rec Loop.

## 2021-03-30 ENCOUNTER — TELEPHONE (OUTPATIENT)
Dept: VASCULAR LAB | Facility: MEDICAL CENTER | Age: 71
End: 2021-03-30

## 2021-03-30 NOTE — TELEPHONE ENCOUNTER
Left message for patient to call back and schedule appt with Dr. Ugarte on Thursday at 10am for IVC Filter Placement Evaluation.

## 2021-03-30 NOTE — TELEPHONE ENCOUNTER
I had placed a referral to vascular medicine back in January for her DVT/blood thinner management.  Can you help her schedule an appointment?  She may not need an IVC filter after 3 months of taking Eliquis.  Also, can you please get records from [facility]

## 2021-03-30 NOTE — TELEPHONE ENCOUNTER
Called Vascular, they are going to reach out to pt and get her scheduled, I also LVM for pt with vasculars number.

## 2021-04-01 ENCOUNTER — OFFICE VISIT (OUTPATIENT)
Dept: VASCULAR LAB | Facility: MEDICAL CENTER | Age: 71
End: 2021-04-01
Attending: FAMILY MEDICINE
Payer: MEDICARE

## 2021-04-01 DIAGNOSIS — O22.30 DVT (DEEP VEIN THROMBOSIS) IN PREGNANCY: ICD-10-CM

## 2021-04-01 DIAGNOSIS — Z79.01 ANTICOAGULATED: ICD-10-CM

## 2021-04-01 DIAGNOSIS — I48.0 PAF (PAROXYSMAL ATRIAL FIBRILLATION) (HCC): ICD-10-CM

## 2021-04-01 DIAGNOSIS — E78.2 MIXED HYPERLIPIDEMIA: ICD-10-CM

## 2021-04-01 DIAGNOSIS — I82.491 ACUTE DEEP VEIN THROMBOSIS (DVT) OF OTHER SPECIFIED VEIN OF RIGHT LOWER EXTREMITY (HCC): ICD-10-CM

## 2021-04-01 DIAGNOSIS — I82.4Y1 ACUTE DEEP VEIN THROMBOSIS (DVT) OF PROXIMAL VEIN OF RIGHT LOWER EXTREMITY (HCC): ICD-10-CM

## 2021-04-01 DIAGNOSIS — M48.00 CENTRAL STENOSIS OF SPINAL CANAL: ICD-10-CM

## 2021-04-01 PROCEDURE — 99204 OFFICE O/P NEW MOD 45 MIN: CPT | Performed by: FAMILY MEDICINE

## 2021-04-01 PROCEDURE — 99212 OFFICE O/P EST SF 10 MIN: CPT

## 2021-04-01 ASSESSMENT — ENCOUNTER SYMPTOMS
NAUSEA: 0
BRUISES/BLEEDS EASILY: 0
FOCAL WEAKNESS: 0
DIZZINESS: 0
MYALGIAS: 0
WEAKNESS: 0
DIARRHEA: 0
VOMITING: 0
PALPITATIONS: 0
HEMOPTYSIS: 0
CHILLS: 0
FEVER: 0
COUGH: 0
TREMORS: 0
SHORTNESS OF BREATH: 0
HEADACHES: 0
ABDOMINAL PAIN: 0
SEIZURES: 0
WHEEZING: 0

## 2021-04-01 NOTE — PROGRESS NOTES
Placed order for IVC filter insert.  IR sched form scanned into media.  IR to contact pt to schedule.    Michelle MARTINEZ  [facility]

## 2021-04-01 NOTE — PROGRESS NOTES
INITIAL VASCULAR ANTICOAGULATION VISIT  Subjective:   Lucía Rodriguez is a 70 y.o. female who presents today 04/01/21 for   No chief complaint on file.    Initially referred by PCP for eval for possible IVC filter placement and length of therapy (LOT) determination and management of anticoagulation in context of acute venothromboembolic disease    HPI:    VTE disease / Anticoagulation:   Apparently pending C4-C7 ACDF with plate per Dr. Greene.    Has pending ILR placement per Dr. Thakkar for definitive dx of PAF.  Has been on ASA only until recent acute DVT in 1/2021 and currently on eliquis.  No prior OAC for CVA prophylaxis.  Using flecainide.    Patient reports no severe neck/back pain and no urgency to the back surg or ILR placement.      Current symptoms:  Denies current SOB, CP, leg swelling, edema, leg pain, cyanosis of digits, redness of extremities.  Current antithrombotic agent: eliquis 5mg BID   Complications: none, tolerating well, no bleeding or bruising   Date of initiation of anticoagulation: 1/20/21   Adherence: reports complete, no missed doses      Pertinent VTE pmhx:  Date of Diagnosis: 1/20/21   Type of Venous thromboembolic disease (VTE): acute RLE DVT   Type of imaging:  Duplex (at [facility] - not available at time of visit)   Preceding/presenting symptoms: acute onset Severe R calf pain, seen at [facility]  Antithrombotic therapy at time of VTE event: no  VTE tx course: Eliquis 10mg BID x 7d, now eliquis 5mg BID    Any personal VTE hx? No  Any family VTE hx? No    UNPROVOKED VS PROVOKED:   Recent surgery ? No  Recent trauma ? No  Smoker?  reports that she has never smoked. She has never used smokeless tobacco.    Extended travel? No  Other periods of immobility? No  Other risk factors for VTE disease:  no  WOMEN: Colorectal CA screening: yes       Cervical CA screening: yes       Breast CA screening: yes       Any estrogen, testosterone HRT, E2 birth control, tamoxifene, raloxifene: no       Hx of  recurrent miscarriages: no  Hypercoaguability work-up completed?  no (see assessment for details)    Past Medical History:   Diagnosis Date   • Arrhythmia     Atrial fibrillation   • Atrial fibrillation (HCC)    • Calculus of gallbladder without cholecystitis 1/9/2017   • CATARACT     left eye   • Glaucoma     right eye   • Hyperlipidemia    • Macular cyst, hole, or pseudohole of retina    • Pulmonary nodules/lesions, multiple 07/06/2016    stable on CTs, no further work up needed     Past Surgical History:   Procedure Laterality Date   • GYN SURGERY      HYSTERECTOMY   • MENISCUS REPAIR Right    • OTHER      eye surgery, right eye   • OTHER      cataract surgery, left eye       Current Outpatient Medications:   •  cyclobenzaprine, TAKE 1 TABLET BY MOUTH THREE TIMES DAILY FOR 3 DAYS  •  apixaban, 5 mg, Oral, BID  •  metoprolol tartrate, TAKE 1/2 TABLET BY MOUTH TWICE A DAY  •  gabapentin, 300 mg, Oral, BID  •  atorvastatin, 20 mg, Oral, DAILY  •  flecainide, TAKE 1 TO 2 TABLETS BY MOUTH TWICE DAILY  •  Multiple Vitamins-Minerals (MULTIVITAMIN ADULT PO), Take  by mouth.  •  Calcium Carbonate-Vitamin D (CALCIUM + D PO), 1 tablet, Oral, DAILY  •  Lutein-Zeaxanthin, 1 tablet, Oral, QHS     Allergies   Allergen Reactions   • Neosporin [Neomycin-Polymyxin B] Rash     Rxn = 6/2016     Family History   Problem Relation Age of Onset   • Heart Disease Father         triple bypass   • Cancer Father    • Cancer Mother    • Clotting Disorder Neg Hx      Social History     Tobacco Use   • Smoking status: Never Smoker   • Smokeless tobacco: Never Used   Substance Use Topics   • Alcohol use: No   • Drug use: No     DIET AND EXERCISE:  Weight Change:stable   BMI Readings from Last 5 Encounters:   03/09/21 28.13 kg/m²   01/21/21 27.86 kg/m²   01/15/21 29.43 kg/m²   01/07/21 27.06 kg/m²   09/21/20 27.83 kg/m²     Diet: common adult  Exercise: no regular exercise program     Review of Systems   Constitutional: Negative for chills,  fever and malaise/fatigue.   Respiratory: Negative for cough, hemoptysis, shortness of breath and wheezing.    Cardiovascular: Negative for chest pain, palpitations and leg swelling.   Gastrointestinal: Negative for abdominal pain, diarrhea, nausea and vomiting.   Musculoskeletal: Negative for joint pain and myalgias.   Skin: Negative for itching and rash.   Neurological: Negative for dizziness, tremors, focal weakness, seizures, weakness and headaches.   Endo/Heme/Allergies: Does not bruise/bleed easily.        Objective:   There were no vitals filed for this visit.   BP Readings from Last 5 Encounters:   03/09/21 118/76   01/21/21 130/70   01/15/21 160/72   01/07/21 124/70   09/21/20 118/76      There is no height or weight on file to calculate BMI.     Physical Exam  Constitutional:       Appearance: Normal appearance.   HENT:      Head: Normocephalic.   Eyes:      Extraocular Movements: Extraocular movements intact.      Conjunctiva/sclera: Conjunctivae normal.   Pulmonary:      Effort: Pulmonary effort is normal.   Musculoskeletal:      Cervical back: Normal range of motion.   Skin:     General: Skin is dry.      Coloration: Skin is not pale.      Findings: No rash.   Neurological:      General: No focal deficit present.      Mental Status: She is alert and oriented to person, place, and time.   Psychiatric:         Mood and Affect: Mood normal.         Behavior: Behavior normal.         Lab Results   Component Value Date    CHOLSTRLTOT 159 01/07/2021    LDL 59 01/07/2021    HDL 63 01/07/2021    TRIGLYCERIDE 185 (H) 01/07/2021           Lab Results   Component Value Date    SODIUM 139 01/15/2021    POTASSIUM 4.2 01/15/2021    CHLORIDE 104 01/15/2021    CO2 23 01/15/2021    GLUCOSE 82 01/15/2021    BUN 23 (H) 01/15/2021    CREATININE 0.91 01/15/2021    IFAFRICA >60 01/15/2021    IFNOTAFR >60 01/15/2021        Lab Results   Component Value Date    WBC 9.8 01/15/2021    RBC 4.71 01/15/2021    HEMOGLOBIN 14.6  01/15/2021    HEMATOCRIT 46.3 01/15/2021    MCV 98.3 (H) 01/15/2021    MCH 31.0 01/15/2021    MCHC 31.5 (L) 01/15/2021    MPV 10.6 01/15/2021      VASCULAR IMAGING:     Last EKG:   Results for orders placed or performed in visit on 21   EKG   Result Value Ref Range    Report       [facility]    Test Date:  2021  Pt Name:    JOSEPH SRIVASTAVA                Department: [facility]  MRN:        [id number]                      Room:  Gender:     Female                       Technician: LUISANA  :        [id number]                   Requested By:JOSE THAKKAR  Order #:    [id number]                    Reading MD: Jose Thakkar MD    Measurements  Intervals                                Axis  Rate:       64                           P:          5  RI:         208                          QRS:        -73  QRSD:       102                          T:          48  QT:         444  QTc:        458    Interpretive Statements  SINUS RHYTHM  FIRST DEGREE AV BLOCK  LAD, CONSIDER LEFT ANTERIOR FASCICULAR BLOCK  LATE PRECORDIAL R/S TRANSITION  BORDERLINE T ABNORMALITIES, ANTERIOR LEADS  Compared to ECG 01/15/2021 14:22:40  Unchanged  Electronically Signed On 3-9-2021 11:55:46 PST by Jose Thakkar MD       Medical Decision Making:  Today's Assessment / Status / Plan:     1. Acute deep vein thrombosis (DVT) of other specified vein of right lower extremity (HCC)     2. PAF (paroxysmal atrial fibrillation) (HCC)     3. Mixed hyperlipidemia     4. Anticoagulated     5. Central stenosis of spinal canal          PATIENT TYPE: Primary Prevention    Etiology of Established CVD if Present:   1) acute RLE DVT   2) PAF, pending definitive     IVC filter:   post-op VTE Caprini risk score = 8pts, indicating high risk (4.0% VTE risk), so recommended for IVC filter, since we need to interrupt her therapy for her recent VTE event and her baseline risk for recurrent VTE is high as this is considered an unprovoked event   Future  Surgeries: extensive Cspine surgery   Bleeding complications while on anticoagulation: non  Decision:   - recommended for preoperative IVC filter placement in anticipation of neck surgery  - pt to get confirmed date of surg with Dr. Greene, and KALA Martinez, will assist in coordinating IVC filter placement   - generally, OAC does not need to be interrupted for IVC  Filter placement, will defer to IR for decision  - we will plan for retrieval around 6-8 weeks post-op     ANTITHROMBOTIC THERAPY:  Anti-Platelet/Anti-Coagulant Tx recommended: yes  Indication: acute RLE DVT, apparently unprovoked   Date of initiation:  1/2021   HAS-BLED bleeding risk calc ([email or web address]): 1 pt, 3.4%, low risk  Thrombophilia/hypercoag evaluation:  not recommended  Factors to consider for indefinite OAC: Unprovoked VTE event  Last CBC, BMP: 1/2021   Expected duration: reviewed standard of care as per ACCP AT10 guidelines (2016) is 3-6 months minimum on full dose OAC.  After review of risks/benefits/alternatives, through shared decision-making, we will continue eliquis for 3mo minimum and re-eval for further extension to 6mo and/or 18mo based upon symptoms, records review, patient preferences.   Complicated decision making due to need for neck surgery, pending ILR for possible underlying PAF and possible need for OAC for CVA proph.    Caprini risk score = 8pts, indicating high risk (4.0% VTE risk) post-op, so recommended for IVC filter, since we need to interrupt her therapy and her baseline risk for recurrent VTE is high   Antithrombotic therapy plan:  - continue eliquis 5mg BID for now and will plan to place IVC filter (see above), and then interrupt therapy 2 days prior to back surgery and then resume based upon Dr. Greene's recommendations - we will be in contact with his office to determine when he is comfortable restarting eliquis   - requested [facility] records   - f/u with me in mid-May for interval eval   - stressed strict adherence to tx and  avoid early termination due to increased risk for recurrent VTE  - check CBC, BMP (CMP if any underlying liver disease), and monitor CrCl as needed Q6mo while on DOAC  - counseled on signs and symptoms of acute VTE that require seeking prompt attention in the ED to include shortness of breath, chest pain, pain with deep inhalation, acute leg swelling and/or pain in calf or leg   - elevate legs as much as possible, use compression stockings/socks if directed by your provider  - Avoid hormonal therapies including estrogen or testosterone-containing meds, or raloxifene or tamoxifene (commonly used for osteoporosis)  - Avoid sedentary periods  - continue complete avoidance of tobacco products  - if having any invasive procedure, please make sure the doctor knows of your history of blood clots and current anticoagulation status  - avoid Aspirin and anti-inflammatories (eg. Advil, ibuprofen, Aleve, naproxen, etc) while anticoagulated   - avoid skiing or other dangerous activities to reduce risk of head injury and brain bleeds  - recommended to see your PCP to discuss if you need age-appropriate cancer screenings as a small % of blood clots may be caused by an underlying malignancy  - if any bleeding lasting 30min without stopping, please seek care with your PCP, urgent care, or ED  - reversal agents for most blood thinners are now available and used if you have major bleeding    LIFESTYLE RECOMMENDATIONS:     Smoking:  reports that she has never smoked. She has never used smokeless tobacco.   - continued complete avoidance of all tobacco products     Physical Activity: continue healthy activity to improve CV fitness, see care instructions for additional details     Weight Management and Nutrition: Dietary plan was discussed with patient at this visit including DASH, low sodium and/or as outlined in care instructions     OTHER:    1) PAF, currently stable on flecainide   - defer ongoing care to Dr. Thakkar  - recommended to  delay ILR until acute VTE therapy completed and after surgical course   - if formal dx of PAF, may benefit with indefinite OAC based upon high GERMÁN-VASC and would defer decision to Dr. Thakkar   - if no OAC needed, then would suggest ASA ongoing     2) pending neck surgery  Cleared for surgery per vasc med standpoint with plan as above   - defer mgmt to Dr. Greene   - will place preop IVC  - recommend to postpone until after 3mo on OAC (4/20/21) to meet the minimum treatment length for acute DVT    Instructed to follow-up with PCP for remainder of adult medical needs: yes  We will partner with other providers in the management of established vascular disease and cardiometabolic risk factors.    Studies to Be Obtained: IVC filter placement    Labs to Be Obtained: as noted above     Follow up in: May with me     Total time: 45-59min - chart review/prep, review of other providers' records, imaging/lab review, face-to-face time for history/examination, ordering, prescribing,  review of results/meds/ treatment plan with patient/family/caregiver, documentation in EMR, care coordination (as needed)      Nathaniel Ugarte M.D.  [facility]   [facility]   [phone]

## 2021-04-06 ENCOUNTER — TELEPHONE (OUTPATIENT)
Dept: VASCULAR LAB | Facility: MEDICAL CENTER | Age: 71
End: 2021-04-06

## 2021-04-06 NOTE — TELEPHONE ENCOUNTER
Spoke with pt over phone regarding IVC filter placement.    Explained placement procedure.  She does not need to hold AC for placement.  Given IR scheduling number.    Michelle MARTINEZ  [facility]

## 2021-04-08 ENCOUNTER — PRE-ADMISSION TESTING (OUTPATIENT)
Dept: ADMISSIONS | Facility: MEDICAL CENTER | Age: 71
End: 2021-04-08
Attending: SURGERY
Payer: MEDICARE

## 2021-04-08 RX ORDER — ACETAMINOPHEN 325 MG/1
650 TABLET ORAL EVERY 4 HOURS PRN
Status: ON HOLD | COMMUNITY
End: 2021-05-03

## 2021-04-08 NOTE — PREPROCEDURE INSTRUCTIONS
Pt instructed to take current prescriptions per MD instructions, ok to continue anticoagulation per pre procedure orders.

## 2021-04-12 ENCOUNTER — HOSPITAL ENCOUNTER (OUTPATIENT)
Facility: MEDICAL CENTER | Age: 71
End: 2021-04-12
Attending: SURGERY | Admitting: SURGERY
Payer: MEDICARE

## 2021-04-12 ENCOUNTER — APPOINTMENT (OUTPATIENT)
Dept: RADIOLOGY | Facility: MEDICAL CENTER | Age: 71
End: 2021-04-12
Attending: NURSE PRACTITIONER
Payer: MEDICARE

## 2021-04-12 VITALS
WEIGHT: 183.2 LBS | SYSTOLIC BLOOD PRESSURE: 111 MMHG | OXYGEN SATURATION: 94 % | HEIGHT: 65 IN | DIASTOLIC BLOOD PRESSURE: 58 MMHG | BODY MASS INDEX: 30.52 KG/M2 | TEMPERATURE: 97.2 F | RESPIRATION RATE: 18 BRPM | HEART RATE: 64 BPM

## 2021-04-12 DIAGNOSIS — I82.4Y1 ACUTE DEEP VEIN THROMBOSIS (DVT) OF PROXIMAL VEIN OF RIGHT LOWER EXTREMITY (HCC): ICD-10-CM

## 2021-04-12 LAB — CREAT SERPL-MCNC: 0.87 MG/DL (ref 0.5–1.4)

## 2021-04-12 PROCEDURE — C1880 VENA CAVA FILTER: HCPCS

## 2021-04-12 PROCEDURE — 82565 ASSAY OF CREATININE: CPT

## 2021-04-12 PROCEDURE — 160002 HCHG RECOVERY MINUTES (STAT)

## 2021-04-12 PROCEDURE — 700117 HCHG RX CONTRAST REV CODE 255: Performed by: RADIOLOGY

## 2021-04-12 PROCEDURE — 700111 HCHG RX REV CODE 636 W/ 250 OVERRIDE (IP)

## 2021-04-12 PROCEDURE — 700105 HCHG RX REV CODE 258: Performed by: SURGERY

## 2021-04-12 RX ORDER — SODIUM CHLORIDE 9 MG/ML
1000 INJECTION, SOLUTION INTRAVENOUS CONTINUOUS
Status: DISCONTINUED | OUTPATIENT
Start: 2021-04-12 | End: 2021-04-12 | Stop reason: HOSPADM

## 2021-04-12 RX ORDER — OXYCODONE HYDROCHLORIDE 5 MG/1
2.5 TABLET ORAL
Status: DISCONTINUED | OUTPATIENT
Start: 2021-04-12 | End: 2021-04-12 | Stop reason: HOSPADM

## 2021-04-12 RX ORDER — ONDANSETRON 2 MG/ML
4 INJECTION INTRAMUSCULAR; INTRAVENOUS PRN
Status: DISCONTINUED | OUTPATIENT
Start: 2021-04-12 | End: 2021-04-12 | Stop reason: HOSPADM

## 2021-04-12 RX ORDER — HYDROMORPHONE HYDROCHLORIDE 1 MG/ML
0.25 INJECTION, SOLUTION INTRAMUSCULAR; INTRAVENOUS; SUBCUTANEOUS
Status: DISCONTINUED | OUTPATIENT
Start: 2021-04-12 | End: 2021-04-12 | Stop reason: HOSPADM

## 2021-04-12 RX ORDER — MIDAZOLAM HYDROCHLORIDE 1 MG/ML
INJECTION INTRAMUSCULAR; INTRAVENOUS
Status: COMPLETED
Start: 2021-04-12 | End: 2021-04-12

## 2021-04-12 RX ORDER — MIDAZOLAM HYDROCHLORIDE 1 MG/ML
.5-2 INJECTION INTRAMUSCULAR; INTRAVENOUS PRN
Status: DISCONTINUED | OUTPATIENT
Start: 2021-04-12 | End: 2021-04-12 | Stop reason: HOSPADM

## 2021-04-12 RX ORDER — SODIUM CHLORIDE 9 MG/ML
500 INJECTION, SOLUTION INTRAVENOUS
Status: DISCONTINUED | OUTPATIENT
Start: 2021-04-12 | End: 2021-04-12 | Stop reason: HOSPADM

## 2021-04-12 RX ADMIN — FENTANYL CITRATE 25 MCG: 50 INJECTION, SOLUTION INTRAMUSCULAR; INTRAVENOUS at 10:56

## 2021-04-12 RX ADMIN — MIDAZOLAM HYDROCHLORIDE 1 MG: 1 INJECTION INTRAMUSCULAR; INTRAVENOUS at 10:56

## 2021-04-12 RX ADMIN — MIDAZOLAM HYDROCHLORIDE 1 MG: 1 INJECTION, SOLUTION INTRAMUSCULAR; INTRAVENOUS at 10:56

## 2021-04-12 RX ADMIN — IOHEXOL 50 ML: 300 INJECTION, SOLUTION INTRAVENOUS at 11:45

## 2021-04-12 RX ADMIN — SODIUM CHLORIDE 1000 ML: 9 INJECTION, SOLUTION INTRAVENOUS at 10:50

## 2021-04-12 ASSESSMENT — FIBROSIS 4 INDEX: FIB4 SCORE: 1.17

## 2021-04-12 NOTE — OR SURGEON
Immediate Post- Operative Note      PostOp Diagnosis: DVT LE IN JAN 2021. PROPHY FILTER FOR SPINE SURGERY      Procedure(s): RIGHT COMMON FEMORAL VEIN PUNCTURE WITH U/S GUIDANCE, IVC GRAM, INFRARENAL PLACEMENT  IVC FILTER PLACEMENT (ARGON OPTION)      Estimated Blood Loss: <20CC (FLUSHES)        Complications: NONE          4/12/2021     11:14 AM     Tomi Murcia M.D.

## 2021-04-12 NOTE — DISCHARGE INSTRUCTIONS
"  ACTIVITY: Rest and take it easy for the first 24 hours.  A responsible adult is recommended to remain with you during that time.  It is normal to feel sleepy.  We encourage you to not do anything that requires balance, judgment or coordination.    MILD FLU-LIKE SYMPTOMS ARE NORMAL. YOU MAY EXPERIENCE GENERALIZED MUSCLE ACHES, THROAT IRRITATION, HEADACHE AND/OR SOME NAUSEA.    FOR 24 HOURS DO NOT:  Drive, operate machinery or run household appliances.  Drink beer or alcoholic beverages.   Make important decisions or sign legal documents.    SPECIAL INSTRUCTIONS:     Groin Care Instructions     INSTRUCTIONS  1. Examine (look and feel) the site of your incision site TODAY so you can recognize changes that should be called to your doctor (see below).  2. Avoid straining either by lifting or pulling objects for 5 days. Avoid lifting over 5 pounds.   3. For at least 72 hours, if you should sneeze or cough, please hold pressure over your groin area.  4. If you should begin to have oozing from the catheterization site, please hold firm pressure and call your doctor's office immediately.  5. If profuse bleeding occurs from the catheterization site, hold firm pressure and call \"911\" immediately for assistance.  6. Remove bandage after 24 hours.     ACTIVITY  1. Limit activity as instructed by your doctor.  2. No driving or very limited driving with frequent stops for one week.   3. If you must take a long car ride, stop every hour and walk around the car.   4. Warm showers are permitted after the bandage is removed. Avoid baths, hot tubs, and swimming for one week.    PLEASE CALL YOUR DOCTOR IF:  1. Temperature elevation occurs.  2. Catheterization site becomes reddened or begins to drain.   3. Bruising appears to be new or not resolving. The bruise may move down your leg. This is normal.  4. The small round lump in the groin increases in size.  5. Any leg numbness, aching, or discomfort (immediately).  6. Increasing " discomfort in the leg at the insertion site.  7. Chest pains, even if relieved by Nitroglycerin.    MISCELLANEOUS INSTRUCTIONS  1. Bruising may occur as a result of heart catheterization. Some of the discoloration may travel down the leg, going from blue to green in color.  2. A small round lump under the catheterization site will remain for up to six weeks.  3. If any questions arise call your physician's office. The Contact Center is open Monday through Friday 7AM to 5PM and may speak to a nurse at [phone], or toll free at [phone].   4. You should call 911 if you develop problems with breathing or chest pain.    FOR PROBLEMS CALL DEENA Ugarte AT: [phone]      Inferior Vena Cava Filter Insertion, Care After  This sheet gives you information about how to care for yourself after your procedure. Your health care provider may also give you more specific instructions. If you have problems or questions, contact your health care provider.  What can I expect after the procedure?  After your procedure, it is common to have:  · Mild pain in the area where the filter was inserted.  · Mild bruising in the area where the filter was inserted.  Follow these instructions at home:  Insertion site care  · Follow instructions from your health care provider about how to take care of the site where a catheter was inserted at your neck or groin (insertion site). Make sure you:  ? Wash your hands with soap and water before you change your bandage (dressing). If soap and water are not available, use hand .  ? Change your dressing as told by your health care provider.  · Check your insertion site every day for signs of infection. Check for:  ? More redness, swelling, or pain.  ? More fluid or blood.  ? Warmth.  ? Pus or a bad smell.  · Keep the insertion site clean and dry.  · Do not shower, bathe, use a hot tub, or let the dressing get wet until your health care provider approves.  Contact a health care provider  if:  · You have more redness, swelling, or pain around your insertion site.  · You have more fluid or blood coming from your insertion site.  · Your insertion site feels warm to the touch.  · You have pus or a bad smell coming from your insertion site.  · You have a fever.  · You are dizzy.  · You have nausea and vomiting.  · You develop a rash.  Get help right away if:  · You develop chest pain, a cough, or difficulty breathing.  · You develop shortness of breath, feel faint, or pass out.  · You cough up blood.  · You have severe pain in your abdomen.  · You develop swelling and discoloration or pain in your legs.  · Your legs become pale and cold or blue.  · You develop weakness, difficulty moving your arms or legs, or balance problems.  · You develop problems with speech or vision.  These symptoms may represent a serious problem that is an emergency. Do not wait to see if the symptoms will go away. Get medical help right away. Call your local emergency services (911 in the [place] Do not drive yourself to the hospital.    DIET: To avoid nausea, slowly advance diet as tolerated, avoiding spicy or greasy foods for the first day.  Add more substantial food to your diet according to your physician's instructions.  Babies can be fed formula or breast milk as soon as they are hungry.  INCREASE FLUIDS AND FIBER TO AVOID CONSTIPATION.    SURGICAL DRESSING/BATHING: Keep dressing and incision dry and intact for 24 hours, may remove dressing and shower after 1 PM on 4/13, do not need to replace.  Do not submerge site in water for 7 days.    FOLLOW-UP APPOINTMENT:  A follow-up appointment should be arranged with your Dr. Ugarte; call to schedule.    You should CALL YOUR PHYSICIAN if you develop:  Fever greater than 101 degrees F.  Pain not relieved by medication, or persistent nausea or vomiting.  Excessive bleeding (blood soaking through dressing) or unexpected drainage from the wound.  Extreme redness or swelling around  the incision site, drainage of pus or foul smelling drainage.  Inability to urinate or empty your bladder within 8 hours.  Problems with breathing or chest pain.    You should call 911 if you develop problems with breathing or chest pain.  If you are unable to contact your doctor or surgical center, you should go to the nearest emergency room or urgent care center.  Physician's telephone #: Dr. Murcia [phone]    If any questions arise, call your doctor.  If your doctor is not available, please feel free to call the Surgical Center at [phone]. The Contact Center is open Monday through Friday 7AM to 5PM and may speak to a nurse at [phone], or toll free at [phone].     A registered nurse may call you a few days after your surgery to see how you are doing after your procedure.    MEDICATIONS: Resume taking daily medication.  Take prescribed pain medication with food.  If no medication is prescribed, you may take non-aspirin pain medication if needed.  PAIN MEDICATION CAN BE VERY CONSTIPATING.  Take a stool softener or laxative such as senokot, pericolace, or milk of magnesia if needed.    If your physician has prescribed pain medication that includes Acetaminophen (Tylenol), do not take additional Acetaminophen (Tylenol) while taking the prescribed medication.    Depression / Suicide Risk    As you are discharged from this [facility] facility, it is important to learn how to keep safe from harming yourself.    Recognize the warning signs:  · Abrupt changes in personality, positive or negative- including increase in energy   · Giving away possessions  · Change in eating patterns- significant weight changes-  positive or negative  · Change in sleeping patterns- unable to sleep or sleeping all the time   · Unwillingness or inability to communicate  · Depression  · Unusual sadness, discouragement and loneliness  · Talk of wanting to die  · Neglect of personal appearance   · Rebelliousness- reckless  behavior  · Withdrawal from people/activities they love  · Confusion- inability to concentrate     If you or a loved one observes any of these behaviors or has concerns about self-harm, here's what you can do:  · Talk about it- your feelings and reasons for harming yourself  · Remove any means that you might use to hurt yourself (examples: pills, rope, extension cords, firearm)  · Get professional help from the community (Mental Health, Substance Abuse, psychological counseling)  · Do not be alone:Call your Safe Contact- someone whom you trust who will be there for you.  · Call your local CRISIS HOTLINE 073-6559 or 298-687-6316  · Call your local Children's Mobile Crisis Response Team [place] (897) 555-6525 or www.Chatalog  · Call the toll free National Suicide Prevention Hotlines   · National Suicide Prevention Lifeline 860-796-RMAG (0102)  · National Hope Line Network 800-SUICIDE (742-6036)

## 2021-04-12 NOTE — OR NURSING
1128 Patient arrived to unit post IVC filter placement.  Patient is awake and alert, denies pain.  Right groin is clean, dry and soft.  Updated on plan of care, verbalized understanding.  1155  to bedside, updated on plan of care.  1230 Access site clean, dry and soft.  Patient tolerating oral intake.  1300 Head of bed elevated, right groin is clean, dry and soft.  1315 Patient ambulated to bathroom, tolerated well.  Back to [facility], right groin is clean, dry and soft.  1330 Access site clean, dry and soft.  All lines and monitors discontinued.  Reviewed discharge paperwork with pt and  Jose. Discussed diet, activity, medications, follow up care and worsening symptoms. No questions at this time.   1340 Pt discharged home with  Jose via wheelchair by CNA.

## 2021-04-13 ENCOUNTER — TELEPHONE (OUTPATIENT)
Dept: CARDIOLOGY | Facility: MEDICAL CENTER | Age: 71
End: 2021-04-13

## 2021-04-13 NOTE — TELEPHONE ENCOUNTER
To DS - pt had IVC filter placed 4/12 for spine surgery.     Pt ok to proceed with spinal surgery and hold eliquis?

## 2021-04-13 NOTE — TELEPHONE ENCOUNTER
DS    Pt called checking on medical clearance. Pt states she will be having surgery on May 3rd. Please call Pt back at [phone].    Thank you

## 2021-04-13 NOTE — LETTER
PROCEDURE/SURGERY CLEARANCE FORM      Encounter Date: 4/13/2021    Patient: Lucía Rodriguez  YOB: 1950    CARDIOLOGIST:  Dr. Jose Thakkar       To whom it may concern:     The above patient is may proceed from a cardiac standpoint for spinal surgery 5/3/21 now that she has a filter.     Additional comments: May Hold Eliquis 2 days.       Best Regards,    Jose Thakkar MD  Electronically signed

## 2021-04-16 NOTE — TELEPHONE ENCOUNTER
Pt called back to check on medical clearance. Please call Pt back at [phone]. Pt states it is ok to leave a message.    Thank you

## 2021-04-19 NOTE — TELEPHONE ENCOUNTER
To DS, please advise on how long to hold eliquis.     Pt  notified.   (pt request pt email clearance letter)

## 2021-04-20 ENCOUNTER — PRE-ADMISSION TESTING (OUTPATIENT)
Dept: ADMISSIONS | Facility: MEDICAL CENTER | Age: 71
DRG: 472 | End: 2021-04-20
Attending: NEUROLOGICAL SURGERY
Payer: MEDICARE

## 2021-04-20 ENCOUNTER — HOSPITAL ENCOUNTER (OUTPATIENT)
Dept: RADIOLOGY | Facility: MEDICAL CENTER | Age: 71
DRG: 472 | End: 2021-04-20
Attending: NEUROLOGICAL SURGERY
Payer: MEDICARE

## 2021-04-20 DIAGNOSIS — M50.00 INTERVERTEBRAL CERVICAL DISC DISORDER WITH MYELOPATHY, CERVICAL REGION: ICD-10-CM

## 2021-04-20 DIAGNOSIS — Z01.811 PRE-OPERATIVE RESPIRATORY EXAMINATION: ICD-10-CM

## 2021-04-20 DIAGNOSIS — Z01.812 PRE-OPERATIVE LABORATORY EXAMINATION: ICD-10-CM

## 2021-04-20 DIAGNOSIS — Z01.810 PRE-OPERATIVE CARDIOVASCULAR EXAMINATION: ICD-10-CM

## 2021-04-20 LAB
ABO GROUP BLD: NORMAL
ANION GAP SERPL CALC-SCNC: 10 MMOL/L (ref 7–16)
APTT PPP: 30 SEC (ref 24.7–36)
BASOPHILS # BLD AUTO: 0.5 % (ref 0–1.8)
BASOPHILS # BLD: 0.04 K/UL (ref 0–0.12)
BLD GP AB SCN SERPL QL: NORMAL
BUN SERPL-MCNC: 14 MG/DL (ref 8–22)
CALCIUM SERPL-MCNC: 10.2 MG/DL (ref 8.5–10.5)
CHLORIDE SERPL-SCNC: 103 MMOL/L (ref 96–112)
CO2 SERPL-SCNC: 28 MMOL/L (ref 20–33)
CREAT SERPL-MCNC: 0.83 MG/DL (ref 0.5–1.4)
EKG IMPRESSION: NORMAL
EOSINOPHIL # BLD AUTO: 0.12 K/UL (ref 0–0.51)
EOSINOPHIL NFR BLD: 1.6 % (ref 0–6.9)
ERYTHROCYTE [DISTWIDTH] IN BLOOD BY AUTOMATED COUNT: 46 FL (ref 35.9–50)
GLUCOSE SERPL-MCNC: 98 MG/DL (ref 65–99)
HCT VFR BLD AUTO: 47.3 % (ref 37–47)
HGB BLD-MCNC: 14.8 G/DL (ref 12–16)
IMM GRANULOCYTES # BLD AUTO: 0.03 K/UL (ref 0–0.11)
IMM GRANULOCYTES NFR BLD AUTO: 0.4 % (ref 0–0.9)
INR PPP: 1.13 (ref 0.87–1.13)
LYMPHOCYTES # BLD AUTO: 2.15 K/UL (ref 1–4.8)
LYMPHOCYTES NFR BLD: 28.9 % (ref 22–41)
MCH RBC QN AUTO: 31 PG (ref 27–33)
MCHC RBC AUTO-ENTMCNC: 31.3 G/DL (ref 33.6–35)
MCV RBC AUTO: 99.2 FL (ref 81.4–97.8)
MONOCYTES # BLD AUTO: 0.55 K/UL (ref 0–0.85)
MONOCYTES NFR BLD AUTO: 7.4 % (ref 0–13.4)
NEUTROPHILS # BLD AUTO: 4.55 K/UL (ref 2–7.15)
NEUTROPHILS NFR BLD: 61.2 % (ref 44–72)
NRBC # BLD AUTO: 0 K/UL
NRBC BLD-RTO: 0 /100 WBC
PLATELET # BLD AUTO: 252 K/UL (ref 164–446)
PMV BLD AUTO: 11.1 FL (ref 9–12.9)
POTASSIUM SERPL-SCNC: 4.7 MMOL/L (ref 3.6–5.5)
PROTHROMBIN TIME: 14.8 SEC (ref 12–14.6)
RBC # BLD AUTO: 4.77 M/UL (ref 4.2–5.4)
RH BLD: NORMAL
SODIUM SERPL-SCNC: 141 MMOL/L (ref 135–145)
WBC # BLD AUTO: 7.4 K/UL (ref 4.8–10.8)

## 2021-04-20 PROCEDURE — 86850 RBC ANTIBODY SCREEN: CPT

## 2021-04-20 PROCEDURE — 71045 X-RAY EXAM CHEST 1 VIEW: CPT

## 2021-04-20 PROCEDURE — 93010 ELECTROCARDIOGRAM REPORT: CPT | Performed by: INTERNAL MEDICINE

## 2021-04-20 PROCEDURE — 85025 COMPLETE CBC W/AUTO DIFF WBC: CPT

## 2021-04-20 PROCEDURE — 80048 BASIC METABOLIC PNL TOTAL CA: CPT

## 2021-04-20 PROCEDURE — 93005 ELECTROCARDIOGRAM TRACING: CPT

## 2021-04-20 PROCEDURE — 85610 PROTHROMBIN TIME: CPT

## 2021-04-20 PROCEDURE — 72050 X-RAY EXAM NECK SPINE 4/5VWS: CPT

## 2021-04-20 PROCEDURE — 86900 BLOOD TYPING SEROLOGIC ABO: CPT

## 2021-04-20 PROCEDURE — 85730 THROMBOPLASTIN TIME PARTIAL: CPT

## 2021-04-20 PROCEDURE — 86901 BLOOD TYPING SEROLOGIC RH(D): CPT

## 2021-04-20 PROCEDURE — 36415 COLL VENOUS BLD VENIPUNCTURE: CPT

## 2021-04-20 ASSESSMENT — FIBROSIS 4 INDEX: FIB4 SCORE: 1.17

## 2021-04-29 ENCOUNTER — TELEPHONE (OUTPATIENT)
Dept: CARDIOLOGY | Facility: MEDICAL CENTER | Age: 71
End: 2021-04-29

## 2021-04-29 NOTE — TELEPHONE ENCOUNTER
Marsha from Dr Greene's office, called stating Dr Greene is requesting a Dr to Dr valles regarding pt Rx Eliquis and if pt can be off this medication for 5 days after procedure. Please call Dr Greene back. Pt procedure on 5/3. # [phone] option [phone]     Thank you

## 2021-04-30 ENCOUNTER — PRE-ADMISSION TESTING (OUTPATIENT)
Dept: ADMISSIONS | Facility: MEDICAL CENTER | Age: 71
DRG: 472 | End: 2021-04-30
Attending: NEUROLOGICAL SURGERY
Payer: MEDICARE

## 2021-04-30 DIAGNOSIS — Z01.812 PRE-OPERATIVE LABORATORY EXAMINATION: ICD-10-CM

## 2021-04-30 LAB
COVID ORDER STATUS COVID19: NORMAL
SARS-COV-2 RNA RESP QL NAA+PROBE: NOTDETECTED
SPECIMEN SOURCE: NORMAL

## 2021-04-30 PROCEDURE — U0003 INFECTIOUS AGENT DETECTION BY NUCLEIC ACID (DNA OR RNA); SEVERE ACUTE RESPIRATORY SYNDROME CORONAVIRUS 2 (SARS-COV-2) (CORONAVIRUS DISEASE [COVID-19]), AMPLIFIED PROBE TECHNIQUE, MAKING USE OF HIGH THROUGHPUT TECHNOLOGIES AS DESCRIBED BY CMS-2020-01-R: HCPCS

## 2021-04-30 PROCEDURE — C9803 HOPD COVID-19 SPEC COLLECT: HCPCS

## 2021-04-30 PROCEDURE — U0005 INFEC AGEN DETEC AMPLI PROBE: HCPCS

## 2021-04-30 NOTE — TELEPHONE ENCOUNTER
That is up to him. The eliquis was for a dvt not a cardiac issue and I thought he put in an IVC filter

## 2021-05-03 ENCOUNTER — APPOINTMENT (OUTPATIENT)
Dept: RADIOLOGY | Facility: MEDICAL CENTER | Age: 71
DRG: 472 | End: 2021-05-03
Attending: NEUROLOGICAL SURGERY
Payer: MEDICARE

## 2021-05-03 ENCOUNTER — ANESTHESIA (OUTPATIENT)
Dept: SURGERY | Facility: MEDICAL CENTER | Age: 71
DRG: 472 | End: 2021-05-03
Payer: MEDICARE

## 2021-05-03 ENCOUNTER — HOSPITAL ENCOUNTER (INPATIENT)
Facility: MEDICAL CENTER | Age: 71
LOS: 2 days | DRG: 472 | End: 2021-05-08
Attending: NEUROLOGICAL SURGERY | Admitting: INTERNAL MEDICINE
Payer: MEDICARE

## 2021-05-03 ENCOUNTER — ANESTHESIA EVENT (OUTPATIENT)
Dept: SURGERY | Facility: MEDICAL CENTER | Age: 71
DRG: 472 | End: 2021-05-03
Payer: MEDICARE

## 2021-05-03 DIAGNOSIS — G89.18 POSTOPERATIVE PAIN: ICD-10-CM

## 2021-05-03 LAB — ABO + RH BLD: NORMAL

## 2021-05-03 PROCEDURE — 160002 HCHG RECOVERY MINUTES (STAT): Performed by: NEUROLOGICAL SURGERY

## 2021-05-03 PROCEDURE — 4A11X4G MONITORING OF PERIPHERAL NERVOUS ELECTRICAL ACTIVITY, INTRAOPERATIVE, EXTERNAL APPROACH: ICD-10-PCS | Performed by: NEUROLOGICAL SURGERY

## 2021-05-03 PROCEDURE — 770006 HCHG ROOM/CARE - MED/SURG/GYN SEMI*

## 2021-05-03 PROCEDURE — 95925 SOMATOSENSORY TESTING: CPT | Mod: XU | Performed by: NEUROLOGICAL SURGERY

## 2021-05-03 PROCEDURE — 95937 NEUROMUSCULAR JUNCTION TEST: CPT | Mod: XU | Performed by: NEUROLOGICAL SURGERY

## 2021-05-03 PROCEDURE — 500367 HCHG DRAIN KIT, HEMOVAC: Performed by: NEUROLOGICAL SURGERY

## 2021-05-03 PROCEDURE — 110454 HCHG SHELL REV 250: Performed by: NEUROLOGICAL SURGERY

## 2021-05-03 PROCEDURE — 00NW0ZZ RELEASE CERVICAL SPINAL CORD, OPEN APPROACH: ICD-10-PCS | Performed by: NEUROLOGICAL SURGERY

## 2021-05-03 PROCEDURE — 36415 COLL VENOUS BLD VENIPUNCTURE: CPT

## 2021-05-03 PROCEDURE — 700111 HCHG RX REV CODE 636 W/ 250 OVERRIDE (IP): Performed by: ANESTHESIOLOGY

## 2021-05-03 PROCEDURE — 500444 HCHG HEMOSTAT, SURGICEL 2X3: Performed by: NEUROLOGICAL SURGERY

## 2021-05-03 PROCEDURE — 700106 HCHG RX REV CODE 271: Performed by: NEUROLOGICAL SURGERY

## 2021-05-03 PROCEDURE — 700105 HCHG RX REV CODE 258: Performed by: NEUROLOGICAL SURGERY

## 2021-05-03 PROCEDURE — 700102 HCHG RX REV CODE 250 W/ 637 OVERRIDE(OP): Performed by: NEUROLOGICAL SURGERY

## 2021-05-03 PROCEDURE — 160036 HCHG PACU - EA ADDL 30 MINS PHASE I: Performed by: NEUROLOGICAL SURGERY

## 2021-05-03 PROCEDURE — C1713 ANCHOR/SCREW BN/BN,TIS/BN: HCPCS | Performed by: NEUROLOGICAL SURGERY

## 2021-05-03 PROCEDURE — 95865 NEEDLE EMG LARYNX: CPT | Mod: XU | Performed by: NEUROLOGICAL SURGERY

## 2021-05-03 PROCEDURE — C1821 INTERSPINOUS IMPLANT: HCPCS | Performed by: NEUROLOGICAL SURGERY

## 2021-05-03 PROCEDURE — A9270 NON-COVERED ITEM OR SERVICE: HCPCS | Performed by: NEUROLOGICAL SURGERY

## 2021-05-03 PROCEDURE — 95864 NEEDLE EMG 4 EXTREMITIES: CPT | Performed by: NEUROLOGICAL SURGERY

## 2021-05-03 PROCEDURE — 95868 NDL EMG CRANIAL NRV MUSC BI: CPT | Performed by: NEUROLOGICAL SURGERY

## 2021-05-03 PROCEDURE — 0RT30ZZ RESECTION OF CERVICAL VERTEBRAL DISC, OPEN APPROACH: ICD-10-PCS | Performed by: NEUROLOGICAL SURGERY

## 2021-05-03 PROCEDURE — 700102 HCHG RX REV CODE 250 W/ 637 OVERRIDE(OP): Performed by: ANESTHESIOLOGY

## 2021-05-03 PROCEDURE — 700101 HCHG RX REV CODE 250: Performed by: ANESTHESIOLOGY

## 2021-05-03 PROCEDURE — 700101 HCHG RX REV CODE 250: Performed by: NEUROLOGICAL SURGERY

## 2021-05-03 PROCEDURE — 700111 HCHG RX REV CODE 636 W/ 250 OVERRIDE (IP): Performed by: NEUROLOGICAL SURGERY

## 2021-05-03 PROCEDURE — 01N10ZZ RELEASE CERVICAL NERVE, OPEN APPROACH: ICD-10-PCS | Performed by: NEUROLOGICAL SURGERY

## 2021-05-03 PROCEDURE — 160029 HCHG SURGERY MINUTES - 1ST 30 MINS LEVEL 4: Performed by: NEUROLOGICAL SURGERY

## 2021-05-03 PROCEDURE — 95939 C MOTOR EVOKED UPR&LWR LIMBS: CPT | Mod: XU | Performed by: NEUROLOGICAL SURGERY

## 2021-05-03 PROCEDURE — 502240 HCHG MISC OR SUPPLY RC 0272: Performed by: NEUROLOGICAL SURGERY

## 2021-05-03 PROCEDURE — 700101 HCHG RX REV CODE 250: Performed by: INTERNAL MEDICINE

## 2021-05-03 PROCEDURE — 500864 HCHG NEEDLE, SPINAL 18G: Performed by: NEUROLOGICAL SURGERY

## 2021-05-03 PROCEDURE — 160035 HCHG PACU - 1ST 60 MINS PHASE I: Performed by: NEUROLOGICAL SURGERY

## 2021-05-03 PROCEDURE — 95940 IONM IN OPERATNG ROOM 15 MIN: CPT | Mod: XU | Performed by: NEUROLOGICAL SURGERY

## 2021-05-03 PROCEDURE — G0378 HOSPITAL OBSERVATION PER HR: HCPCS

## 2021-05-03 PROCEDURE — 501838 HCHG SUTURE GENERAL: Performed by: NEUROLOGICAL SURGERY

## 2021-05-03 PROCEDURE — 72040 X-RAY EXAM NECK SPINE 2-3 VW: CPT

## 2021-05-03 PROCEDURE — 502000 HCHG MISC OR IMPLANTS RC 0278: Performed by: NEUROLOGICAL SURGERY

## 2021-05-03 PROCEDURE — A9270 NON-COVERED ITEM OR SERVICE: HCPCS | Performed by: ANESTHESIOLOGY

## 2021-05-03 PROCEDURE — 160041 HCHG SURGERY MINUTES - EA ADDL 1 MIN LEVEL 4: Performed by: NEUROLOGICAL SURGERY

## 2021-05-03 PROCEDURE — 0RG20A0 FUSION OF 2 OR MORE CERVICAL VERTEBRAL JOINTS WITH INTERBODY FUSION DEVICE, ANTERIOR APPROACH, ANTERIOR COLUMN, OPEN APPROACH: ICD-10-PCS | Performed by: NEUROLOGICAL SURGERY

## 2021-05-03 PROCEDURE — 160009 HCHG ANES TIME/MIN: Performed by: NEUROLOGICAL SURGERY

## 2021-05-03 PROCEDURE — 160048 HCHG OR STATISTICAL LEVEL 1-5: Performed by: NEUROLOGICAL SURGERY

## 2021-05-03 PROCEDURE — 95955 EEG DURING SURGERY: CPT | Performed by: NEUROLOGICAL SURGERY

## 2021-05-03 DEVICE — IMPLANTABLE DEVICE: Type: IMPLANTABLE DEVICE | Site: SPINE CERVICAL | Status: FUNCTIONAL

## 2021-05-03 DEVICE — CAGE 17X14X7 MM SPINAL CERVICAL STS: Type: IMPLANTABLE DEVICE | Site: SPINE CERVICAL | Status: FUNCTIONAL

## 2021-05-03 DEVICE — CAGE SPINAL 17X14 X8 MM CERVICAL STS: Type: IMPLANTABLE DEVICE | Site: SPINE CERVICAL | Status: FUNCTIONAL

## 2021-05-03 RX ORDER — HYDRALAZINE HYDROCHLORIDE 20 MG/ML
10 INJECTION INTRAMUSCULAR; INTRAVENOUS
Status: DISCONTINUED | OUTPATIENT
Start: 2021-05-03 | End: 2021-05-08 | Stop reason: HOSPADM

## 2021-05-03 RX ORDER — HYDROMORPHONE HYDROCHLORIDE 1 MG/ML
0.1 INJECTION, SOLUTION INTRAMUSCULAR; INTRAVENOUS; SUBCUTANEOUS
Status: DISCONTINUED | OUTPATIENT
Start: 2021-05-03 | End: 2021-05-03 | Stop reason: HOSPADM

## 2021-05-03 RX ORDER — ROCURONIUM BROMIDE 10 MG/ML
INJECTION, SOLUTION INTRAVENOUS PRN
Status: DISCONTINUED | OUTPATIENT
Start: 2021-05-03 | End: 2021-05-03 | Stop reason: SURG

## 2021-05-03 RX ORDER — ALPRAZOLAM 0.25 MG/1
0.25 TABLET ORAL 2 TIMES DAILY PRN
Status: DISCONTINUED | OUTPATIENT
Start: 2021-05-03 | End: 2021-05-08 | Stop reason: HOSPADM

## 2021-05-03 RX ORDER — ONDANSETRON 4 MG/1
4 TABLET, ORALLY DISINTEGRATING ORAL EVERY 4 HOURS PRN
Status: DISCONTINUED | OUTPATIENT
Start: 2021-05-03 | End: 2021-05-08 | Stop reason: HOSPADM

## 2021-05-03 RX ORDER — SUCCINYLCHOLINE/SOD CL,ISO/PF 200MG/10ML
SYRINGE (ML) INTRAVENOUS PRN
Status: DISCONTINUED | OUTPATIENT
Start: 2021-05-03 | End: 2021-05-03 | Stop reason: SURG

## 2021-05-03 RX ORDER — DEXAMETHASONE SODIUM PHOSPHATE 4 MG/ML
INJECTION, SOLUTION INTRA-ARTICULAR; INTRALESIONAL; INTRAMUSCULAR; INTRAVENOUS; SOFT TISSUE PRN
Status: DISCONTINUED | OUTPATIENT
Start: 2021-05-03 | End: 2021-05-03 | Stop reason: SURG

## 2021-05-03 RX ORDER — ONDANSETRON 2 MG/ML
INJECTION INTRAMUSCULAR; INTRAVENOUS PRN
Status: DISCONTINUED | OUTPATIENT
Start: 2021-05-03 | End: 2021-05-03 | Stop reason: SURG

## 2021-05-03 RX ORDER — CEFAZOLIN SODIUM 2 G/100ML
2 INJECTION, SOLUTION INTRAVENOUS EVERY 8 HOURS
Status: COMPLETED | OUTPATIENT
Start: 2021-05-03 | End: 2021-05-05

## 2021-05-03 RX ORDER — POLYETHYLENE GLYCOL 3350 17 G/17G
1 POWDER, FOR SOLUTION ORAL 2 TIMES DAILY PRN
Status: DISCONTINUED | OUTPATIENT
Start: 2021-05-03 | End: 2021-05-08 | Stop reason: HOSPADM

## 2021-05-03 RX ORDER — LABETALOL HYDROCHLORIDE 5 MG/ML
10 INJECTION, SOLUTION INTRAVENOUS
Status: DISCONTINUED | OUTPATIENT
Start: 2021-05-03 | End: 2021-05-08 | Stop reason: HOSPADM

## 2021-05-03 RX ORDER — CEFAZOLIN SODIUM 1 G/3ML
INJECTION, POWDER, FOR SOLUTION INTRAMUSCULAR; INTRAVENOUS
Status: DISCONTINUED | OUTPATIENT
Start: 2021-05-03 | End: 2021-05-03 | Stop reason: HOSPADM

## 2021-05-03 RX ORDER — ONDANSETRON 2 MG/ML
4 INJECTION INTRAMUSCULAR; INTRAVENOUS EVERY 4 HOURS PRN
Status: DISCONTINUED | OUTPATIENT
Start: 2021-05-03 | End: 2021-05-08 | Stop reason: HOSPADM

## 2021-05-03 RX ORDER — CEFAZOLIN SODIUM 1 G/3ML
INJECTION, POWDER, FOR SOLUTION INTRAMUSCULAR; INTRAVENOUS PRN
Status: DISCONTINUED | OUTPATIENT
Start: 2021-05-03 | End: 2021-05-03 | Stop reason: SURG

## 2021-05-03 RX ORDER — SODIUM CHLORIDE AND POTASSIUM CHLORIDE 150; 900 MG/100ML; MG/100ML
INJECTION, SOLUTION INTRAVENOUS CONTINUOUS
Status: DISCONTINUED | OUTPATIENT
Start: 2021-05-03 | End: 2021-05-08 | Stop reason: HOSPADM

## 2021-05-03 RX ORDER — AMOXICILLIN 250 MG
1 CAPSULE ORAL
Status: DISCONTINUED | OUTPATIENT
Start: 2021-05-03 | End: 2021-05-08 | Stop reason: HOSPADM

## 2021-05-03 RX ORDER — LIDOCAINE HYDROCHLORIDE 20 MG/ML
INJECTION, SOLUTION EPIDURAL; INFILTRATION; INTRACAUDAL; PERINEURAL PRN
Status: DISCONTINUED | OUTPATIENT
Start: 2021-05-03 | End: 2021-05-03 | Stop reason: SURG

## 2021-05-03 RX ORDER — FLECAINIDE ACETATE 100 MG/1
50 TABLET ORAL EVERY MORNING
Status: DISCONTINUED | OUTPATIENT
Start: 2021-05-04 | End: 2021-05-08 | Stop reason: HOSPADM

## 2021-05-03 RX ORDER — PHENYLEPHRINE HCL IN 0.9% NACL 0.5 MG/5ML
SYRINGE (ML) INTRAVENOUS PRN
Status: DISCONTINUED | OUTPATIENT
Start: 2021-05-03 | End: 2021-05-03 | Stop reason: SURG

## 2021-05-03 RX ORDER — AMOXICILLIN 250 MG
1 CAPSULE ORAL NIGHTLY
Status: DISCONTINUED | OUTPATIENT
Start: 2021-05-03 | End: 2021-05-08 | Stop reason: HOSPADM

## 2021-05-03 RX ORDER — DIPHENHYDRAMINE HYDROCHLORIDE 50 MG/ML
25 INJECTION INTRAMUSCULAR; INTRAVENOUS EVERY 6 HOURS PRN
Status: DISCONTINUED | OUTPATIENT
Start: 2021-05-03 | End: 2021-05-08 | Stop reason: HOSPADM

## 2021-05-03 RX ORDER — DIPHENHYDRAMINE HYDROCHLORIDE 50 MG/ML
12.5 INJECTION INTRAMUSCULAR; INTRAVENOUS
Status: DISCONTINUED | OUTPATIENT
Start: 2021-05-03 | End: 2021-05-03 | Stop reason: HOSPADM

## 2021-05-03 RX ORDER — DEXAMETHASONE SODIUM PHOSPHATE 4 MG/ML
4 INJECTION, SOLUTION INTRA-ARTICULAR; INTRALESIONAL; INTRAMUSCULAR; INTRAVENOUS; SOFT TISSUE EVERY 6 HOURS
Status: COMPLETED | OUTPATIENT
Start: 2021-05-03 | End: 2021-05-04

## 2021-05-03 RX ORDER — ENEMA 19; 7 G/133ML; G/133ML
1 ENEMA RECTAL
Status: DISCONTINUED | OUTPATIENT
Start: 2021-05-03 | End: 2021-05-08 | Stop reason: HOSPADM

## 2021-05-03 RX ORDER — SODIUM CHLORIDE, SODIUM LACTATE, POTASSIUM CHLORIDE, CALCIUM CHLORIDE 600; 310; 30; 20 MG/100ML; MG/100ML; MG/100ML; MG/100ML
INJECTION, SOLUTION INTRAVENOUS CONTINUOUS
Status: DISCONTINUED | OUTPATIENT
Start: 2021-05-03 | End: 2021-05-03 | Stop reason: HOSPADM

## 2021-05-03 RX ORDER — LABETALOL HYDROCHLORIDE 5 MG/ML
5 INJECTION, SOLUTION INTRAVENOUS
Status: DISCONTINUED | OUTPATIENT
Start: 2021-05-03 | End: 2021-05-03 | Stop reason: HOSPADM

## 2021-05-03 RX ORDER — ACETAMINOPHEN 500 MG
1000 TABLET ORAL EVERY 6 HOURS
Status: DISCONTINUED | OUTPATIENT
Start: 2021-05-03 | End: 2021-05-08 | Stop reason: HOSPADM

## 2021-05-03 RX ORDER — FLECAINIDE ACETATE 50 MG/1
50-100 TABLET ORAL 2 TIMES DAILY
Status: DISCONTINUED | OUTPATIENT
Start: 2021-05-03 | End: 2021-05-03

## 2021-05-03 RX ORDER — DOCUSATE SODIUM 100 MG/1
100 CAPSULE, LIQUID FILLED ORAL 2 TIMES DAILY
Status: DISCONTINUED | OUTPATIENT
Start: 2021-05-03 | End: 2021-05-08 | Stop reason: HOSPADM

## 2021-05-03 RX ORDER — OXYCODONE HCL 5 MG/5 ML
10 SOLUTION, ORAL ORAL
Status: COMPLETED | OUTPATIENT
Start: 2021-05-03 | End: 2021-05-03

## 2021-05-03 RX ORDER — OXYCODONE HCL 5 MG/5 ML
5 SOLUTION, ORAL ORAL
Status: COMPLETED | OUTPATIENT
Start: 2021-05-03 | End: 2021-05-03

## 2021-05-03 RX ORDER — FLECAINIDE ACETATE 100 MG/1
100 TABLET ORAL EVERY EVENING
Status: DISCONTINUED | OUTPATIENT
Start: 2021-05-03 | End: 2021-05-08 | Stop reason: HOSPADM

## 2021-05-03 RX ORDER — SODIUM CHLORIDE, SODIUM LACTATE, POTASSIUM CHLORIDE, CALCIUM CHLORIDE 600; 310; 30; 20 MG/100ML; MG/100ML; MG/100ML; MG/100ML
INJECTION, SOLUTION INTRAVENOUS CONTINUOUS
Status: ACTIVE | OUTPATIENT
Start: 2021-05-03 | End: 2021-05-03

## 2021-05-03 RX ORDER — CYCLOBENZAPRINE HCL 10 MG
10 TABLET ORAL EVERY 8 HOURS PRN
Status: DISCONTINUED | OUTPATIENT
Start: 2021-05-03 | End: 2021-05-08 | Stop reason: HOSPADM

## 2021-05-03 RX ORDER — ONDANSETRON 2 MG/ML
4 INJECTION INTRAMUSCULAR; INTRAVENOUS
Status: DISCONTINUED | OUTPATIENT
Start: 2021-05-03 | End: 2021-05-03 | Stop reason: HOSPADM

## 2021-05-03 RX ORDER — HYDROMORPHONE HYDROCHLORIDE 1 MG/ML
0.4 INJECTION, SOLUTION INTRAMUSCULAR; INTRAVENOUS; SUBCUTANEOUS
Status: DISCONTINUED | OUTPATIENT
Start: 2021-05-03 | End: 2021-05-03 | Stop reason: HOSPADM

## 2021-05-03 RX ORDER — BISACODYL 10 MG
10 SUPPOSITORY, RECTAL RECTAL
Status: DISCONTINUED | OUTPATIENT
Start: 2021-05-03 | End: 2021-05-08 | Stop reason: HOSPADM

## 2021-05-03 RX ORDER — GABAPENTIN 300 MG/1
300 CAPSULE ORAL 2 TIMES DAILY
Status: DISCONTINUED | OUTPATIENT
Start: 2021-05-03 | End: 2021-05-08 | Stop reason: HOSPADM

## 2021-05-03 RX ORDER — BUPIVACAINE HYDROCHLORIDE AND EPINEPHRINE 5; 5 MG/ML; UG/ML
INJECTION, SOLUTION EPIDURAL; INTRACAUDAL; PERINEURAL
Status: DISCONTINUED | OUTPATIENT
Start: 2021-05-03 | End: 2021-05-03 | Stop reason: HOSPADM

## 2021-05-03 RX ORDER — HALOPERIDOL 5 MG/ML
1 INJECTION INTRAMUSCULAR
Status: DISCONTINUED | OUTPATIENT
Start: 2021-05-03 | End: 2021-05-03 | Stop reason: HOSPADM

## 2021-05-03 RX ORDER — HYDRALAZINE HYDROCHLORIDE 20 MG/ML
5 INJECTION INTRAMUSCULAR; INTRAVENOUS
Status: DISCONTINUED | OUTPATIENT
Start: 2021-05-03 | End: 2021-05-03 | Stop reason: HOSPADM

## 2021-05-03 RX ORDER — DIPHENHYDRAMINE HCL 25 MG
25 TABLET ORAL EVERY 6 HOURS PRN
Status: DISCONTINUED | OUTPATIENT
Start: 2021-05-03 | End: 2021-05-08 | Stop reason: HOSPADM

## 2021-05-03 RX ORDER — ATORVASTATIN CALCIUM 20 MG/1
20 TABLET, FILM COATED ORAL
Status: DISCONTINUED | OUTPATIENT
Start: 2021-05-03 | End: 2021-05-08 | Stop reason: HOSPADM

## 2021-05-03 RX ORDER — HYDROMORPHONE HYDROCHLORIDE 1 MG/ML
0.2 INJECTION, SOLUTION INTRAMUSCULAR; INTRAVENOUS; SUBCUTANEOUS
Status: DISCONTINUED | OUTPATIENT
Start: 2021-05-03 | End: 2021-05-03 | Stop reason: HOSPADM

## 2021-05-03 RX ORDER — ACETAMINOPHEN 500 MG
1000 TABLET ORAL EVERY 6 HOURS PRN
Status: DISCONTINUED | OUTPATIENT
Start: 2021-05-08 | End: 2021-05-08 | Stop reason: HOSPADM

## 2021-05-03 RX ORDER — SODIUM CHLORIDE, SODIUM LACTATE, POTASSIUM CHLORIDE, AND CALCIUM CHLORIDE .6; .31; .03; .02 G/100ML; G/100ML; G/100ML; G/100ML
IRRIGANT IRRIGATION
Status: DISCONTINUED | OUTPATIENT
Start: 2021-05-03 | End: 2021-05-03 | Stop reason: HOSPADM

## 2021-05-03 RX ADMIN — Medication 100 MG: at 11:43

## 2021-05-03 RX ADMIN — SODIUM CHLORIDE, POTASSIUM CHLORIDE, SODIUM LACTATE AND CALCIUM CHLORIDE: 600; 310; 30; 20 INJECTION, SOLUTION INTRAVENOUS at 10:41

## 2021-05-03 RX ADMIN — GABAPENTIN 300 MG: 300 CAPSULE ORAL at 17:15

## 2021-05-03 RX ADMIN — Medication 100 MCG: at 11:56

## 2021-05-03 RX ADMIN — PROPOFOL 200 MG: 10 INJECTION, EMULSION INTRAVENOUS at 11:43

## 2021-05-03 RX ADMIN — EPHEDRINE SULFATE 10 MG: 50 INJECTION, SOLUTION INTRAVENOUS at 11:55

## 2021-05-03 RX ADMIN — OXYCODONE HYDROCHLORIDE 10 MG: 5 SOLUTION ORAL at 14:29

## 2021-05-03 RX ADMIN — DEXAMETHASONE SODIUM PHOSPHATE 10 MG: 4 INJECTION, SOLUTION INTRA-ARTICULAR; INTRALESIONAL; INTRAMUSCULAR; INTRAVENOUS; SOFT TISSUE at 11:44

## 2021-05-03 RX ADMIN — FENTANYL CITRATE 50 MCG: 50 INJECTION, SOLUTION INTRAMUSCULAR; INTRAVENOUS at 14:28

## 2021-05-03 RX ADMIN — FENTANYL CITRATE 50 MCG: 50 INJECTION, SOLUTION INTRAMUSCULAR; INTRAVENOUS at 13:11

## 2021-05-03 RX ADMIN — DOCUSATE SODIUM 100 MG: 100 CAPSULE, LIQUID FILLED ORAL at 21:30

## 2021-05-03 RX ADMIN — POTASSIUM CHLORIDE AND SODIUM CHLORIDE: 900; 150 INJECTION, SOLUTION INTRAVENOUS at 21:49

## 2021-05-03 RX ADMIN — ROCURONIUM BROMIDE 5 MG: 10 INJECTION, SOLUTION INTRAVENOUS at 11:43

## 2021-05-03 RX ADMIN — DOCUSATE SODIUM 50 MG AND SENNOSIDES 8.6 MG 1 TABLET: 8.6; 5 TABLET, FILM COATED ORAL at 21:30

## 2021-05-03 RX ADMIN — PROPOFOL 150 MCG/KG/MIN: 10 INJECTION, EMULSION INTRAVENOUS at 11:44

## 2021-05-03 RX ADMIN — FENTANYL CITRATE 50 MCG: 50 INJECTION, SOLUTION INTRAMUSCULAR; INTRAVENOUS at 12:23

## 2021-05-03 RX ADMIN — METOPROLOL TARTRATE 12.5 MG: 25 TABLET, FILM COATED ORAL at 21:31

## 2021-05-03 RX ADMIN — FENTANYL CITRATE 50 MCG: 50 INJECTION, SOLUTION INTRAMUSCULAR; INTRAVENOUS at 12:17

## 2021-05-03 RX ADMIN — FENTANYL CITRATE 50 MCG: 50 INJECTION, SOLUTION INTRAMUSCULAR; INTRAVENOUS at 12:35

## 2021-05-03 RX ADMIN — ATORVASTATIN CALCIUM 20 MG: 20 TABLET, FILM COATED ORAL at 21:30

## 2021-05-03 RX ADMIN — ACETAMINOPHEN 1000 MG: 500 TABLET ORAL at 21:30

## 2021-05-03 RX ADMIN — LIDOCAINE HYDROCHLORIDE 100 MG: 20 INJECTION, SOLUTION EPIDURAL; INFILTRATION; INTRACAUDAL at 11:43

## 2021-05-03 RX ADMIN — FENTANYL CITRATE 50 MCG: 50 INJECTION, SOLUTION INTRAMUSCULAR; INTRAVENOUS at 11:43

## 2021-05-03 RX ADMIN — EPHEDRINE SULFATE 10 MG: 50 INJECTION, SOLUTION INTRAVENOUS at 11:50

## 2021-05-03 RX ADMIN — FENTANYL CITRATE 50 MCG: 50 INJECTION, SOLUTION INTRAMUSCULAR; INTRAVENOUS at 15:18

## 2021-05-03 RX ADMIN — ONDANSETRON 4 MG: 2 INJECTION INTRAMUSCULAR; INTRAVENOUS at 13:15

## 2021-05-03 RX ADMIN — DEXAMETHASONE SODIUM PHOSPHATE 4 MG: 4 INJECTION, SOLUTION INTRA-ARTICULAR; INTRALESIONAL; INTRAMUSCULAR; INTRAVENOUS; SOFT TISSUE at 21:31

## 2021-05-03 RX ADMIN — POVIDONE IODINE 15 ML: 100 SOLUTION TOPICAL at 10:34

## 2021-05-03 RX ADMIN — CEFAZOLIN 2 G: 330 INJECTION, POWDER, FOR SOLUTION INTRAMUSCULAR; INTRAVENOUS at 11:44

## 2021-05-03 RX ADMIN — FLECAINIDE ACETATE 100 MG: 100 TABLET ORAL at 21:31

## 2021-05-03 RX ADMIN — FENTANYL CITRATE 50 MCG: 50 INJECTION, SOLUTION INTRAMUSCULAR; INTRAVENOUS at 12:40

## 2021-05-03 RX ADMIN — CEFAZOLIN SODIUM 2 G: 2 INJECTION, SOLUTION INTRAVENOUS at 21:49

## 2021-05-03 ASSESSMENT — PAIN DESCRIPTION - PAIN TYPE
TYPE: ACUTE PAIN;SURGICAL PAIN
TYPE: SURGICAL PAIN
TYPE: SURGICAL PAIN

## 2021-05-03 ASSESSMENT — FIBROSIS 4 INDEX: FIB4 SCORE: 1.12

## 2021-05-03 ASSESSMENT — PAIN SCALES - GENERAL: PAIN_LEVEL: 2

## 2021-05-03 NOTE — ANESTHESIA PREPROCEDURE EVALUATION
Relevant Problems   ANESTHESIA (within normal limits)      PULMONARY (within normal limits)      NEURO (within normal limits)      CARDIAC   (+) Acute deep vein thrombosis (DVT) of other specified vein of right lower extremity (HCC)   (+) PAF (paroxysmal atrial fibrillation) (HCC)      GI (within normal limits)         (+) CKD (chronic kidney disease) stage 3, GFR 30-59 ml/min      ENDO (within normal limits)      Other   (+) Anticoagulated   (+) Peripheral polyneuropathy   (+) Syncope and collapse   (+) Takotsubo cardiomyopathy       Physical Exam    Airway   Mallampati: II  TM distance: >3 FB  Neck ROM: full       Cardiovascular - normal exam  Rhythm: regular  Rate: normal  (-) murmur     Dental - normal exam           Pulmonary - normal exam  Breath sounds clear to auscultation     Abdominal    Neurological - normal exam                 Anesthesia Plan    ASA 3   ASA physical status 3 criteria: other (comment)    Plan - general       Airway plan will be ETT          Induction: intravenous    Postoperative Plan: Postoperative administration of opioids is intended.    Pertinent diagnostic labs and testing reviewed    Informed Consent:    Anesthetic plan and risks discussed with patient.    Use of blood products discussed with: patient whom consented to blood products.

## 2021-05-03 NOTE — OR SURGEON
Immediate Post OP Note    PreOp Diagnosis: C4-7 stenosis, severe stenosis C5,6 with myelomalacia    PostOp Diagnosis: same      Procedure(s):  DISCECTOMY, SPINE, CERVICAL, ANTERIOR APPROACH, WITH FUSION - C4-7 WITH PLATE. - Wound Class: Clean with Drain    Surgeon(s):  BOBBY Mcbride M.D.    Anesthesiologist/Type of Anesthesia:  Anesthesiologist: John Bautista M.D./General    Surgical Staff:  Circulator: Piedad Mcgovern R.N.  Monitoring Nurse: Mouna Garcia R.N.  Relief Circulator: Isabella Chance R.N.  Scrub Person: Chemo Flores; Zahida Delgado    Specimens removed if any:  * No specimens in log *    Estimated Blood Loss: minimal    Findings: severe stenosis relieved, stable ssep's, emg's, mep's, rln monitoring, nice placement of hardware    Complications: none        5/3/2021 1:45 PM Heri Greene M.D.

## 2021-05-03 NOTE — PROGRESS NOTES
Patient received from OR at 1328 following C4 to C7 ACDF with plates by Dr Greene. Post operative recovery satisfactory with inpatient orders processed and spouse Jose updated frequently in the WR. Awaiting bed assignment. See systems assessment, MAR and recorded VS's for detail. PMH: Retina pseudohole. Arthrites. Cataracs. Glaucoma. HTN. Hyperlipedemia. Clotting disorder with presence of an IVC. Cholecystites. Allergies: Neosporin.

## 2021-05-03 NOTE — ANESTHESIA TIME REPORT
Anesthesia Start and Stop Event Times     Date Time Event    5/3/2021 1136 Ready for Procedure     1139 Anesthesia Start     1329 Anesthesia Stop        Responsible Staff  05/03/21    Name Role Begin End    John Bautista M.D. Anesth 1139 1329        Preop Diagnosis (Free Text):  Pre-op Diagnosis     CERVICAL HNP W/ MYELOPATHY        Preop Diagnosis (Codes):    Post op Diagnosis  Cervical stenosis of spinal canal      Premium Reason  Non-Premium    Comments:

## 2021-05-03 NOTE — PROGRESS NOTES
Med rec updated and complete  Allergies reviewed  Interviewed pt with  at bedside with permission from pt.  Pt had a list of medications at bedside, went over list of medications and returned list of medications back to pt at bedside.   Pt reports no antibiotics in the last 2 weeks

## 2021-05-03 NOTE — OP REPORT
DATE OF SERVICE:  05/03/2021     PREOPERATIVE DIAGNOSES:  C4-C7 stenosis with severe stenosis at C5-C6 with   myelomalacia.     PREOPERATIVE DIAGNOSES:  C4-C7 stenosis with severe stenosis at C5-C6 with   myelomalacia.     PRINCIPAL PROCEDURES PERFORMED:  C4-C5, C5-C6, C6-C7 anterior cervical   diskectomy and interbody fusion.  The 4WEB titanium Truss interbody cages were   used x3 and a NuVasive anterior cervical plate was placed from C4-C7.     SURGEON:  Heri Greene MD     ASSISTANT:  Mahamed Moe MD     ANESTHESIA:  The procedure was performed under general anesthesia.     ANESTHESIOLOGIST:  John Bautista MD     COMPLICATIONS:  There were no complications.     FINDINGS:  Include evidence of severe stenosis, which was relieved, stable   SSEPs, EMGs, MEPs, and recurrent laryngeal nerve monitoring, nice placement of   hardware.     FOR IV FLUIDS, URINE OUTPUT, ESTIMATED BLOOD LOSS:  Please see the anesthesia   record.     DISPOSITION:  The patient will be extubated and brought to the recovery room.     CLINICAL HISTORY:  The patient is a 70-year-old female who presents with   severe stenosis on her MRI and myelomalacia.  The patient recently had a DVT,   so an IVC filter was placed.  She was taken off Eliquis for the first couple   of days prior to surgery.  Risks, benefits and options were discussed.  I have   recommended surgery for asymptomatic severe spinal cord compression given the   myelomalacia on the MRI.  The patient signed a written informed consent.     DESCRIPTION OF PROCEDURE:  She was brought to the operating room and placed   under general anesthesia.  Needle electrodes were placed for baseline   neuromonitoring signals.  The neck was prepped and draped in the usual sterile   fashion.  Local anesthetic was infiltrated in the skin after a timeout was   performed.  A right-sided skin incision was made down to the platysma.  Blunt   dissection was used to exploit the natural plane between  the carotid sheath   laterally and the trachea and esophagus medially.  The longus colli muscles   were undermined and radiolucent Self-retaining retractors were placed.    Intraoperative fluoroscopy was used to demonstrate the correct level.  At each   level, I placed Herrick pins and was able to achieve parallel to lordotic   distraction.  An upbiting curette and an AM-12 drill bit was used to prepare   the endplates and to make a box for the cage.  I used an AM-8 drill bit to   thin down the posterior osteophytes.  I used a small right angle hook to   develop a plane between the dura and the posterior longitudinal ligament.    Kerrison 1 and Kerrison 2 punch was used to complete the diskectomy and   complete bilateral foraminotomies.  After the decompression at each level,   perfect hemostasis was achieved.  I placed the appropriate sized lordotic   titanium Truss 4WEB interbody cage packed with i-FACTOR.  A total of 3 cages   were used for a C4-C7 fusion.  SSEPs, EMGs, MEPs, and recurrent laryngeal   nerve monitoring were stable throughout the case.  Next, Herrick pins were   removed and I custom bent as it was basically a plate and I placed 14 mm   screws x8.  The locking mechanism was engaged.  Perfect hemostasis was   achieved.  Prior to closure, instruments, sponge and needle counts were   correct.  The wound was closed in anatomic layers and a sterile dressing was   applied.  The patient was extubated and brought to the recovery room.        ______________________________  MD MANUELA MORALES/FAMILIA    DD:  05/03/2021 14:00  DT:  05/03/2021 15:15    Job#:  [id number]

## 2021-05-03 NOTE — ANESTHESIA PROCEDURE NOTES
Airway    Date/Time: 5/3/2021 11:43 AM  Performed by: John Bautista M.D.  Authorized by: John Bautista M.D.     Location:  OR  Urgency:  Elective  Indications for Airway Management:  Anesthesia      Spontaneous Ventilation: absent    Sedation Level:  Deep  Preoxygenated: Yes    Patient Position:  Sniffing  Final Airway Type:  Endotracheal airway  Final Endotracheal Airway:  ETT  Cuffed: Yes    Technique Used for Successful ETT Placement:  Direct laryngoscopy    Insertion Site:  Oral  Blade Type:  Glide  Laryngoscope Blade/Videolaryngoscope Blade Size:  3  ETT Size (mm):  7.0  Measured from:  Teeth  ETT to Teeth (cm):  22  Placement Verified by: auscultation and capnometry    Cormack-Lehane Classification:  Grade I - full view of glottis  Number of Attempts at Approach:  1

## 2021-05-04 PROCEDURE — G0378 HOSPITAL OBSERVATION PER HR: HCPCS

## 2021-05-04 PROCEDURE — A9270 NON-COVERED ITEM OR SERVICE: HCPCS | Performed by: NEUROLOGICAL SURGERY

## 2021-05-04 PROCEDURE — 97165 OT EVAL LOW COMPLEX 30 MIN: CPT

## 2021-05-04 PROCEDURE — 700102 HCHG RX REV CODE 250 W/ 637 OVERRIDE(OP): Performed by: STUDENT IN AN ORGANIZED HEALTH CARE EDUCATION/TRAINING PROGRAM

## 2021-05-04 PROCEDURE — 700101 HCHG RX REV CODE 250: Performed by: NEUROLOGICAL SURGERY

## 2021-05-04 PROCEDURE — 700111 HCHG RX REV CODE 636 W/ 250 OVERRIDE (IP): Performed by: STUDENT IN AN ORGANIZED HEALTH CARE EDUCATION/TRAINING PROGRAM

## 2021-05-04 PROCEDURE — 700101 HCHG RX REV CODE 250: Performed by: INTERNAL MEDICINE

## 2021-05-04 PROCEDURE — 97161 PT EVAL LOW COMPLEX 20 MIN: CPT

## 2021-05-04 PROCEDURE — 700102 HCHG RX REV CODE 250 W/ 637 OVERRIDE(OP): Performed by: NEUROLOGICAL SURGERY

## 2021-05-04 PROCEDURE — A9270 NON-COVERED ITEM OR SERVICE: HCPCS | Performed by: STUDENT IN AN ORGANIZED HEALTH CARE EDUCATION/TRAINING PROGRAM

## 2021-05-04 PROCEDURE — 700111 HCHG RX REV CODE 636 W/ 250 OVERRIDE (IP): Performed by: NEUROLOGICAL SURGERY

## 2021-05-04 RX ORDER — DEXAMETHASONE SODIUM PHOSPHATE 4 MG/ML
10 INJECTION, SOLUTION INTRA-ARTICULAR; INTRALESIONAL; INTRAMUSCULAR; INTRAVENOUS; SOFT TISSUE EVERY 6 HOURS
Status: DISCONTINUED | OUTPATIENT
Start: 2021-05-04 | End: 2021-05-05

## 2021-05-04 RX ORDER — METHYLPREDNISOLONE ACETATE 40 MG/ML
40 INJECTION, SUSPENSION INTRA-ARTICULAR; INTRALESIONAL; INTRAMUSCULAR; SOFT TISSUE ONCE
Status: COMPLETED | OUTPATIENT
Start: 2021-05-04 | End: 2021-05-04

## 2021-05-04 RX ORDER — METHYLPREDNISOLONE ACETATE 40 MG/ML
40 INJECTION, SUSPENSION INTRA-ARTICULAR; INTRALESIONAL; INTRAMUSCULAR; SOFT TISSUE ONCE
Status: DISCONTINUED | OUTPATIENT
Start: 2021-05-04 | End: 2021-05-04

## 2021-05-04 RX ORDER — HYDROCODONE BITARTRATE AND ACETAMINOPHEN 5; 325 MG/1; MG/1
1 TABLET ORAL EVERY 4 HOURS PRN
Qty: 84 TABLET | Refills: 0 | Status: SHIPPED | OUTPATIENT
Start: 2021-05-04 | End: 2021-05-18

## 2021-05-04 RX ORDER — DEXAMETHASONE SODIUM PHOSPHATE 4 MG/ML
4 INJECTION, SOLUTION INTRA-ARTICULAR; INTRALESIONAL; INTRAMUSCULAR; INTRAVENOUS; SOFT TISSUE EVERY 6 HOURS
Status: DISCONTINUED | OUTPATIENT
Start: 2021-05-04 | End: 2021-05-04

## 2021-05-04 RX ORDER — METHYLPREDNISOLONE 4 MG/1
TABLET ORAL
Qty: 21 TABLET | Refills: 0 | Status: SHIPPED | OUTPATIENT
Start: 2021-05-04 | End: 2021-06-21

## 2021-05-04 RX ORDER — CYCLOBENZAPRINE HCL 10 MG
10 TABLET ORAL 3 TIMES DAILY PRN
Qty: 90 TABLET | Refills: 0 | Status: SHIPPED | OUTPATIENT
Start: 2021-05-04 | End: 2021-06-21

## 2021-05-04 RX ORDER — HYDROCODONE BITARTRATE AND ACETAMINOPHEN 5; 325 MG/1; MG/1
1 TABLET ORAL EVERY 4 HOURS PRN
Status: DISCONTINUED | OUTPATIENT
Start: 2021-05-04 | End: 2021-05-08 | Stop reason: HOSPADM

## 2021-05-04 RX ORDER — DOCUSATE SODIUM 100 MG/1
100 CAPSULE, LIQUID FILLED ORAL 2 TIMES DAILY
Qty: 30 CAPSULE | Refills: 0 | Status: SHIPPED | OUTPATIENT
Start: 2021-05-04 | End: 2021-06-21

## 2021-05-04 RX ADMIN — ACETAMINOPHEN 1000 MG: 500 TABLET ORAL at 03:59

## 2021-05-04 RX ADMIN — CEFAZOLIN SODIUM 2 G: 2 INJECTION, SOLUTION INTRAVENOUS at 04:51

## 2021-05-04 RX ADMIN — FLECAINIDE ACETATE 50 MG: 100 TABLET ORAL at 05:14

## 2021-05-04 RX ADMIN — DEXAMETHASONE SODIUM PHOSPHATE 10 MG: 4 INJECTION, SOLUTION INTRA-ARTICULAR; INTRALESIONAL; INTRAMUSCULAR; INTRAVENOUS; SOFT TISSUE at 23:39

## 2021-05-04 RX ADMIN — HYDROCODONE BITARTRATE AND ACETAMINOPHEN 1 TABLET: 5; 325 TABLET ORAL at 17:09

## 2021-05-04 RX ADMIN — DEXAMETHASONE SODIUM PHOSPHATE 4 MG: 4 INJECTION, SOLUTION INTRA-ARTICULAR; INTRALESIONAL; INTRAMUSCULAR; INTRAVENOUS; SOFT TISSUE at 08:23

## 2021-05-04 RX ADMIN — POTASSIUM CHLORIDE AND SODIUM CHLORIDE: 900; 150 INJECTION, SOLUTION INTRAVENOUS at 11:53

## 2021-05-04 RX ADMIN — HYDROCODONE BITARTRATE AND ACETAMINOPHEN 1 TABLET: 5; 325 TABLET ORAL at 21:52

## 2021-05-04 RX ADMIN — FLECAINIDE ACETATE 100 MG: 100 TABLET ORAL at 17:13

## 2021-05-04 RX ADMIN — DOCUSATE SODIUM 100 MG: 100 CAPSULE, LIQUID FILLED ORAL at 05:14

## 2021-05-04 RX ADMIN — BENZOCAINE AND MENTHOL 1 LOZENGE: 15; 3.6 LOZENGE ORAL at 22:50

## 2021-05-04 RX ADMIN — ACETAMINOPHEN 1000 MG: 500 TABLET ORAL at 08:23

## 2021-05-04 RX ADMIN — CEFAZOLIN SODIUM 2 G: 2 INJECTION, SOLUTION INTRAVENOUS at 11:53

## 2021-05-04 RX ADMIN — GABAPENTIN 300 MG: 300 CAPSULE ORAL at 05:13

## 2021-05-04 RX ADMIN — DEXAMETHASONE SODIUM PHOSPHATE 4 MG: 4 INJECTION, SOLUTION INTRA-ARTICULAR; INTRALESIONAL; INTRAMUSCULAR; INTRAVENOUS; SOFT TISSUE at 17:33

## 2021-05-04 RX ADMIN — METHYLPREDNISOLONE ACETATE 40 MG: 40 INJECTION, SUSPENSION INTRA-ARTICULAR; INTRALESIONAL; INTRAMUSCULAR; SOFT TISSUE at 22:47

## 2021-05-04 RX ADMIN — METOPROLOL TARTRATE 12.5 MG: 25 TABLET, FILM COATED ORAL at 21:53

## 2021-05-04 RX ADMIN — DEXAMETHASONE SODIUM PHOSPHATE 4 MG: 4 INJECTION, SOLUTION INTRA-ARTICULAR; INTRALESIONAL; INTRAMUSCULAR; INTRAVENOUS; SOFT TISSUE at 03:59

## 2021-05-04 ASSESSMENT — PAIN DESCRIPTION - PAIN TYPE
TYPE: ACUTE PAIN;SURGICAL PAIN

## 2021-05-04 ASSESSMENT — COGNITIVE AND FUNCTIONAL STATUS - GENERAL
MOBILITY SCORE: 21
WALKING IN HOSPITAL ROOM: A LITTLE
SUGGESTED CMS G CODE MODIFIER MOBILITY: CJ
STANDING UP FROM CHAIR USING ARMS: A LITTLE
SUGGESTED CMS G CODE MODIFIER DAILY ACTIVITY: CH
DAILY ACTIVITIY SCORE: 24
CLIMB 3 TO 5 STEPS WITH RAILING: A LITTLE

## 2021-05-04 ASSESSMENT — GAIT ASSESSMENTS
DISTANCE (FEET): 150
GAIT LEVEL OF ASSIST: SUPERVISED

## 2021-05-04 ASSESSMENT — ACTIVITIES OF DAILY LIVING (ADL): TOILETING: INDEPENDENT

## 2021-05-04 NOTE — PROGRESS NOTES
0850 Patient's brace not positioned correctly. Traction contacted. Per traction, pt's neck is a little short and she might benefit from a customized neck brace. Patient also requesting stronger pain medication. Only available pain medications is tylenol. Patient's HR running low 60's, high 50's. Spoke to RAMIREZ Hernandez, she was informed regarding HR, pain medication request and brace. New orders received for pain medication and custom aspen collar. Pt informed.

## 2021-05-04 NOTE — PROGRESS NOTES
Report received from PACU RN at 1938. Assumed care of patient. Plan of care discussed, questions answered. Assessment completed. VSS, A&O x4, states slight swallowing pain. Patient is okay with scheduled Tyelnol. Call light in reach. Patient educated on use of call light for assistance.    Aspen collar on at all times.   Hemovac in place.

## 2021-05-04 NOTE — PROGRESS NOTES
2 RN Skin Check    2 RN skin check complete with SADIA Summers.   Devices in place: SCDs and Nasal Cannula.  Skin assessed under devices: yes.  Confirmed pressure ulcers found on: N/A.  New potential pressure ulcers noted on N/A. Wound consult placed N/A.  The following interventions in place Pillows.     Bony prominences intact. Aspen collar in place, hemovac in place on anterior neck.

## 2021-05-04 NOTE — ANESTHESIA POSTPROCEDURE EVALUATION
Patient: Lucía Rodriguez    Procedure Summary     Date: 05/03/21 Room / Location: [place] [address] / SURGERY [facility]    Anesthesia Start: 1139 Anesthesia Stop: 1329    Procedure: DISCECTOMY, SPINE, CERVICAL, ANTERIOR APPROACH, WITH FUSION - C4-7 WITH PLATE. (N/A Spine Cervical) Diagnosis: (CERVICAL HNP W/ MYELOPATHY)    Surgeons: Heri Greene M.D. Responsible Provider: John Bautista M.D.    Anesthesia Type: general ASA Status: 3          Final Anesthesia Type: general  Last vitals  BP   Blood Pressure : 118/54    Temp   36.8 °C (98.3 °F)    Pulse   67   Resp   13    SpO2   94 %      Anesthesia Post Evaluation    Patient location during evaluation: PACU  Patient participation: complete - patient participated  Level of consciousness: awake and alert  Pain score: 2    Airway patency: patent  Anesthetic complications: no  Cardiovascular status: hemodynamically stable  Respiratory status: acceptable  Hydration status: euvolemic    PONV: none          No complications documented.     Nurse Pain Score: 2 (NPRS)

## 2021-05-04 NOTE — CARE PLAN
Problem: Pain Management  Goal: Pain level will decrease to patient's comfort goal  Outcome: PROGRESSING AS EXPECTED  Problem: Safety  Goal: Will remain free from injury  Outcome: PROGRESSING AS EXPECTED  Note: Patient encouraged to use call light for assistance. Appropriate DME utilized.  Problem: Infection  Goal: Will remain free from infection  Outcome: PROGRESSING AS EXPECTED  Note: Antibiotics ordered.  Problem: Knowledge Deficit  Goal: Knowledge of disease process/condition, treatment plan, diagnostic tests, and medications will improve  Outcome: PROGRESSING AS EXPECTED  Note: Plan of care discussed. Questions answered.

## 2021-05-04 NOTE — PROGRESS NOTES
"1500 Pt having difficulty swallowing liquids. Per patient, \"I feel like I'm going to choke\". Pt is able to tolerate pudding but not thinner liquids, VS stable, pt not drinking enough fluids due to pain swallowing . RAMIREZ Hernandez page regarding pt.  Spoke to RAMIREZ Hernandez, New orders receivd for decadron q6 and discharge orders canceled for now. Patient informed regarding plan and agrees. Water thickened and pills given crushed in apple sauce. Patient verbalizes swallowing better with the thickened water.  "

## 2021-05-04 NOTE — PROGRESS NOTES
Neurosurgery Progress Note    Subjective:  Patient denies new pain  C/o incisional pain and sore throat  Ambulatory and voiding without difficulty    Exam:  VSS  A&Ox4, NAD  No hoarseness of voice.   Trachea midline, no difficulty swallowing - no coughing or choking while drinking water   No nuchal rigidity   NM: 5/5 deltoid, biceps, triceps, handgrip, intrinsics   Sensation intact and equal throughout all four extremities.   Abdomen: soft, non-tender  Pulmonary: non-labored breathing on room air, normal respiratory effort  No LE edema, erythema, cyanosis, clubbing  Calves non-tender to compression bilat  Incision CDI, no halo sign   C-collar being worn appropriately  Drain: 0mL     BP  Min: 99/49  Max: 148/55  Pulse  Av.8  Min: 55  Max: 85  Resp  Avg: 15  Min: 11  Max: 19  Temp  Av.2 °C (97.2 °F)  Min: 35.8 °C (96.5 °F)  Max: 36.8 °C (98.3 °F)  SpO2  Av.3 %  Min: 87 %  Max: 99 %    No data recorded                      Intake/Output       21 07 - 21 0659 21 - 21 0659       8561-4291 Total 5181-4955 8786-6889 Total       Intake    P.O.  170  -- 170  --  -- --    P.O. 170 -- 170 -- -- --    I.V.  1509.7  -- 1509.7  --  -- --    Volume (mL) (lactated ringers infusion) 500 -- 500 -- -- --    Volume (mL) (lactated ringers infusion) 1009.7 -- 1009.7 -- -- --    Total Intake 1679.7 -- 1679.7 -- -- --       Output    Urine  150  -- 150  --  -- --    Number of Times Voided -- 2 x 2 x -- -- --    Urine Void (mL) 150 -- 150 -- -- --    Drains  --  0 0  --  -- --    Output (mL) (Closed/Suction Drain 1 Anterior Neck Hemovac) -- 0 0 -- -- --    Blood  50  -- 50  --  -- --    Est. Blood Loss 50 -- 50 -- -- --    Total Output 200 0 200 -- -- --       Net I/O     1479.7 0 1479.7 -- -- --            Intake/Output Summary (Last 24 hours) at 2021 0922  Last data filed at 2021 0400  Gross per 24 hour   Intake 1679.67 ml   Output 200 ml   Net 1479.67 ml            •  HYDROcodone-acetaminophen  1 tablet Q4HRS PRN   • atorvastatin  20 mg QHS   • oyster shell calcium/vitamin D  1 tablet BID   • cholecalciferol  400 Units BID   • gabapentin  300 mg BID   • metoprolol tartrate  12.5 mg BID   • multivitamin  1 tablet DAILY   • Pharmacy Consult Request  1 Each PHARMACY TO DOSE   • MD ALERT...DO NOT ADMINISTER NSAIDS or ASPIRIN unless ORDERED By Neurosurgery  1 Each PRN   • docusate sodium  100 mg BID   • senna-docusate  1 tablet Nightly   • senna-docusate  1 tablet Q24HRS PRN   • polyethylene glycol/lytes  1 Packet BID PRN   • magnesium hydroxide  30 mL QDAY PRN   • bisacodyl  10 mg Q24HRS PRN   • fleet  1 Each Once PRN   • 0.9 % NaCl with KCl 20 mEq 1,000 mL   Continuous   • acetaminophen  1,000 mg Q6HRS    Followed by   • [START ON 5/8/2021] acetaminophen  1,000 mg Q6HRS PRN   • ceFAZolin  2 g Q8HR   • diphenhydrAMINE  25 mg Q6HRS PRN    Or   • diphenhydrAMINE  25 mg Q6HRS PRN   • ondansetron  4 mg Q4HRS PRN   • ondansetron  4 mg Q4HRS PRN   • cyclobenzaprine  10 mg Q8HRS PRN   • ALPRAZolam  0.25 mg BID PRN   • labetalol  10 mg Q HOUR PRN   • hydrALAZINE  10 mg Q HOUR PRN   • benzocaine-menthol  1 Lozenge Q2HRS PRN   • flecainide  50 mg QAM   • flecainide  100 mg Q EVENING       Assessment and Plan:  POD# 1 C4-7 ACDF    Patient doing very well this morning   Denies new symptoms  Neurologically intact  D/c drain  Okay to start eliquis on Sunday  Okay to d/c home today    INCISION CARE:  OK to shower with incision covered or uncovered. After shower, pat incision dry and cover with gauze and tape if draining. OK to leave open to air if not draining.   Steri-strips, if applicable can be removed as they fall off on their own naturally.     RESTRICTIONS:  Continue to wear your brace as directed   No lifting greater than 10 pounds, no repetitive bending or twisting  No driving for two weeks, no driving while on narcotic medication or muscle relaxers  No aspirin, blood thinners, NSAIDS  (non-steroidal anti-inflammatory medications - aleve, motrin, ibuprofen, celebrex) for two weeks     RECOMMENDATIONS:  Over the counter stool softeners daily while on narcotics  Ambulate often to prevent blood clots in your legs  Continue incentive spirometer hourly   Follow up at [facility] in 2 weeks after surgery.    Please call [phone] to confirm the date, location, and time of your follow up appointment that was made for you.     Case d/w Dr. Greene

## 2021-05-04 NOTE — CARE PLAN
Problem: Pain Management  Goal: Pain level will decrease to patient's comfort goal  Outcome: PROGRESSING AS EXPECTED     Problem: Communication  Goal: The ability to communicate needs accurately and effectively will improve  Outcome: PROGRESSING AS EXPECTED   Patient able to communicate needs. All questions answered at this time.   Problem: Safety  Goal: Will remain free from injury  Outcome: PROGRESSING AS EXPECTED   Non-slip socks are on, bed is locked and in lowest position, personal belongings are within reach, room is free of clutter and spills, call light is in place. Patient educated on how to use the call light and how to call for assistance. Patient verbalized understanding.

## 2021-05-04 NOTE — PROGRESS NOTES
Custom order was placed. To check the status of the order or if you have any questions, please call [facility] at [phone].

## 2021-05-04 NOTE — DISCHARGE SUMMARY
DATE OF ADMISSION:  05/03/2021   DATE OF DISCHARGE:  05/04/2021     ADMISSION DIAGNOSES:  C4-C7 stenosis with severe stenosis at C5-C6 with   myelomalacia.     DISCHARGE DIAGNOSES:  C4-C7 stenosis with severe stenosis at C5-C6 with   myelomalacia.     PRINCIPAL PROCEDURES PERFORMED:  C4-C5, C5-C6, C6-C7 anterior cervical   diskectomy with interbody fusion.  The 4WEB titanium Truss interbody cages   were used x3 and a NuVasive anterior cervical plate was placed from C4-C7.     SURGEON:  Heri Greene MD     ASSISTANT:  Mahamed Moe MD     COMPLICATIONS:  So far no complications.     HOSPITAL COURSE:  The patient was extubated and brought to the recovery room   and transferred to the neurosurgery floor.  On postoperative day #1, she   denies any new symptoms.  The patient complains of incisional pain and a sore   throat.  She is ambulatory and voiding without difficulty.  The patient was   discharged in stable to improved condition with the following physical   examination.     PHYSICAL EXAMINATION:  VITAL SIGNS:  Stable, alert and oriented x4, in no apparent distress.  HEENT:  No hoarseness of voice.  Trachea is midline.  No difficulty   swallowing.  No nuchal rigidity.  NEUROMUSCULAR:  5/5 for deltoid, biceps, triceps, hand  and intrinsics.    Sensation is intact and equal throughout all 4 extremities.  ABDOMEN:  Soft, nontender.  PULMONARY:  Nonlabored breathing on room air.  Normal respiratory effort.  EXTREMITIES:  No lower extremity edema, erythema, cyanosis or clubbing.    Calves are nontender to compression bilaterally.  NECK:  Incision is clean, dry and intact.  No halo sign.  C-collar is being   worn appropriately.     DISCHARGE MEDICATIONS:  The patient will be discharged with Norco, Flexeril   and Medrol Dosepak.     DISCHARGE DISPOSITION:  The patient will be discharged to home.     DISCHARGE INSTRUCTIONS:  Incision care:  Okay to shower with incision covered   or uncovered after shower, bright  incision dry and cover with gauze and tape if   draining.  Okay to leave open to air if not draining.  Steri-Strips if   applicable can be removed as they fall off on their own naturally.    Restrictions:  Continue to wear a brace as directed.  No lifting greater than   10 pounds, no repetitive bending or twisting.  No driving for 2 weeks, no   driving while on narcotic medication or muscle relaxers.  No aspirin, blood   thinners or NSAIDs for 2 weeks.     RECOMMENDATIONS:  Over-the-counter stool softeners while on narcotics.    Ambulate often to prevent blood clots in her legs.  Continue incentive   spirometry hourly.  Follow up at [facility] in 2 weeks after   surgery.  Please call [phone] to confirm the date, time and location of   the followup appointment that was made for you.        ______________________________  Michelle Pelaez PA-C        ______________________________  TASNEEM MARQUES MD    ASP/GUR/PAWEL    DD:  05/04/2021 09:32  DT:  05/04/2021 10:59    Job#:  [id number]    CC:Harvinder Bell MD

## 2021-05-04 NOTE — THERAPY
"Occupational Therapy   Initial Evaluation     Patient Name: Lucía Rodriguez  Age:  70 y.o., Sex:  female  Medical Record #: [id number]  Today's Date: 5/4/2021     Precautions  Precautions: (P) Cervical Collar  , Spinal / Back Precautions   Comments: (P) [place] collar AAT    Assessment  Patient is 70 y.o. female admitted for C3-6 laminectomy, soft collar PRN for comfort. Pt lives in a [facility] with spouse who is able to assist patient as needed throughout day. Pt able to complete all mobility and ADLs with supervision, provided education on spinal precautions, adaptive strategies for lower body dressing, and brace management as well as provided handout for reference. Patient will not be actively followed for occupational therapy services at this time, however may be seen if requested by physician for 1 more visit within 30 days to address any discharge or equipment needs.     Plan    Recommend Occupational Therapy for Evaluation only.    DC Equipment Recommendations: (P) None  Discharge Recommendations: (P) Anticipate that the patient will have no further occupational therapy needs after discharge from the hospital     Subjective    \"My  will always be around\"     Objective       05/04/21 0901   Prior Living Situation   Prior Services None   Housing / Facility 1 Story House   Steps Into Home 0   Steps In Home 0   Bathroom Set up Walk In Shower;Grab Bars;Shower Chair   Equipment Owned None   Lives with - Patient's Self Care Capacity Spouse   Comments Spouse able to assist as needed   Prior Level of ADL Function   Self Feeding Independent   Grooming / Hygiene Independent   Bathing Independent   Dressing Independent   Toileting Independent   Prior Level of IADL Function   Medication Management Independent   Laundry Independent   Kitchen Mobility Independent   Finances Independent   Home Management Independent   Shopping Independent   Prior Level Of Mobility Independent Without Device in Community   Driving / " Transportation Driving Independent   Occupation (Pre-Hospital Vocational) Retired Due To Age   History of Falls   History of Falls No   Precautions   Precautions Cervical Collar  ;Spinal / Back Precautions    Comments [place] collar AAT   Pain 0 - 10 Group   Therapist Pain Assessment Post Activity Pain Same as Prior to Activity;Nurse Notified   Non Verbal Descriptors   Non Verbal Scale  Calm   Cognition    Cognition / Consciousness WDL   Level of Consciousness Alert   Comments pleasant, cooperative, receptive to educatin   Active ROM Upper Body   Active ROM Upper Body  WDL   Dominant Hand Right   Strength Upper Body   Upper Body Strength  WDL   Sensation Upper Body   Upper Extremity Sensation  WDL   Upper Body Muscle Tone   Upper Body Muscle Tone  WDL   Neurological Concerns   Neurological Concerns No   Coordination Upper Body   Coordination WDL   Balance Assessment   Sitting Balance (Static) Good   Sitting Balance (Dynamic) Good   Standing Balance (Static) Fair   Standing Balance (Dynamic) Fair   Weight Shift Sitting Good   Weight Shift Standing Good   Comments no AD   Bed Mobility    Supine to Sit Supervised   Scooting Supervised   Rolling Supervised   ADL Assessment   Grooming Supervision;Standing   Upper Body Dressing Supervision   Lower Body Dressing Supervision   Toileting Supervision   How much help from another person does the patient currently need...   Putting on and taking off regular lower body clothing? 4   Bathing (including washing, rinsing, and drying)? 4   Toileting, which includes using a toilet, bedpan, or urinal? 4   Putting on and taking off regular upper body clothing? 4   Taking care of personal grooming such as brushing teeth? 4   Eating meals? 4   6 Clicks Daily Activity Score 24   Functional Mobility   Sit to Stand Supervised   Bed, Chair, Wheelchair Transfer Supervised   Toilet Transfers Supervised   Transfer Method Stand Step   Mobility bed mobility, bathroom mobility, hallway mobility    Comments no AD   Visual Perception   Visual Perception  WDL   Activity Tolerance   Sitting in Chair left seated in chair   Sitting Edge of Bed 5   Standing 10   Education Group   Education Provided Role of Occupational Therapist;Back Safety   Role of Occupational Therapist Patient Response Patient;Acceptance;Explanation   Back Safety Patient Response Patient;Acceptance;Explanation;Handout;Action Demonstration   Problem List   Problem List None   Interdisciplinary Plan of Care Collaboration   IDT Collaboration with  Nursing;Physical Therapist   Patient Position at End of Therapy Seated;Call Light within Reach;Tray Table within Reach;Phone within Reach   Collaboration Comments RN updated

## 2021-05-04 NOTE — THERAPY
"Physical Therapy   Initial Evaluation     Patient Name: Lucía Rodriguez  Age:  70 y.o., Sex:  female  Medical Record #: [id number]  Today's Date: 5/4/2021     Precautions: Cervical Collar  , Spinal / Back Precautions     Assessment  Patient is 70 y.o. female s/p C4-7 ACDF with Dr. Greene on 5/3. She performed bed mobility, transfers, and community distance ambulation at supervision level. Provided patient education regarding cervical precautions, log roll, brace wear and care, and pain management techniques, patient with good return demo and verbalized understanding. Recommend outpatient PT following medical clearance to optimize return to PLOF. Patient will not be actively followed for physical therapy services at this time, however may be seen if requested by physician for 1 more visit within 30 days to address any discharge or equipment needs.       Plan    Recommend Physical Therapy for Evaluation only    DC Equipment Recommendations: None  Discharge Recommendations: Recommend outpatient physical therapy services to address higher level deficits       Subjective    \"My  will hover. He has a doctor's appointment and he asked someone to come stay with me while he is gone.\"     Objective       05/04/21 0856   Total Time Spent   Total Time Spent (Mins) 23   Charge Group   PT Evaluation PT Evaluation Low   Initial Contact Note    Initial Contact Note Order Received and Verified, Evaluation Only - Patient Does Not Require Further Acute Physical Therapy at this Time.  However, May Benefit from Post Acute Therapy for Higher Level Functional Deficits.   Precautions   Precautions Cervical Collar  ;Spinal / Back Precautions    Comments [place] collar on AAT   Vitals   O2 (LPM) 2   O2 Delivery Device Silicone Nasal Cannula   Pain 0 - 10 Group   Location Neck   Therapist Pain Assessment During Activity;Post Activity Pain Same as Prior to Activity;Nurse Notified   Prior Living Situation   Prior Services None "   Housing / Facility 1 Story House   Steps Into Home 0   Steps In Home 0   Equipment Owned None  (can borrow FWW from neighbor)   Lives with - Patient's Self Care Capacity Spouse   Comments Patient reported spouse supportive and able to assist   Prior Level of Functional Mobility   Bed Mobility Independent   Transfer Status Independent   Ambulation Independent   Distance Ambulation (Feet)   (community)   Assistive Devices Used None   Stairs Independent   Cognition    Cognition / Consciousness WDL   Level of Consciousness Alert   Comments pleasant, cooperative, internalized education   Passive ROM Lower Body   Passive ROM Lower Body WDL   Comments not formally tested, WFL for mobility   Active ROM Lower Body    Active ROM Lower Body  WDL   Comments as above   Strength Lower Body   Lower Body Strength  WDL   Comments as above   Sensation Lower Body   Lower Extremity Sensation   X   Comments patient reported peripheral neuropathy at baseline   Lower Body Muscle Tone   Lower Body Muscle Tone  WDL   Neurological Concerns   Neurological Concerns No   Coordination Lower Body    Coordination Lower Body  WDL   Balance Assessment   Sitting Balance (Static) Good   Sitting Balance (Dynamic) Good   Standing Balance (Static) Fair   Standing Balance (Dynamic) Fair   Weight Shift Sitting Good   Weight Shift Standing Good   Comments no AD, no LOB; balance improved during session   Gait Analysis   Gait Level Of Assist Supervised   Assistive Device None   Distance (Feet) 150   # of Times Distance was Traveled 1   Deviation   (decreased karri, step length)   # of Stairs Climbed 0   Weight Bearing Status no restrictions   Vision Deficits Impacting Mobility NT, 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   Turning over in bed (including adjusting bedclothes, sheets and blankets)? 4   Sitting down on and standing up from a chair with arms (e.g., wheelchair, bedside commode, etc.) 4   Moving from lying on back to sitting on the side of the bed? 4   How much help from another person does the patient currently need...   Moving to and from a bed to a chair (including a wheelchair)? 3   Need to walk in a hospital room? 3   Climbing 3-5 steps with a railing? 3   6 clicks Mobility Score 21   Activity Tolerance   Sitting in Chair left in chair   Sitting Edge of Bed 5 min   Standing 8 min   Comments No overt pain, fatigue, SOB, dizziness   Edema / Skin Assessment   Edema / Skin  Not Assessed   Education Group   Education Provided Role of Physical Therapist;Cervical Precautions;Brace Wear and Care   Cervical Precautions Patient Response Patient;Acceptance;Explanation;Demonstration;Handout;Verbal Demonstration;Action Demonstration   Role of Physical Therapist Patient Response Patient;Acceptance;Explanation;Verbal Demonstration   Brace Wear & Care Patient Response Patient;Acceptance;Explanation;Demonstration;Handout;Verbal Demonstration;Action Demonstration   Anticipated Discharge Equipment and Recommendations   DC Equipment Recommendations None   Discharge Recommendations Recommend outpatient physical therapy services to address higher level deficits   Interdisciplinary Plan of Care Collaboration   IDT Collaboration with  Nursing;Occupational Therapist   Patient Position at End of Therapy Seated;Call Light within Reach;Tray Table within Reach;Phone within Reach   Collaboration Comments RN aware of visit, response   Session Information   Date / Session Number  5/4 - 1x only   Priority 0

## 2021-05-05 ENCOUNTER — APPOINTMENT (OUTPATIENT)
Dept: RADIOLOGY | Facility: MEDICAL CENTER | Age: 71
DRG: 472 | End: 2021-05-05
Attending: INTERNAL MEDICINE
Payer: MEDICARE

## 2021-05-05 ENCOUNTER — APPOINTMENT (OUTPATIENT)
Dept: RADIOLOGY | Facility: MEDICAL CENTER | Age: 71
DRG: 472 | End: 2021-05-05
Attending: NEUROLOGICAL SURGERY
Payer: MEDICARE

## 2021-05-05 PROBLEM — R13.12 OROPHARYNGEAL DYSPHAGIA: Status: ACTIVE | Noted: 2021-05-05

## 2021-05-05 PROBLEM — R09.02 HYPOXIA: Status: ACTIVE | Noted: 2021-05-05

## 2021-05-05 PROBLEM — Z86.718 HISTORY OF DVT (DEEP VEIN THROMBOSIS): Status: ACTIVE | Noted: 2021-05-05

## 2021-05-05 PROBLEM — R00.1 BRADYCARDIA: Status: ACTIVE | Noted: 2021-05-05

## 2021-05-05 LAB
ALBUMIN SERPL BCP-MCNC: 3.9 G/DL (ref 3.2–4.9)
ALBUMIN/GLOB SERPL: 1.5 G/DL
ALP SERPL-CCNC: 99 U/L (ref 30–99)
ALT SERPL-CCNC: 9 U/L (ref 2–50)
ANION GAP SERPL CALC-SCNC: 12 MMOL/L (ref 7–16)
AST SERPL-CCNC: 14 U/L (ref 12–45)
BILIRUB SERPL-MCNC: 0.2 MG/DL (ref 0.1–1.5)
BUN SERPL-MCNC: 14 MG/DL (ref 8–22)
CALCIUM SERPL-MCNC: 9.4 MG/DL (ref 8.5–10.5)
CHLORIDE SERPL-SCNC: 107 MMOL/L (ref 96–112)
CO2 SERPL-SCNC: 25 MMOL/L (ref 20–33)
CREAT SERPL-MCNC: 0.65 MG/DL (ref 0.5–1.4)
GLOBULIN SER CALC-MCNC: 2.6 G/DL (ref 1.9–3.5)
GLUCOSE SERPL-MCNC: 144 MG/DL (ref 65–99)
MAGNESIUM SERPL-MCNC: 2.3 MG/DL (ref 1.5–2.5)
PHOSPHATE SERPL-MCNC: 2.3 MG/DL (ref 2.5–4.5)
POTASSIUM SERPL-SCNC: 4.4 MMOL/L (ref 3.6–5.5)
PROT SERPL-MCNC: 6.5 G/DL (ref 6–8.2)
SODIUM SERPL-SCNC: 144 MMOL/L (ref 135–145)
TSH SERPL DL<=0.005 MIU/L-ACNC: 0.17 UIU/ML (ref 0.38–5.33)

## 2021-05-05 PROCEDURE — 700102 HCHG RX REV CODE 250 W/ 637 OVERRIDE(OP): Performed by: NEUROLOGICAL SURGERY

## 2021-05-05 PROCEDURE — 70490 CT SOFT TISSUE NECK W/O DYE: CPT

## 2021-05-05 PROCEDURE — 700101 HCHG RX REV CODE 250: Performed by: NEUROLOGICAL SURGERY

## 2021-05-05 PROCEDURE — 84100 ASSAY OF PHOSPHORUS: CPT

## 2021-05-05 PROCEDURE — 71045 X-RAY EXAM CHEST 1 VIEW: CPT

## 2021-05-05 PROCEDURE — 36415 COLL VENOUS BLD VENIPUNCTURE: CPT

## 2021-05-05 PROCEDURE — 92610 EVALUATE SWALLOWING FUNCTION: CPT

## 2021-05-05 PROCEDURE — 80053 COMPREHEN METABOLIC PANEL: CPT

## 2021-05-05 PROCEDURE — 84443 ASSAY THYROID STIM HORMONE: CPT

## 2021-05-05 PROCEDURE — 83735 ASSAY OF MAGNESIUM: CPT

## 2021-05-05 PROCEDURE — 700111 HCHG RX REV CODE 636 W/ 250 OVERRIDE (IP): Performed by: STUDENT IN AN ORGANIZED HEALTH CARE EDUCATION/TRAINING PROGRAM

## 2021-05-05 PROCEDURE — 700111 HCHG RX REV CODE 636 W/ 250 OVERRIDE (IP): Performed by: NEUROLOGICAL SURGERY

## 2021-05-05 PROCEDURE — G0378 HOSPITAL OBSERVATION PER HR: HCPCS

## 2021-05-05 PROCEDURE — A9270 NON-COVERED ITEM OR SERVICE: HCPCS | Performed by: NEUROLOGICAL SURGERY

## 2021-05-05 PROCEDURE — 99214 OFFICE O/P EST MOD 30 MIN: CPT | Performed by: INTERNAL MEDICINE

## 2021-05-05 PROCEDURE — 700101 HCHG RX REV CODE 250: Performed by: INTERNAL MEDICINE

## 2021-05-05 RX ORDER — DEXAMETHASONE SODIUM PHOSPHATE 4 MG/ML
10 INJECTION, SOLUTION INTRA-ARTICULAR; INTRALESIONAL; INTRAMUSCULAR; INTRAVENOUS; SOFT TISSUE EVERY 4 HOURS
Status: DISCONTINUED | OUTPATIENT
Start: 2021-05-05 | End: 2021-05-05

## 2021-05-05 RX ORDER — DEXAMETHASONE SODIUM PHOSPHATE 4 MG/ML
4 INJECTION, SOLUTION INTRA-ARTICULAR; INTRALESIONAL; INTRAMUSCULAR; INTRAVENOUS; SOFT TISSUE EVERY 4 HOURS
Status: DISCONTINUED | OUTPATIENT
Start: 2021-05-05 | End: 2021-05-06

## 2021-05-05 RX ORDER — MORPHINE SULFATE 4 MG/ML
2 INJECTION, SOLUTION INTRAMUSCULAR; INTRAVENOUS
Status: DISCONTINUED | OUTPATIENT
Start: 2021-05-05 | End: 2021-05-08 | Stop reason: HOSPADM

## 2021-05-05 RX ADMIN — GABAPENTIN 300 MG: 300 CAPSULE ORAL at 05:13

## 2021-05-05 RX ADMIN — FLECAINIDE ACETATE 50 MG: 100 TABLET ORAL at 08:37

## 2021-05-05 RX ADMIN — DEXAMETHASONE SODIUM PHOSPHATE 10 MG: 4 INJECTION, SOLUTION INTRA-ARTICULAR; INTRALESIONAL; INTRAMUSCULAR; INTRAVENOUS; SOFT TISSUE at 15:01

## 2021-05-05 RX ADMIN — DEXAMETHASONE SODIUM PHOSPHATE 10 MG: 4 INJECTION, SOLUTION INTRA-ARTICULAR; INTRALESIONAL; INTRAMUSCULAR; INTRAVENOUS; SOFT TISSUE at 03:49

## 2021-05-05 RX ADMIN — DEXAMETHASONE SODIUM PHOSPHATE 4 MG: 4 INJECTION, SOLUTION INTRA-ARTICULAR; INTRALESIONAL; INTRAMUSCULAR; INTRAVENOUS; SOFT TISSUE at 17:39

## 2021-05-05 RX ADMIN — FAMOTIDINE 20 MG: 10 INJECTION INTRAVENOUS at 05:13

## 2021-05-05 RX ADMIN — CEFAZOLIN SODIUM 2 G: 2 INJECTION, SOLUTION INTRAVENOUS at 17:48

## 2021-05-05 RX ADMIN — POTASSIUM CHLORIDE AND SODIUM CHLORIDE: 900; 150 INJECTION, SOLUTION INTRAVENOUS at 17:49

## 2021-05-05 RX ADMIN — CEFAZOLIN SODIUM 2 G: 2 INJECTION, SOLUTION INTRAVENOUS at 01:23

## 2021-05-05 RX ADMIN — BENZOCAINE AND MENTHOL 1 LOZENGE: 15; 3.6 LOZENGE ORAL at 03:54

## 2021-05-05 RX ADMIN — POTASSIUM CHLORIDE AND SODIUM CHLORIDE: 900; 150 INJECTION, SOLUTION INTRAVENOUS at 01:23

## 2021-05-05 RX ADMIN — CEFAZOLIN SODIUM 2 G: 2 INJECTION, SOLUTION INTRAVENOUS at 10:20

## 2021-05-05 RX ADMIN — FAMOTIDINE 20 MG: 10 INJECTION INTRAVENOUS at 17:48

## 2021-05-05 RX ADMIN — DEXAMETHASONE SODIUM PHOSPHATE 4 MG: 4 INJECTION, SOLUTION INTRA-ARTICULAR; INTRALESIONAL; INTRAMUSCULAR; INTRAVENOUS; SOFT TISSUE at 21:48

## 2021-05-05 RX ADMIN — DEXAMETHASONE SODIUM PHOSPHATE 10 MG: 4 INJECTION, SOLUTION INTRA-ARTICULAR; INTRALESIONAL; INTRAMUSCULAR; INTRAVENOUS; SOFT TISSUE at 10:20

## 2021-05-05 ASSESSMENT — ENCOUNTER SYMPTOMS
HEADACHES: 0
SPEECH CHANGE: 0
COUGH: 1
FOCAL WEAKNESS: 0
WEIGHT LOSS: 0
PALPITATIONS: 0
FLANK PAIN: 0
TREMORS: 0
ORTHOPNEA: 0
HEMOPTYSIS: 0
VOMITING: 0
POLYDIPSIA: 0
FEVER: 0
CHILLS: 0
BLURRED VISION: 0
NAUSEA: 0
HALLUCINATIONS: 0
SHORTNESS OF BREATH: 0
HEARTBURN: 0
BRUISES/BLEEDS EASILY: 0
DOUBLE VISION: 0
PHOTOPHOBIA: 0
SORE THROAT: 1
BACK PAIN: 0
NECK PAIN: 0
SPUTUM PRODUCTION: 1
NERVOUS/ANXIOUS: 0

## 2021-05-05 ASSESSMENT — PAIN DESCRIPTION - PAIN TYPE
TYPE: ACUTE PAIN;SURGICAL PAIN
TYPE: ACUTE PAIN
TYPE: ACUTE PAIN;SURGICAL PAIN

## 2021-05-05 ASSESSMENT — LIFESTYLE VARIABLES: SUBSTANCE_ABUSE: 0

## 2021-05-05 NOTE — PROGRESS NOTES
Report received from Day RN at 1915. Assumed care of patient. Plan of care discussed, questions answered. Assessment completed. VSS, A&O x4, states pain on anterior neck. PRN med given. Call light in reach. Patient educated on use of call light for assistance.    [place] collar on at all times.

## 2021-05-05 NOTE — THERAPY
"Speech Language Pathology   Clinical Swallow Evaluation     Patient Name: Lucía Rodriguez  AGE:  70 y.o., SEX:  female  Medical Record #: [id number]  Today's Date: 5/5/2021     Precautions  Precautions: Cervical Collar  , Spinal / Back Precautions , Swallow Precautions ( See Comments)  Comments: [place] collar AAT, aspiration risk    Assessment    Patient is 70 y.o. female who presented 5/3/21 d/t C4-C7 stenossis, with severe spinal cord compression at C5-C6 with myelomalacia. Pt is s/p C4-C7 ACDF and fusion completed on date of admission. Pt began complaining of throat swelling and feeling of \"closing up\" with subsequent dysphagia and odynophagia the evening of 5/4. IV Decadron began last night. CT of neck from overnight (5/5) is WNL (see report). RN reported the pt has been swallowing pudding and slightly thickened water. No history of SLP per chart. Additional factors influencing patient status/progress: hard cervical collar.    Clinical swallow evaluation completed. Hard cervical collar in place. Pt initially tearful upon SLP entry due to pain, having just been coughing. Pt initially on room air with SpO2 94-97%. Oral motor exam GWFL, though with limited jaw opening due to c-collar. Intermittent throat clear noted at baseline with pt endorsing post-nasal drip. Pt assessed with PO trials of ice chips x 3 (~size of a pencil eraser), mildly thick liquids x 6 (half- and full-tsp boluses), liquidized x half-tsp applesauce, puree x 5 half-tsp pudding. Oral phase appeared WFL for trialed textures. Pharyngeal phase marked by delayed swallow initiation with presumed reduced hyolaryngeal elevation to palpation. Multiple swallows/bolus (3-4) across trials with pt complaint of difficulty swallowing on right side and bolus \"hanging up\" in her throat. Pt demonstrated immediate throat clear following 100% of trials with intermittent pt-report of bolus \"going down the wrong way,\" concerning for possible penetration/aspiration. " SpO2 also noted to drop to 87% during PO trials, prompting phone call to RN, who placed 1L via nasal cannula. SpO2 increased to 92-95%. Pt does not appear safe for PO intake at this time. Recommend strict NPO with non-oral medications and frequent oral care via toothbrush. Pt may benefit from diagnostic swallow study, pending clinical course. SLP will follow closely.     Plan    Recommend strict NPO with non-oral medications and frequent oral care via toothbrush.    Recommend Speech Therapy 5 times per week until therapy goals are met for the following treatments:  Dysphagia Training and Patient / Family / Caregiver Education.    Discharge Recommendations: Recommend outpatient speech therapy services       Objective       05/05/21 1226   Vitals   O2 (LPM) 1  (initially on 0, RN called to place O2 w/ PO)   O2 Delivery Device Silicone Nasal Cannula   Pain 0 - 10 Group   Location Throat   Therapist Pain Assessment Post Activity Pain Same as Prior to Activity;Nurse Notified;8   Prior Living Situation   Prior Services None   Housing / Facility 1 Story House   Steps Into Home 0   Steps In Home 0   Bathroom Set up Walk In Shower   Equipment Owned None   Lives with - Patient's Self Care Capacity Spouse   Comments per OT, spouse able to assist   Prior Level Of Function   Communication Within Functional Limits   Swallow Within Functional Limits   Dentition Intact   Dentures None   Hearing Within Functional Limits for Evaluation   Hearing Aid None   Vision Wears Corrective Lenses   Patient's Primary Language English   Oral Motor Eval    Is Patient Able to Complete Oral Motor Eval Yes, Within Normal Limits  (limited jaw opening d/t cervical collar)   Laryngeal Function   Voice Quality Minimal   Volutional Cough Within Functional Limits   Excursion Upon Swallow   (reduced, suspect d/t swelling)   Oral Food Presentation   Ice Chips Moderate   Single Swallow Mildly Thick (2) - (Nectar Thick)  Moderate   Serial Swallow Mildly Thick  "(2) - (Nectar Thick) Severe   Liquidised (3) Severe   Pureed (4) Moderate   Self Feeding Independent   Tracheostomy   Tracheostomy  No   Dysphagia Strategies / Recommendations   Strategies / Interventions Recommended (Yes / No) Yes   Compensatory Strategies None   Diet / Liquid Recommendation NPO;Pre-Feeding Trials with SLP Only   Medication Administration  Other (See Comments)  (non-oral source)   Therapy Interventions Dysphagia Therapy By Speech Language Pathologist   Follow Up SLP Evaluation may benefit from MBS   Dysphagia Rating   Nutritional Liquid Intake Rating Scale Nothing by mouth   Nutritional Food Intake Rating Scale Nothing by mouth   Patient / Family Goals   Patient / Family Goal #1 \"I want to get better\"   Short Term Goals   Short Term Goal # 1 Pt will safely consume prefeeding trials with SLP given min cues for safe swallowing.         "

## 2021-05-05 NOTE — ASSESSMENT & PLAN NOTE
Mild, asymptomatic  Monitor  TSH level suggestive of subclinical hyperthyroidism   Hold metoprolol for now for heart rate below 60

## 2021-05-05 NOTE — ASSESSMENT & PLAN NOTE
History of DVT of right lower extremity in 1/2021, has been on apixaban.  IVC filter was placed on 4/12 for surgery, off apixaban  No lower extremity swelling or tenderness noted on exam  Resume apixaban per surgical recommendations

## 2021-05-05 NOTE — PROGRESS NOTES
Neurosurgery Progress Note    Subjective:  Patient has had increased difficulty swallowing overnight, states she feels like her throat is closing up.   IV decadron increased to 10mg q6 last night  Denies SOB, chest pain  Ambulatory and voiding without difficulty  Drain out yesterday    Exam:  VSS  A&Ox4, NAD  No hoarseness of voice.   Trachea midline, difficulty and pain with swallowing  No nuchal rigidity   NM: 5/5 deltoid, biceps, triceps, handgrip, intrinsics   Sensation intact and equal throughout all four extremities.   Abdomen: soft, non-tender  Pulmonary: non-labored breathing on room air, normal respiratory effort  No LE edema, erythema, cyanosis, clubbing  Calves non-tender to compression bilat  Incision CDI, no halo sign   C-collar being worn appropriately      BP  Min: 96/42  Max: 133/65  Pulse  Av.6  Min: 15  Max: 68  Resp  Av.5  Min: 15  Max: 54  Temp  Av.4 °C (97.5 °F)  Min: 35.8 °C (96.5 °F)  Max: 36.8 °C (98.2 °F)  SpO2  Av.2 %  Min: 94 %  Max: 96 %    No data recorded                      Intake/Output     None          No intake or output data in the 24 hours ending 21 0836         • dexamethasone  10 mg Q4HRS   • famotidine  20 mg Q12HRS   • HYDROcodone-acetaminophen  1 tablet Q4HRS PRN   • atorvastatin  20 mg QHS   • oyster shell calcium/vitamin D  1 tablet BID   • cholecalciferol  400 Units BID   • gabapentin  300 mg BID   • metoprolol tartrate  12.5 mg BID   • multivitamin  1 tablet DAILY   • Pharmacy Consult Request  1 Each PHARMACY TO DOSE   • MD ALERT...DO NOT ADMINISTER NSAIDS or ASPIRIN unless ORDERED By Neurosurgery  1 Each PRN   • docusate sodium  100 mg BID   • senna-docusate  1 tablet Nightly   • senna-docusate  1 tablet Q24HRS PRN   • polyethylene glycol/lytes  1 Packet BID PRN   • magnesium hydroxide  30 mL QDAY PRN   • bisacodyl  10 mg Q24HRS PRN   • fleet  1 Each Once PRN   • 0.9 % NaCl with KCl 20 mEq 1,000 mL   Continuous   • acetaminophen  1,000 mg  Q6HRS    Followed by   • [START ON 5/8/2021] acetaminophen  1,000 mg Q6HRS PRN   • ceFAZolin  2 g Q8HR   • diphenhydrAMINE  25 mg Q6HRS PRN    Or   • diphenhydrAMINE  25 mg Q6HRS PRN   • ondansetron  4 mg Q4HRS PRN   • ondansetron  4 mg Q4HRS PRN   • cyclobenzaprine  10 mg Q8HRS PRN   • ALPRAZolam  0.25 mg BID PRN   • labetalol  10 mg Q HOUR PRN   • hydrALAZINE  10 mg Q HOUR PRN   • benzocaine-menthol  1 Lozenge Q2HRS PRN   • flecainide  50 mg QAM   • flecainide  100 mg Q EVENING       Assessment and Plan:  POD# 2 C4-7 ACDF    Patient has continued difficulty swallowing  Continue IV decadron 10mg q6  Swallow eval with SLP ordered  Patient to stay another night due to dysphagia  Okay to start eliquis on Sunday    Case d/w Dr. Greene

## 2021-05-05 NOTE — PROGRESS NOTES
Paged Dr. Greene regarding updates on patient for throat swelling. Administered decadron IV. Received orders for Decadron 10 mg IV Q4 hours, Pepcid 20 mg IV Q12 hours, 0.9% NS with 20 mEQ of KCl IV continuous at 60 mL/hr, and a CT scan of neck without contrast.

## 2021-05-05 NOTE — ASSESSMENT & PLAN NOTE
Appears to be in sinus rhythm  Resume outpatient medications once safe for oral intake  We will hold metoprolol and flecainide for now due to dysphagia  Monitor heart rate on telemetry  If patient experiences paroxysm of A. Fib, will consult cardiology

## 2021-05-05 NOTE — PROGRESS NOTES
"Paged Dr. Greene regarding patient's complaint of throat swelling and feeling of \"closing up\". Received orders to increase dose of Decadron from 4 mg to 10 mg IV Q6 hours and to give IV Decadron dose as soon as possible. Received orders for methylprednisolone (Depomedrol) 40 mg IM ONCE NOW.  "

## 2021-05-05 NOTE — CONSULTS
Hospital Medicine Consultation    Date of Service  5/5/2021    Referring Physician  Heri Greene M.D.    Consulting Physician  Adolfo Lechuga M.D.    Reason for Consultation  Dysphagia  Bradycardia    History of Presenting Illness  70 y.o. female with past medical history of paroxysmal A. fib on flecainide, history of DVT of right lower extremity in January 2021, who presented 5/3/2021 with C4-C7 and C5-C6 stenosis, myelomalacia.  Patient undergone anterior cervical discectomy with interbody fusion by Dr. Greene on 5/3.  Patient meant to be discharged on 5/4, however she noted to have dysphagia.  When she was given scrambled eggs for breakfast, she was not able to swallow it.  She was feeling some pain when was trying to swallow.  CT of soft tissue of the neck was done and did not show acute abnormalities.  Dose of IV Decadron increased to 10 mg every 6 on 4/5 to help with swelling.  Today according to the patient she feels better, however SLP evaluated the patient and recommended strict n.p.o. with nonoral medications due to oropharyngeal dysphagia.  Patient received 1 dose of flecainide in the morning, but could not receive second dose in the afternoon.  Additional concern is bradycardia.  Heart rate has been within 52-59.  Patient normally on flecainide and metoprolol.  Metoprolol was held due to bradycardia.  Patient denies any dizziness, palpitation, chest pain, shortness of breath.  She does have some postnasal drip, occasional cough with clear sputum today.  Denies fever or chills.    Review of Systems  Review of Systems   Constitutional: Negative for chills, fever and weight loss.   HENT: Positive for sore throat. Negative for ear pain, hearing loss and tinnitus.    Eyes: Negative for blurred vision, double vision and photophobia.   Respiratory: Positive for cough and sputum production. Negative for hemoptysis and shortness of breath.    Cardiovascular: Negative for chest pain, palpitations and  orthopnea.   Gastrointestinal: Negative for heartburn, nausea and vomiting.   Genitourinary: Negative for dysuria, flank pain, frequency and hematuria.   Musculoskeletal: Negative for back pain, joint pain and neck pain.   Skin: Negative for itching and rash.   Neurological: Negative for tremors, speech change, focal weakness and headaches.   Endo/Heme/Allergies: Negative for environmental allergies and polydipsia. Does not bruise/bleed easily.   Psychiatric/Behavioral: Negative for hallucinations and substance abuse. The patient is not nervous/anxious.        Past Medical History   has a past medical history of Anesthesia, Arrhythmia, Arthritis, Atrial fibrillation (HCC), Atrial fibrillation (HCC) (2008), Blood clotting disorder (HCC) (01/2021), Calculus of gallbladder without cholecystitis (1/9/2017), CATARACT, Glaucoma, High cholesterol, Hyperlipidemia, Hypertension, Macular cyst, hole, or pseudohole of retina, Presence of IVC filter (2021), and Pulmonary nodules/lesions, multiple (07/06/2016).    Surgical History   has a past surgical history that includes other; other; meniscus repair (Right); gyn surgery; and cervical disk and fusion anterior (N/A, 5/3/2021).    Family History  family history includes Cancer in her father and mother; Heart Disease in her father.    Social History   reports that she has never smoked. She has never used smokeless tobacco. She reports that she does not drink alcohol and does not use drugs.    Medications  Prior to Admission Medications   Prescriptions Last Dose Informant Patient Reported? Taking?   Calcium Carbonate-Vitamin D (CALCIUM + D PO) >2 weeks at AM Patient Yes No   Sig: Take 1 tablet by mouth 2 times a day. **OTC**   Cholecalciferol (VITAMIN D3 PO) > 2 weeks at AM Patient Yes Yes   Sig: Take 1 capsule by mouth 2 times a day.   Lutein-Zeaxanthin (LUTEIN) 15-0.7 MG CAPS >2 weeks at PM Patient Yes No   Sig: Take 1 Tab by mouth every bedtime.   Probiotic Product (PROBIOTIC  DAILY PO) >2 weeks at AM Patient Yes Yes   Sig: Take 1 capsule by mouth every day.   Psyllium (METAMUCIL PO) 5/2/2021 at 1500 Patient Yes No   Sig: Take 1 Scoop by mouth every day.   apixaban (ELIQUIS) 5mg Tab 4/24/2021 at PM Patient No No   Sig: Take 1 tablet by mouth 2 Times a Day.   atorvastatin (LIPITOR) 20 MG Tab 5/2/2021 at 2200 Patient No No   Sig: Take 1 Tab by mouth every day.   Patient taking differently: Take 20 mg by mouth at bedtime.   flecainide (TAMBOCOR) 50 MG tablet 5/3/2021 at 0800 Patient No No   Sig: TAKE 1 TO 2 TABLETS BY MOUTH TWICE DAILY   Patient taking differently: Take  mg by mouth 2 times a day. TAKE 1 TAB QAM AND 2 TAB QPM   gabapentin (NEURONTIN) 300 MG Cap 5/3/2021 at 0800 Patient Yes No   Sig: Take 1 Cap by mouth 2 Times a Day.   metoprolol tartrate (LOPRESSOR) 25 MG Tab 5/3/2021 at 0800 Patient No No   Sig: TAKE 1/2 TABLET BY MOUTH TWICE A DAY   Patient taking differently: Take 12.5 mg by mouth 2 times a day.   multivitamin (THERAGRAN) Tab > 2 weeks at AM Patient Yes No   Sig: Take 1 tablet by mouth every day.      Facility-Administered Medications: None       Allergies  Allergies   Allergen Reactions   • Neosporin [Neomycin-Polymyxin B] Rash     Rxn = 6/2016       Physical Exam  Temp:  [35.8 °C (96.5 °F)-36.8 °C (98.2 °F)] 36.2 °C (97.1 °F)  Pulse:  [52-67] 52  Resp:  [14-18] 16  BP: (120-133)/(52-66) 129/66  SpO2:  [93 %-96 %] 93 %    Physical Exam  Vitals and nursing note reviewed.   Constitutional:       General: She is not in acute distress.     Appearance: Normal appearance.   HENT:      Head: Normocephalic and atraumatic.      Nose: Nose normal.      Mouth/Throat:      Mouth: Mucous membranes are moist.   Eyes:      Extraocular Movements: Extraocular movements intact.      Pupils: Pupils are equal, round, and reactive to light.   Neck:      Comments: Anterior neck incision CDI, covered with sterile dressing  Rigid neck collar in place  Cardiovascular:      Rate and  Rhythm: Regular rhythm. Bradycardia present.   Pulmonary:      Effort: Pulmonary effort is normal.      Breath sounds: Normal breath sounds.   Abdominal:      General: Abdomen is flat. There is no distension.      Tenderness: There is no abdominal tenderness.   Musculoskeletal:         General: No swelling or deformity. Normal range of motion.      Cervical back: Normal range of motion and neck supple.   Skin:     General: Skin is warm and dry.   Neurological:      General: No focal deficit present.      Mental Status: She is alert and oriented to person, place, and time.   Psychiatric:         Mood and Affect: Mood normal.         Behavior: Behavior normal.         Fluids      Laboratory                          Imaging  IR-US GUIDED PIV   Final Result    Ultrasound-guided PERIPHERAL IV INSERTION performed by    qualified nursing staff as above.      CT-SOFT TISSUE NECK W/O   Final Result      CT of the neck soft tissues without contrast within normal limits.      DX-CERVICAL SPINE-2 OR 3 VIEWS   Final Result      Anatomic alignment cervical spine following ACDF C4-C7.      DX-PORTABLE FLUORO > 1 HOUR   Final Result      Portable fluoroscopy utilized for 6 seconds.         INTERPRETING LOCATION: [address] [place] [address], [facility]          Assessment/Plan  Oropharyngeal dysphagia  Assessment & Plan  Probably related to surgery on C4-C5 with anterior approach  CT soft tissue of the neck without acute abnormalities  No evidence of thrush or pharyngitis on exam  Continue steroids for postop edema  SLP is following, currently recommended strict n.p.o. with non  oral medications  Patient received morning dose of flecainide.  We will hold off on evening dose.  It has 1 half-life 20 hours and holding 1 dose of flecainide would unlikely cause paroxysm of A. fib   In case patient was not cleared by SLP for oral medications tomorrow, will discuss case with cardiology  Aspiration precautions per SLP recommendations  IV fluids for  n.p.o.    PAF (paroxysmal atrial fibrillation) (HCC)- (present on admission)  Assessment & Plan  Appears to be in sinus rhythm  Resume outpatient medications once safe for oral intake  Monitor heart rate on telemetry    Hypoxia  Assessment & Plan  Patient dropped SPO2 to 87% on room air during p.o. trial.  Patient reports some cough with clear sputum.  Afebrile. COVID-19 screen negative on admission  Order chest x-ray to evaluate lungs fields, CBC, CMP  Lungs are clear on exam to auscultation  Incentive spirometry  Supplemental oxygen  Wean down as tolerated  If hypoxia fails to resolve or worsens, consider CT PE study    Bradycardia  Assessment & Plan  Mild, asymptomatic  Monitor  Repeat CBC, CMP, magnesium, TSH  Hold metoprolol for now for heart rate below 60    History of DVT (deep vein thrombosis)  Assessment & Plan  History of DVT of right lower extremity in 1/2021, has been on apixaban.  IVC filter was placed on 4/12 for surgery, off apixaban  No lower extremity swelling or tenderness noted on exam    Central stenosis of spinal canal- (present on admission)  Assessment & Plan  Status post cervical discectomy with interbody fusion by Dr. Greene on 5/3  Neck collar  Holding apixaban as per neurosurgery  PT recommended outpatient PT

## 2021-05-05 NOTE — ASSESSMENT & PLAN NOTE
Sp02 dropped SPO2 to 87% on room air during p.o. trial.  Patient reports some cough with clear sputum.  Afebrile. COVID-19 screen negative on admission  Order chest x-ray to evaluate lungs fields, CBC, CMP  Lungs are clear on exam to auscultation  Incentive spirometry  Supplemental oxygen  Wean down as tolerated  If hypoxia fails to resolve or worsens, consider CT PE study

## 2021-05-05 NOTE — ASSESSMENT & PLAN NOTE
Status post cervical discectomy with interbody fusion by Dr. Greene on 5/3  Neck collar  Holding apixaban as per neurosurgery  PT recommended outpatient PT

## 2021-05-05 NOTE — CARE PLAN
Problem: Pain Management  Goal: Pain level will decrease to patient's comfort goal  Outcome: PROGRESSING AS EXPECTED     Problem: Communication  Goal: The ability to communicate needs accurately and effectively will improve  Outcome: PROGRESSING AS EXPECTED     Problem: Safety  Goal: Will remain free from injury  Outcome: PROGRESSING AS EXPECTED

## 2021-05-05 NOTE — ASSESSMENT & PLAN NOTE
Probably related to surgery on C4-C5 with anterior approach  CT soft tissue of the neck without acute abnormalities  No evidence of thrush or pharyngitis on exam  SLP is following, currently recommended strict n.p.o. with non  oral medications.  Pending MBS S  MBSS passed. Advance diet as tolerated   Aspiration precautions per SLP recommendations  IV fluids for n.p.o.

## 2021-05-05 NOTE — PROGRESS NOTES
"Spoke to Shannan Mao, Speech Therapist which recommends pt to be NPO at this time due to her high risk for aspiration. Patient and patient's  agrees with plan. Paged RAMIREZ Hernandez to provide update. Patient concerned about heart medication.  Spoke to Michelle Rosas regarding updates. Patient to be NPO at this point. No new orders received at this time.     1500 Patient verbalizes \"feeling better\" when swallowing. Pt states there is a decrease in pain in her throat.  "

## 2021-05-06 PROBLEM — E05.90 SUBCLINICAL HYPERTHYROIDISM: Status: ACTIVE | Noted: 2021-05-06

## 2021-05-06 PROBLEM — R09.02 HYPOXIA: Status: RESOLVED | Noted: 2021-05-05 | Resolved: 2021-05-06

## 2021-05-06 LAB
BASOPHILS # BLD AUTO: 0.2 % (ref 0–1.8)
BASOPHILS # BLD: 0.02 K/UL (ref 0–0.12)
EOSINOPHIL # BLD AUTO: 0 K/UL (ref 0–0.51)
EOSINOPHIL NFR BLD: 0 % (ref 0–6.9)
ERYTHROCYTE [DISTWIDTH] IN BLOOD BY AUTOMATED COUNT: 45.3 FL (ref 35.9–50)
HCT VFR BLD AUTO: 41.2 % (ref 37–47)
HGB BLD-MCNC: 13.1 G/DL (ref 12–16)
IMM GRANULOCYTES # BLD AUTO: 0.11 K/UL (ref 0–0.11)
IMM GRANULOCYTES NFR BLD AUTO: 0.9 % (ref 0–0.9)
LYMPHOCYTES # BLD AUTO: 1.11 K/UL (ref 1–4.8)
LYMPHOCYTES NFR BLD: 9.1 % (ref 22–41)
MCH RBC QN AUTO: 30.6 PG (ref 27–33)
MCHC RBC AUTO-ENTMCNC: 31.8 G/DL (ref 33.6–35)
MCV RBC AUTO: 96.3 FL (ref 81.4–97.8)
MONOCYTES # BLD AUTO: 0.48 K/UL (ref 0–0.85)
MONOCYTES NFR BLD AUTO: 3.9 % (ref 0–13.4)
NEUTROPHILS # BLD AUTO: 10.5 K/UL (ref 2–7.15)
NEUTROPHILS NFR BLD: 85.9 % (ref 44–72)
NRBC # BLD AUTO: 0 K/UL
NRBC BLD-RTO: 0 /100 WBC
PLATELET # BLD AUTO: 223 K/UL (ref 164–446)
PMV BLD AUTO: 10.6 FL (ref 9–12.9)
RBC # BLD AUTO: 4.28 M/UL (ref 4.2–5.4)
S PYO DNA SPEC NAA+PROBE: NOT DETECTED
T4 FREE SERPL-MCNC: 0.94 NG/DL (ref 0.93–1.7)
WBC # BLD AUTO: 12.2 K/UL (ref 4.8–10.8)

## 2021-05-06 PROCEDURE — 87651 STREP A DNA AMP PROBE: CPT

## 2021-05-06 PROCEDURE — 92526 ORAL FUNCTION THERAPY: CPT

## 2021-05-06 PROCEDURE — 700111 HCHG RX REV CODE 636 W/ 250 OVERRIDE (IP): Performed by: NEUROLOGICAL SURGERY

## 2021-05-06 PROCEDURE — 770020 HCHG ROOM/CARE - TELE (206)

## 2021-05-06 PROCEDURE — 700102 HCHG RX REV CODE 250 W/ 637 OVERRIDE(OP): Performed by: NEUROLOGICAL SURGERY

## 2021-05-06 PROCEDURE — 84439 ASSAY OF FREE THYROXINE: CPT

## 2021-05-06 PROCEDURE — 700111 HCHG RX REV CODE 636 W/ 250 OVERRIDE (IP): Performed by: INTERNAL MEDICINE

## 2021-05-06 PROCEDURE — 99232 SBSQ HOSP IP/OBS MODERATE 35: CPT | Performed by: INTERNAL MEDICINE

## 2021-05-06 PROCEDURE — 36415 COLL VENOUS BLD VENIPUNCTURE: CPT

## 2021-05-06 PROCEDURE — 700101 HCHG RX REV CODE 250: Performed by: INTERNAL MEDICINE

## 2021-05-06 PROCEDURE — A9270 NON-COVERED ITEM OR SERVICE: HCPCS | Performed by: NEUROLOGICAL SURGERY

## 2021-05-06 PROCEDURE — 700111 HCHG RX REV CODE 636 W/ 250 OVERRIDE (IP): Performed by: STUDENT IN AN ORGANIZED HEALTH CARE EDUCATION/TRAINING PROGRAM

## 2021-05-06 PROCEDURE — 85025 COMPLETE CBC W/AUTO DIFF WBC: CPT

## 2021-05-06 RX ADMIN — FAMOTIDINE 20 MG: 10 INJECTION INTRAVENOUS at 17:06

## 2021-05-06 RX ADMIN — DEXAMETHASONE SODIUM PHOSPHATE 4 MG: 4 INJECTION, SOLUTION INTRA-ARTICULAR; INTRALESIONAL; INTRAMUSCULAR; INTRAVENOUS; SOFT TISSUE at 03:35

## 2021-05-06 RX ADMIN — FAMOTIDINE 20 MG: 10 INJECTION INTRAVENOUS at 08:42

## 2021-05-06 RX ADMIN — METOPROLOL TARTRATE 12.5 MG: 25 TABLET, FILM COATED ORAL at 08:42

## 2021-05-06 RX ADMIN — FAMOTIDINE 20 MG: 10 INJECTION INTRAVENOUS at 05:17

## 2021-05-06 RX ADMIN — GABAPENTIN 300 MG: 300 CAPSULE ORAL at 08:48

## 2021-05-06 RX ADMIN — FLECAINIDE ACETATE 50 MG: 100 TABLET ORAL at 08:48

## 2021-05-06 RX ADMIN — ACETAMINOPHEN 1000 MG: 500 TABLET ORAL at 20:25

## 2021-05-06 RX ADMIN — ATORVASTATIN CALCIUM 20 MG: 20 TABLET, FILM COATED ORAL at 20:25

## 2021-05-06 RX ADMIN — DEXAMETHASONE SODIUM PHOSPHATE 4 MG: 4 INJECTION, SOLUTION INTRA-ARTICULAR; INTRALESIONAL; INTRAMUSCULAR; INTRAVENOUS; SOFT TISSUE at 05:17

## 2021-05-06 RX ADMIN — POTASSIUM CHLORIDE AND SODIUM CHLORIDE: 900; 150 INJECTION, SOLUTION INTRAVENOUS at 05:17

## 2021-05-06 RX ADMIN — GABAPENTIN 300 MG: 300 CAPSULE ORAL at 17:06

## 2021-05-06 RX ADMIN — ACETAMINOPHEN 1000 MG: 500 TABLET ORAL at 14:53

## 2021-05-06 RX ADMIN — METOPROLOL TARTRATE 12.5 MG: 25 TABLET, FILM COATED ORAL at 20:25

## 2021-05-06 RX ADMIN — FLECAINIDE ACETATE 100 MG: 100 TABLET ORAL at 17:06

## 2021-05-06 RX ADMIN — POTASSIUM CHLORIDE AND SODIUM CHLORIDE: 900; 150 INJECTION, SOLUTION INTRAVENOUS at 14:53

## 2021-05-06 ASSESSMENT — COGNITIVE AND FUNCTIONAL STATUS - GENERAL
CLIMB 3 TO 5 STEPS WITH RAILING: A LITTLE
DAILY ACTIVITIY SCORE: 24
MOBILITY SCORE: 21
SUGGESTED CMS G CODE MODIFIER MOBILITY: CJ
SUGGESTED CMS G CODE MODIFIER DAILY ACTIVITY: CH
STANDING UP FROM CHAIR USING ARMS: A LITTLE
WALKING IN HOSPITAL ROOM: A LITTLE

## 2021-05-06 ASSESSMENT — ENCOUNTER SYMPTOMS
FEVER: 0
BLURRED VISION: 0
DIARRHEA: 0
NAUSEA: 0
SPEECH CHANGE: 0
SENSORY CHANGE: 0
CLAUDICATION: 0
DOUBLE VISION: 0
BLOOD IN STOOL: 0
HEARTBURN: 0
SORE THROAT: 0
PHOTOPHOBIA: 0
BRUISES/BLEEDS EASILY: 0
ORTHOPNEA: 0
WEAKNESS: 0
COUGH: 0
BACK PAIN: 0
ABDOMINAL PAIN: 0
EYE DISCHARGE: 0
SHORTNESS OF BREATH: 0
MYALGIAS: 0
VOMITING: 0
CHILLS: 0
HEMOPTYSIS: 0
PALPITATIONS: 0
DIZZINESS: 0
FLANK PAIN: 0
HEADACHES: 0
DEPRESSION: 0
WHEEZING: 0
SPUTUM PRODUCTION: 0

## 2021-05-06 ASSESSMENT — LIFESTYLE VARIABLES
TOTAL SCORE: 0
HAVE YOU EVER FELT YOU SHOULD CUT DOWN ON YOUR DRINKING: NO
ALCOHOL_USE: NO
EVER HAD A DRINK FIRST THING IN THE MORNING TO STEADY YOUR NERVES TO GET RID OF A HANGOVER: NO
HAVE PEOPLE ANNOYED YOU BY CRITICIZING YOUR DRINKING: NO
DOES PATIENT WANT TO STOP DRINKING: NO
TOTAL SCORE: 0
EVER FELT BAD OR GUILTY ABOUT YOUR DRINKING: NO
AVERAGE NUMBER OF DAYS PER WEEK YOU HAVE A DRINK CONTAINING ALCOHOL: 0
HOW MANY TIMES IN THE PAST YEAR HAVE YOU HAD 5 OR MORE DRINKS IN A DAY: 0
ON A TYPICAL DAY WHEN YOU DRINK ALCOHOL HOW MANY DRINKS DO YOU HAVE: 0
CONSUMPTION TOTAL: NEGATIVE
TOTAL SCORE: 0

## 2021-05-06 ASSESSMENT — PAIN DESCRIPTION - PAIN TYPE: TYPE: ACUTE PAIN

## 2021-05-06 ASSESSMENT — PATIENT HEALTH QUESTIONNAIRE - PHQ9
SUM OF ALL RESPONSES TO PHQ9 QUESTIONS 1 AND 2: 0
1. LITTLE INTEREST OR PLEASURE IN DOING THINGS: NOT AT ALL
2. FEELING DOWN, DEPRESSED, IRRITABLE, OR HOPELESS: NOT AT ALL

## 2021-05-06 NOTE — PROGRESS NOTES
Received report over phone from T3 SADIA Zepeda. Pt on unit. Monitor room notified, tele box applied. POC reviewed white board updated

## 2021-05-06 NOTE — THERAPY
"Speech Language Pathology  Daily Treatment     Patient Name: Lucía Rodriguez  Age:  70 y.o., Sex:  female  Medical Record #: [id number]  Today's Date: 5/6/2021     Precautions  Precautions: Cervical Collar  , Spinal / Back Precautions , Swallow Precautions ( See Comments)  Comments: Aspen collare on at all times    Assessment    Patient seen for reassessment of swallowing function on this date.  Patient NPO with no source of nutrition.  Vocal quality clear with adequate intensity.  Patient stated swallowing is better today but still impaired.  She was able to swallow a pill with sips of thickened liquids this morning.  Upon entering the room, throat clearing was noted prior to initiation of PO.  Presentation of PO included ice chips, thin liquids, puree, and mildly thick liquids.  All liquids were consumed via straw or teaspoon.  The patient took small sips and bites.  She presented with adequate laryngeal elevation noted upon palpation and delayed oral prep, which appeared to be related to cervical collar and throat pain, but timely initiation of swallow trigger.  Consistent throat clearing with all consistencies consumed.  Patient stated the puree was too thick and sticky and thins were \"too hard to manage.\"  Patient stated she still feels thickened liquids \"sticking on the right side\" but it is an easier consistency to swallow than the others presented.    Recommend initiation of a Liquidised/Mildy Thick Liquid diet.  Patient is cognitively intact and educated to hold PO intake with any difficulty.  Patient verbalized understanding.  There are concerns that she may not meet her nutritional needs, given reduced PO intake due to pain when swallowing.  Discussed case with MD and received order for a Modified Barium Swallow Study to further assess pharyngeal swallowing function.  MBSS planned for tomorrow as warranted.       Plan    Continue current treatment plan.    Discharge Recommendations: (P) Recommend " "outpatient speech therapy services    Subjective    Pleasant and agreeable.       Objective       05/06/21 0940   Dysphagia    Dysphagia X   Positioning / Behavior Modification Self Monitoring;Other (see Comments)  (Sit EOB/elevate HOB to 90, straws for PO intake)   Diet / Liquid Recommendation Liquidised (3) - (Nectar Thick Full Liquid);Mildly Thick (2) - (Nectar Thick)   Nutritional Liquid Intake Rating Scale Thickened beverages (mildly thick unless otherwise specified)   Nutritional Food Intake Rating Scale Total oral diet of a single consistency   Nursing Communication Swallow Precaution Sign Posted at Head of Bed   Skilled Intervention Compensatory Strategies;Verbal Cueing   Recommended Route of Medication Administration   Medication Administration  Other (See Comments)  (crush in thickened liquids/whole-per pt preference)   Patient / Family Goals   Patient / Family Goal #1 \"I want to get better\"   Goal #1 Outcome Progressing as expected   Short Term Goals   Short Term Goal # 1 Pt will safely consume prefeeding trials with SLP given min cues for safe swallowing.   Goal Outcome # 1 Goal met, new goal added   Short Term Goal # 1 B  Pt will consume LQ3/MT2 diet with no overt s/sx of aspiration         "

## 2021-05-06 NOTE — CARE PLAN
Problem: Fluid Volume:  Goal: Will maintain balanced intake and output  Outcome: PROGRESSING AS EXPECTED     Problem: Communication  Goal: The ability to communicate needs accurately and effectively will improve  Outcome: PROGRESSING AS EXPECTED     Problem: Safety  Goal: Will remain free from injury  Outcome: PROGRESSING AS EXPECTED  Educated pt to call for help when needing to get out of bed and to wear the  C collar when getting out of bed.     Problem: Venous Thromboembolism (VTW)/Deep Vein Thrombosis (DVT) Prevention:  Goal: Patient will participate in Venous Thrombosis (VTE)/Deep Vein Thrombosis (DVT)Prevention Measures  Outcome: PROGRESSING AS EXPECTED   Educated pt on the purpose of the SCD pumps. Pt agreed to wear them throughout the night.     Problem: Knowledge Deficit  Goal: Knowledge of disease process/condition, treatment plan, diagnostic tests, and medications will improve  Outcome: PROGRESSING AS EXPECTED

## 2021-05-06 NOTE — CARE PLAN
Problem: Pain Management  Goal: Pain level will decrease to patient's comfort goal  Outcome: PROGRESSING AS EXPECTED  Patient educated to pain scale system. Patient encouraged to verbalize discomfort. Patient taught about non-pharmacological pain management. Patient is comfortable at this time without statements of discomfort or pain.      Problem: Communication  Goal: The ability to communicate needs accurately and effectively will improve  Outcome: PROGRESSING AS EXPECTED  Patient educated to utilize call light. Patient and family oriented to hospital room. Patient encouraged to ask questions about plan of care. Patient effectively uses call light and is involved in POC.

## 2021-05-06 NOTE — PROGRESS NOTES
Neurosurgery Progress Note    Subjective:  Patient has continued dysphagia as well as bradycardia  States her symptoms are improving this morning  SLP saw patient yesterday, strict npo with nonoral medications  Consulted by hospital medicine yesterday and transferred to tele  Denies new pain, weakness, numbness, SOB, chest pain  Ambulatory, passing gas, and voiding without difficulty    Exam:  VSS  A&Ox4, NAD  No hoarseness of voice.   Trachea midline, difficulty and pain with swallowing  No nuchal rigidity   NM: 5/5 deltoid, biceps, triceps, handgrip, intrinsics   Sensation intact and equal throughout all four extremities.   Abdomen: soft, non-tender  Pulmonary: non-labored breathing on room air, normal respiratory effort  No LE edema, erythema, cyanosis, clubbing  Calves non-tender to compression bilat  Incision CDI, no halo sign   C-collar being worn appropriately      BP  Min: 121/70  Max: 144/77  Pulse  Av.2  Min: 51  Max: 61  Resp  Av.8  Min: 16  Max: 18  Temp  Av.2 °C (97.1 °F)  Min: 35.7 °C (96.2 °F)  Max: 36.8 °C (98.2 °F)  SpO2  Av.3 %  Min: 92 %  Max: 98 %    No data recorded    Recent Labs     21  0443   WBC 12.2*   RBC 4.28   HEMOGLOBIN 13.1   HEMATOCRIT 41.2   MCV 96.3   MCH 30.6   MCHC 31.8*   RDW 45.3   PLATELETCT 223   MPV 10.6     Recent Labs     21  1720   SODIUM 144   POTASSIUM 4.4   CHLORIDE 107   CO2 25   GLUCOSE 144*   BUN 14   CREATININE 0.65   CALCIUM 9.4               Intake/Output     None          No intake or output data in the 24 hours ending 21 0805         • famotidine  20 mg Q12HRS   • morphine injection  2 mg Q3HRS PRN   • dexamethasone  4 mg Q4HRS   • HYDROcodone-acetaminophen  1 tablet Q4HRS PRN   • atorvastatin  20 mg QHS   • oyster shell calcium/vitamin D  1 tablet BID   • cholecalciferol  400 Units BID   • gabapentin  300 mg BID   • metoprolol tartrate  12.5 mg BID   • multivitamin  1 tablet DAILY   • Pharmacy Consult Request  1 Each  PHARMACY TO DOSE   • MD ALERT...DO NOT ADMINISTER NSAIDS or ASPIRIN unless ORDERED By Neurosurgery  1 Each PRN   • docusate sodium  100 mg BID   • senna-docusate  1 tablet Nightly   • senna-docusate  1 tablet Q24HRS PRN   • polyethylene glycol/lytes  1 Packet BID PRN   • magnesium hydroxide  30 mL QDAY PRN   • bisacodyl  10 mg Q24HRS PRN   • fleet  1 Each Once PRN   • 0.9 % NaCl with KCl 20 mEq 1,000 mL   Continuous   • acetaminophen  1,000 mg Q6HRS    Followed by   • [START ON 5/8/2021] acetaminophen  1,000 mg Q6HRS PRN   • diphenhydrAMINE  25 mg Q6HRS PRN    Or   • diphenhydrAMINE  25 mg Q6HRS PRN   • ondansetron  4 mg Q4HRS PRN   • ondansetron  4 mg Q4HRS PRN   • cyclobenzaprine  10 mg Q8HRS PRN   • ALPRAZolam  0.25 mg BID PRN   • labetalol  10 mg Q HOUR PRN   • hydrALAZINE  10 mg Q HOUR PRN   • benzocaine-menthol  1 Lozenge Q2HRS PRN   • flecainide  50 mg QAM   • flecainide  100 mg Q EVENING       Assessment and Plan:  POD# 3 C4-7 ACDF    Patient has continued difficulty swallowing, states her symptoms are improving this morning  Neurologically intact on exam  CT soft tissues of neck nonremarkable  SLP following, strict npo    Okay to start eliquis on Sunday    [facility] medicine consult    Case d/w Dr. Greene

## 2021-05-06 NOTE — PROGRESS NOTES
[facility] Medicine Daily Progress Note    Date of Service  5/6/2021    Chief Complaint  70 y.o. female admitted 5/3/2021 with dysphagia     Hospital Course    70 y.o. female with past medical history of paroxysmal A. fib on flecainide, history of DVT of right lower extremity in January 2021, who presented 5/3/2021 with C4-C7 and C5-C6 stenosis, myelomalacia.  Patient undergone anterior cervical discectomy with interbody fusion by Dr. Greene on 5/3.  Patient meant to be discharged on 5/4, however she noted to have dysphagia.  When she was given scrambled eggs for breakfast, she was not able to swallow it.  She was feeling some pain when was trying to swallow.  CT of soft tissue of the neck was done and did not show acute abnormalities.  Dose of IV Decadron increased to 10 mg every 6 on 4/5 to help with swelling.  Today according to the patient she feels better, however SLP evaluated the patient and recommended strict n.p.o. with nonoral medications due to oropharyngeal dysphagia.  Patient received 1 dose of flecainide in the morning, but could not receive second dose in the afternoon.  Additional concern is bradycardia.  Heart rate has been within 52-59.  Patient normally on flecainide and metoprolol.  Metoprolol was held due to bradycardia.  Patient denies any dizziness, palpitation, chest pain, shortness of breath.  She does have some postnasal drip, occasional cough with clear sputum today.  Denies fever or chills.      Interval Problem Update    5/6/2021: The patient is seen and evaluated at bedside and appears comfortable.  Patient states that she is able to tolerate oral intake better specifically thickened liquids.  Speech therapy with current recommendations for MBS S.  Pending results of rapid strep testing.  Patient without overt thyroid hormone abnormalities.  Current dysphagia likely related to postoperative period.  Patient denies any other motor/sensory deficits.  No chest pains, palpitations, shortness of  breath    Consultants/Specialty  Neurosurgery    Code Status  Full Code    Disposition  Pending improvement in dysphagia.  Pending MBS test.  PT/OT states that she has no further needs.    Review of Systems  Review of Systems   Constitutional: Negative for chills, fever and malaise/fatigue.   HENT: Negative for congestion, hearing loss, sore throat and tinnitus.    Eyes: Negative for blurred vision, double vision, photophobia and discharge.   Respiratory: Negative for cough, hemoptysis, sputum production, shortness of breath and wheezing.    Cardiovascular: Negative for chest pain, palpitations, orthopnea, claudication and leg swelling.   Gastrointestinal: Negative for abdominal pain, blood in stool, diarrhea, heartburn, melena, nausea and vomiting.   Genitourinary: Negative for dysuria, flank pain, hematuria and urgency.   Musculoskeletal: Negative for back pain, joint pain and myalgias.   Skin: Negative for itching and rash.   Neurological: Negative for dizziness, sensory change, speech change, weakness and headaches.   Endo/Heme/Allergies: Does not bruise/bleed easily.   Psychiatric/Behavioral: Negative for depression and suicidal ideas.        Physical Exam  Temp:  [35.7 °C (96.2 °F)-36.8 °C (98.2 °F)] 36.1 °C (97 °F)  Pulse:  [51-61] 60  Resp:  [16-18] 18  BP: (121-144)/(48-77) 132/63  SpO2:  [91 %-98 %] 91 %    Physical Exam  Vitals and nursing note reviewed.   Constitutional:       General: She is not in acute distress.     Appearance: Normal appearance. She is obese.   HENT:      Head: Normocephalic and atraumatic.      Mouth/Throat:      Mouth: Mucous membranes are moist.      Pharynx: No oropharyngeal exudate or posterior oropharyngeal erythema.   Eyes:      Extraocular Movements: Extraocular movements intact.      Conjunctiva/sclera: Conjunctivae normal.      Pupils: Pupils are equal, round, and reactive to light.   Neck:      Comments: Cervical collar in place. Surgical site well dressed without  drainage.   Cardiovascular:      Rate and Rhythm: Normal rate and regular rhythm.      Heart sounds: No murmur. No friction rub.   Pulmonary:      Effort: Pulmonary effort is normal. No respiratory distress.      Breath sounds: Normal breath sounds. No wheezing.   Abdominal:      General: Abdomen is flat. Bowel sounds are normal.      Palpations: Abdomen is soft.      Tenderness: There is no abdominal tenderness. There is no guarding.   Musculoskeletal:         General: No swelling or tenderness. Normal range of motion.      Cervical back: Neck supple.   Skin:     General: Skin is warm and dry.      Findings: No rash.   Neurological:      General: No focal deficit present.      Mental Status: She is alert and oriented to person, place, and time.      Cranial Nerves: No cranial nerve deficit.      Motor: No weakness.   Psychiatric:         Mood and Affect: Mood normal.         Behavior: Behavior normal.         Fluids  No intake or output data in the 24 hours ending 05/06/21 1220    Laboratory  Recent Labs     05/06/21  0443   WBC 12.2*   RBC 4.28   HEMOGLOBIN 13.1   HEMATOCRIT 41.2   MCV 96.3   MCH 30.6   MCHC 31.8*   RDW 45.3   PLATELETCT 223   MPV 10.6     Recent Labs     05/05/21  1720   SODIUM 144   POTASSIUM 4.4   CHLORIDE 107   CO2 25   GLUCOSE 144*   BUN 14   CREATININE 0.65   CALCIUM 9.4                   Imaging  DX-CHEST-LIMITED (1 VIEW)   Final Result      1.  Bibasilar atelectasis.      2.  Cardiomegaly.      IR-US GUIDED PIV   Final Result    Ultrasound-guided PERIPHERAL IV INSERTION performed by    qualified nursing staff as above.      CT-SOFT TISSUE NECK W/O   Final Result      CT of the neck soft tissues without contrast within normal limits.      DX-CERVICAL SPINE-2 OR 3 VIEWS   Final Result      Anatomic alignment cervical spine following ACDF C4-C7.      DX-PORTABLE FLUORO > 1 HOUR   Final Result      Portable fluoroscopy utilized for 6 seconds.         INTERPRETING LOCATION: [address] [place] [address]  [place], [id number]      DX-ESOPHAGUS - [id number]    (Results Pending)        Assessment/Plan  Oropharyngeal dysphagia  Assessment & Plan  Probably related to surgery on C4-C5 with anterior approach  CT soft tissue of the neck without acute abnormalities  No evidence of thrush or pharyngitis on exam  Continue steroids for postop edema  SLP is following, currently recommended strict n.p.o. with non  oral medications  Patient received morning dose of flecainide.  We will hold off on evening dose.  It has 1 half-life 20 hours and holding 1 dose of flecainide would unlikely cause paroxysm of A. fib   In case patient was not cleared by SLP for oral medications tomorrow, will discuss case with cardiology  Aspiration precautions per SLP recommendations  IV fluids for n.p.o.    PAF (paroxysmal atrial fibrillation) (HCC)- (present on admission)  Assessment & Plan  Appears to be in sinus rhythm  Resume outpatient medications once safe for oral intake  We will hold metoprolol and flecainide for now due to dysphagia  Monitor heart rate on telemetry  If patient experiences paroxysm of A. Fib, will consult cardiology    Subclinical hyperthyroidism  Assessment & Plan  Patient with mildly depressed TSH in the setting of normal T4 levels  No active symptoms at this time and no goiter identified  She will need close outpatient follow-up    Bradycardia  Assessment & Plan  Mild, asymptomatic  Monitor  Repeat CBC, CMP, magnesium, TSH  Hold metoprolol for now for heart rate below 60    History of DVT (deep vein thrombosis)  Assessment & Plan  History of DVT of right lower extremity in 1/2021, has been on apixaban.  IVC filter was placed on 4/12 for surgery, off apixaban  No lower extremity swelling or tenderness noted on exam  Resume apixaban per surgical recommendations    Central stenosis of spinal canal- (present on admission)  Assessment & Plan  Status post cervical discectomy with interbody fusion by Dr. Greene on 5/3  Neck collar  Holding  apixaban as per neurosurgery  PT recommended outpatient PT           VTE prophylaxis: restart home apixaban on Sunday per neurosurgery recommendations

## 2021-05-06 NOTE — PROGRESS NOTES
2104: Report given to SADIA Good on T7    2108: Patient picked up from T327-2 via bed, trudiroute to T716-2.     2110: , Jose informed of transfer.

## 2021-05-06 NOTE — ASSESSMENT & PLAN NOTE
Patient with mildly depressed TSH in the setting of normal T4 levels  No active symptoms at this time and no goiter identified  She will need close outpatient follow-up

## 2021-05-06 NOTE — PROGRESS NOTES
2 RN skin check completed with SADIA Novoa  Devices in place C collar, SCDs, nasal cannula, eyeglasses.  Skin assessed under devices yes  No Confirmed pressure ulcers found   No new potential pressure ulcers noted    The following interventions in place pillows.     Bilateral heels dry, flaky   Gauze and tegaderm applied to neck incision.

## 2021-05-06 NOTE — PROGRESS NOTES
Report received at bedside from NOC RN. PT care assumed.  Tele box on and verified with monitor room. PT A&Ox4, no signs of distress noted at this time. Patient resting comfortably in bed. POC discussed with PT and verbalizes no questions. PT c/o of no pain. PT denies any additional needs at this time. Bed in lowest and locked position, PT educated on fall risk and verbalized understanding. Call light within reach. Will continue plan of care.

## 2021-05-07 ENCOUNTER — APPOINTMENT (OUTPATIENT)
Dept: RADIOLOGY | Facility: MEDICAL CENTER | Age: 71
DRG: 472 | End: 2021-05-07
Attending: INTERNAL MEDICINE
Payer: MEDICARE

## 2021-05-07 PROCEDURE — 700111 HCHG RX REV CODE 636 W/ 250 OVERRIDE (IP): Performed by: INTERNAL MEDICINE

## 2021-05-07 PROCEDURE — 700102 HCHG RX REV CODE 250 W/ 637 OVERRIDE(OP): Performed by: NEUROLOGICAL SURGERY

## 2021-05-07 PROCEDURE — 700101 HCHG RX REV CODE 250: Performed by: INTERNAL MEDICINE

## 2021-05-07 PROCEDURE — 770020 HCHG ROOM/CARE - TELE (206)

## 2021-05-07 PROCEDURE — 92611 MOTION FLUOROSCOPY/SWALLOW: CPT

## 2021-05-07 PROCEDURE — A9270 NON-COVERED ITEM OR SERVICE: HCPCS | Performed by: NEUROLOGICAL SURGERY

## 2021-05-07 PROCEDURE — 99232 SBSQ HOSP IP/OBS MODERATE 35: CPT | Performed by: INTERNAL MEDICINE

## 2021-05-07 PROCEDURE — 74230 X-RAY XM SWLNG FUNCJ C+: CPT

## 2021-05-07 RX ADMIN — FAMOTIDINE 20 MG: 10 INJECTION INTRAVENOUS at 17:47

## 2021-05-07 RX ADMIN — GABAPENTIN 300 MG: 300 CAPSULE ORAL at 05:27

## 2021-05-07 RX ADMIN — ACETAMINOPHEN 1000 MG: 500 TABLET ORAL at 21:17

## 2021-05-07 RX ADMIN — FLECAINIDE ACETATE 100 MG: 100 TABLET ORAL at 18:00

## 2021-05-07 RX ADMIN — POTASSIUM CHLORIDE AND SODIUM CHLORIDE: 900; 150 INJECTION, SOLUTION INTRAVENOUS at 19:30

## 2021-05-07 RX ADMIN — ACETAMINOPHEN 1000 MG: 500 TABLET ORAL at 03:03

## 2021-05-07 RX ADMIN — ACETAMINOPHEN 1000 MG: 500 TABLET ORAL at 08:38

## 2021-05-07 RX ADMIN — Medication 1 TABLET: at 18:00

## 2021-05-07 RX ADMIN — GABAPENTIN 300 MG: 300 CAPSULE ORAL at 17:46

## 2021-05-07 RX ADMIN — FAMOTIDINE 20 MG: 10 INJECTION INTRAVENOUS at 06:00

## 2021-05-07 RX ADMIN — POTASSIUM CHLORIDE AND SODIUM CHLORIDE: 900; 150 INJECTION, SOLUTION INTRAVENOUS at 05:36

## 2021-05-07 RX ADMIN — ACETAMINOPHEN 1000 MG: 500 TABLET ORAL at 17:46

## 2021-05-07 RX ADMIN — METOPROLOL TARTRATE 12.5 MG: 25 TABLET, FILM COATED ORAL at 08:38

## 2021-05-07 RX ADMIN — METOPROLOL TARTRATE 12.5 MG: 25 TABLET, FILM COATED ORAL at 21:17

## 2021-05-07 RX ADMIN — ATORVASTATIN CALCIUM 20 MG: 20 TABLET, FILM COATED ORAL at 21:16

## 2021-05-07 RX ADMIN — CHOLECALCIFEROL (VITAMIN D3) 10 MCG (400 UNIT) TABLET 400 UNITS: at 18:00

## 2021-05-07 RX ADMIN — FLECAINIDE ACETATE 50 MG: 100 TABLET ORAL at 06:00

## 2021-05-07 ASSESSMENT — ENCOUNTER SYMPTOMS
DEPRESSION: 0
VOMITING: 0
COUGH: 0
PHOTOPHOBIA: 0
WEAKNESS: 0
DOUBLE VISION: 0
HEMOPTYSIS: 0
SPEECH CHANGE: 0
ORTHOPNEA: 0
BRUISES/BLEEDS EASILY: 0
FEVER: 0
NAUSEA: 0
ABDOMINAL PAIN: 0
BLURRED VISION: 0
PALPITATIONS: 0
BACK PAIN: 0
HEARTBURN: 0
SENSORY CHANGE: 0
MYALGIAS: 0
DIZZINESS: 0
CHILLS: 0
HEADACHES: 0
DIARRHEA: 0

## 2021-05-07 NOTE — CARE PLAN
Problem: Pain Management  Goal: Pain level will decrease to patient's comfort goal  Outcome: PROGRESSING AS EXPECTED     Problem: Communication  Goal: The ability to communicate needs accurately and effectively will improve  Outcome: PROGRESSING AS EXPECTED     Problem: Infection  Goal: Will remain free from infection  Outcome: PROGRESSING AS EXPECTED     Problem: Venous Thromboembolism (VTW)/Deep Vein Thrombosis (DVT) Prevention:  Goal: Patient will participate in Venous Thrombosis (VTE)/Deep Vein Thrombosis (DVT)Prevention Measures  Outcome: PROGRESSING AS EXPECTED     Problem: Discharge Barriers/Planning  Goal: Patient's continuum of care needs will be met  Outcome: PROGRESSING AS EXPECTED

## 2021-05-07 NOTE — THERAPY
Speech Language Pathology   Video Swallow Evaluation     Patient Name: Lucía Rodriguez  AGE:  70 y.o., SEX:  female  Medical Record #: [id number]  Today's Date: 5/7/2021     Precautions  Precautions: (P) Cardiac Precautions (See Comments), Spinal / Back Precautions , Swallow Precautions ( See Comments)  Comments: Aspen collare on at all times    Assessment    Patient seen for a modified barium swallow study on this date.  Discussed with the patient the risks, benefits, and alternatives of the MBSS procedure. Patient acknowledges and agreeable to proceed with the procedure and tolerated well.  Patient stated her swallowing is improving and she took medications this morning without difficulty.  Presentation of PO included thin liquids, mildly thick liquids, puree, soft solids, and regular textures.  Please see graph below for complete details of findings.  Patient presented with mild pharyngeal dysphagia as seen by severe vallecular residue with pudding, soft solids, and regular textures.  A liquid wash cleared pudding and soft solid residue but unable to clear kimberlee cracker from the valleculae.  She had in-out penetration during the swallow with thin liquids but NO aspiration was visualized with any consistency consumed.  Suspect mild pharyngeal edema, given narrow column of barium seen in the pharynx during the swallow, which appears to be affecting vallecular clearance.  Anticipate that the patient's swallowing function will continue to improve over the next few days.  Recommend a full liquid diet as tolerated.  Okay to upgrade to regular textures, per patient wishes, given no aspiration.  SLP to follow x1-2 during the acute care.      Plan    Recommend Speech Therapy 5 times per week until therapy goals are met for the following treatments:  Dysphagia Training and Patient / Family / Caregiver Education.    Discharge Recommendations: (P) Anticipate that the patient will have no further speech therapy needs after  discharge from the hospital    Subjective    Pleasant and agreeable.      Objective       05/07/21 0950   History / Background Information   Prior Level of Function for Eating / Swallowing Regular diet   Onset Date Of Dysphagia 05/04/2021   Dysphagia Symptoms Warranting Video Swallow Difficulty swallowing following ACDF; r/o aspiration    General Anatomy / Physiology Typical   Procedure   Patient Seated in  MBS chair   Seated at (Degrees) 90   Views Completed Lateral   Consistencies / Presentation Method   Consistencies / Presentation Method Tested   Mildly Thick (2) - (Nectar Thick) Teaspoon;Straw   Thin (0) Teaspoon;Straw   Pureed (4) Teaspoon   Soft & Bite-Sized (6) - (Dysphagia III) Teaspoon   Regular (7)   (bite)   Oral Phase   Oral Phase WDL   Pharyngeal Phase   Pharyngeal Phase X   Mildly Thick (2) - (Nectar Thick) Residue In Valleculae   Thin (0) Residue In Valleculae;Penetration During Swallow   Pureed (4) Residue In Valleculae   Soft & Bite-Sized (6) - (Dysphagia III) Residue In Valleculae   Regular (7) Residue In Valleculae   Esophageal Phase   Esophageal Phase Comments Esophagus not scanned.  UES patent for all consistencies consumed   Compensatory Strategies Attempted   Compensatory Strategies Attempted Yes   Multiple Swallows Reduced residue, minimally    Liquid Wash After Swallow Cleared pudding residue    Penetration Aspiration Scale   Penetration Aspiration Scale 2 - Material enters airway but remains above vocal folds, Ejected from airway, no stasis   Impression   Dysphagia Present Yes   Pharyngeal Mild Impairment   Prognosis   Prognosis for Improvement Good   Barriers to Improvement recent ACDF   Positive Indicators for Improvement Pt reports swallow improving daily   Recommendations   Diet / Liquid Recommendation Thin (0)  (full liquid diet)   Medication Administration  Crush all Medications in Puree  (follow with a liquid wash)   Strategies / Precautions Sitting Upright at 90 Degrees while  "Eating   Interventions Dysphagia Therapy by SLP   Dysphagia Rating   Nutritional Liquid Intake Rating Scale Non thickened beverages   Nutritional Food Intake Rating Scale Total oral diet of a single consistency   Patient / Family Goals   Patient / Family Goal #1 \"I want to get better\"   Goal #1 Outcome Progressing as expected   Short Term Goals   Short Term Goal # 1 B  Pt will consume LQ3/MT2 diet with no overt s/sx of aspiration   Goal Outcome  # 1 B Goal met   Short Term Goal # 2 Pt will independently consume a full liquid diet with no overt s/sx of aspiration.          "

## 2021-05-07 NOTE — PROGRESS NOTES
Bedside report received from day nurse. Assumed care of patient. Pt. resting comfortably without any sign of distress. A&Ox4, VSS, no complaints at this time. POC reviewed and white board updated, Tele box on, Call light in reach, Bed locked in lowest position. C-collar in place.

## 2021-05-07 NOTE — PROGRESS NOTES
Neurosurgery Progress Note    Subjective:  Patient has continued dysphagia as well as bradycardia  States her symptoms are continuing to improve  Thick liquid diet  Denies new pain, weakness, numbness, SOB, chest pain  Ambulatory, passing gas, and voiding without difficulty    Exam:  VSS  A&Ox4, NAD  No hoarseness of voice.   Trachea midline, difficulty and pain with swallowing  No nuchal rigidity   NM: 5/5 deltoid, biceps, triceps, handgrip, intrinsics   Sensation intact and equal throughout all four extremities.   Abdomen: soft, non-tender  Pulmonary: non-labored breathing on room air, normal respiratory effort  No LE edema, erythema, cyanosis, clubbing  Calves non-tender to compression bilat  Incision CDI, no halo sign   C-collar being worn appropriately      BP  Min: 118/68  Max: 143/69  Pulse  Av  Min: 47  Max: 64  Resp  Av  Min: 18  Max: 18  Temp  Av.1 °C (96.9 °F)  Min: 35.7 °C (96.2 °F)  Max: 36.3 °C (97.4 °F)  SpO2  Av.4 %  Min: 91 %  Max: 96 %    No data recorded    Recent Labs     21  0443   WBC 12.2*   RBC 4.28   HEMOGLOBIN 13.1   HEMATOCRIT 41.2   MCV 96.3   MCH 30.6   MCHC 31.8*   RDW 45.3   PLATELETCT 223   MPV 10.6     Recent Labs     21  1720   SODIUM 144   POTASSIUM 4.4   CHLORIDE 107   CO2 25   GLUCOSE 144*   BUN 14   CREATININE 0.65   CALCIUM 9.4               Intake/Output       21 - 21 0659 21 - 21 0659       Total  Total       Intake    P.O.  180  -- 180  --  -- --    P.O. 180 -- 180 -- -- --    Total Intake 180 -- 180 -- -- --       Output    Total Output -- -- -- -- -- --       Net I/O     180 -- 180 -- -- --            Intake/Output Summary (Last 24 hours) at 2021 0844  Last data filed at 2021 1430  Gross per 24 hour   Intake 180 ml   Output --   Net 180 ml            • famotidine  20 mg BID   • morphine injection  2 mg Q3HRS PRN   • HYDROcodone-acetaminophen  1 tablet Q4HRS PRN    • atorvastatin  20 mg QHS   • oyster shell calcium/vitamin D  1 tablet BID   • cholecalciferol  400 Units BID   • gabapentin  300 mg BID   • metoprolol tartrate  12.5 mg BID   • multivitamin  1 tablet DAILY   • Pharmacy Consult Request  1 Each PHARMACY TO DOSE   • MD ALERT...DO NOT ADMINISTER NSAIDS or ASPIRIN unless ORDERED By Neurosurgery  1 Each PRN   • docusate sodium  100 mg BID   • senna-docusate  1 tablet Nightly   • senna-docusate  1 tablet Q24HRS PRN   • polyethylene glycol/lytes  1 Packet BID PRN   • magnesium hydroxide  30 mL QDAY PRN   • bisacodyl  10 mg Q24HRS PRN   • fleet  1 Each Once PRN   • 0.9 % NaCl with KCl 20 mEq 1,000 mL   Continuous   • acetaminophen  1,000 mg Q6HRS    Followed by   • [START ON 5/8/2021] acetaminophen  1,000 mg Q6HRS PRN   • diphenhydrAMINE  25 mg Q6HRS PRN    Or   • diphenhydrAMINE  25 mg Q6HRS PRN   • ondansetron  4 mg Q4HRS PRN   • ondansetron  4 mg Q4HRS PRN   • cyclobenzaprine  10 mg Q8HRS PRN   • ALPRAZolam  0.25 mg BID PRN   • labetalol  10 mg Q HOUR PRN   • hydrALAZINE  10 mg Q HOUR PRN   • benzocaine-menthol  1 Lozenge Q2HRS PRN   • flecainide  50 mg QAM   • flecainide  100 mg Q EVENING       Assessment and Plan:  POD#  C4-7 ACDF    Symptoms continue to improve   Neurologically intact on exam  CT soft tissues of neck nonremarkable  SLP following, thick liquid diet  Swallow study today    Okay to start eliquis on Sunday    Appreciate nursing, IM, and cardiology help    Case d/w Dr. Greene

## 2021-05-07 NOTE — PROGRESS NOTES
[facility] Medicine Daily Progress Note    Date of Service  5/7/2021    Chief Complaint  70 y.o. female admitted 5/3/2021 with dysphagia     Hospital Course    70 y.o. female with past medical history of paroxysmal A. fib on flecainide, history of DVT of right lower extremity in January 2021, who presented 5/3/2021 with C4-C7 and C5-C6 stenosis, myelomalacia.  Patient undergone anterior cervical discectomy with interbody fusion by Dr. Greene on 5/3.  Patient meant to be discharged on 5/4, however she noted to have dysphagia.  When she was given scrambled eggs for breakfast, she was not able to swallow it.  She was feeling some pain when was trying to swallow.  CT of soft tissue of the neck was done and did not show acute abnormalities.  Dose of IV Decadron increased to 10 mg every 6 on 4/5 to help with swelling.  Today according to the patient she feels better, however SLP evaluated the patient and recommended strict n.p.o. with nonoral medications due to oropharyngeal dysphagia.  Patient received 1 dose of flecainide in the morning, but could not receive second dose in the afternoon.  Additional concern is bradycardia.  Heart rate has been within 52-59.  Patient normally on flecainide and metoprolol.  Metoprolol was held due to bradycardia.  Patient denies any dizziness, palpitation, chest pain, shortness of breath.  She does have some postnasal drip, occasional cough with clear sputum today.  Denies fever or chills.      Interval Problem Update    5/6/2021: The patient is seen and evaluated at bedside and appears comfortable.  Patient states that she is able to tolerate oral intake better specifically thickened liquids.  Speech therapy with current recommendations for MBS S.  Pending results of rapid strep testing.  Patient without overt thyroid hormone abnormalities.  Current dysphagia likely related to postoperative period.  Patient denies any other motor/sensory deficits.  No chest pains, palpitations, shortness of  breath    5/7/2021: The patient was seen and evaluated at bedside.  She underwent MBS status and passed.  According to speech therapy continue thickened liquids and advance diet as tolerated as long as there is no evidence of aspiration.  Patient does state that her ability to swallow is improving daily.  Rapid strep screening was negative.  She denies any nausea/vomiting is ambulating dependently.  Per surgical recommendations okay to resume patient's anticoagulation on Sunday.  No other overnight events reported.    Consultants/Specialty  Neurosurgery    Code Status  Full Code    Disposition  Pending final surgical clearance    Review of Systems  Review of Systems   Constitutional: Negative for chills, fever and malaise/fatigue.   HENT: Negative for hearing loss and tinnitus.    Eyes: Negative for blurred vision, double vision and photophobia.   Respiratory: Negative for cough and hemoptysis.    Cardiovascular: Negative for chest pain, palpitations and orthopnea.   Gastrointestinal: Negative for abdominal pain, diarrhea, heartburn, melena, nausea and vomiting.   Genitourinary: Negative for dysuria and urgency.   Musculoskeletal: Negative for back pain and myalgias.   Skin: Negative for itching and rash.   Neurological: Negative for dizziness, sensory change, speech change, weakness and headaches.   Endo/Heme/Allergies: Does not bruise/bleed easily.   Psychiatric/Behavioral: Negative for depression and suicidal ideas.        Physical Exam  Temp:  [35.7 °C (96.2 °F)-36.3 °C (97.4 °F)] 36.2 °C (97.1 °F)  Pulse:  [47-64] 51  Resp:  [18-20] 18  BP: (114-145)/(56-74) 145/74  SpO2:  [92 %-98 %] 96 %    Physical Exam  Vitals and nursing note reviewed.   Constitutional:       General: She is not in acute distress.     Appearance: Normal appearance. She is obese.   HENT:      Head: Normocephalic and atraumatic.      Mouth/Throat:      Mouth: Mucous membranes are moist.      Pharynx: No oropharyngeal exudate or posterior  oropharyngeal erythema.   Eyes:      Extraocular Movements: Extraocular movements intact.      Conjunctiva/sclera: Conjunctivae normal.      Pupils: Pupils are equal, round, and reactive to light.   Neck:      Comments: Cervical collar in place. Surgical site well dressed without drainage.   Cardiovascular:      Rate and Rhythm: Normal rate and regular rhythm.      Pulses: Normal pulses.      Heart sounds: Normal heart sounds.   Pulmonary:      Effort: Pulmonary effort is normal. No respiratory distress.      Breath sounds: Normal breath sounds. No wheezing.   Abdominal:      General: Abdomen is flat. Bowel sounds are normal.      Palpations: Abdomen is soft.   Musculoskeletal:         General: No swelling or tenderness. Normal range of motion.      Cervical back: Neck supple.   Skin:     General: Skin is warm and dry.      Findings: No rash.   Neurological:      General: No focal deficit present.      Mental Status: She is alert and oriented to person, place, and time.      Cranial Nerves: No cranial nerve deficit.      Motor: No weakness.   Psychiatric:         Mood and Affect: Mood normal.         Behavior: Behavior normal.         Fluids    Intake/Output Summary (Last 24 hours) at 5/7/2021 1238  Last data filed at 5/6/2021 1430  Gross per 24 hour   Intake 180 ml   Output --   Net 180 ml       Laboratory  Recent Labs     05/06/21  0443   WBC 12.2*   RBC 4.28   HEMOGLOBIN 13.1   HEMATOCRIT 41.2   MCV 96.3   MCH 30.6   MCHC 31.8*   RDW 45.3   PLATELETCT 223   MPV 10.6     Recent Labs     05/05/21  1720   SODIUM 144   POTASSIUM 4.4   CHLORIDE 107   CO2 25   GLUCOSE 144*   BUN 14   CREATININE 0.65   CALCIUM 9.4                   Imaging  DX-ESOPHAGUS - [id number]   Final Result      Modified swallow evaluation performed by speech pathology.  Please see speech pathology report for full details.      DX-CHEST-LIMITED (1 VIEW)   Final Result      1.  Bibasilar atelectasis.      2.  Cardiomegaly.      IR-US GUIDED PIV    Final Result    Ultrasound-guided PERIPHERAL IV INSERTION performed by    qualified nursing staff as above.      CT-SOFT TISSUE NECK W/O   Final Result      CT of the neck soft tissues without contrast within normal limits.      DX-CERVICAL SPINE-2 OR 3 VIEWS   Final Result      Anatomic alignment cervical spine following ACDF C4-C7.      DX-PORTABLE FLUORO > 1 HOUR   Final Result      Portable fluoroscopy utilized for 6 seconds.         INTERPRETING LOCATION: [address] [place] [address], APRIL [place], [facility]           Assessment/Plan  Oropharyngeal dysphagia  Assessment & Plan  Probably related to surgery on C4-C5 with anterior approach  CT soft tissue of the neck without acute abnormalities  No evidence of thrush or pharyngitis on exam  SLP is following, currently recommended strict n.p.o. with non  oral medications.  Pending MBS S  MBSS passed. Advance diet as tolerated   Aspiration precautions per SLP recommendations  IV fluids for n.p.o.    PAF (paroxysmal atrial fibrillation) (HCC)- (present on admission)  Assessment & Plan  Appears to be in sinus rhythm  Resume outpatient medications once safe for oral intake  We will hold metoprolol and flecainide for now due to dysphagia  Monitor heart rate on telemetry  If patient experiences paroxysm of A. Fib, will consult cardiology    Subclinical hyperthyroidism  Assessment & Plan  Patient with mildly depressed TSH in the setting of normal T4 levels  No active symptoms at this time and no goiter identified  She will need close outpatient follow-up    Bradycardia  Assessment & Plan  Mild, asymptomatic  Monitor  TSH level suggestive of subclinical hyperthyroidism   Hold metoprolol for now for heart rate below 60    History of DVT (deep vein thrombosis)  Assessment & Plan  History of DVT of right lower extremity in 1/2021, has been on apixaban.  IVC filter was placed on 4/12 for surgery, off apixaban  No lower extremity swelling or tenderness noted on exam  Resume apixaban per surgical  recommendations    Central stenosis of spinal canal- (present on admission)  Assessment & Plan  Status post cervical discectomy with interbody fusion by Dr. Greene on 5/3  Neck collar  Holding apixaban as per neurosurgery  PT recommended outpatient PT         VTE prophylaxis: restart home apixaban on Sunday per neurosurgery recommendations

## 2021-05-07 NOTE — CARE PLAN
Problem: Fluid Volume:  Goal: Will maintain balanced intake and output  Outcome: PROGRESSING AS EXPECTED     Problem: Communication  Goal: The ability to communicate needs accurately and effectively will improve  Outcome: PROGRESSING AS EXPECTED     Problem: Safety  Goal: Will remain free from injury  Outcome: PROGRESSING AS EXPECTED  Goal: Will remain free from falls  Outcome: PROGRESSING AS EXPECTED   Educated pt to call for help when needing to get out of bed due to neck precautions. Pt agreeable    Problem: Venous Thromboembolism (VTW)/Deep Vein Thrombosis (DVT) Prevention:  Goal: Patient will participate in Venous Thrombosis (VTE)/Deep Vein Thrombosis (DVT)Prevention Measures  Outcome: PROGRESSING AS EXPECTED   Educated pt on the importance of moving around in her room/hallway if she does not want to wear SCDs.     Problem: Bowel/Gastric:  Goal: Normal bowel function is maintained or improved  Outcome: PROGRESSING AS EXPECTED     Problem: Knowledge Deficit  Goal: Knowledge of disease process/condition, treatment plan, diagnostic tests, and medications will improve  Outcome: PROGRESSING AS EXPECTED     Problem: Respiratory:  Goal: Respiratory status will improve  Outcome: PROGRESSING AS EXPECTED

## 2021-05-08 ENCOUNTER — PATIENT OUTREACH (OUTPATIENT)
Dept: HEALTH INFORMATION MANAGEMENT | Facility: OTHER | Age: 71
End: 2021-05-08

## 2021-05-08 VITALS
RESPIRATION RATE: 10 BRPM | OXYGEN SATURATION: 94 % | HEART RATE: 51 BPM | SYSTOLIC BLOOD PRESSURE: 135 MMHG | DIASTOLIC BLOOD PRESSURE: 65 MMHG | TEMPERATURE: 96.9 F | BODY MASS INDEX: 30.38 KG/M2 | HEIGHT: 65 IN | WEIGHT: 182.32 LBS

## 2021-05-08 PROBLEM — R13.12 OROPHARYNGEAL DYSPHAGIA: Status: RESOLVED | Noted: 2021-05-05 | Resolved: 2021-05-08

## 2021-05-08 LAB
ANION GAP SERPL CALC-SCNC: 8 MMOL/L (ref 7–16)
BUN SERPL-MCNC: 18 MG/DL (ref 8–22)
CALCIUM SERPL-MCNC: 8.9 MG/DL (ref 8.5–10.5)
CHLORIDE SERPL-SCNC: 110 MMOL/L (ref 96–112)
CO2 SERPL-SCNC: 22 MMOL/L (ref 20–33)
CREAT SERPL-MCNC: 0.67 MG/DL (ref 0.5–1.4)
ERYTHROCYTE [DISTWIDTH] IN BLOOD BY AUTOMATED COUNT: 43.4 FL (ref 35.9–50)
GLUCOSE SERPL-MCNC: 77 MG/DL (ref 65–99)
HCT VFR BLD AUTO: 40 % (ref 37–47)
HGB BLD-MCNC: 13.1 G/DL (ref 12–16)
MAGNESIUM SERPL-MCNC: 2.1 MG/DL (ref 1.5–2.5)
MCH RBC QN AUTO: 30.8 PG (ref 27–33)
MCHC RBC AUTO-ENTMCNC: 32.8 G/DL (ref 33.6–35)
MCV RBC AUTO: 94.1 FL (ref 81.4–97.8)
PHOSPHATE SERPL-MCNC: 2.5 MG/DL (ref 2.5–4.5)
PLATELET # BLD AUTO: 248 K/UL (ref 164–446)
PMV BLD AUTO: 10.7 FL (ref 9–12.9)
POTASSIUM SERPL-SCNC: 4.3 MMOL/L (ref 3.6–5.5)
RBC # BLD AUTO: 4.25 M/UL (ref 4.2–5.4)
SODIUM SERPL-SCNC: 140 MMOL/L (ref 135–145)
WBC # BLD AUTO: 9.6 K/UL (ref 4.8–10.8)

## 2021-05-08 PROCEDURE — 700101 HCHG RX REV CODE 250: Performed by: INTERNAL MEDICINE

## 2021-05-08 PROCEDURE — 700102 HCHG RX REV CODE 250 W/ 637 OVERRIDE(OP): Performed by: NEUROLOGICAL SURGERY

## 2021-05-08 PROCEDURE — 84100 ASSAY OF PHOSPHORUS: CPT

## 2021-05-08 PROCEDURE — 36415 COLL VENOUS BLD VENIPUNCTURE: CPT

## 2021-05-08 PROCEDURE — 700102 HCHG RX REV CODE 250 W/ 637 OVERRIDE(OP): Performed by: INTERNAL MEDICINE

## 2021-05-08 PROCEDURE — 85027 COMPLETE CBC AUTOMATED: CPT

## 2021-05-08 PROCEDURE — A9270 NON-COVERED ITEM OR SERVICE: HCPCS | Performed by: NEUROLOGICAL SURGERY

## 2021-05-08 PROCEDURE — 83735 ASSAY OF MAGNESIUM: CPT

## 2021-05-08 PROCEDURE — 80048 BASIC METABOLIC PNL TOTAL CA: CPT

## 2021-05-08 PROCEDURE — 99239 HOSP IP/OBS DSCHRG MGMT >30: CPT | Performed by: INTERNAL MEDICINE

## 2021-05-08 PROCEDURE — 700111 HCHG RX REV CODE 636 W/ 250 OVERRIDE (IP): Performed by: INTERNAL MEDICINE

## 2021-05-08 RX ORDER — METHYLPREDNISOLONE 4 MG/1
4 TABLET ORAL
Status: DISCONTINUED | OUTPATIENT
Start: 2021-05-10 | End: 2021-05-08 | Stop reason: HOSPADM

## 2021-05-08 RX ORDER — METHYLPREDNISOLONE 4 MG/1
4 TABLET ORAL
Status: DISCONTINUED | OUTPATIENT
Start: 2021-05-09 | End: 2021-05-08 | Stop reason: HOSPADM

## 2021-05-08 RX ORDER — METHYLPREDNISOLONE 4 MG/1
4 TABLET ORAL 2 TIMES DAILY WITH MEALS
Status: DISCONTINUED | OUTPATIENT
Start: 2021-05-12 | End: 2021-05-08 | Stop reason: HOSPADM

## 2021-05-08 RX ORDER — METHYLPREDNISOLONE 4 MG/1
4 TABLET ORAL
Status: DISCONTINUED | OUTPATIENT
Start: 2021-05-11 | End: 2021-05-08 | Stop reason: HOSPADM

## 2021-05-08 RX ORDER — METHYLPREDNISOLONE 4 MG/1
8 TABLET ORAL
Status: DISCONTINUED | OUTPATIENT
Start: 2021-05-09 | End: 2021-05-08 | Stop reason: HOSPADM

## 2021-05-08 RX ADMIN — FLECAINIDE ACETATE 50 MG: 100 TABLET ORAL at 05:28

## 2021-05-08 RX ADMIN — METHYLPREDNISOLONE 24 MG: 16 TABLET ORAL at 09:51

## 2021-05-08 RX ADMIN — GABAPENTIN 300 MG: 300 CAPSULE ORAL at 05:28

## 2021-05-08 RX ADMIN — ACETAMINOPHEN 1000 MG: 500 TABLET ORAL at 09:51

## 2021-05-08 RX ADMIN — ACETAMINOPHEN 1000 MG: 500 TABLET ORAL at 03:35

## 2021-05-08 RX ADMIN — FAMOTIDINE 20 MG: 10 INJECTION INTRAVENOUS at 05:28

## 2021-05-08 RX ADMIN — POTASSIUM CHLORIDE AND SODIUM CHLORIDE: 900; 150 INJECTION, SOLUTION INTRAVENOUS at 05:44

## 2021-05-08 NOTE — PROGRESS NOTES
Bedside report received from day nurse. Assumed care of patient. Pt. resting comfortably without any sign of distress. A&Ox4, VSS, no complaints at this time. POC reviewed and white board updated, Tele box on, Call light in reach, Bed locked in lowest position. C-collar in place

## 2021-05-08 NOTE — DISCHARGE SUMMARY
Discharge Summary    CHIEF COMPLAINT ON ADMISSION  No chief complaint on file.      Reason for Admission  CERVICAL HNP W/ MYELOPATHY     Admission Date  5/3/2021    CODE STATUS  Full     HPI & HOSPITAL COURSE  This is a 70 y.o. female here with neck pain    70 y.o. female with past medical history of paroxysmal A. fib on flecainide, history of DVT of right lower extremity in January 2021, who presented 5/3/2021 with C4-C7 and C5-C6 stenosis, myelomalacia.  Patient undergone anterior cervical discectomy with interbody fusion by Dr. Greene on 5/3.  Patient meant to be discharged on 5/4, however she noted to have dysphagia.  When she was given scrambled eggs for breakfast, she was not able to swallow it.  She was feeling some pain when was trying to swallow.  CT of soft tissue of the neck was done and did not show acute abnormalities.  Dose of IV Decadron increased to 10 mg every 6 on 4/5 to help with swelling.  Today according to the patient she feels better, however SLP evaluated the patient and recommended strict n.p.o. with nonoral medications due to oropharyngeal dysphagia.  Patient received 1 dose of flecainide in the morning, but could not receive second dose in the afternoon.  Additional concern is bradycardia.  Heart rate has been within 52-59.  Patient normally on flecainide and metoprolol.  Metoprolol was held due to bradycardia.  Patient denies any dizziness, palpitation, chest pain, shortness of breath.  She does have some postnasal drip, occasional cough with clear sputum today.  Denies fever or chills.        5/6/2021: The patient is seen and evaluated at bedside and appears comfortable.  Patient states that she is able to tolerate oral intake better specifically thickened liquids.  Speech therapy with current recommendations for MBS S.  Pending results of rapid strep testing.  Patient without overt thyroid hormone abnormalities.  Current dysphagia likely related to postoperative period.  Patient denies  any other motor/sensory deficits.  No chest pains, palpitations, shortness of breath     5/7/2021: The patient was seen and evaluated at bedside.  She underwent MBS status and passed.  According to speech therapy continue thickened liquids and advance diet as tolerated as long as there is no evidence of aspiration.  Patient does state that her ability to swallow is improving daily.  Rapid strep screening was negative.  She denies any nausea/vomiting is ambulating dependently.  Per surgical recommendations okay to resume patient's anticoagulation on Sunday.  No other overnight events reported.    5/8/2021: The patient was seen and evaluated at bedside and continues to tolerate advancement in diet.  She denies any associated nausea or vomiting or globus sensation while eating.  She is ambulating independently and having regular bowel movements.  Final neurosurgery recommendations are to continue steroid taper and resume anticoagulation tomorrow with close outpatient follow-up scheduled in 10 to 14 days.  Patient's heart rate has significantly improved.  She is advised to hold dose of metoprolol if heart rate less than 60 and advised to follow-up with her cardiologist.  She is currently medically cleared for discharge.         Therefore, she is discharged in good and stable condition to home with close outpatient follow-up.    The patient met 2-midnight criteria for an inpatient stay at the time of discharge.    Discharge Date  5/8/2021    FOLLOW UP ITEMS POST DISCHARGE  Follow-up with neurosurgery in 10 to 14 days  Resume NOAC on Sunday  Be compliant with steroid taper as directed  Hold dose of metoprolol for heart rate less than 60    DISCHARGE DIAGNOSES  Active Problems:    PAF (paroxysmal atrial fibrillation) ([facility]) POA: Yes    Central stenosis of spinal canal POA: Yes    History of DVT (deep vein thrombosis) POA: Yes    Bradycardia POA: Yes    Subclinical hyperthyroidism POA: Yes  Resolved Problems:     Oropharyngeal dysphagia POA: Yes    Hypoxia POA: Unknown      FOLLOW UP  Future Appointments   Date Time Provider Department Center   5/20/2021  3:00 PM Nathaniel Ugarte M.D. [facility] None   6/21/2021 10:40 AM Jose Thakkar M.D. [facility] None     Heri Greene M.D.  [address]  [place] [address]  [phone]    Call in 1 week  Please call to schedule follow up appointment. Thank You.      MEDICATIONS ON DISCHARGE     Medication List      START taking these medications      Instructions   cyclobenzaprine 10 mg Tabs  Commonly known as: Flexeril   Take 1 tablet by mouth 3 times a day as needed.  Dose: 10 mg     docusate sodium 100 MG Caps  Commonly known as: COLACE   Take 1 capsule by mouth 2 times a day.  Dose: 100 mg     HYDROcodone-acetaminophen 5-325 MG Tabs per tablet  Commonly known as: Norco   Take 1 tablet by mouth every four hours as needed for up to 14 days.  Dose: 1 tablet     methylPREDNISolone 4 MG Tbpk  Commonly known as: MEDROL DOSEPAK   Follow schedule on package instructions.        CHANGE how you take these medications      Instructions   atorvastatin 20 MG Tabs  What changed: when to take this  Commonly known as: LIPITOR   Take 1 Tab by mouth every day.  Dose: 20 mg     flecainide 50 MG tablet  What changed: See the new instructions.  Commonly known as: TAMBOCOR  Notes to patient: Monitor heart rate   TAKE 1 TO 2 TABLETS BY MOUTH TWICE DAILY     metoprolol tartrate 25 MG Tabs  What changed: when to take this  Commonly known as: LOPRESSOR  Notes to patient: Monitor heart rate   TAKE 1/2 TABLET BY MOUTH TWICE A DAY        CONTINUE taking these medications      Instructions   apixaban 5mg Tabs  Start taking on: May 9, 2021  Commonly known as: Eliquis  Notes to patient: Restart on 5/9/21   Take 1 tablet by mouth 2 times a day.  Dose: 5 mg     CALCIUM + D PO   Take 1 tablet by mouth 2 times a day. **OTC**  Dose: 1 tablet     gabapentin 300 MG Caps  Commonly known as: NEURONTIN   Take 1 Cap by mouth  2 Times a Day.  Dose: 300 mg     Lutein-Zeaxanthin 15-0.7 MG Caps   Take 1 Tab by mouth every bedtime.  Dose: 1 tablet     METAMUCIL PO   Take 1 Scoop by mouth every day.  Dose: 1 Scoop     PROBIOTIC DAILY PO   Take 1 capsule by mouth every day.  Dose: 1 capsule     VITAMIN D3 PO   Take 1 capsule by mouth 2 times a day.  Dose: 1 capsule        STOP taking these medications    multivitamin Tabs            Allergies  Allergies   Allergen Reactions   • Neosporin [Neomycin-Polymyxin B] Rash     Rxn = 6/2016       DIET  No orders of the defined types were placed in this encounter.      ACTIVITY  As tolerated.  Weight bearing as tolerated    CONSULTATIONS  Neurosurgery    PROCEDURES  Cervical fusion    LABORATORY  Lab Results   Component Value Date    SODIUM 140 05/08/2021    POTASSIUM 4.3 05/08/2021    CHLORIDE 110 05/08/2021    CO2 22 05/08/2021    GLUCOSE 77 05/08/2021    BUN 18 05/08/2021    CREATININE 0.67 05/08/2021    CREATININE 1.0 10/13/2008        Lab Results   Component Value Date    WBC 9.6 05/08/2021    HEMOGLOBIN 13.1 05/08/2021    HEMATOCRIT 40.0 05/08/2021    PLATELETCT 248 05/08/2021        Total time of the discharge process exceeds 35 minutes.

## 2021-05-08 NOTE — CARE PLAN
Problem: Pain Management  Goal: Pain level will decrease to patient's comfort goal  Outcome: PROGRESSING AS EXPECTED     Problem: Fluid Volume:  Goal: Will maintain balanced intake and output  Outcome: PROGRESSING AS EXPECTED     Problem: Safety  Goal: Will remain free from injury  Outcome: PROGRESSING AS EXPECTED  Goal: Will remain free from falls  Outcome: PROGRESSING AS EXPECTED   Educated pt to call for assistance before getting out of bed to use the bathroom.     Problem: Venous Thromboembolism (VTW)/Deep Vein Thrombosis (DVT) Prevention:  Goal: Patient will participate in Venous Thrombosis (VTE)/Deep Vein Thrombosis (DVT)Prevention Measures  Outcome: PROGRESSING AS EXPECTED   Educated pt on the importance of ambulating to prevent blood clots if she does not want to wear the SCDs.     Problem: Knowledge Deficit  Goal: Knowledge of disease process/condition, treatment plan, diagnostic tests, and medications will improve  Outcome: PROGRESSING AS EXPECTED

## 2021-05-08 NOTE — DISCHARGE INSTRUCTIONS
Discharge Instructions    Discharged to home by car with relative. Discharged via walking, hospital escort: Refused.  Special equipment needed: C-Collar    Be sure to schedule a follow-up appointment with your primary care doctor or any specialists as instructed.     Discharge Plan:   Diet Plan: Discussed  Activity Level: Discussed  Confirmed Follow up Appointment: Appointment Scheduled  Confirmed Symptoms Management: Discussed  Medication Reconciliation Updated: Yes    I understand that a diet low in cholesterol, fat, and sodium is recommended for good health. Unless I have been given specific instructions below for another diet, I accept this instruction as my diet prescription.   Other diet: Liquid, advance as tolerated    Special Instructions: None    · Is patient discharged on Warfarin / Coumadin?   No     Depression / Suicide Risk    As you are discharged from this [facility] facility, it is important to learn how to keep safe from harming yourself.    Recognize the warning signs:  · Abrupt changes in personality, positive or negative- including increase in energy   · Giving away possessions  · Change in eating patterns- significant weight changes-  positive or negative  · Change in sleeping patterns- unable to sleep or sleeping all the time   · Unwillingness or inability to communicate  · Depression  · Unusual sadness, discouragement and loneliness  · Talk of wanting to die  · Neglect of personal appearance   · Rebelliousness- reckless behavior  · Withdrawal from people/activities they love  · Confusion- inability to concentrate     If you or a loved one observes any of these behaviors or has concerns about self-harm, here's what you can do:  · Talk about it- your feelings and reasons for harming yourself  · Remove any means that you might use to hurt yourself (examples: pills, rope, extension cords, firearm)  · Get professional help from the community (Mental Health, Substance Abuse, psychological  counseling)  · Do not be alone:Call your Safe Contact- someone whom you trust who will be there for you.  · Call your local CRISIS HOTLINE 052-7343 or 712-504-0256  · Call your local Children's Mobile Crisis Response Team [place] (694) 154-0525 or www.[employer]  · Call the toll free National Suicide Prevention Hotlines   · National Suicide Prevention Lifeline 918-251-FCWL (0076)  · National Hope Line Network 800-SUICIDE (449-3964)      Spinal Fusion, Adult, Care After  This sheet gives you information about how to care for yourself after your procedure. Your doctor may also give you more specific instructions. If you have problems or questions, contact your doctor.  Follow these instructions at home:  Medicines  · Take over-the-counter and prescription medicines only as told by your doctor. These include any medicines for pain or blood-thinning medicines (anticoagulants).  · If you were prescribed an antibiotic medicine, take it as told by your doctor. Do not stop taking the antibiotic even if you start to feel better.  · Do not drive for 24 hours if you were given a medicine to help you relax (sedative) during your procedure.  · Do not drive or use heavy machinery while taking prescription pain medicine.  If you have a brace:  · Wear the brace as told by your doctor. Take it off only as told by your doctor.  · Keep the brace clean.  Managing pain, stiffness, and swelling  · If directed, put ice on the surgery area:  ? If you have a removable brace, take it off as told by your doctor.  ? Put ice in a plastic bag.  ? Place a towel between your skin and the bag.  ? Leave the ice on for 20 minutes, 2-3 times a day.  Surgery cut care    · Follow instructions from your doctor about how to take care of your cut from surgery (incision). Make sure you:  ? Wash your hands with soap and water before you change your bandage (dressing). If you cannot use soap and water, use hand .  ? Change your bandage as  told by your doctor.  ? Leave stitches (sutures), skin glue, or skin tape (adhesive) strips in place. They may need to stay in place for 2 weeks or longer. If tape strips get loose and curl up, you may trim the loose edges. Do not remove tape strips completely unless your doctor says it is okay.  · Keep your cut from surgery clean and dry.  ? Do not take baths, swim, or use a hot tub until your doctor says it is okay.  ? Ask your doctor if you can take showers. You may only be allowed to take sponge baths.  · Every day, check your cut from surgery and the area around it for:  ? More redness, swelling, or pain.  ? Fluid or blood.  ? Warmth.  ? Pus or a bad smell.  · If you have a drain tube, follow instructions from your doctor about caring for it. Do not take out the drain tube or any bandages unless your doctor says it is okay.  Physical activity  · Rest and protect your back as much as possible.  · Follow instructions from your doctor about how to move. Use good posture to help your spine heal.  · Do not lift anything that is heavier than 8 lb (3.6 kg), or the limit that you are told, until your doctor says that it is safe.  · Do not twist or bend at the waist until your doctor says it is okay.  · It is best if you:  ? Do not make pushing and pulling motions.  ? Do not sit or lie down in the same position for a long time.  ? Do not raise your hands or arms above your head.  · Return to your normal activities as told by your doctor. Ask your doctor what activities are safe for you. Rest and protect your back as much as you can.  · Do not start to exercise until your doctor says it is okay. Ask your doctor what kinds of exercise you can do to make your back stronger.  General instructions  · To prevent blood clots and lessen swelling in your legs:  ? Wear compression stockings as told.  ? Walk one or more times every few hours as told by your doctor.  · Do not use any products that contain nicotine or tobacco, such  as cigarettes and e-cigarettes. These can delay bone healing. If you need help quitting, ask your doctor.  · To prevent or treat constipation while you are taking prescription pain medicine, your doctor may suggest that you:  ? Drink enough fluid to keep your pee (urine) pale yellow.  ? Take over-the-counter or prescription medicines.  ? Eat foods that are high in fiber. These include fresh fruits and vegetables, whole grains, and beans.  ? Limit foods that are high in fat and processed sugars, such as fried and sweet foods.  · Keep all follow-up visits as told by your doctor. This is important.  Contact a doctor if:  · Your pain gets worse.  · Your medicine does not help your pain.  · Your legs or feet get painful or swollen.  · Your cut from surgery is more red, swollen, or painful.  · Your cut from surgery feels warm to the touch.  · You have:  ? Fluid or blood coming from your cut from surgery.  ? Pus or a bad smell coming from your cut from surgery.  ? A fever.  ? Weakness or loss of feeling (numbness) in your legs that is new or getting worse.  ? Trouble controlling when you pee (urinate) or poop (have a bowel movement).  · You feel sick to your stomach (nauseous).  · You throw up (vomit).  Get help right away if:  · Your pain is very bad.  · You have chest pain.  · You have trouble breathing.  · You start to have a cough.  These symptoms may be an emergency. Do not wait to see if the symptoms will go away. Get medical help right away. Call your local emergency services (911 in the [place] Do not drive yourself to the hospital.  Summary  · After the procedure, it is common to have pain in your back and pain by your surgery cut(s).  · Icing and pain medicines may help to control the pain. Follow directions from your doctor.  · Rest and protect your back as much as possible. Do not twist or bend at the waist.  · Get up and walk one or more times every few hours as told by your doctor.  This information is not  intended to replace advice given to you by your health care provider. Make sure you discuss any questions you have with your health care provider.  Document Released: 04/12/2012 Document Revised: 04/09/2020 Document Reviewed: 04/03/2018  Elsevier Patient Education © 2020 [employer]

## 2021-05-08 NOTE — PROGRESS NOTES
Neurosurgery Progress Note    Subjective:  No complaints.  States that her swallowing is improving.    Video swallow performed yesterday.    Diet advanced.    Exam:  Awake, alert, no dysphonia, + dysphagia, motor 5/5, sensory is grossly intact, incision is cdi with steri strips    BP  Min: 114/61  Max: 145/74  Pulse  Av.3  Min: 49  Max: 59  Resp  Av.3  Min: 18  Max: 20  Temp  Av.2 °C (97.1 °F)  Min: 35.9 °C (96.6 °F)  Max: 36.9 °C (98.5 °F)  SpO2  Av.8 %  Min: 92 %  Max: 98 %    No data recorded    Recent Labs     21  0443   WBC 12.2*   RBC 4.28   HEMOGLOBIN 13.1   HEMATOCRIT 41.2   MCV 96.3   MCH 30.6   MCHC 31.8*   RDW 45.3   PLATELETCT 223   MPV 10.6     Recent Labs     21  1720   SODIUM 144   POTASSIUM 4.4   CHLORIDE 107   CO2 25   GLUCOSE 144*   BUN 14   CREATININE 0.65   CALCIUM 9.4               Intake/Output       21 07 - 21 0659 21 07 - 21 0659      [id number] Total 8300-0500 [id number] Total       Intake    Total Intake -- -- -- -- -- --       Output    Urine  --  -- --  --  -- --    Number of Times Voided -- 2 x 2 x -- -- --    Total Output -- -- -- -- -- --       Net I/O     -- -- -- -- -- --          No intake or output data in the 24 hours ending 21 0715         • famotidine  20 mg BID   • morphine injection  2 mg Q3HRS PRN   • HYDROcodone-acetaminophen  1 tablet Q4HRS PRN   • atorvastatin  20 mg QHS   • oyster shell calcium/vitamin D  1 tablet BID   • cholecalciferol  400 Units BID   • gabapentin  300 mg BID   • metoprolol tartrate  12.5 mg BID   • multivitamin  1 tablet DAILY   • Pharmacy Consult Request  1 Each PHARMACY TO DOSE   • MD ALERT...DO NOT ADMINISTER NSAIDS or ASPIRIN unless ORDERED By Neurosurgery  1 Each PRN   • docusate sodium  100 mg BID   • senna-docusate  1 tablet Nightly   • senna-docusate  1 tablet Q24HRS PRN   • polyethylene glycol/lytes  1 Packet BID PRN   • magnesium hydroxide  30 mL QDAY PRN   • bisacodyl   10 mg Q24HRS PRN   • fleet  1 Each Once PRN   • 0.9 % NaCl with KCl 20 mEq 1,000 mL   Continuous   • acetaminophen  1,000 mg Q6HRS    Followed by   • acetaminophen  1,000 mg Q6HRS PRN   • diphenhydrAMINE  25 mg Q6HRS PRN    Or   • diphenhydrAMINE  25 mg Q6HRS PRN   • ondansetron  4 mg Q4HRS PRN   • ondansetron  4 mg Q4HRS PRN   • cyclobenzaprine  10 mg Q8HRS PRN   • ALPRAZolam  0.25 mg BID PRN   • labetalol  10 mg Q HOUR PRN   • hydrALAZINE  10 mg Q HOUR PRN   • benzocaine-menthol  1 Lozenge Q2HRS PRN   • flecainide  50 mg QAM   • flecainide  100 mg Q EVENING       Assessment and Plan:  Hospital day #6  POD #5  Prophylactic anticoagulation: yes         Start date/time: see chart    Patient was transferred to [facility] for heart issues.    Her dysphagia is slowly resolving.  She is very pleasant and accepting of her circumstances.  She states she always has a struggle in the hospital and is not upset.    CT neck looks surprisingly good with no swelling or hematoma.    Patient will need 10 day steroid taper upon discharge.    Appreciate IM help.      Patient will need to see me in 1-2 weeks in [place].    Thanks so much.

## 2021-05-20 ENCOUNTER — OFFICE VISIT (OUTPATIENT)
Dept: VASCULAR LAB | Facility: MEDICAL CENTER | Age: 71
End: 2021-05-20
Attending: FAMILY MEDICINE
Payer: MEDICARE

## 2021-05-20 DIAGNOSIS — I82.441 ACUTE DEEP VEIN THROMBOSIS (DVT) OF TIBIAL VEIN OF RIGHT LOWER EXTREMITY (HCC): ICD-10-CM

## 2021-05-20 DIAGNOSIS — Z79.01 ANTICOAGULATED: ICD-10-CM

## 2021-05-20 DIAGNOSIS — Z86.718 HISTORY OF DVT (DEEP VEIN THROMBOSIS): ICD-10-CM

## 2021-05-20 DIAGNOSIS — Z79.01 CHRONIC ANTICOAGULATION: ICD-10-CM

## 2021-05-20 DIAGNOSIS — I48.0 PAF (PAROXYSMAL ATRIAL FIBRILLATION) (HCC): ICD-10-CM

## 2021-05-20 DIAGNOSIS — E78.2 MIXED HYPERLIPIDEMIA: ICD-10-CM

## 2021-05-20 DIAGNOSIS — Z95.828 PRESENCE OF IVC FILTER: ICD-10-CM

## 2021-05-20 PROCEDURE — 99214 OFFICE O/P EST MOD 30 MIN: CPT | Mod: 95,CR | Performed by: FAMILY MEDICINE

## 2021-05-20 ASSESSMENT — ENCOUNTER SYMPTOMS
FOCAL WEAKNESS: 0
WHEEZING: 0
CHILLS: 0
HEMOPTYSIS: 0
BRUISES/BLEEDS EASILY: 0
COUGH: 0
NAUSEA: 0
DIZZINESS: 0
FEVER: 0
VOMITING: 0
HEADACHES: 0
TREMORS: 0
ABDOMINAL PAIN: 0
MYALGIAS: 0
WEAKNESS: 0
DIARRHEA: 0
PALPITATIONS: 0
SEIZURES: 0
SHORTNESS OF BREATH: 0

## 2021-05-20 NOTE — PATIENT INSTRUCTIONS
As reviewed with you, we have opted for IVC filter retrieval. MICHELLE Alves, will coordinate your appointment with our interventional radiology staff to complete the retrieval.  She can be reached at [phone] for questions.    - if there are any complexities with retrieval of the filter locally, our staff will discuss other options with you including coordination with your insurance for filter retrieval to be completed at [facility] IVC filter retrieval program   - please review the educational materials I have provided     - continue eliquis for now through July 2021 visit with me.    - in the meantime, see dr. Thakkar to review need for ongoing blood thinners to prevent stroke due to Afib.

## 2021-05-20 NOTE — PROGRESS NOTES
oximi  This visit was conducted via [employer] video  using secure and encrypted video conferencing technology due to covid restrictions  The patient was in a private location in the [facility].    The patient's identity was confirmed and verbal consent was obtained for this virtual visit.     FOLLOW-UP VASCULAR ANTICOAGULATION VISIT  Subjective:   Lucía Rodriguez is a female who presents today 05/20/21 for   Chief Complaint   Patient presents with   • Follow-Up   Initially referred by PCP for eval for possible IVC filter placement and length of therapy (LOT) determination and management of anticoagulation in context of acute venothromboembolic disease    HPI:    S/p neck surg with Dr. Greene, 5/3/21.  Stable and improved dysphagia.  Overall good results, still in neck brace through 6/20.      IVC filter:   Date of placement: 4/12/21   1. Ultrasound and fluoroscopic guided placement of a Argon Option retrievable caval filter in the infrarenal IVC.  The Argon Option retrievable filter is suitable for removal under usual circumstances to 180 days (6 months) and sometimes up to one year and longer. This filter may also be used as a permanent filter. Please consult Interventional Radiology for filter   removal if indicated.   2. Inferior vena cavagram within normal limits with no evidence of caval thrombus or occlusion.  Reason for placement: recent DVT, unable to provide AC due to periop bleed risk for neck surg - s/p neck surg on 5/3/21 with Dr. Greene   Pending surgeries: none reported at this time    VTE disease / Anticoagulation:   Current symptoms:  Denies current SOB, CP, leg swelling, edema, leg pain, cyanosis of digits, redness of extremities.  Current antithrombotic agent: eliquis 5mg BID   Complications: none, tolerating well, no bleeding or bruising   Date of initiation of anticoagulation: 1/20/21   Adherence: reports complete, no missed doses     Pertinent VTE pmhx:  Date of Diagnosis: 1/20/21    Type of Venous thromboembolic disease (VTE): acute RLE DVT   Type of imaging:  Duplex at [facility]  Preceding/presenting symptoms: acute RLE foot/calf pain, had eval with MRI and ALY neg per PCP, then eval at [facility] with RLE duplex showing DVT   Antithrombotic therapy at time of VTE event: no  VTE tx course: Eliquis 10mg BID x 7d, now eliquis 5mg BID    Any personal VTE hx? No  Any family VTE hx? No   UNPROVOKED VS PROVOKED:   Recent surgery ? No  Recent trauma ? No  Smoker?  reports that she has never smoked. She has never used smokeless tobacco.    Extended travel? No  Other periods of immobility? No  Other risk factors for VTE disease:  no  WOMEN:  No abnormal cancer screenings in the past.        Any estrogen, testosterone HRT, E2 birth control, tamoxifene, raloxifene: no       Hx of recurrent miscarriages: no  Hypercoaguability work-up completed?  no (see assessment for details)    PAF:  Stable, no sx.  No prior TIA/CVA   Had biotel 28-day, pending with Dr. Thakkar for ongoing care 6/21, may need ILR to assist with dx       Current Outpatient Medications:   •  apixaban, 5 mg, Oral, BID  •  cyclobenzaprine, 10 mg, Oral, TID PRN  •  docusate sodium, 100 mg, Oral, BID  •  methylPREDNISolone, Follow schedule on package instructions.  •  Probiotic Product (PROBIOTIC DAILY PO), 1 capsule, Oral, DAILY  •  Cholecalciferol (VITAMIN D3 PO), 1 capsule, Oral, BID  •  Psyllium (METAMUCIL PO), 1 Scoop, Oral, DAILY  •  metoprolol tartrate, TAKE 1/2 TABLET BY MOUTH TWICE A DAY  •  gabapentin, 300 mg, Oral, BID  •  atorvastatin, 20 mg, Oral, DAILY (Patient taking differently: 20 mg, Oral, EVERY BEDTIME)  •  flecainide, TAKE 1 TO 2 TABLETS BY MOUTH TWICE DAILY  •  Calcium Carbonate-Vitamin D (CALCIUM + D PO), 1 tablet, Oral, BID  •  Lutein-Zeaxanthin, 1 tablet, Oral, QHS     Social History     Tobacco Use   • Smoking status: Never Smoker   • Smokeless tobacco: Never Used   Vaping Use   • Vaping Use: Never used   Substance Use Topics    • Alcohol use: No   • Drug use: No     DIET AND EXERCISE:  Weight Change:stable   BMI Readings from Last 5 Encounters:   05/03/21 30.34 kg/m²   04/12/21 30.49 kg/m²   03/09/21 28.13 kg/m²   01/21/21 27.86 kg/m²   01/15/21 29.43 kg/m²     Diet: common adult  Exercise: no regular exercise program     Review of Systems   Constitutional: Negative for chills, fever and malaise/fatigue.   Respiratory: Negative for cough, hemoptysis, shortness of breath and wheezing.    Cardiovascular: Negative for chest pain, palpitations and leg swelling.   Gastrointestinal: Negative for abdominal pain, diarrhea, nausea and vomiting.   Musculoskeletal: Negative for joint pain and myalgias.   Skin: Negative for itching and rash.   Neurological: Negative for dizziness, tremors, focal weakness, seizures, weakness and headaches.   Endo/Heme/Allergies: Does not bruise/bleed easily.        Objective:   There were no vitals filed for this visit.   BP Readings from Last 5 Encounters:   05/08/21 135/65   04/12/21 111/58   03/09/21 118/76   01/21/21 130/70   01/15/21 160/72      There is no height or weight on file to calculate BMI.     Physical Exam  Constitutional:       Appearance: Normal appearance.   HENT:      Head: Normocephalic.   Eyes:      Extraocular Movements: Extraocular movements intact.      Conjunctiva/sclera: Conjunctivae normal.   Pulmonary:      Effort: Pulmonary effort is normal.   Musculoskeletal:      Cervical back: Normal range of motion.   Skin:     General: Skin is dry.      Coloration: Skin is not pale.      Findings: No rash.   Neurological:      General: No focal deficit present.      Mental Status: She is alert and oriented to person, place, and time.   Psychiatric:         Mood and Affect: Mood normal.         Behavior: Behavior normal.         Lab Results   Component Value Date    CHOLSTRLTOT 159 01/07/2021    LDL 59 01/07/2021    HDL 63 01/07/2021    TRIGLYCERIDE 185 (H) 01/07/2021      Lab Results   Component  Value Date    PROTHROMBTM 14.8 (H) 2021    INR 1.13 2021         Lab Results   Component Value Date    SODIUM 140 2021    POTASSIUM 4.3 2021    CHLORIDE 110 2021    CO2 22 2021    GLUCOSE 77 2021    BUN 18 2021    CREATININE 0.67 2021    IFAFRICA >60 2021    IFNOTAFR >60 2021        Lab Results   Component Value Date    WBC 9.6 2021    RBC 4.25 2021    HEMOGLOBIN 13.1 2021    HEMATOCRIT 40.0 2021    MCV 94.1 2021    MCH 30.8 2021    MCHC 32.8 (L) 2021    MPV 10.7 2021      VASCULAR IMAGING:     Last EKG:   Results for orders placed or performed in visit on 21   EKG   Result Value Ref Range    Report       [facility]    Test Date:  2021  Pt Name:    JOSEPH SRIVASTAVA                Department: [facility]  MRN:        [id number]                      Room:  Gender:     Female                       Technician: PEP  :        [id number]                   Requested By:TASNEEM MARQUES  Order #:    [id number]                    Reading MD: Erich Koch MD    Measurements  Intervals                                Axis  Rate:       61                           P:          72  NM:         252                          QRS:        -8  QRSD:       94                           T:          46  QT:         464  QTc:        468    Interpretive Statements  SINUS RHYTHM  FIRST DEGREE AV BLOCK  Nonspecific T wave changes  Electronically Signed On 2021 12:11:32 PDT by Erich Koch MD       BLE venous 2020   Negative for DVT or SVT.    ALY 2021    No prior study is available for comparison.    No evidence of arterial insufficiency.    RLE venous 21 ([facility] - in media)   DVT Right post tib vein mid to prox portion with no extension to the pop vein    Biotel 28-day 21   1. Sinus rhythm with appropriate heart rate range. Average 65, range 40 to 120 bpm.   2. Normal sinus node and AV node conduction  normal. No pauses.  Intermittent first-degree AV delay.   3. Rare PACs.   4. Rare PVCs.   5. No rhythm changes. No atrial fibrillation/flutter.   6.  6 patient triggers, symptoms reported include skipped beat, heart racing.   Conclusion: Sinus rhythm.  Rare PVCs and PACs.  No rhythm changes.  Probably symptomatic PVCs.     CT neck 5/5/21  CT of the neck soft tissues without contrast within normal limits.    Medical Decision Making:  Today's Assessment / Status / Plan:     1. Acute deep vein thrombosis (DVT) of tibial vein of right lower extremity (HCC)     2. PAF (paroxysmal atrial fibrillation) (HCC)     3. Mixed hyperlipidemia     4. Anticoagulated     5. Presence of IVC filter     6. History of DVT (deep vein thrombosis)     7. Chronic anticoagulation        PATIENT TYPE: Primary Prevention    Etiology of Established CVD if Present:   1) acute RLE DVT, 7/2020   2) PAF, pending definitive     IVC filter:   post-op VTE Caprini risk score = 8pts, indicating high risk (4.0% VTE risk), so was recommended for IVC filter, since we need to interrupt her therapy for her recent VTE event and her baseline risk for recurrent VTE is high as this is considered an unprovoked event   Future Surgeries: none at this time   Bleeding complications while on anticoagulation: none, back on eliquis w/o issues   Decision:   - recommended for IVC filter retrieval as no longer has indications in late June/early July after neck brace removed and she is out of post-operative course  - stable for IVC filter removal.    - reviewed of options including maintenance of filter and retrieval, and through [id number] has opted for retrieval in line with current FDA recommendations for filter retrieval when no longer indicated   - APRN to coordinate with local IR, vascular surgeon, and/or with [facility] IVC filter retrieval program  for more complex cases   - provided review of anatomy and basics of retrieval procedure in addition to a pt  educ handout.    ANTITHROMBOTIC THERAPY:  Anti-Platelet/Anti-Coagulant Tx recommended: yes  Indication: acute RLE DVT, apparently unprovoked   Date of initiation:  1/2021   HAS-BLED bleeding risk calc ([email or web address]): 1 pt, 3.4%, low risk  Thrombophilia/hypercoag evaluation:  not recommended  Factors to consider for indefinite OAC: Unprovoked VTE event  Last CBC, BMP: 1/2021   Expected duration: reviewed standard of care as per ACCP AT10 guidelines (2016) is 3-6 months minimum on full dose OAC.  After review of risks/benefits/alternatives, through shared decision-making, we will continue eliquis for 6mo therapy (July 2021), then review further extension based upon dx of PAF.    - she will not require more than 6mo OAC for DVT as it was distal RLE and low risk involving posterior tib v only.   Antithrombotic therapy plan:  - continue eliquis 5mg BID now through July 2020 visit with me,   - to f/u with cardiology  - check CBC, BMP (CMP if any underlying liver disease), and monitor CrCl as needed Q6mo while on DOAC  - counseled on signs and symptoms of acute VTE that require seeking prompt attention in the ED to include shortness of breath, chest pain, pain with deep inhalation, acute leg swelling and/or pain in calf or leg   - elevate legs as much as possible, use compression stockings/socks if directed by your provider  - Avoid hormonal therapies including estrogen or testosterone-containing meds, or raloxifene or tamoxifene (commonly used for osteoporosis)  - Avoid sedentary periods  - continue complete avoidance of tobacco products  - if having any invasive procedure, please make sure the doctor knows of your history of blood clots and current anticoagulation status  - avoid Aspirin and anti-inflammatories (eg. Advil, ibuprofen, Aleve, naproxen, etc) while anticoagulated   - avoid skiing or other dangerous activities to reduce risk of head injury and brain bleeds  - recommended to see your PCP to discuss if you need  age-appropriate cancer screenings as a small % of blood clots may be caused by an underlying malignancy  - if any bleeding lasting 30min without stopping, please seek care with your PCP, urgent care, or ED  - reversal agents for most blood thinners are now available and used if you have major bleeding    LIFESTYLE RECOMMENDATIONS:     Smoking:  reports that she has never smoked. She has never used smokeless tobacco.   - continued complete avoidance of all tobacco products     Physical Activity: continue healthy activity to improve CV fitness, see care instructions for additional details     Weight Management and Nutrition: Dietary plan was discussed with patient at this visit including DASH, low sodium and/or as outlined in care instructions     OTHER:    1) PAF, currently stable on flecainide   Reduced metoprolol during hosp due to bradycardia   Continued on eliquis due to high GERMÁN-VASC  - defer ongoing care to Dr. Thakkar  - possible ILR placement to help with definitive dx of PAF   - if formal dx of PAF, may benefit with indefinite OAC based upon high GERMÁN-VASC and would defer decision to Dr. Thakkar   - if no OAC needed, then would suggest ASA ongoing     2) s/p Cspine surg, stable   - defer mgmt to Dr. Greene     Instructed to follow-up with PCP for remainder of adult medical needs: yes  We will partner with other providers in the management of established vascular disease and cardiometabolic risk factors.    Studies to Be Obtained: IVC filter retrieval  - early July   Labs to Be Obtained: as noted above     Follow up in: late July with me,  Ongoing with Dr. Thakkar     Time: 30-39min - chart review/prep, review of other providers' records, imaging/lab review, face-to-face time for history/examination, ordering, prescribing,  review of results/meds/ treatment plan with patient/family/caregiver, documentation in EMR, care coordination (as needed)    Nathaniel Ugarte M.D.  [facility]   [facility]   [phone]

## 2021-05-25 DIAGNOSIS — Z95.828 PRESENCE OF IVC FILTER: ICD-10-CM

## 2021-05-25 NOTE — PROGRESS NOTES
IVC filter order placed in system.  IR sched form scanned into media.  Pt can schedule whenever convenient for her.     Michelle MARTINEZ  [facility]

## 2021-06-15 ENCOUNTER — HOSPITAL ENCOUNTER (OUTPATIENT)
Dept: RADIOLOGY | Facility: MEDICAL CENTER | Age: 71
End: 2021-06-15
Attending: NEUROLOGICAL SURGERY
Payer: MEDICARE

## 2021-06-15 DIAGNOSIS — M50.00 CERVICAL DISC DISORDER WITH MYELOPATHY, UNSPECIFIED CERVICAL REGION: ICD-10-CM

## 2021-06-15 PROCEDURE — 72040 X-RAY EXAM NECK SPINE 2-3 VW: CPT

## 2021-06-21 ENCOUNTER — OFFICE VISIT (OUTPATIENT)
Dept: CARDIOLOGY | Facility: MEDICAL CENTER | Age: 71
End: 2021-06-21
Payer: MEDICARE

## 2021-06-21 VITALS
DIASTOLIC BLOOD PRESSURE: 72 MMHG | HEART RATE: 68 BPM | BODY MASS INDEX: 27.58 KG/M2 | HEIGHT: 68 IN | SYSTOLIC BLOOD PRESSURE: 110 MMHG | OXYGEN SATURATION: 97 % | RESPIRATION RATE: 18 BRPM | WEIGHT: 182 LBS

## 2021-06-21 DIAGNOSIS — R55 SYNCOPE AND COLLAPSE: ICD-10-CM

## 2021-06-21 DIAGNOSIS — M48.00 CENTRAL STENOSIS OF SPINAL CANAL: ICD-10-CM

## 2021-06-21 DIAGNOSIS — I48.0 PAF (PAROXYSMAL ATRIAL FIBRILLATION) (HCC): ICD-10-CM

## 2021-06-21 DIAGNOSIS — Z79.01 ANTICOAGULATED: ICD-10-CM

## 2021-06-21 DIAGNOSIS — Z95.828 PRESENCE OF IVC FILTER: ICD-10-CM

## 2021-06-21 LAB — EKG IMPRESSION: NORMAL

## 2021-06-21 PROCEDURE — 93000 ELECTROCARDIOGRAM COMPLETE: CPT | Performed by: INTERNAL MEDICINE

## 2021-06-21 PROCEDURE — 99214 OFFICE O/P EST MOD 30 MIN: CPT | Performed by: INTERNAL MEDICINE

## 2021-06-21 ASSESSMENT — FIBROSIS 4 INDEX: FIB4 SCORE: 1.34

## 2021-06-21 NOTE — PROGRESS NOTES
Chief Complaint   Patient presents with   • Atrial Fibrillation     F/V Dx: PAF        Subjective:   Lucía Rodriguez is a 70 y.o. female who presents today with history of DVT in January placed on Eliquis. PAF well controlled. Syncope x2 in January. None since. Back on Eliquis. IVC filter was placed for spinal surgery. Overall feels well. No palpitations no syncope. Chads Vasc score 2.    Past Medical History:   Diagnosis Date   • Anesthesia     Takes longer to wake up   • Arrhythmia     Atrial fibrillation   • Arthritis    • Atrial fibrillation (HCC)    • Atrial fibrillation (HCC) 2008   • Blood clotting disorder (HCC) 01/2021    r leg   • Calculus of gallbladder without cholecystitis 1/9/2017   • CATARACT     left eye   • Glaucoma     right eye   • High cholesterol    • Hyperlipidemia    • Hypertension    • Macular cyst, hole, or pseudohole of retina    • Presence of IVC filter 2021   • Pulmonary nodules/lesions, multiple 07/06/2016    stable on CTs, no further work up needed     Past Surgical History:   Procedure Laterality Date   • CERVICAL DISK AND FUSION ANTERIOR N/A 5/3/2021    Procedure: DISCECTOMY, SPINE, CERVICAL, ANTERIOR APPROACH, WITH FUSION - C4-7 WITH PLATE.;  Surgeon: Heri Greene M.D.;  Location: SURGERY [facility];  Service: Neurosurgery   • GYN SURGERY      HYSTERECTOMY   • MENISCUS REPAIR Right    • OTHER      eye surgery, right eye   • OTHER      cataract surgery, left eye     Family History   Problem Relation Age of Onset   • Heart Disease Father         triple bypass   • Cancer Father    • Cancer Mother    • Clotting Disorder Neg Hx      Social History     Socioeconomic History   • Marital status:      Spouse name: Not on file   • Number of children: Not on file   • Years of education: Not on file   • Highest education level: Not on file   Occupational History   • Not on file   Tobacco Use   • Smoking status: Never Smoker   • Smokeless tobacco: Never Used   Vaping Use   • Vaping  Use: Never used   Substance and Sexual Activity   • Alcohol use: No   • Drug use: No   • Sexual activity: Yes     Partners: Male   Other Topics Concern   • Not on file   Social History Narrative   • Not on file     Social Determinants of Health     Financial Resource Strain:    • Difficulty of Paying Living Expenses:    Food Insecurity:    • Worried About Running Out of Food in the Last Year:    • Ran Out of Food in the Last Year:    Transportation Needs:    • Lack of Transportation (Medical):    • Lack of Transportation (Non-Medical):    Physical Activity:    • Days of Exercise per Week:    • Minutes of Exercise per Session:    Stress:    • Feeling of Stress :    Social Connections:    • Frequency of Communication with Friends and Family:    • Frequency of Social Gatherings with Friends and Family:    • Attends Yaz[employer] Services:    • Active Member of Clubs or Organizations:    • Attends Club or Organization Meetings:    • Marital Status:    Intimate Partner Violence:    • Fear of Current or Ex-Partner:    • Emotionally Abused:    • Physically Abused:    • Sexually Abused:      Allergies   Allergen Reactions   • Neosporin [Neomycin-Polymyxin B] Rash     Rxn = 6/2016     Outpatient Encounter Medications as of 6/21/2021   Medication Sig Dispense Refill   • apixaban (ELIQUIS) 5mg Tab Take 1 tablet by mouth 2 times a day. 180 tablet 3   • Psyllium (METAMUCIL PO) Take 1 Scoop by mouth every day.     • metoprolol tartrate (LOPRESSOR) 25 MG Tab TAKE 1/2 TABLET BY MOUTH TWICE A DAY (Patient taking differently: Take 12.5 mg by mouth 2 times a day.) 90 Tab 3   • gabapentin (NEURONTIN) 300 MG Cap Take 1 Cap by mouth 2 Times a Day.     • flecainide (TAMBOCOR) 50 MG tablet TAKE 1 TO 2 TABLETS BY MOUTH TWICE DAILY (Patient taking differently: Take  mg by mouth 2 times a day. TAKE 1 TAB QAM AND 2 TAB QPM) 360 Tab 3   • [DISCONTINUED] cyclobenzaprine (FLEXERIL) 10 mg Tab Take 1 tablet by mouth 3 times a day as needed. 90  "tablet 0   • [DISCONTINUED] docusate sodium (COLACE) 100 MG Cap Take 1 capsule by mouth 2 times a day. 30 capsule 0   • [DISCONTINUED] methylPREDNISolone (MEDROL DOSEPAK) 4 MG Tablet Therapy Pack Follow schedule on package instructions. 21 tablet 0   • [DISCONTINUED] Probiotic Product (PROBIOTIC DAILY PO) Take 1 capsule by mouth every day. (Patient not taking: Reported on 6/21/2021)     • [DISCONTINUED] Cholecalciferol (VITAMIN D3 PO) Take 1 capsule by mouth 2 times a day. (Patient not taking: Reported on 6/21/2021)     • [DISCONTINUED] atorvastatin (LIPITOR) 20 MG Tab Take 1 Tab by mouth every day. (Patient taking differently: Take 20 mg by mouth at bedtime.) 90 Tab 3   • Lutein-Zeaxanthin (LUTEIN) 15-0.7 MG CAPS Take 1 Tab by mouth every bedtime. (Patient not taking: Reported on 6/21/2021)     • [DISCONTINUED] Calcium Carbonate-Vitamin D (CALCIUM + D PO) Take 1 tablet by mouth 2 times a day. **OTC** (Patient not taking: Reported on 6/21/2021)       No facility-administered encounter medications on file as of 6/21/2021.     ROS     Objective:   /72 (BP Location: Left arm, Patient Position: Sitting, BP Cuff Size: Adult)   Pulse 68   Resp 18   Ht 1.727 m (5' 8\")   Wt 82.6 kg (182 lb)   LMP 12/28/2003   SpO2 97%   BMI 27.67 kg/m²     Physical Exam   Constitutional: She is oriented to person, place, and time. She appears well-developed.   HENT:   Head: Normocephalic and atraumatic.   Eyes: Pupils are equal, round, and reactive to light.   Cardiovascular: Normal rate, regular rhythm and normal heart sounds. Exam reveals no gallop and no friction rub.   No murmur heard.  Pulmonary/Chest: Effort normal and breath sounds normal.   Abdominal: Soft. Bowel sounds are normal.   Musculoskeletal:         General: Normal range of motion.      Cervical back: Normal range of motion and neck supple.   Neurological: She is alert and oriented to person, place, and time. No cranial nerve deficit.   Skin: Skin is warm. "   Psychiatric: Her behavior is normal. Judgment and thought content normal.       Assessment:     1. PAF (paroxysmal atrial fibrillation) ([facility])  EKG   2. Presence of IVC filter     3. Anticoagulated     4. Central stenosis of spinal canal     5. Syncope and collapse         Medical Decision Making:  Today's Assessment / Status / Plan:   1. Anticoagulation. On for DVT. Does not quite meet criteria for Eliquis for atrial fibrillation. She will consider coming off in 6 months post DVT.  2. IVC filter being removed soon.  3. Recent spinal surgery..  4. Previous syncope no further episodes. Patient defers on getting an ILR.  5. Follow-up in 3 months.

## 2021-06-25 ENCOUNTER — PATIENT MESSAGE (OUTPATIENT)
Dept: CARDIOLOGY | Facility: MEDICAL CENTER | Age: 71
End: 2021-06-25

## 2021-06-25 ENCOUNTER — PHYSICAL THERAPY (OUTPATIENT)
Dept: PHYSICAL THERAPY | Facility: REHABILITATION | Age: 71
End: 2021-06-25
Attending: NEUROLOGICAL SURGERY
Payer: MEDICARE

## 2021-06-25 DIAGNOSIS — M50.00 CERVICAL DISC DISORDER WITH MYELOPATHY, UNSPECIFIED CERVICAL REGION: ICD-10-CM

## 2021-06-25 PROCEDURE — 97110 THERAPEUTIC EXERCISES: CPT

## 2021-06-25 PROCEDURE — 97161 PT EVAL LOW COMPLEX 20 MIN: CPT

## 2021-06-25 ASSESSMENT — ENCOUNTER SYMPTOMS
PAIN SCALE AT LOWEST: 3
PAIN SCALE AT HIGHEST: 7
PAIN SCALE: 3

## 2021-06-25 NOTE — OP THERAPY EVALUATION
"  Outpatient Physical Therapy  INITIAL EVALUATION    [facility] Physical Therapy [place] [id number] [facility], [address]  [place] [address]  Phone:  [phone]    Date of Evaluation: 06/25/2021    Patient: Lucía Rodriguez  YOB: 1950  MRN: [id number]     Referring Provider: Heri Greene M.D.  [address] [place] [address]   Referring Diagnosis Cervical disc disorder with myelopathy, unspecified cervical region [M50.00]     Time Calculation  Start time: 0900  Stop time: 1000 Time Calculation (min): 60 minutes         Chief Complaint: No chief complaint on file.    Visit Diagnoses     ICD-10-CM   1. Cervical disc disorder with myelopathy, unspecified cervical region  M50.00       Date of onset of impairment: 6/25/2021    Subjective:   History of Present Illness:     Date of onset:  5/3/2021    Mechanism of injury:  Patient had disectomy on 5/3/2021, patient states she went to the primary care, patient states that she was completely asymptomatic prior to the surgery, but then MD gave her x-ray of neck and back, and found that neck was \"90% crunched\" so she had to do the surgery.      Pain Description:  Neck pain into shoulders, ache.  Constant.  Denies arm pain.   Pain Irritability:  Low   Pain AM/PM Pattern: Worse in AM    Better:  Avoid turning. \"haven't don anything because they told me to do anything).    Worse:  Turning (in front), Turn back (more on back side)    PMHx - peripheral neuropathy since 2004, balance issues occurred around the same time.  Patient wants to work on balance.   Blind in R eye (new but from long standing issue)  Lying down results in dizziness, initially, but goes away quickly.    Cardiologist - patient states clears to exercise, having filter removed in next 1-2 months as patient has had DVT prior to surgery and needed it in order to be cleared for surgery.  Has A-fib, and 2 episodes of medical fall, cardiologist states no concerns from heart, per patient, " and believes dehydration is bigger factor.  Patient only reducing beta blocker use from advice from physician in hospital as patient is periodically bradycardic.   Pain:     Current pain rating:  3    At best pain rating:  3    At worst pain ratin  Patient Goals:     Patient goals for therapy:  [place] with ADLs/IADLs, return to sport/leisure activities and decreased pain      Past Medical History:   Diagnosis Date   • Anesthesia     Takes longer to wake up   • Arrhythmia     Atrial fibrillation   • Arthritis    • Atrial fibrillation (HCC)    • Atrial fibrillation (HCC)    • Blood clotting disorder (HCC) 2021    r leg   • Calculus of gallbladder without cholecystitis 2017   • CATARACT     left eye   • Glaucoma     right eye   • High cholesterol    • Hyperlipidemia    • Hypertension    • Macular cyst, hole, or pseudohole of retina    • Presence of IVC filter    • Pulmonary nodules/lesions, multiple 2016    stable on CTs, no further work up needed     Past Surgical History:   Procedure Laterality Date   • CERVICAL DISK AND FUSION ANTERIOR N/A 5/3/2021    Procedure: DISCECTOMY, SPINE, CERVICAL, ANTERIOR APPROACH, WITH FUSION - C4-7 WITH PLATE.;  Surgeon: Heri Greene M.D.;  Location: SURGERY [facility];  Service: Neurosurgery   • GYN SURGERY      HYSTERECTOMY   • MENISCUS REPAIR Right    • OTHER      eye surgery, right eye   • OTHER      cataract surgery, left eye     Social History     Tobacco Use   • Smoking status: Never Smoker   • Smokeless tobacco: Never Used   Substance Use Topics   • Alcohol use: No     Family and Occupational History     Socioeconomic History   • Marital status:      Spouse name: Not on file   • Number of children: Not on file   • Years of education: Not on file   • Highest education level: Not on file   Occupational History   • Not on file       Objective     Postural Observations    Additional Postural Observation Details  Mild forward  head    Neurological Testing     Reflexes   Left   Biceps (C5/C6): normal (2+)  Brachioradialis (C6): normal (2+)  Triceps (C7): normal (2+)    Right   Biceps (C5/C6): normal (2+)  Brachioradialis (C6): normal (2+)  Triceps (C7): normal (2+)    Myotome testing   Cervical (left)   All left cervical myotomes within normal limits    Cervical (right)   All right cervical myotomes within normal limits    Dermatome testing   Cervical (left)   All left cervical dermatomes intact    Cervical (right)   All right cervical dermatomes intact    Active Range of Motion     Cervical Spine   Flexion: decreased  Extension: decreased  Retraction: within functional limits  Left lateral flexion: decreased  Right lateral flexion: decreased  Left rotation: decreased  Right rotation: decreased    Upper Cervical   Flexion: decreased  Extension: decreased  Left lateral flexion: decreased  Right lateral flexion: decreased  Left rotation: decreased  Right rotation: decreased    Additional Active Range of Motion Details  All motions painful    Passive Range of Motion     Cervical Spine   Flexion: decreased  Extension: decreased  Retraction: decreased  Left lateral flexion: decreased  Right lateral flexion: decreased  Left rotation: decreased  Right rotation: decreased    Strength:    Cervical Spine   Deep neck flexors: 1  Deep neck extensors: 1    Additional Strength Details  Mild deficits in B UE, likely baseline.   Significant deficits in periscapular - likely secondary collar use, surgery, and deconditioning.    General Comments     Spine Comments   Balance testing   CTSIB - moderate loss of balance after 12 seconds on foam with EC  DGI - 22: limitations with turning, head turns  Sit<>stand 5 times - 18 seconds         Therapeutic Exercises (CPT 67078):     1. UPOC - 6/25/2021      Therapeutic Exercise Summary: Patient educated to start gradual rotations and flexion/extensoin of cervical spine 4-5 times daily for 5 reps each side.    If  cleared by cardiologist, which patient states she is, start aerobic program.       Time-based treatments/modalities:    Physical Therapy Timed Treatment Charges  Therapeutic exercise minutes (CPT 90215): 15 minutes    Assessment, Response and Plan:   Impairments: abnormal ADL function    Assessment details:  Patient presents today with significant neck pain and stiffness post s/p cervical discectomy and C4-C7 ACDF.  Patient notes she was asymptomatic prior to surgery.  Current impairments include strength, ROM, balance, activity tolerance, proprioception, and pain which are all negatively impacting functional mobility.  Unrelated to cervical condition, patient has long standing balance deficits due to peripheral neuropathy and vision changes that she hopes to improve upon.  Patient denies mechanical falls, but notes she has has some medical falls, that she states has been diagnosed as secondary to cardiovascular deficits, including A-fib and potential dehydration.  PT will focus on cervical spine strength and mobility initially.   Goals:   Short Term Goals:   Patient will demonstrate compliance with HEP in 1 week in order to progress with physical therapy  Patient will demonstrate 50% improvement in cervical ROM in order to improve participate in ADLs  Short term goal time span:  2-4 weeks      Long Term Goals:    Patient will demonstrate independence with HEP in 4 week in order to progress towards d/c from physical therapy  Patient will demonstrate 50% improvement in cervical ROM in order to improve participate in ADLs  Patient will demonstrate DNF endurance test of 45 seconds in order to improve participate in heavy ADLs.  Long term goal time span:  6-8 weeks    Plan:   Therapy options:  Physical therapy treatment to continue  Planned therapy interventions:  Neuromuscular Re-education (CPT 33982), Manual Therapy (CPT 32959), Gait Training (CPT 00265), Therapeutic Exercise (CPT 14509) and Therapeutic Activities  (CPT 79205)  Frequency:  2x week  Duration in weeks:  6  Duration in visits:  12  Discussed with:  Patient      Functional Assessment Used        Referring provider co-signature:  I have reviewed this plan of care and my co-signature certifies the need for services.    Certification Period: 06/25/2021 to  08/13/21    Physician Signature: ________________________________ Date: ______________

## 2021-06-30 ENCOUNTER — APPOINTMENT (OUTPATIENT)
Dept: PHYSICAL THERAPY | Facility: REHABILITATION | Age: 71
End: 2021-06-30
Attending: NEUROLOGICAL SURGERY
Payer: MEDICARE

## 2021-07-07 ENCOUNTER — PHYSICAL THERAPY (OUTPATIENT)
Dept: PHYSICAL THERAPY | Facility: REHABILITATION | Age: 71
End: 2021-07-07
Attending: NEUROLOGICAL SURGERY
Payer: MEDICARE

## 2021-07-07 DIAGNOSIS — M50.00 CERVICAL DISC DISORDER WITH MYELOPATHY, UNSPECIFIED CERVICAL REGION: ICD-10-CM

## 2021-07-07 PROCEDURE — 97110 THERAPEUTIC EXERCISES: CPT

## 2021-07-07 NOTE — OP THERAPY DAILY TREATMENT
Outpatient Physical Therapy  DAILY TREATMENT     [facility] Outpatient Physical Therapy [place] [id number] [facility], [address]  [place] [address]  Phone:  [phone]    Date: 07/07/2021    Patient: Lucía Rodriguez  YOB: 1950  MRN: [id number]     Time Calculation    Start time: 0735  Stop time: 0815 Time Calculation (min): 40 minutes     Chief Complaint: No chief complaint on file.    Visit #: 2    SUBJECTIVE:  Monday reaching overhead for some pantry stuff.      Gentle ROM - improving  Walking program - difficult with painful hip    OBJECTIVE:  DNF endurance - Patient physically unable           Therapeutic Exercises (CPT 45764):     1. UPOC - 7/25/2021      Therapeutic Exercise Summary:   Rows - 3 sets purple and pink TB  Supine Chin Tucks - 50x   Sphynx Chin Tucks - 2 sets   AROM C/s all motions - reviewed   Rear Delt Flies - GTB 3 sets   Shrugs -   Resisted Chin Tucks -   W's -           Time-based treatments/modalities:    Physical Therapy Timed Treatment Charges  Therapeutic exercise minutes (CPT 87310): 40 minutes    ASSESSMENT:   Focused today on scapular strength and learning cervical movements.  Will progress to cervical strengthening next visit. Anticipate DOMs.    Fatigue - 1-2/10    PLAN/RECOMMENDATIONS:   Plan for treatment: therapy treatment to continue next visit.  Planned interventions for next visit: continue with current treatment.

## 2021-07-13 ENCOUNTER — PHYSICAL THERAPY (OUTPATIENT)
Dept: PHYSICAL THERAPY | Facility: REHABILITATION | Age: 71
End: 2021-07-13
Attending: NEUROLOGICAL SURGERY
Payer: MEDICARE

## 2021-07-13 DIAGNOSIS — M50.00 CERVICAL DISC DISORDER WITH MYELOPATHY, UNSPECIFIED CERVICAL REGION: ICD-10-CM

## 2021-07-13 PROCEDURE — 97110 THERAPEUTIC EXERCISES: CPT

## 2021-07-13 NOTE — OP THERAPY DAILY TREATMENT
Outpatient Physical Therapy  DAILY TREATMENT     [facility] Outpatient Physical Therapy [place] [id number] [facility], [address]  [place] [address]  Phone:  [phone]    Date: 07/13/2021    Patient: Lucía Rodriguez  YOB: 1950  MRN: [id number]     Time Calculation    Start time: 0900  Stop time: 0945 Time Calculation (min): 45 minutes     Chief Complaint: No chief complaint on file.    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  Moderately sore after last PT session.    Gentle ROM - improving  Walking program - difficult with painful hip     OBJECTIVE:  DNF endurance - test in 2 weeks.     Therapeutic Exercises (CPT 74602):     1. UPOC - 7/25/2021      Therapeutic Exercise Summary:   Rows - 3 sets purple and pink TB  Sphynx Chin Tucks - 2 sets  Supine cervical extension - 10x    AROM C/s all motions - reviewed   Rear Delt Flies - GTB 3 sets   Shrugs - (next visit)  Resisted Chin Tucks - 4 sets   W's - 2 x 10 (next visit SL ER)      Time-based treatments/modalities:    Physical Therapy Timed Treatment Charges  Therapeutic exercise minutes (CPT 60470): 45 minutes    ASSESSMENT:   Progressed strength and mobility this session.  Significant weakness is limiting ROM overall.  Anticipate DOMs, and limited in progression secondary to DOMs.  Anticipate gradual progress.     Fatigue - 1-2/10    PLAN/RECOMMENDATIONS:   Plan for treatment: therapy treatment to continue next visit.  Planned interventions for next visit: continue with current treatment.

## 2021-07-16 ENCOUNTER — PHYSICAL THERAPY (OUTPATIENT)
Dept: PHYSICAL THERAPY | Facility: REHABILITATION | Age: 71
End: 2021-07-16
Attending: NEUROLOGICAL SURGERY
Payer: MEDICARE

## 2021-07-16 DIAGNOSIS — M50.00 CERVICAL DISC DISORDER WITH MYELOPATHY, UNSPECIFIED CERVICAL REGION: ICD-10-CM

## 2021-07-16 PROCEDURE — 97110 THERAPEUTIC EXERCISES: CPT

## 2021-07-16 NOTE — OP THERAPY DAILY TREATMENT
Outpatient Physical Therapy  DAILY TREATMENT     [facility] Outpatient Physical Therapy [address], [address]  [place] [address]  Phone:  [phone]    Date: 07/16/2021    Patient: Lucía Rodriguez  YOB: 1950  MRN: [id number]     Time Calculation    Start time: 1415  Stop time: 1500 Time Calculation (min): 45 minutes     Chief Complaint: No chief complaint on file.    Visit #: 4    SUBJECTIVE:  Pretty sore overall. (likely DOMs), otherwise doing fine.     Gentle ROM - improving  Walking program - no walking     OBJECTIVE:  DNF endurance - test in 2 weeks.     Therapeutic Exercises (CPT 53129):     1. UPOC - 7/25/2021      Therapeutic Exercise Summary:   Rows - 3 sets purple and pink TB  Sphynx Chin Tucks - 2 sets  Supine cervical extension - 3 sets   AROM C/s all motions - reviewed   Rear Delt Flies - GTB 3 sets   Shrugs - progress next visit   Resisted Chin Tucks - 4 sets   SL ER - progress next visit       Time-based treatments/modalities:    Physical Therapy Timed Treatment Charges  Therapeutic exercise minutes (CPT 87545): 45 minutes    ASSESSMENT:   Maintained volume of strength training, and next visit will add [facility] ER and upper trap strengthening.  Anticipate continued DOMs secondary to sensitivity of upper traps with strengthening.  Patient aware.      Fatigue - 1-2/10    PLAN/RECOMMENDATIONS:   Plan for treatment: therapy treatment to continue next visit.  Planned interventions for next visit: continue with current treatment.

## 2021-07-19 ENCOUNTER — PHYSICAL THERAPY (OUTPATIENT)
Dept: PHYSICAL THERAPY | Facility: REHABILITATION | Age: 71
End: 2021-07-19
Attending: NEUROLOGICAL SURGERY
Payer: MEDICARE

## 2021-07-19 ENCOUNTER — PRE-ADMISSION TESTING (OUTPATIENT)
Dept: ADMISSIONS | Facility: MEDICAL CENTER | Age: 71
End: 2021-07-19
Attending: FAMILY MEDICINE
Payer: MEDICARE

## 2021-07-19 ENCOUNTER — OFFICE VISIT (OUTPATIENT)
Dept: VASCULAR LAB | Facility: MEDICAL CENTER | Age: 71
End: 2021-07-19
Attending: FAMILY MEDICINE
Payer: MEDICARE

## 2021-07-19 DIAGNOSIS — M50.00 CERVICAL DISC DISORDER WITH MYELOPATHY, UNSPECIFIED CERVICAL REGION: ICD-10-CM

## 2021-07-19 DIAGNOSIS — I48.0 PAF (PAROXYSMAL ATRIAL FIBRILLATION) (HCC): ICD-10-CM

## 2021-07-19 DIAGNOSIS — Z95.828 PRESENCE OF IVC FILTER: ICD-10-CM

## 2021-07-19 DIAGNOSIS — E78.2 MIXED HYPERLIPIDEMIA: ICD-10-CM

## 2021-07-19 DIAGNOSIS — Z86.718 HISTORY OF DVT (DEEP VEIN THROMBOSIS): ICD-10-CM

## 2021-07-19 DIAGNOSIS — I82.441 ACUTE DEEP VEIN THROMBOSIS (DVT) OF TIBIAL VEIN OF RIGHT LOWER EXTREMITY (HCC): ICD-10-CM

## 2021-07-19 PROCEDURE — 97110 THERAPEUTIC EXERCISES: CPT

## 2021-07-19 PROCEDURE — 99214 OFFICE O/P EST MOD 30 MIN: CPT | Performed by: FAMILY MEDICINE

## 2021-07-19 ASSESSMENT — ENCOUNTER SYMPTOMS
COUGH: 0
ABDOMINAL PAIN: 0
WEAKNESS: 0
BACK PAIN: 1
BRUISES/BLEEDS EASILY: 0
FEVER: 0
PALPITATIONS: 0
DIARRHEA: 0
DIZZINESS: 0
HEMOPTYSIS: 0
SHORTNESS OF BREATH: 0
WHEEZING: 0
MYALGIAS: 0
TREMORS: 0
FOCAL WEAKNESS: 0
NECK PAIN: 1
SEIZURES: 0
NAUSEA: 0
VOMITING: 0
HEADACHES: 0

## 2021-07-19 NOTE — PROGRESS NOTES
oximi  This visit was conducted via [email or web address] video  using secure and encrypted video conferencing technology due to covid restrictions  The patient was in a private location in the state [facility].    The patient's identity was confirmed and verbal consent was obtained for this virtual visit.     FOLLOW-UP VASCULAR ANTICOAGULATION VISIT  Subjective:   Lucía Rodriguez is a female who presents today 07/19/21 for   Chief Complaint   Patient presents with   • Follow-Up   Initially referred by PCP for eval for possible IVC filter placement and length of therapy (LOT) determination and management of anticoagulation in context of acute venothromboembolic disease    HPI:    IVC filter:   Interval hx/concerns: s/p neck surg, doing well. Pending retrieval 8/5/21    Date of placement: 4/12/21   1. Ultrasound and fluoroscopic guided placement of a Argon Option retrievable caval filter in the infrarenal IVC.  The Argon Option retrievable filter is suitable for removal under usual circumstances to 180 days (6 months) and sometimes up to one year and longer. This filter may also be used as a permanent filter. Please consult Interventional Radiology for filter   removal if indicated.   2. Inferior vena cavagram within normal limits with no evidence of caval thrombus or occlusion.  Reason for placement: recent DVT, unable to provide AC due to periop bleed risk for neck surg - s/p neck surg on 5/3/21 with Dr. Greene   Pending surgeries: none reported at this time    VTE disease / Anticoagulation:   Current symptoms:  Denies current SOB, CP, leg swelling, edema, leg pain, cyanosis of digits, redness of extremities.  Current antithrombotic agent: eliquis 5mg BID   Complications: none, tolerating well, no bleeding or bruising   Date of initiation of anticoagulation: 1/20/21   Adherence: reports complete, no missed doses     Pertinent VTE pmhx:  Date of Diagnosis: 1/20/21   Type of Venous thromboembolic disease (VTE): acute RLE  DVT   Type of imaging:  Duplex at [facility]  Preceding/presenting symptoms: acute RLE foot/calf pain, had eval with MRI and ALY neg per PCP, then eval at [facility] with RLE duplex showing DVT   Antithrombotic therapy at time of VTE event: no  VTE tx course: Eliquis 10mg BID x 7d, now eliquis 5mg BID    Any personal VTE hx? No  Any family VTE hx? No   UNPROVOKED VS PROVOKED:   Recent surgery ? No  Recent trauma ? No  Smoker?  reports that she has never smoked. She has never used smokeless tobacco.    Extended travel? No  Other periods of immobility? No  Other risk factors for VTE disease:  no  WOMEN:  No abnormal cancer screenings in the past.        Any estrogen, testosterone HRT, E2 birth control, tamoxifene, raloxifene: no       Hx of recurrent miscarriages: no  Hypercoaguability work-up completed?  no (see assessment for details)    PAF:  Stable, no sx.  Recent eval with Dr. Thakkar, no indications for ongoing OAC therapy.   No prior TIA/CVA   No other med changes     Neck/back surgery   S/p neck surgery, seen post-op and improving overall  Has ongoing swallowing issues and upper back pain.   Ongoing PT and doing well.       Current Outpatient Medications:   •  aspirin EC, 162.5 mg, Oral, DAILY  •  Psyllium (METAMUCIL PO), 1 Scoop, Oral, DAILY  •  metoprolol tartrate, TAKE 1/2 TABLET BY MOUTH TWICE A DAY  •  gabapentin, 300 mg, Oral, BID  •  flecainide, TAKE 1 TO 2 TABLETS BY MOUTH TWICE DAILY  •  Lutein-Zeaxanthin, 1 tablet, Oral, QHS (Patient not taking: Reported on 6/21/2021)     Social History     Tobacco Use   • Smoking status: Never Smoker   • Smokeless tobacco: Never Used   Vaping Use   • Vaping Use: Never used   Substance Use Topics   • Alcohol use: No   • Drug use: No     DIET AND EXERCISE:  Weight Change:stable   BMI Readings from Last 5 Encounters:   06/21/21 27.67 kg/m²   05/03/21 30.34 kg/m²   04/12/21 30.49 kg/m²   03/09/21 28.13 kg/m²   01/21/21 27.86 kg/m²     Diet: common adult  Exercise: no regular exercise  program     Review of Systems   Constitutional: Negative for fever and malaise/fatigue.   Respiratory: Negative for cough, hemoptysis, shortness of breath and wheezing.    Cardiovascular: Negative for chest pain, palpitations and leg swelling.   Gastrointestinal: Negative for abdominal pain, diarrhea, nausea and vomiting.   Musculoskeletal: Positive for back pain (chronic) and neck pain (chronic, improved ). Negative for joint pain and myalgias.   Skin: Negative for itching and rash.   Neurological: Negative for dizziness, tremors, focal weakness, seizures, weakness and headaches.   Endo/Heme/Allergies: Does not bruise/bleed easily.        Objective:   There were no vitals filed for this visit.   BP Readings from Last 5 Encounters:   06/21/21 110/72   05/08/21 135/65   04/12/21 111/58   03/09/21 118/76   01/21/21 130/70      There is no height or weight on file to calculate BMI.     Physical Exam  Constitutional:       Appearance: Normal appearance.   HENT:      Head: Normocephalic.   Eyes:      Extraocular Movements: Extraocular movements intact.      Conjunctiva/sclera: Conjunctivae normal.   Pulmonary:      Effort: Pulmonary effort is normal.   Musculoskeletal:      Cervical back: Normal range of motion.   Skin:     General: Skin is dry.      Coloration: Skin is not pale.      Findings: No rash.   Neurological:      General: No focal deficit present.      Mental Status: She is alert and oriented to person, place, and time.   Psychiatric:         Mood and Affect: Mood normal.         Behavior: Behavior normal.         Lab Results   Component Value Date    CHOLSTRLTOT 159 01/07/2021    LDL 59 01/07/2021    HDL 63 01/07/2021    TRIGLYCERIDE 185 (H) 01/07/2021      Lab Results   Component Value Date    PROTHROMBTM 14.8 (H) 04/20/2021    INR 1.13 04/20/2021         Lab Results   Component Value Date    SODIUM 140 05/08/2021    POTASSIUM 4.3 05/08/2021    CHLORIDE 110 05/08/2021    CO2 22 05/08/2021    GLUCOSE 77  2021    BUN 18 2021    CREATININE 0.67 2021    IFAFRICA >60 2021    IFNOTAFR >60 2021        Lab Results   Component Value Date    WBC 9.6 2021    RBC 4.25 2021    HEMOGLOBIN 13.1 2021    HEMATOCRIT 40.0 2021    MCV 94.1 2021    MCH 30.8 2021    MCHC 32.8 (L) 2021    MPV 10.7 2021      VASCULAR IMAGING:     Last EKG:   Results for orders placed or performed in visit on 21   EKG   Result Value Ref Range    Report       [facility]    Test Date:  2021  Pt Name:    JOSEPH SRIVASTAVA                Department: [facility]  MRN:        [id number]                      Room:  Gender:     Female                       Technician: DINESH  :        1950                   Requested By:WILBERTO THAKKAR  Order #:    [id number]                    Reading MD: Wilberto Thakkar MD    Measurements  Intervals                                Axis  Rate:       66                           P:          64  ND:         235                          QRS:        5  QRSD:       95                           T:          46  QT:         436  QTc:        457    Interpretive Statements  Sinus rhythm  Prolonged ND interval  Low voltage, precordial leads  Compared to ECG 2021 11:42:21  Unchanged  Electronically Signed On 2021 11:51:28 PDT by Wilberto Thakkar MD       BLE venous 2020   Negative for DVT or SVT.    ALY 2021    No prior study is available for comparison.    No evidence of arterial insufficiency.    RLE venous 21 ([facility] - in media)   DVT Right post tib vein mid to prox portion with no extension to the pop vein    Biotel 28-day 21   1. Sinus rhythm with appropriate heart rate range. Average 65, range 40 to 120 bpm.   2. Normal sinus node and AV node conduction normal. No pauses.  Intermittent first-degree AV delay.   3. Rare PACs.   4. Rare PVCs.   5. No rhythm changes. No atrial fibrillation/flutter.   6.  6 patient triggers, symptoms  reported include skipped beat, heart racing.   Conclusion: Sinus rhythm.  Rare PVCs and PACs.  No rhythm changes.  Probably symptomatic PVCs.     CT neck 5/5/21  CT of the neck soft tissues without contrast within normal limits.    Medical Decision Making:  Today's Assessment / Status / Plan:     1. Acute deep vein thrombosis (DVT) of tibial vein of right lower extremity (HCC)     2. PAF (paroxysmal atrial fibrillation) (HCC)     3. Mixed hyperlipidemia     4. Presence of IVC filter     5. History of DVT (deep vein thrombosis)        PATIENT TYPE: Primary Prevention    Etiology of Established CVD if Present:   1) acute RLE DVT, 7/2020   2) PAF, pending definitive     IVC filter:   - stable for retrieval, no further indications, s/p neck surg   - Future Surgeries: none at this time   - Bleeding complications while on anticoagulation: none, back on eliquis w/o issues   Decision:   - recommended for IVC filter retrieval as no longer has indications in late June/early July after neck brace removed and she is out of post-operative course  - stable for IVC filter removal  - pending retrieval 8/5   - reviewed of options including maintenance of filter and retrieval, and through [id number] has opted for retrieval in line with current FDA recommendations for filter retrieval when no longer indicated   - APRN to coordinate with local IR, vascular surgeon, and/or with [place] IVC filter retrieval program  for more complex cases   - provided review of anatomy and basics of retrieval procedure in addition to a pt educ handout.    ANTITHROMBOTIC THERAPY:  Anti-Platelet/Anti-Coagulant Tx recommended: yes  Indication: acute RLE DVT, apparently unprovoked   Date of initiation:  1/2021   HAS-BLED bleeding risk calc ([email or web address]): 1 pt, 3.4%, low risk  Thrombophilia/hypercoag evaluation:  not recommended  Factors to consider for indefinite OAC: Unprovoked VTE event  Last CBC, BMP: 1/2021   Expected duration: has completed  6mo full-dose OAC (7/2021), after review of risks/bneefitsfor further extension, through Shared MDM we will d/c OAC and transition to ASA   - she will not require more than 6mo OAC for DVT as it was distal RLE and low risk involving posterior tib v only.   Antithrombotic therapy plan:  - stop eliquis, start ASA 325mg 1/2 tab daily   - counseled on signs and symptoms of acute VTE that require seeking prompt attention in the ED to include shortness of breath, chest pain, pain with deep inhalation, acute leg swelling and/or pain in calf or leg   - elevate legs as much as possible, use compression stockings/socks if directed by your provider  - Avoid hormonal therapies including estrogen or testosterone-containing meds, or raloxifene or tamoxifene (commonly used for osteoporosis)  - Avoid sedentary periods  - continue complete avoidance of tobacco products  - if having any invasive procedure, please make sure the doctor knows of your history of blood clots and current anticoagulation status    LIFESTYLE RECOMMENDATIONS:     Smoking:  reports that she has never smoked. She has never used smokeless tobacco.   - continued complete avoidance of all tobacco products     Physical Activity: continue healthy activity to improve CV fitness, see care instructions for additional details     Weight Management and Nutrition: Dietary plan was discussed with patient at this visit including DASH, low sodium and/or as outlined in care instructions     OTHER:    # PAF, currently stable  No med changes   Per Dr. Thakkar (EP specialist) no indications for ongoing OAC   - continue med mgmt with cardiology  - stop Eliquis, start ASA     # s/p Cspine surg, stable   - defer mgmt to Dr. Greene     Instructed to follow-up with PCP for remainder of adult medical needs: yes  We will partner with other providers in the management of established vascular disease and cardiometabolic risk factors.    Studies to Be Obtained: IVC filter retrieval  - early  August   Labs to Be Obtained: as noted above     Follow up in: defer ongoing to cardiology and PCP,  vasc med available prn     Time: 30-39min - chart review/prep, review of other providers' records, imaging/lab review, face-to-face time for history/examination, ordering, prescribing,  review of results/meds/ treatment plan with patient/family/caregiver, documentation in EMR, care coordination (as needed)    Nathaniel Ugarte M.D.  [facility]   [facility]   [phone]

## 2021-07-19 NOTE — OP THERAPY DAILY TREATMENT
Outpatient Physical Therapy  DAILY TREATMENT     [facility] Outpatient Physical Therapy [place] [address] [facility], [address]  [place] [address]  Phone:  [phone]    Date: 07/19/2021    Patient: Lucía Rodriguez  YOB: 1950  MRN: [id number]     Time Calculation    Start time: 1500  Stop time: 1545 Time Calculation (min): 45 minutes     Chief Complaint: No chief complaint on file.    Visit #: 5    SUBJECTIVE:  DOMs - significant   Gentle ROM - improving  Walking program - no walking     OBJECTIVE:  DNF endurance - test in 2 weeks.     Therapeutic Exercises (CPT 36687):     1. UPOC - 7/25/2021      Therapeutic Exercise Summary:   Rows - 3 sets purple and pink TB  Sphynx Chin Tucks - 1 sets  Supine cervical extension - 1 sets   AROM C/s all motions - reviewed   Cross reached cervical spine rotations (full ROM) - with manual overpressure in CTJ   Rear Delt Flies - GTB 1 sets   Resisted Chin Tucks - 1 sets     Shrugs - progress next visit   SL ER - progress next visit       Time-based treatments/modalities:    Physical Therapy Timed Treatment Charges  Therapeutic exercise minutes (CPT 67629): 45 minutes    ASSESSMENT:   Reduced volume this visit, and focused on mobility of cervical spine this visit.  Able to improve rotation with motor control drills. Patient has hypomobility in lower cervical spine, and forward head results in overall increase in upper cervical muscular tension.  Will continue to gradually progress loading, however limited by recovery at this time as DOMs are significant.     PLAN/RECOMMENDATIONS:   Plan for treatment: therapy treatment to continue next visit.  Planned interventions for next visit: continue with current treatment.

## 2021-07-21 ENCOUNTER — PHYSICAL THERAPY (OUTPATIENT)
Dept: PHYSICAL THERAPY | Facility: REHABILITATION | Age: 71
End: 2021-07-21
Attending: NEUROLOGICAL SURGERY
Payer: MEDICARE

## 2021-07-21 DIAGNOSIS — M50.00 CERVICAL DISC DISORDER WITH MYELOPATHY, UNSPECIFIED CERVICAL REGION: ICD-10-CM

## 2021-07-21 PROCEDURE — 97110 THERAPEUTIC EXERCISES: CPT

## 2021-07-21 NOTE — OP THERAPY DAILY TREATMENT
Outpatient Physical Therapy  DAILY TREATMENT     [facility] Outpatient Physical Therapy [place] [address] [facility], [address]  [place] [address]  Phone:  [phone]    Date: 07/21/2021    Patient: Lucía Rodriguez  YOB: 1950  MRN: [id number]     Time Calculation    Start time: 1415  Stop time: 1500 Time Calculation (min): 45 minutes     Chief Complaint: No chief complaint on file.    Visit #: 6    SUBJECTIVE:  Neck a little sore but not better.  Hands on work felt bad when you did it, but it helped.     OBJECTIVE:  80% left cervical ROM approximately, will measure next visit.         Therapeutic Exercises (CPT 11684):     1. UPOC - 7/25/2021      Therapeutic Exercise Summary:   Rows - 3 sets purple  Supine cervical extension - 1 sets   Standing cervical extension (serratus press) - 2 sets   sidelying cervical rotation  AROM C/s all motions - reviewed   Cross reached cervical spine rotations (full ROM) - with manual overpressure in CTJ   Resisted Chin Tucks - 8 sets   Cervical Rotation with SNAG CTJ.     Shrugs - progress next visit   SL ER - progress next visit       Time-based treatments/modalities:    Physical Therapy Timed Treatment Charges  Therapeutic exercise minutes (CPT 52326): 40 minutes    ASSESSMENT:   Focused on loading today as ROM was focus last session.  Anticipate significant DOMs post activity, as patient still has tissue healing that is occurring.  Patient appears to have motor control deficit in cervical spine, resulting in resisted activities improving pain.  This needs to be balanced out with heavy DOMs.     PLAN/RECOMMENDATIONS:   Plan for treatment: therapy treatment to continue next visit.  Planned interventions for next visit: continue with current treatment.

## 2021-07-26 ENCOUNTER — PHYSICAL THERAPY (OUTPATIENT)
Dept: PHYSICAL THERAPY | Facility: REHABILITATION | Age: 71
End: 2021-07-26
Attending: NEUROLOGICAL SURGERY
Payer: MEDICARE

## 2021-07-26 DIAGNOSIS — M50.00 CERVICAL DISC DISORDER WITH MYELOPATHY, UNSPECIFIED CERVICAL REGION: ICD-10-CM

## 2021-07-26 PROCEDURE — 97110 THERAPEUTIC EXERCISES: CPT

## 2021-07-26 PROCEDURE — 97140 MANUAL THERAPY 1/> REGIONS: CPT

## 2021-07-26 NOTE — OP THERAPY DAILY TREATMENT
Outpatient Physical Therapy  DAILY TREATMENT     [facility] Outpatient Physical Therapy [place] [address] [facility], [address]  [place] [address]  Phone:  [phone]    Date: 07/26/2021    Patient: Lucía Rodriguez  YOB: 1950  MRN: [id number]     Time Calculation    Start time: 0945  Stop time: 1030 Time Calculation (min): 45 minutes     Chief Complaint: No chief complaint on file.    Visit #: 7    SUBJECTIVE:  Not very sore after last time, neck still hurts in same spot.     OBJECTIVE:  80% left cervical ROM approximately, will measure next visit.         Therapeutic Exercises (CPT 62278):     1. UPOC - 7/25/2021      Therapeutic Exercise Summary:   Rows - 3 sets purple/pink  Wall Slides - 3 sets   Supine cervical extension - 1 sets   Standing cervical extension (serratus press) - 2 sets   sidelying cervical rotation - 2 sets   Cross reached cervical spine rotations (full ROM) - with manual overpressure in CTJ   reard Delti Flies - 1 set GTB 10 reps   Resisted Chin Tucks - 8 sets   Cervical Rotation with SNAG CTJ.   SL ER - 3 sets     Shrugs - progress next visit       Time-based treatments/modalities:    Physical Therapy Timed Treatment Charges  Manual therapy minutes (CPT 16739): 15 minutes  Therapeutic exercise minutes (CPT 94750): 30 minutes    ASSESSMENT:   Continued to focus on improving motor control deficits in cervical spine.  Resisted RROM appears better than AROM.  Continue to promote scapular strength and motor control.  Patient's biggest limitations is controlling ROM she has, PROM is approximately 80%; anticipate limited ROM secondary to result of surgery.     PLAN/RECOMMENDATIONS:   Plan for treatment: therapy treatment to continue next visit.  Planned interventions for next visit: continue with current treatment.

## 2021-07-28 ENCOUNTER — PHYSICAL THERAPY (OUTPATIENT)
Dept: PHYSICAL THERAPY | Facility: REHABILITATION | Age: 71
End: 2021-07-28
Attending: NEUROLOGICAL SURGERY
Payer: MEDICARE

## 2021-07-28 DIAGNOSIS — M50.00 CERVICAL DISC DISORDER WITH MYELOPATHY, UNSPECIFIED CERVICAL REGION: ICD-10-CM

## 2021-07-28 PROCEDURE — 97140 MANUAL THERAPY 1/> REGIONS: CPT

## 2021-07-28 PROCEDURE — 97110 THERAPEUTIC EXERCISES: CPT

## 2021-07-28 NOTE — OP THERAPY DAILY TREATMENT
Outpatient Physical Therapy  DAILY TREATMENT     [facility] Outpatient Physical Therapy [place] [address] [facility] [facility], [address]  [place] [address]  Phone:  [phone]    Date: 07/28/2021    Patient: Lucía Rodriguez  YOB: 1950  MRN: [id number]     Time Calculation    Start time: 0945  Stop time: 1030 Time Calculation (min): 45 minutes     Chief Complaint: No chief complaint on file.    Visit #: 8    SUBJECTIVE:  Allodynia into upper trap, noticing referral in middle of back.  States she was pain free prior to surgery, so concerned pain will persist.     OBJECTIVE:  45% torso can move 80% cervical extension and rotation AROM  0 degrees in standing.       Therapeutic Exercises (CPT 87386):     1. UPOC - 7/25/2021      Therapeutic Exercise Summary:   Rows - 3 sets purple/pink  45 degree standing cervical extension (serratus press) - 2 sets   sidelying cervical rotation - 2 sets   Cross reached cervical spine rotations (full ROM) - with manual overpressure in CTJ   reard Delti Flies - Held  Resisted Chin Tucks - 3 sets   SL ER - Held   Shrugs - 2# 2 x 10    Therapeutic Treatments and Modalities:     1. Manual Therapy (CPT 12826)    Therapeutic Treatment and Modalities Summary: Cervical Rotation with SNAG CTJ.   IASTM upper trapezius     Time-based treatments/modalities:    Physical Therapy Timed Treatment Charges  Manual therapy minutes (CPT 10215): 15 minutes  Therapeutic exercise minutes (CPT 50705): 30 minutes    ASSESSMENT:   Continued to focus on improving motor control deficits in cervical spine and scapular strength.  Significant soreness post loading program has limited patient so far in terms of progression of stability.  However, will take patient through gradual progression to upright for cervical ROM, right now tolerating resisted motion in supine and sidelying respectively.  Added upper trap loading this visit to reduce ischemia.     PLAN/RECOMMENDATIONS:   Plan for treatment: therapy treatment to  continue next visit.  Planned interventions for next visit: continue with current treatment.

## 2021-07-30 ENCOUNTER — HOSPITAL ENCOUNTER (OUTPATIENT)
Dept: RADIOLOGY | Facility: MEDICAL CENTER | Age: 71
End: 2021-07-30
Attending: STUDENT IN AN ORGANIZED HEALTH CARE EDUCATION/TRAINING PROGRAM
Payer: MEDICARE

## 2021-07-30 ENCOUNTER — PRE-ADMISSION TESTING (OUTPATIENT)
Dept: ADMISSIONS | Facility: MEDICAL CENTER | Age: 71
End: 2021-07-30
Attending: NURSE PRACTITIONER
Payer: MEDICARE

## 2021-07-30 DIAGNOSIS — Z01.812 PRE-OPERATIVE LABORATORY EXAMINATION: ICD-10-CM

## 2021-07-30 DIAGNOSIS — M50.00 CERVICAL DISC DISORDER WITH MYELOPATHY, UNSPECIFIED CERVICAL REGION: ICD-10-CM

## 2021-07-30 LAB
CREAT SERPL-MCNC: 0.75 MG/DL (ref 0.5–1.4)
ERYTHROCYTE [DISTWIDTH] IN BLOOD BY AUTOMATED COUNT: 50.8 FL (ref 35.9–50)
HCT VFR BLD AUTO: 42 % (ref 37–47)
HGB BLD-MCNC: 13.5 G/DL (ref 12–16)
INR PPP: 1 (ref 0.87–1.13)
MCH RBC QN AUTO: 30.5 PG (ref 27–33)
MCHC RBC AUTO-ENTMCNC: 32.1 G/DL (ref 33.6–35)
MCV RBC AUTO: 94.8 FL (ref 81.4–97.8)
PLATELET # BLD AUTO: 258 K/UL (ref 164–446)
PMV BLD AUTO: 10.9 FL (ref 9–12.9)
PROTHROMBIN TIME: 12.9 SEC (ref 12–14.6)
RBC # BLD AUTO: 4.43 M/UL (ref 4.2–5.4)
WBC # BLD AUTO: 6.4 K/UL (ref 4.8–10.8)

## 2021-07-30 PROCEDURE — 82565 ASSAY OF CREATININE: CPT

## 2021-07-30 PROCEDURE — 72050 X-RAY EXAM NECK SPINE 4/5VWS: CPT

## 2021-07-30 PROCEDURE — 36415 COLL VENOUS BLD VENIPUNCTURE: CPT

## 2021-07-30 PROCEDURE — 85610 PROTHROMBIN TIME: CPT

## 2021-07-30 PROCEDURE — 85027 COMPLETE CBC AUTOMATED: CPT

## 2021-08-02 ENCOUNTER — PHYSICAL THERAPY (OUTPATIENT)
Dept: PHYSICAL THERAPY | Facility: REHABILITATION | Age: 71
End: 2021-08-02
Attending: NEUROLOGICAL SURGERY
Payer: MEDICARE

## 2021-08-02 DIAGNOSIS — M50.00 CERVICAL DISC DISORDER WITH MYELOPATHY, UNSPECIFIED CERVICAL REGION: ICD-10-CM

## 2021-08-02 PROCEDURE — 97140 MANUAL THERAPY 1/> REGIONS: CPT

## 2021-08-02 PROCEDURE — 97110 THERAPEUTIC EXERCISES: CPT

## 2021-08-02 NOTE — OP THERAPY DAILY TREATMENT
Outpatient Physical Therapy  DAILY TREATMENT     [facility] Outpatient Physical Therapy [place] [address] [employer] [facility], [address]  [place] [address]  Phone:  [phone]    Date: 08/02/2021    Patient: Lucía Rodriguez  YOB: 1950  MRN: [id number]     Time Calculation    Start time: 0953  Stop time: 1031 Time Calculation (min): 38 minutes     Chief Complaint: No chief complaint on file.    Visit #: 9    SUBJECTIVE:  No upper trap pain.  Neck motion improving.  Mid thoracic spine back remains (referral from CTJ)     OBJECTIVE:  45 degree torso can move 80% cervical extension and rotation AROM  0 degrees in standing.       Therapeutic Exercises (CPT 83145):     1. UPOC - 7/25/2021      Therapeutic Exercise Summary:   Rows - 3 sets purple/pink  45 degree standing cervical extension (serratus press) - 2 sets   sidelying cervical rotation - 2 sets   Rear Delti Flies - 3 sets   Resisted Chin Tucks - 10 sets   SL ER -  3 sets at 1 pound  Shrugs - 3# 2 x 15    Therapeutic Treatments and Modalities:     1. Manual Therapy (CPT 89455)    Therapeutic Treatment and Modalities Summary: Cervical Rotation with SNAG CTJ.   IASTM upper trapezius     Time-based treatments/modalities:    Physical Therapy Timed Treatment Charges  Manual therapy minutes (CPT 79141): 8 minutes  Therapeutic exercise minutes (CPT 76865): 30 minutes    ASSESSMENT:   Continued on progressive loading this visit, patient remains significantly sore post PT after strengthening activities.  Patient has lower cervical spine referral pain into thoracic spine and localized in scapula.  Focused on continued postural strengthening.  Anticipate as cervical extension is restored, pain will relieve, but limited currently by tissue healing and results of surgery.     PLAN/RECOMMENDATIONS:   Plan for treatment: therapy treatment to continue next visit.  Planned interventions for next visit: continue with current treatment.

## 2021-08-04 ENCOUNTER — PHYSICAL THERAPY (OUTPATIENT)
Dept: PHYSICAL THERAPY | Facility: REHABILITATION | Age: 71
End: 2021-08-04
Attending: NEUROLOGICAL SURGERY
Payer: MEDICARE

## 2021-08-04 DIAGNOSIS — M50.00 CERVICAL DISC DISORDER WITH MYELOPATHY, UNSPECIFIED CERVICAL REGION: ICD-10-CM

## 2021-08-04 PROCEDURE — 97110 THERAPEUTIC EXERCISES: CPT

## 2021-08-04 NOTE — OP THERAPY DAILY TREATMENT
Outpatient Physical Therapy  DAILY TREATMENT     [facility] Outpatient Physical Therapy [place] [address] [facility], [address]  [place] [address]  Phone:  [phone]    Date: 08/04/2021    Patient: Lucía Rodriguez  YOB: 1950  MRN: [id number]     Time Calculation    Start time: 0948  Stop time: 1030 Time Calculation (min): 42 minutes     Chief Complaint: No chief complaint on file.    Visit #: 10    SUBJECTIVE:  Felt a little sore, but it's getting a little better.     OBJECTIVE:  45 degree torso can move 80% cervical extension and rotation AROM  0 degrees in standing.       Therapeutic Exercises (CPT 07733):     1. UPOC - 7/25/2021      Therapeutic Exercise Summary:   Rows - 3 sets purple/pink  45 degree standing cervical extension (serratus press) - 2 sets   sidelying cervical rotation - 2 sets   Rear Delti Flies - 3 sets   Resisted Chin Tucks - 10 sets   SL ER -  3 sets at 1 pound  Shrugs - 3# 2 x 15    HEP given - Access Code: [email or web address] once cleared to return to exercise.      Therapeutic Treatments and Modalities:     1. Manual Therapy (CPT 18106)    Therapeutic Treatment and Modalities Summary:   (held)  Cervical Rotation with SNAG CTJ.   IASTM upper trapezius     Time-based treatments/modalities:    Physical Therapy Timed Treatment Charges  Therapeutic exercise minutes (CPT 66604): 40 minutes    ASSESSMENT:   Provided HEP and reviewed exercises.  Patient will be on hold until cardiovascular surgery performed and patient cleared to return to PT. At this time patient has lower cervical spine referral pain into thoracic spine and localized in scapula.  Focused on continued postural strengthening.  Anticipate as cervical extension is restored, pain will relieve, but limited currently by tissue healing and results of surgery. Overall progressing slowly but consistently.     PLAN/RECOMMENDATIONS:   Plan for treatment: therapy treatment to continue next visit.  Planned interventions for next visit: continue  with current treatment.

## 2021-08-05 ENCOUNTER — APPOINTMENT (OUTPATIENT)
Dept: RADIOLOGY | Facility: MEDICAL CENTER | Age: 71
End: 2021-08-05
Attending: NURSE PRACTITIONER
Payer: MEDICARE

## 2021-08-05 ENCOUNTER — HOSPITAL ENCOUNTER (OUTPATIENT)
Facility: MEDICAL CENTER | Age: 71
End: 2021-08-05
Attending: FAMILY MEDICINE | Admitting: RADIOLOGY
Payer: MEDICARE

## 2021-08-05 VITALS
BODY MASS INDEX: 30.16 KG/M2 | WEIGHT: 181 LBS | OXYGEN SATURATION: 94 % | DIASTOLIC BLOOD PRESSURE: 64 MMHG | TEMPERATURE: 97.2 F | HEIGHT: 65 IN | SYSTOLIC BLOOD PRESSURE: 135 MMHG | RESPIRATION RATE: 16 BRPM | HEART RATE: 60 BPM

## 2021-08-05 DIAGNOSIS — Z95.828 PRESENCE OF IVC FILTER: ICD-10-CM

## 2021-08-05 PROCEDURE — 99153 MOD SED SAME PHYS/QHP EA: CPT

## 2021-08-05 PROCEDURE — 160002 HCHG RECOVERY MINUTES (STAT)

## 2021-08-05 PROCEDURE — 700117 HCHG RX CONTRAST REV CODE 255: Performed by: RADIOLOGY

## 2021-08-05 PROCEDURE — 51798 US URINE CAPACITY MEASURE: CPT

## 2021-08-05 PROCEDURE — A9270 NON-COVERED ITEM OR SERVICE: HCPCS | Performed by: RADIOLOGY

## 2021-08-05 PROCEDURE — 700105 HCHG RX REV CODE 258: Performed by: RADIOLOGY

## 2021-08-05 PROCEDURE — 700102 HCHG RX REV CODE 250 W/ 637 OVERRIDE(OP): Performed by: RADIOLOGY

## 2021-08-05 PROCEDURE — 700111 HCHG RX REV CODE 636 W/ 250 OVERRIDE (IP)

## 2021-08-05 RX ORDER — ONDANSETRON 2 MG/ML
4 INJECTION INTRAMUSCULAR; INTRAVENOUS PRN
Status: DISCONTINUED | OUTPATIENT
Start: 2021-08-05 | End: 2021-08-05

## 2021-08-05 RX ORDER — SODIUM CHLORIDE 9 MG/ML
1000 INJECTION, SOLUTION INTRAVENOUS CONTINUOUS
Status: DISCONTINUED | OUTPATIENT
Start: 2021-08-05 | End: 2021-08-05 | Stop reason: HOSPADM

## 2021-08-05 RX ORDER — OXYCODONE HYDROCHLORIDE 5 MG/1
10 TABLET ORAL
Status: DISCONTINUED | OUTPATIENT
Start: 2021-08-05 | End: 2021-08-05 | Stop reason: HOSPADM

## 2021-08-05 RX ORDER — OXYCODONE HYDROCHLORIDE 5 MG/1
5 TABLET ORAL
Status: DISCONTINUED | OUTPATIENT
Start: 2021-08-05 | End: 2021-08-05 | Stop reason: HOSPADM

## 2021-08-05 RX ORDER — SODIUM CHLORIDE 9 MG/ML
500 INJECTION, SOLUTION INTRAVENOUS
Status: DISCONTINUED | OUTPATIENT
Start: 2021-08-05 | End: 2021-08-05 | Stop reason: HOSPADM

## 2021-08-05 RX ORDER — MIDAZOLAM HYDROCHLORIDE 1 MG/ML
.5-2 INJECTION INTRAMUSCULAR; INTRAVENOUS PRN
Status: DISCONTINUED | OUTPATIENT
Start: 2021-08-05 | End: 2021-08-05

## 2021-08-05 RX ORDER — ONDANSETRON 2 MG/ML
4 INJECTION INTRAMUSCULAR; INTRAVENOUS EVERY 8 HOURS PRN
Status: DISCONTINUED | OUTPATIENT
Start: 2021-08-05 | End: 2021-08-05 | Stop reason: HOSPADM

## 2021-08-05 RX ORDER — MIDAZOLAM HYDROCHLORIDE 1 MG/ML
INJECTION INTRAMUSCULAR; INTRAVENOUS
Status: COMPLETED
Start: 2021-08-05 | End: 2021-08-05

## 2021-08-05 RX ORDER — FLECAINIDE ACETATE 50 MG/1
50-100 TABLET ORAL 2 TIMES DAILY
COMMUNITY
End: 2021-08-06 | Stop reason: SDUPTHER

## 2021-08-05 RX ADMIN — SODIUM CHLORIDE 1000 ML: 9 INJECTION, SOLUTION INTRAVENOUS at 08:50

## 2021-08-05 RX ADMIN — MIDAZOLAM HYDROCHLORIDE 1 MG: 1 INJECTION, SOLUTION INTRAMUSCULAR; INTRAVENOUS at 10:00

## 2021-08-05 RX ADMIN — IOHEXOL 50 ML: 300 INJECTION, SOLUTION INTRAVENOUS at 10:43

## 2021-08-05 RX ADMIN — FENTANYL CITRATE 25 MCG: 50 INJECTION, SOLUTION INTRAMUSCULAR; INTRAVENOUS at 10:00

## 2021-08-05 RX ADMIN — OXYCODONE 5 MG: 5 TABLET ORAL at 12:19

## 2021-08-05 ASSESSMENT — PAIN DESCRIPTION - PAIN TYPE
TYPE: SURGICAL PAIN

## 2021-08-05 ASSESSMENT — FIBROSIS 4 INDEX: FIB4 SCORE: 1.28

## 2021-08-05 NOTE — PROGRESS NOTES
IR Nursing Note      IVC filter removal by MD Pang assisted by ANTONIA Woo access site.    Access site sealed with gauze and covered with tegaderm.    IVC filter not found in LDA's.    Unable to retrieve ICV filter after several attempts.    Report given to SADIA Jeter.*  Patient transported to [facility] via IR RN monitored then transferred care to report RN.

## 2021-08-05 NOTE — OR NURSING
1054 Pt arrived from IR via gurney. Report given by Jayleen MCCULLOGUH. Awake and alert. RA. Even non labored breathing, lungs clear, airway patent. SR. R neck incision, dressing cdi, dry gauze, tegaderm. Denies pain, nausea, sob, chest pain. PIV patent, sl. Strict bed rest 1 hour. HOB 30 or greater. Neuro intact. Strong +2 ble pedal pulses, cms intact.     1115 given purewick for urination. SR with rare PVC, first degree av block, 0.24 IA. (hx of intermittent first degree)    1130 awake and alert. Denies pain, nausea, sob, chest pain, r neck dressing cdi, no hematoma, no drainage. Neuro intact. BLE cms intact.    1137 525cc bladder scan. Unable to urinate with pure wick.  Pt off bedrest in 30 min. Plan to walk to bathroom to urinate.     1144 updated Jose, spouse. O2 tank at 80% full for transfer. Personal belongings in locker 34    1145 no changes.    1200 up to bathroom. Steady on feet. Large urine output yellow clear. Pt ready for phase 2    1208 Report given to SADIA Keen    1211 ready for transfer to phase 2. VSS             Burow's Advancement Flap Text: The defect edges were debeveled with a #15 scalpel blade.  Given the location of the defect and the proximity to free margins a Burow's advancement flap was deemed most appropriate.  Using a sterile surgical marker, the appropriate advancement flap was drawn incorporating the defect and placing the expected incisions within the relaxed skin tension lines where possible.    The area thus outlined was incised deep to adipose tissue with a #15 scalpel blade.  The skin margins were undermined to an appropriate distance in all directions utilizing iris scissors.

## 2021-08-05 NOTE — PROGRESS NOTES
Radiology Progress Note- Telephone follow up    Author: ASHLY Armenta Date & Time created: 8/5/2021  1:47 PM   Date of admission  8/5/2021    Chief Complaint  70 y.o. female came in through outpatient on 8/5/2021 for a Unsuccessful IVC Filter Removal    5 minutes spent on the phone with patient     Assessment/Plan  Interval History   Patient Active Problem List    Diagnosis Date Noted   • Acute deep vein thrombosis (DVT) of tibial vein of right lower extremity ([facility]) 05/20/2021   • Presence of IVC filter 05/20/2021   • Subclinical hyperthyroidism 05/06/2021   • History of DVT (deep vein thrombosis) 05/05/2021   • Bradycardia 05/05/2021   • Acute deep vein thrombosis (DVT) of other specified vein of right lower extremity ([facility]) 04/01/2021   • Anticoagulated 03/09/2021   • Syncope and collapse 03/09/2021   • Central stenosis of spinal canal 01/18/2021   • Peripheral polyneuropathy 01/07/2021   • CKD (chronic kidney disease) stage 3, GFR 30-59 ml/min ([facility]) 09/21/2020   • Osteoarthritis of hands, bilateral 12/02/2016   • PATEL positive 06/04/2015   • Osteopenia 03/08/2015   • Glaucoma 12/02/2014   • Hyperlipidemia 11/19/2014   • Takotsubo cardiomyopathy 11/19/2014   • PAF (paroxysmal atrial fibrillation) ([facility]) 12/27/2013        Plan IR  -Education provdied to patient about IVC and unsuccessful removal. It was disucssed that filter could stay in place or be surgically removed but would require surgery to remove. Risk and benefit were discussed of leaving filter in place.   - No baths/ swimming/ soaking in tub for 5 days. Shower OK. OK to change dressings to band aid as needed.  - Ok to resume physical therapy and all activities after 4-5 days post procedure starting 8/5/21   - Thank you for allowing Interventional Radiology team to participate in the patients care, if any additonal care or requests are needed in the future please do not hesitate call or place IR order      [id number]  IR:              Labs             IR-RETRIEVAL/REMOVAL, VENA CAVA FILTER   Final Result      1. Inferior venacavogram demonstrating no evidence of thrombus within the infrarenal Option Elite filter.  No evidence of IVC stenosis, occlusive lesion or other significant IVC abnormality.      2. Unsuccessful removal of infrarenal Option Elite IVC filter.        No results for input(s): SODIUM, POTASSIUM, CHLORIDE, CO2, GLUCOSE, BUN, CPKTOTAL in the last 72 hours.  INR   Date Value Ref Range Status   07/30/2021 1.00 0.87 - 1.13 Final     Comment:     INR - Non-therapeutic Reference Range: 0.87-1.13  INR - Therapeutic Reference Range: 2.0-4.0       No results found for: POCINR   No intake or output data in the 24 hours ending 08/05/21 1347    Results     ** No results found for the last 168 hours. **             Labs not explicitly included in this progress note were reviewed by the author. Radiology/imaging not explicitly included in this progress note was reviewed by the author.     I have performed a telephone ROS and updated the patient with the plan of care (8/5/2021).     10 minutes in directly providing, coordinating, and educating patient with extensive data review.

## 2021-08-05 NOTE — DISCHARGE INSTRUCTIONS
ACTIVITY: Rest and take it easy for the first 24 hours.  A responsible adult is recommended to remain with you during that time.  It is normal to feel sleepy.  We encourage you to not do anything that requires balance, judgment or coordination.    MILD FLU-LIKE SYMPTOMS ARE NORMAL. YOU MAY EXPERIENCE GENERALIZED MUSCLE ACHES, THROAT IRRITATION, HEADACHE AND/OR SOME NAUSEA.    FOR 24 HOURS DO NOT:  Drive, operate machinery or run household appliances.  Drink beer or alcoholic beverages.   Make important decisions or sign legal documents.    SPECIAL INSTRUCTIONS: Check with Dr barnett for instructions    DIET: To avoid nausea, slowly advance diet as tolerated, avoiding spicy or greasy foods for the first day.  Add more substantial food to your diet according to your physician's instructions.  INCREASE FLUIDS AND FIBER TO AVOID CONSTIPATION.    SURGICAL DRESSING/BATHING: follow MD instructions    FOLLOW-UP APPOINTMENT:  A follow-up appointment should be arranged with your doctor in 1-2 weeks; call to schedule.    You should CALL YOUR PHYSICIAN if you develop:  Fever greater than 101 degrees F.  Pain not relieved by medication, or persistent nausea or vomiting.  Excessive bleeding (blood soaking through dressing) or unexpected drainage from the wound.  Extreme redness or swelling around the incision site, drainage of pus or foul smelling drainage.  Inability to urinate or empty your bladder within 8 hours.  Problems with breathing or chest pain.    You should call 911 if you develop problems with breathing or chest pain.  If you are unable to contact your doctor or surgical center, you should go to the nearest emergency room or urgent care center.    Physician's telephone #: [phone]    If any questions arise, call your doctor.  If your doctor is not available, please feel free to call the Surgical Center at {Surgical Dept Numbers:[id number]}. The Contact Center is open Monday through Friday 7AM to 5PM and may speak  to a nurse at [phone], or toll free at [phone].     A registered nurse may call you a few days after your surgery to see how you are doing after your procedure.    MEDICATIONS: Resume taking daily medication.  Take prescribed pain medication with food.  If no medication is prescribed, you may take non-aspirin pain medication if needed.  PAIN MEDICATION CAN BE VERY CONSTIPATING.  Take a stool softener or laxative such as senokot, pericolace, or milk of magnesia if needed.     Last pain medication Roxicodone  given at 12:19    If your physician has prescribed pain medication that includes Acetaminophen (Tylenol), do not take additional Acetaminophen (Tylenol) while taking the prescribed medication.    Depression / Suicide Risk    As you are discharged from this [facility] facility, it is important to learn how to keep safe from harming yourself.    Recognize the warning signs:  · Abrupt changes in personality, positive or negative- including increase in energy   · Giving away possessions  · Change in eating patterns- significant weight changes-  positive or negative  · Change in sleeping patterns- unable to sleep or sleeping all the time   · Unwillingness or inability to communicate  · Depression  · Unusual sadness, discouragement and loneliness  · Talk of wanting to die  · Neglect of personal appearance   · Rebelliousness- reckless behavior  · Withdrawal from people/activities they love  · Confusion- inability to concentrate     If you or a loved one observes any of these behaviors or has concerns about self-harm, here's what you can do:  · Talk about it- your feelings and reasons for harming yourself  · Remove any means that you might use to hurt yourself (examples: pills, rope, extension cords, firearm)  · Get professional help from the community (Mental Health, Substance Abuse, psychological counseling)  · Do not be alone:Call your Safe Contact- someone whom you trust who will be there for  you.  · Call your local CRISIS HOTLINE 865-5326 or 502-540-9755  · Call your local Children's Mobile Crisis Response Team [place] (479) 222-4848 or www.[employer]  · Call the toll free National Suicide Prevention Hotlines   · National Suicide Prevention Lifeline 801-513-TYXK (5097)  · National Meograph Line Network 800-SUICIDE (546-9665)

## 2021-08-05 NOTE — OR NURSING
D/c orders received. IV dc'd. Pt changed into clothing with assistance. Pt up and ambulated to BR, steady gait, voided adequately. Discharge instructions given  pt and family verbalized understanding and questions answered. Patient states ready to d/c home. Pt dc'd in w/c with Cna in stable condition.

## 2021-08-05 NOTE — OR SURGEON
Immediate Post- Operative Note        PostOp Diagnosis: Resolved DVT/PE    Procedure(s): Unsuccessful IVC Filter Removal    Estimated Blood Loss: Less than 5 ml    Complications: None      8/5/2021     10:31 AM     Brown Pang M.D.

## 2021-08-05 NOTE — OR NURSING
Pt's VSS; denies N/V; states pain is at  Level of 4 out of 10. Roxicodone given see MAR for details.  Dressing CDI to R Chest wall.

## 2021-08-06 ENCOUNTER — TELEPHONE (OUTPATIENT)
Dept: CARDIOLOGY | Facility: MEDICAL CENTER | Age: 71
End: 2021-08-06

## 2021-08-06 RX ORDER — FLECAINIDE ACETATE 50 MG/1
50-100 TABLET ORAL 2 TIMES DAILY
Qty: 270 TABLET | Refills: 3 | Status: SHIPPED | OUTPATIENT
Start: 2021-08-06 | End: 2022-07-06

## 2021-08-06 NOTE — TELEPHONE ENCOUNTER
DS    Patient called to advise they will need their metoprolol tartrate (LOPRESSOR) 25 MG Tab and flecainide (TAMBOCOR) 50 MG tablet to be fill soon. Please send to the pharmacy on file.     Thank you,  Linnette MONTOYA

## 2021-08-10 ENCOUNTER — TELEPHONE (OUTPATIENT)
Dept: VASCULAR LAB | Facility: MEDICAL CENTER | Age: 71
End: 2021-08-10

## 2021-08-10 DIAGNOSIS — Z95.828 PRESENCE OF IVC FILTER: ICD-10-CM

## 2021-08-10 DIAGNOSIS — E78.1 HYPERTRIGLYCERIDEMIA: ICD-10-CM

## 2021-08-10 RX ORDER — ATORVASTATIN CALCIUM 20 MG/1
20 TABLET, FILM COATED ORAL DAILY
Qty: 90 TABLET | Refills: 1 | Status: SHIPPED | OUTPATIENT
Start: 2021-08-10 | End: 2021-12-15

## 2021-08-11 NOTE — TELEPHONE ENCOUNTER
Spoke with pt over the phone regarding IVC filter failed removal attempt.  Discussed options moving forward.    She would like to see a vascular surgeon to discuss potential filter removal.  Pt had severe topical skin reaction to tape used after procedure.  Please avoid in future.   Also listed in allergies.       Michelle MARTINEZ  [facility]

## 2021-08-19 ENCOUNTER — PHYSICAL THERAPY (OUTPATIENT)
Dept: PHYSICAL THERAPY | Facility: REHABILITATION | Age: 71
End: 2021-08-19
Attending: NEUROLOGICAL SURGERY
Payer: MEDICARE

## 2021-08-19 DIAGNOSIS — M50.00 CERVICAL DISC DISORDER WITH MYELOPATHY, UNSPECIFIED CERVICAL REGION: ICD-10-CM

## 2021-08-19 PROCEDURE — 999999 HB NO CHARGE

## 2021-08-19 NOTE — OP THERAPY DAILY TREATMENT
"Physical therapy office has not received clearance from Surgeon/MD to return to PT post attempted IVC filter removal.  Patient aware and reports that she asked them to fax clearance, and they said they would.  She will follow up today.  PT office reached out to office to ask for fax today.      Request that clearance states any limitations that patient may have after unsuccessful IVC filter removal, as patient reports she was told that it was \"twisted\" but unlikely to break off.      PT and patient discussed steps moving forward as well as continuing to hold on PT exercises until clearance from MD/surgeon.      Patient reports she is following up in 1 month with vascular surgeon due to unsuccessful IVC filter removal, and reports that overall the procedure did not go well, as she states it was very painful and she was awake throughout.  Recommend she follow up with vascular medicine for further discussion and guidance as this procedure is out of the scope of physical therapy.            "

## 2021-08-19 NOTE — OP THERAPY DAILY TREATMENT
"  Outpatient Physical Therapy  DAILY TREATMENT     [facility] Outpatient Physical Therapy [address], [address]  [place] [address]  Phone:  [phone]    Date: 08/19/2021    Patient: Lucía Rodriguez  YOB: 1950  MRN: [id number]     Time Calculation    Start time: 0900  Stop time: 0926 Time Calculation (min): 26 minutes         Chief Complaint: No chief complaint on file.    Visit #: 11    SUBJECTIVE:  ***    OBJECTIVE:  Current objective measures: ***        Exercises/Treatment  Time-based treatments/modalities:           Pain rating (1-10) before treatment:  {PAIN NUMBERS_1-10:99280}  Pain rating (1-10) after treatment:  {PAIN NUMBERS_1-10:52909}    ASSESSMENT:   Response to treatment: ***    PLAN/RECOMMENDATIONS:   Plan for treatment: {AMB OP THERAPY - THERAPY PLAN:813900539::\"therapy treatment to continue next visit\"}.  Planned interventions for next visit: {PT PLANNED THERAPY INTERVENTIONS:964167954::\"continue with current treatment\"}.       "

## 2021-08-20 ENCOUNTER — OFFICE VISIT (OUTPATIENT)
Dept: VASCULAR LAB | Facility: MEDICAL CENTER | Age: 71
End: 2021-08-20
Attending: FAMILY MEDICINE
Payer: MEDICARE

## 2021-08-20 VITALS
HEIGHT: 65 IN | HEART RATE: 65 BPM | BODY MASS INDEX: 30.32 KG/M2 | SYSTOLIC BLOOD PRESSURE: 121 MMHG | DIASTOLIC BLOOD PRESSURE: 71 MMHG | WEIGHT: 182 LBS

## 2021-08-20 DIAGNOSIS — Z95.828 PRESENCE OF IVC FILTER: ICD-10-CM

## 2021-08-20 DIAGNOSIS — Z79.01 ANTICOAGULATED: ICD-10-CM

## 2021-08-20 DIAGNOSIS — E78.2 MIXED HYPERLIPIDEMIA: ICD-10-CM

## 2021-08-20 DIAGNOSIS — I48.0 PAF (PAROXYSMAL ATRIAL FIBRILLATION) (HCC): ICD-10-CM

## 2021-08-20 DIAGNOSIS — I82.441 ACUTE DEEP VEIN THROMBOSIS (DVT) OF TIBIAL VEIN OF RIGHT LOWER EXTREMITY (HCC): ICD-10-CM

## 2021-08-20 PROCEDURE — 99214 OFFICE O/P EST MOD 30 MIN: CPT | Performed by: FAMILY MEDICINE

## 2021-08-20 PROCEDURE — 99212 OFFICE O/P EST SF 10 MIN: CPT

## 2021-08-20 ASSESSMENT — ENCOUNTER SYMPTOMS
SHORTNESS OF BREATH: 0
VOMITING: 0
CHILLS: 0
DIARRHEA: 0
NAUSEA: 0
TREMORS: 0
WEAKNESS: 0
DIZZINESS: 0
COUGH: 0
PALPITATIONS: 0
WHEEZING: 0
FEVER: 0
ABDOMINAL PAIN: 0
SEIZURES: 0
FOCAL WEAKNESS: 0
BRUISES/BLEEDS EASILY: 0
MYALGIAS: 0
HEADACHES: 0
HEMOPTYSIS: 0

## 2021-08-20 ASSESSMENT — FIBROSIS 4 INDEX: FIB4 SCORE: 1.28

## 2021-08-20 NOTE — PROGRESS NOTES
oFOLLOW-UP VASCULAR ANTICOAGULATION VISIT  Subjective:   Lucía Rodriguez is a female who presents today 08/20/21  for   Chief Complaint   Patient presents with   • Follow-Up   Initially referred by PCP for eval for possible IVC filter placement and length of therapy (LOT) determination and management of anticoagulation in context of acute venothromboembolic disease    HPI:    IVC filter:   Failed IR-based retrieval.  Pending with Dr. Ash (vasc surg) for review and retrieval in 9/21.  Concerned about reasons for failure, apparently filter is tilted.    IR retrieval attempt:  8/5/21:  1. Inferior venacavogram demonstrating no evidence of thrombus within the infrarenal Option Elite filter.  No evidence of IVC stenosis, occlusive lesion or other significant IVC abnormality.   2. Unsuccessful removal of infrarenal Option Elite IVC filter.    Date of placement: 4/12/21   1. Ultrasound and fluoroscopic guided placement of a Argon Option retrievable caval filter in the infrarenal IVC.  The Argon Option retrievable filter is suitable for removal under usual circumstances to 180 days (6 months) and sometimes up to one year and longer. This filter may also be used as a permanent filter. Please consult Interventional Radiology for filter   removal if indicated.   2. Inferior vena cavagram within normal limits with no evidence of caval thrombus or occlusion.  Reason for placement: recent DVT, unable to provide AC due to periop bleed risk for neck surg - s/p neck surg on 5/3/21 with Dr. Greene   Pending surgeries: none reported at this time    VTE disease / Anticoagulation:   Current symptoms:  Denies current SOB, CP, leg swelling, edema, leg pain, cyanosis of digits, redness of extremities.  Current antithrombotic agent: eliquis 5mg BID   Complications: none, tolerating well, no bleeding or bruising   Date of initiation of anticoagulation: 1/20/21   Adherence: reports complete, no missed doses     Pertinent VTE pmhx:  Date  of Diagnosis: 1/20/21   Type of Venous thromboembolic disease (VTE): acute RLE DVT   Type of imaging:  Duplex at [facility]  Preceding/presenting symptoms: acute RLE foot/calf pain, had eval with MRI and ALY neg per PCP, then eval at [facility] with RLE duplex showing DVT   Antithrombotic therapy at time of VTE event: no  VTE tx course: Eliquis 10mg BID x 7d, now eliquis 5mg BID    Any personal VTE hx? No  Any family VTE hx? No   UNPROVOKED VS PROVOKED:   Recent surgery ? No  Recent trauma ? No  Smoker?  reports that she has never smoked. She has never used smokeless tobacco.    Extended travel? No  Other periods of immobility? No  Other risk factors for VTE disease:  no  WOMEN:  No abnormal cancer screenings in the past.        Any estrogen, testosterone HRT, E2 birth control, tamoxifene, raloxifene: no       Hx of recurrent miscarriages: no  Hypercoaguability work-up completed?  no (see assessment for details)    PAF:  Stable, no sx.  No prior TIA/CVA   Had biotel 28-day, pending with Dr. Thakkar for ongoing care 6/21, may need ILR to assist with dx   No current OAC.       Current Outpatient Medications:   •  atorvastatin, 20 mg, Oral, DAILY, Taking  •  metoprolol tartrate, 12.5 mg, Oral, BID, Taking  •  flecainide,  mg, Oral, BID, Taking  •  aspirin EC, 81 mg, Oral, BID, Taking  •  Psyllium (METAMUCIL PO), 1 Scoop, Oral, DAILY, Taking  •  gabapentin, 300 mg, Oral, BID, Taking     Social History     Tobacco Use   • Smoking status: Never Smoker   • Smokeless tobacco: Never Used   Vaping Use   • Vaping Use: Never used   Substance Use Topics   • Alcohol use: No   • Drug use: No     DIET AND EXERCISE:  Weight Change:stable   BMI Readings from Last 5 Encounters:   08/20/21 30.29 kg/m²   08/05/21 30.12 kg/m²   06/21/21 27.67 kg/m²   05/03/21 30.34 kg/m²   04/12/21 30.49 kg/m²     Diet: common adult  Exercise: no regular exercise program     Review of Systems   Constitutional: Negative for chills, fever and malaise/fatigue.  "  Respiratory: Negative for cough, hemoptysis, shortness of breath and wheezing.    Cardiovascular: Negative for chest pain, palpitations and leg swelling.   Gastrointestinal: Negative for abdominal pain, diarrhea, nausea and vomiting.   Musculoskeletal: Negative for joint pain and myalgias.   Skin: Negative for itching and rash.   Neurological: Negative for dizziness, tremors, focal weakness, seizures, weakness and headaches.   Endo/Heme/Allergies: Does not bruise/bleed easily.        Objective:     Vitals:    08/20/21 0942   BP: 121/71   BP Location: Left arm   Patient Position: Sitting   BP Cuff Size: Adult   Pulse: 65   Weight: 82.6 kg (182 lb)   Height: 1.651 m (5' 5\")      BP Readings from Last 5 Encounters:   08/20/21 121/71   08/05/21 135/64   06/21/21 110/72   05/08/21 135/65   04/12/21 111/58      Body mass index is 30.29 kg/m².     Physical Exam  Constitutional:       Appearance: Normal appearance.   HENT:      Head: Normocephalic.   Eyes:      Extraocular Movements: Extraocular movements intact.      Conjunctiva/sclera: Conjunctivae normal.   Pulmonary:      Effort: Pulmonary effort is normal.   Musculoskeletal:      Cervical back: Normal range of motion.   Skin:     General: Skin is dry.      Coloration: Skin is not pale.      Findings: No rash.   Neurological:      General: No focal deficit present.      Mental Status: She is alert and oriented to person, place, and time.   Psychiatric:         Mood and Affect: Mood normal.         Behavior: Behavior normal.         Lab Results   Component Value Date    CHOLSTRLTOT 159 01/07/2021    LDL 59 01/07/2021    HDL 63 01/07/2021    TRIGLYCERIDE 185 (H) 01/07/2021      Lab Results   Component Value Date    PROTHROMBTM 12.9 07/30/2021    INR 1.00 07/30/2021         Lab Results   Component Value Date    SODIUM 140 05/08/2021    POTASSIUM 4.3 05/08/2021    CHLORIDE 110 05/08/2021    CO2 22 05/08/2021    GLUCOSE 77 05/08/2021    BUN 18 05/08/2021    CREATININE 0.75 " 2021    IFAFRICA >60 2021    IFNOTAFR >60 2021        Lab Results   Component Value Date    WBC 6.4 2021    RBC 4.43 2021    HEMOGLOBIN 13.5 2021    HEMATOCRIT 42.0 2021    MCV 94.8 2021    MCH 30.5 2021    MCHC 32.1 (L) 2021    MPV 10.9 2021      VASCULAR IMAGING:     Last EKG:   Results for orders placed or performed in visit on 21   EKG   Result Value Ref Range    Report       [facility]    Test Date:  2021  Pt Name:    JOSEPH SRIVASTAVA                Department: [facility]  MRN:        [id number]                      Room:  Gender:     Female                       Technician:   :        1950                   Requested By:JOSE THAKKAR  Order #:    [id number]                    Reading MD: Jose Thakkar MD    Measurements  Intervals                                Axis  Rate:       66                           P:          64  WY:         235                          QRS:        5  QRSD:       95                           T:          46  QT:         436  QTc:        457    Interpretive Statements  Sinus rhythm  Prolonged WY interval  Low voltage, precordial leads  Compared to ECG 2021 11:42:21  Unchanged  Electronically Signed On 2021 11:51:28 PDT by Jose Thakkar MD       BLE venous 2020   Negative for DVT or SVT.    ALY 2021    No prior study is available for comparison.    No evidence of arterial insufficiency.    RLE venous 21 ([facility] - in media)   DVT Right post tib vein mid to prox portion with no extension to the pop vein    Biotel 28-day 21   1. Sinus rhythm with appropriate heart rate range. Average 65, range 40 to 120 bpm.   2. Normal sinus node and AV node conduction normal. No pauses.  Intermittent first-degree AV delay.   3. Rare PACs.   4. Rare PVCs.   5. No rhythm changes. No atrial fibrillation/flutter.   6.  6 patient triggers, symptoms reported include skipped beat, heart racing.    Conclusion: Sinus rhythm.  Rare PVCs and PACs.  No rhythm changes.  Probably symptomatic PVCs.     CT neck 5/5/21  CT of the neck soft tissues without contrast within normal limits.    Medical Decision Making:  Today's Assessment / Status / Plan:     1. Acute deep vein thrombosis (DVT) of tibial vein of right lower extremity (HCC)     2. Presence of IVC filter     3. PAF (paroxysmal atrial fibrillation) (HCC)     4. Mixed hyperlipidemia     5. Anticoagulated        PATIENT TYPE: Primary Prevention    Etiology of Established CVD if Present:     1) acute RLE DVT, 7/2020 - see below     2) PAF, currently stable on flecainide   Reduced metoprolol during hosp due to bradycardia   - not currently on OAC at this time, will defer to cardiologist to review GERMÁN-VASC and OAC for CVA risk prophylaxis  - defer ongoing care to Dr. Thakkar   - possible ILR placement to help with definitive dx of PAF   - if formal dx of PAF, may benefit with indefinite OAC based upon high GERMÁN-VASC and would defer decision to Dr. Thakkar   - if no OAC needed, then would suggest ASA ongoing     IVC filter:   Due to prior high risk post-op VTE Caprini risk score = 8pts for her Cspine surgery, indicating high risk (4.0% VTE risk), she had IVC filter placed due to the need to interrupt her therapy in light of her recent VTE event and her baseline risk for recurrent VTE is high, david since prior VTE event was unprovoked  Future Surgeries: none at this time   Bleeding complications while on anticoagulation: none, back on eliquis w/o issues   Decision:   - due to failed retrieval attempt per IR, pt pending with vasc surg in  Sept for review and continued monitoring/coordination per APRN   - stable for IVC filter removal.    - reviewed of options including maintenance of filter and retrieval, and through [id number] has opted for retrieval in line with current FDA recommendations for filter retrieval when no longer indicated   - provided review of  anatomy and basics of retrieval procedure in addition to a pt educ handout.    ANTITHROMBOTIC THERAPY:  Anti-Platelet/Anti-Coagulant Tx recommended: yes  Indication: acute RLE DVT, apparently unprovoked   Date of initiation:  1/2021   HAS-BLED bleeding risk calc ([email or web address]): 1 pt, 3.4%, low risk  Thrombophilia/hypercoag evaluation:  not recommended  Factors to consider for indefinite OAC: Unprovoked VTE event  Last CBC, BMP: 1/2021   Expected duration: reviewed standard of care as per ACCP AT10 guidelines (2016) is 3-6 months minimum on full dose OAC.  After review of risks/benefits/alternatives, through shared decision-making, she has completed 6mo on full-dose DOAC (ended 7/2021)  - she will not require more than 6mo OAC for DVT as it was distal RLE and low risk involving posterior tib v only.   Antithrombotic therapy plan:  - continue ASA 162mg daily   - to f/u with cardiology to determine if Afib, may return to DOAC if Afib noted   - check CBC, BMP (CMP if any underlying liver disease), and monitor CrCl as needed Q6mo while on DOAC  - counseled on signs and symptoms of acute VTE that require seeking prompt attention in the ED to include shortness of breath, chest pain, pain with deep inhalation, acute leg swelling and/or pain in calf or leg   - elevate legs as much as possible, use compression stockings/socks if directed by your provider  - Avoid hormonal therapies including estrogen or testosterone-containing meds, or raloxifene or tamoxifene (commonly used for osteoporosis)  - Avoid sedentary periods  - continue complete avoidance of tobacco products  - if having any invasive procedure, please make sure the doctor knows of your history of blood clots and current anticoagulation status  - avoid Aspirin and anti-inflammatories (eg. Advil, ibuprofen, Aleve, naproxen, etc) while anticoagulated   - avoid skiing or other dangerous activities to reduce risk of head injury and brain bleeds  - recommended to see your  PCP to discuss if you need age-appropriate cancer screenings as a small % of blood clots may be caused by an underlying malignancy  - if any bleeding lasting 30min without stopping, please seek care with your PCP, urgent care, or ED  - reversal agents for most blood thinners are now available and used if you have major bleeding    LIFESTYLE RECOMMENDATIONS:     Smoking:  reports that she has never smoked. She has never used smokeless tobacco.   - continued complete avoidance of all tobacco products     Physical Activity: continue healthy activity to improve CV fitness, see care instructions for additional details     Weight Management and Nutrition: Dietary plan was discussed with patient at this visit including DASH, low sodium and/or as outlined in care instructions     OTHER:    # s/p Cspine surg, stable   - defer mgmt to Dr. Greene     Instructed to follow-up with PCP for remainder of adult medical needs: yes  We will partner with other providers in the management of established vascular disease and cardiometabolic risk factors.    Studies to Be Obtained: IVC filter retrieval - Sept   Labs to Be Obtained: as noted above     Follow up in:  Pending with vasc surg for IVC filter retrieval, vasc med available prn , ongoing  Care deferred to cardiology and PCP     Nathaniel Ugarte M.D.  [facility]   [facility]   [phone]

## 2021-08-25 ENCOUNTER — PHYSICAL THERAPY (OUTPATIENT)
Dept: PHYSICAL THERAPY | Facility: REHABILITATION | Age: 71
End: 2021-08-25
Attending: NEUROLOGICAL SURGERY
Payer: MEDICARE

## 2021-08-25 DIAGNOSIS — M50.00 CERVICAL DISC DISORDER WITH MYELOPATHY, UNSPECIFIED CERVICAL REGION: ICD-10-CM

## 2021-08-25 PROCEDURE — 97110 THERAPEUTIC EXERCISES: CPT

## 2021-09-01 ENCOUNTER — PHYSICAL THERAPY (OUTPATIENT)
Dept: PHYSICAL THERAPY | Facility: REHABILITATION | Age: 71
End: 2021-09-01
Attending: NEUROLOGICAL SURGERY
Payer: MEDICARE

## 2021-09-01 DIAGNOSIS — M50.00 CERVICAL DISC DISORDER WITH MYELOPATHY, UNSPECIFIED CERVICAL REGION: ICD-10-CM

## 2021-09-01 PROCEDURE — 97110 THERAPEUTIC EXERCISES: CPT

## 2021-09-01 PROCEDURE — 97140 MANUAL THERAPY 1/> REGIONS: CPT

## 2021-09-01 NOTE — OP THERAPY DAILY TREATMENT
Outpatient Physical Therapy  DAILY TREATMENT     [facility] Outpatient Physical Therapy [place] [address], [address]  [place] [address]  Phone:  [phone]    Date: 09/01/2021    Patient: Lucía Rodriguez  YOB: 1950  MRN: [id number]     Time Calculation    Start time: 1330  Stop time: 1413 Time Calculation (min): 43 minutes         Chief Complaint: Shoulder Problem and Neck Problem    Visit #: 13    SUBJECTIVE:  Patient reports overall improvement, continued symptoms in L shoulder. Patient states complaince with HEP    OBJECTIVE:  Current objective measures:           Therapeutic Exercises (CPT 88860):     1. UPOC - 7/25/2021      Therapeutic Exercise Summary:   Rows - 3 sets purple/pink  serratus press with cervical extension - 2 sets   sidelying cervical rotation - 2 sets   Cervical Rotation to left - 20x   Chin tucks with SB - improves left rotation, but symptoms with C5 referral (hold)   Rear Delt Flies - 3 sets   Shrugs - 3# 2 x 15    Resisted Chin Tucks - 10 sets   SL ER -  3 sets at 1 pound    Therapeutic Treatments and Modalities:     1. Manual Therapy (CPT 94646)    Therapeutic Treatment and Modalities Summary:   (held)  Cervical Rotation with SNAG CTJ.   IASTM upper trapezius     Time-based treatments/modalities:    Physical Therapy Timed Treatment Charges  Manual therapy minutes (CPT 63535): 13 minutes  Therapeutic exercise minutes (CPT 64942): 26 minutes          ASSESSMENT:   Response to treatment: Tolerated treatment well and reported no pain following treatment session. Demonstrated good form with exercises. Continued limitations in cervical motion. Continue with HEP and progress as tolerated.     PLAN/RECOMMENDATIONS:   Plan for treatment: therapy treatment to continue next visit.  Planned interventions for next visit: continue with current treatment.

## 2021-09-07 ENCOUNTER — APPOINTMENT (OUTPATIENT)
Dept: PHYSICAL THERAPY | Facility: REHABILITATION | Age: 71
End: 2021-09-07
Attending: NEUROLOGICAL SURGERY
Payer: MEDICARE

## 2021-09-07 NOTE — OP THERAPY DAILY TREATMENT
Outpatient Physical Therapy  DAILY TREATMENT     [facility] Outpatient Physical Therapy [place] [address], [address]  [place] [address]  Phone:  [phone]    Date: 09/07/2021    Patient: Lucía Rodriguez  YOB: 1950  MRN: [id number]     Time Calculation                   Chief Complaint: No chief complaint on file.    Visit #: 14    SUBJECTIVE:  Patient reports overall improvement, continued symptoms in L shoulder. Patient states complaince with HEP    OBJECTIVE:  ***          Therapeutic Exercises (CPT 19256):     1. UPOC - 7/25/2021      Therapeutic Exercise Summary:   Rows - 3 sets purple/pink  serratus press with cervical extension - 2 sets   sidelying cervical rotation - 2 sets   Cervical Rotation to left - 20x   Chin tucks with SB - improves left rotation, but symptoms with C5 referral (hold)   Rear Delt Flies - 3 sets   Shrugs - 3# 2 x 15    Resisted Chin Tucks - 10 sets   SL ER -  3 sets at 1 pound      Time-based treatments/modalities:               ASSESSMENT:   ***    PLAN/RECOMMENDATIONS:   Plan for treatment: therapy treatment to continue next visit.  Planned interventions for next visit: continue with current treatment.

## 2021-09-09 ENCOUNTER — APPOINTMENT (OUTPATIENT)
Dept: PHYSICAL THERAPY | Facility: REHABILITATION | Age: 71
End: 2021-09-09
Attending: NEUROLOGICAL SURGERY
Payer: MEDICARE

## 2021-09-14 ENCOUNTER — PHYSICAL THERAPY (OUTPATIENT)
Dept: PHYSICAL THERAPY | Facility: REHABILITATION | Age: 71
End: 2021-09-14
Attending: NEUROLOGICAL SURGERY
Payer: MEDICARE

## 2021-09-14 DIAGNOSIS — M50.00 CERVICAL DISC DISORDER WITH MYELOPATHY, UNSPECIFIED CERVICAL REGION: ICD-10-CM

## 2021-09-14 PROCEDURE — 97110 THERAPEUTIC EXERCISES: CPT

## 2021-09-14 NOTE — OP THERAPY PROGRESS SUMMARY
Outpatient Physical Therapy  RE-EVALUATION NOTE      [facility] Outpatient Physical Therapy [place] [address], [address]  [place] [address]  Phone:  [phone]    Date of Visit: 09/14/2021    Patient: Lucía Rodriguez  YOB: 1950  MRN: [id number]     Referring Provider: Heri Greene M.D.  [address] [place] [address]   Referring Diagnosis Cervical disc disorder with myelopathy, unspecified cervical region [M50.00]     Visit Diagnoses     ICD-10-CM   1. Cervical disc disorder with myelopathy, unspecified cervical region  M50.00       Rehab Potential: good    Progress Report Period: 5/3/21 - 9/14/21  Large disruption in care secondary to IVC filter removal procedure and lack of clearance to return to PT (3 weeks), and has no been to PT since 8/25 (> almost another 3 weeks), resulting in significant delay in progress.     Objective Findings and Assessment:   Patient progression towards goals: Short Term Goals:   Patient will demonstrate compliance with HEP in 1 week in order to progress with physical therapy - MET   Patient will demonstrate 50% improvement in cervical ROM in order to improve participate in ADLs - MET      Long Term Goals:    Patient will demonstrate independence with HEP in 4 week in order to progress towards d/c from physical therapy - MET  Patient will demonstrate 100% improvement in cervical ROM in order to improve participate in ADLs - PROGRESSING  Patient will demonstrate DNF endurance test of 45 seconds in order to improve participate in heavy ADLs. - NOT MET    Objective findings and assessment details:   Cervical ROM   All WNL except 75% cervical extension   DNF endurance - 3 seconds   Scapular strength - overall good rear delt, RTC and rhomboid strength, 4/5 upper trap strength, 3/5 lower trap strength.     Goals:   Short Term Goals:   Patient will demonstrate DNF endurance test of 20 seconds in order to improve participate in heavy ADLs  Short term goal time  span:  2-4 weeks      Long Term Goals:    Patient will demonstrate DNF endurance test of 45 seconds in order to improve participate in heavy ADLs  Long term goal time span:  4-6 weeks    Plan:   Planned therapy interventions:  Neuromuscular Re-education (CPT 64658), Manual Therapy (CPT 33309), Therapeutic Activities (CPT 88857) and Therapeutic Exercise (CPT 34048)  Frequency: 1x/week for 1-2 visits, 1x/2 weeks for 1-3 visits.  Duration in weeks:  8  Duration in visits:  4  Plan details:  Likely will spread visits out over 2 weeks after 1-2 visits.       Referring provider co-signature:  I have reviewed this plan of care and my co-signature certifies the need for services.     Certification Period: 09/14/2021 to 11/09/21    Physician Signature: ________________________________ Date: ______________

## 2021-09-14 NOTE — OP THERAPY DAILY TREATMENT
Outpatient Physical Therapy  DAILY TREATMENT     [facility] Outpatient Physical Therapy [place] [address], [address]  [place] [address]  Phone:  [phone]    Date: 09/14/2021    Patient: Lucía Rodriguez  YOB: 1950  MRN: [id number]     Time Calculation    Start time: 0945  Stop time: 1030 Time Calculation (min): 45 minutes     Chief Complaint: No chief complaint on file.    Visit #: 14    SUBJECTIVE:  Had a week long of A-fib, happens from time to time, haven't had it for the 1.5 weeks.  Only lasts 4-8 hours, and then it's gone.     Been doing the exercises you had me do at home, been helping.     OBJECTIVE:  See progress notes    DOS - 5/3/21          Therapeutic Exercises (CPT 39357):     1. UPOC - 7/25/2021      Therapeutic Exercise Summary:   Rows - 3 sets purple/pink  Self SNAG into cervical extension - 3 sets (GTB)  Cervical Rotation to left - 30x   Chin tucks with SB - improves left rotation, but symptoms with C5 referral (NT)  Rear Delt Flies - 3 sets   Shrugs - 10# 2 x 6  Rear delt flies - 3 sets   Resisted Chin Tucks - 3 sets   SL ER -  3 sets at 1 pound (next visit)  W's - GTB 3 sets   Serratus press with chin tuck holds GTB - 3 sets     Therapeutic Treatments and Modalities:     1. Manual Therapy (CPT 13094)    Therapeutic Treatment and Modalities Summary:   SNAG lower cervical spine with left rotation  IASTM upper trapezius    Time-based treatments/modalities:    Physical Therapy Timed Treatment Charges  Therapeutic exercise minutes (CPT 68023): 45 minutes    ASSESSMENT:   Patient progressing well with ROM, however remains significantly limited with DNF strength, and cervical conditioning.  Will focus on this from now on in PT.    PLAN/RECOMMENDATIONS:   Plan for treatment: therapy treatment to continue next visit.  Planned interventions for next visit: continue with current treatment.

## 2021-09-20 ENCOUNTER — OFFICE VISIT (OUTPATIENT)
Dept: CARDIOLOGY | Facility: MEDICAL CENTER | Age: 71
End: 2021-09-20
Payer: MEDICARE

## 2021-09-20 VITALS
OXYGEN SATURATION: 95 % | WEIGHT: 182 LBS | DIASTOLIC BLOOD PRESSURE: 68 MMHG | BODY MASS INDEX: 30.32 KG/M2 | SYSTOLIC BLOOD PRESSURE: 114 MMHG | HEIGHT: 65 IN | HEART RATE: 75 BPM

## 2021-09-20 DIAGNOSIS — Z95.828 PRESENCE OF IVC FILTER: ICD-10-CM

## 2021-09-20 DIAGNOSIS — Z79.01 ANTICOAGULATED: ICD-10-CM

## 2021-09-20 DIAGNOSIS — R00.1 BRADYCARDIA: ICD-10-CM

## 2021-09-20 DIAGNOSIS — I82.441 ACUTE DEEP VEIN THROMBOSIS (DVT) OF TIBIAL VEIN OF RIGHT LOWER EXTREMITY (HCC): ICD-10-CM

## 2021-09-20 DIAGNOSIS — Z86.718 HISTORY OF DVT (DEEP VEIN THROMBOSIS): ICD-10-CM

## 2021-09-20 DIAGNOSIS — I48.0 PAF (PAROXYSMAL ATRIAL FIBRILLATION) (HCC): ICD-10-CM

## 2021-09-20 LAB — EKG IMPRESSION: NORMAL

## 2021-09-20 PROCEDURE — 99214 OFFICE O/P EST MOD 30 MIN: CPT | Performed by: INTERNAL MEDICINE

## 2021-09-20 PROCEDURE — 93000 ELECTROCARDIOGRAM COMPLETE: CPT | Performed by: INTERNAL MEDICINE

## 2021-09-20 ASSESSMENT — FIBROSIS 4 INDEX: FIB4 SCORE: 1.28

## 2021-09-20 NOTE — PROGRESS NOTES
Chief Complaint   Patient presents with   • Atrial Fibrillation     F/V Dx: PAF (paroxysmal atrial fibrillation) (HCC)       Subjective     Lucía Rodriguez is a 70 y.o. female who presents today with previous history of syncope back in January, no recurrences.  DVT treated with anticoagulation at that time.  Neck surgery.  Protected with IVC filter.  Unable to get the filter out recently.  Chads vasc score 2.  Seeing vascular surgeon tomorrow about IVC filter.  Overall feels well.  2 episodes of A. fib in the last few months.  On flecainide 50/100.    Past Medical History:   Diagnosis Date   • Arthritis    • Atrial fibrillation (HCC)    • Blood clotting disorder (HCC) 01/2021    r leg   • Calculus of gallbladder without cholecystitis 1/9/2017   • CATARACT     left eye   • Glaucoma     right eye   • High cholesterol    • Hyperlipidemia    • Macular cyst, hole, or pseudohole of retina    • Presence of IVC filter 2021   • Pulmonary nodules/lesions, multiple 07/06/2016    stable on CTs, no further work up needed     Past Surgical History:   Procedure Laterality Date   • CERVICAL DISK AND FUSION ANTERIOR N/A 5/3/2021    Procedure: DISCECTOMY, SPINE, CERVICAL, ANTERIOR APPROACH, WITH FUSION - C4-7 WITH PLATE.;  Surgeon: Heri Greene M.D.;  Location: SURGERY [facility];  Service: Neurosurgery   • GYN SURGERY      HYSTERECTOMY   • MENISCUS REPAIR Right    • OTHER      eye surgery, right eye   • OTHER      cataract surgery, left eye     Family History   Problem Relation Age of Onset   • Heart Disease Father         triple bypass   • Cancer Father    • Cancer Mother    • Clotting Disorder Neg Hx      Social History     Socioeconomic History   • Marital status:      Spouse name: Not on file   • Number of children: Not on file   • Years of education: Not on file   • Highest education level: Not on file   Occupational History   • Not on file   Tobacco Use   • Smoking status: Never Smoker   • Smokeless tobacco:  Never Used   Vaping Use   • Vaping Use: Never used   Substance and Sexual Activity   • Alcohol use: No   • Drug use: No   • Sexual activity: Yes     Partners: Male   Other Topics Concern   • Not on file   Social History Narrative   • Not on file     Social Determinants of Health     Financial Resource Strain:    • Difficulty of Paying Living Expenses:    Food Insecurity:    • Worried About Running Out of Food in the Last Year:    • Ran Out of Food in the Last Year:    Transportation Needs:    • Lack of Transportation (Medical):    • Lack of Transportation (Non-Medical):    Physical Activity:    • Days of Exercise per Week:    • Minutes of Exercise per Session:    Stress:    • Feeling of Stress :    Social Connections:    • Frequency of Communication with Friends and Family:    • Frequency of Social Gatherings with Friends and Family:    • Attends [employer] Services:    • Active Member of Clubs or Organizations:    • Attends Club or Organization Meetings:    • Marital Status:    Intimate Partner Violence:    • Fear of Current or Ex-Partner:    • Emotionally Abused:    • Physically Abused:    • Sexually Abused:      Allergies   Allergen Reactions   • Neosporin [Neomycin-Polymyxin B] Rash     Rxn = 6/2016   • Tape      Severe blister reaction to adhesive from tape on skin     Outpatient Encounter Medications as of 9/20/2021   Medication Sig Dispense Refill   • apixaban (ELIQUIS) 5mg Tab Take 1 Tablet by mouth 2 times a day. 180 Tablet 3   • atorvastatin (LIPITOR) 20 MG Tab Take 1 tablet by mouth every day. 90 tablet 1   • metoprolol tartrate (LOPRESSOR) 25 MG Tab Take 0.5 Tablets by mouth 2 times a day. 90 tablet 3   • flecainide (TAMBOCOR) 50 MG tablet Take 1-2 Tablets by mouth 2 times a day. 50 mg in am and 100 mg in pm 270 tablet 3   • Psyllium (METAMUCIL PO) Take 1 Scoop by mouth every day.     • gabapentin (NEURONTIN) 300 MG Cap Take 1 Cap by mouth 2 Times a Day.     • [DISCONTINUED] aspirin EC (ECOTRIN) 81 MG  "Tablet Delayed Response Take 81 mg by mouth 2 times a day.       No facility-administered encounter medications on file as of 9/20/2021.     ROS           Objective     /68 (BP Location: Left arm, Patient Position: Sitting, BP Cuff Size: Adult)   Pulse 75   Ht 1.651 m (5' 5\")   Wt 82.6 kg (182 lb)   LMP 12/28/2003   SpO2 95%   BMI 30.29 kg/m²     Physical Exam  Constitutional:       Appearance: She is well-developed.   HENT:      Head: Normocephalic and atraumatic.   Eyes:      Pupils: Pupils are equal, round, and reactive to light.   Cardiovascular:      Rate and Rhythm: Normal rate and regular rhythm.      Heart sounds: Normal heart sounds. No murmur heard.   No friction rub. No gallop.    Pulmonary:      Effort: Pulmonary effort is normal.      Breath sounds: Normal breath sounds.   Abdominal:      General: Bowel sounds are normal.      Palpations: Abdomen is soft.   Musculoskeletal:         General: Normal range of motion.      Cervical back: Normal range of motion and neck supple.   Skin:     General: Skin is warm.   Neurological:      Mental Status: She is alert and oriented to person, place, and time.      Cranial Nerves: No cranial nerve deficit.   Psychiatric:         Behavior: Behavior normal.         Thought Content: Thought content normal.         Judgment: Judgment normal.                Assessment & Plan     1. PAF (paroxysmal atrial fibrillation) ([facility])  EKG   2. Bradycardia     3. Anticoagulated     4. Acute deep vein thrombosis (DVT) of tibial vein of right lower extremity ([facility])     5. Presence of IVC filter     6. History of DVT (deep vein thrombosis)         Medical Decision Making: Today's Assessment/Status/Plan:      1.  PAF.  Discussed anticoagulation she wishes to go back on Eliquis 5 mg twice daily.  2.  IVC filter seeing vascular surgeons.  3.  Previous DVT.  4.  Syncope no recurrence.  5.  Continue flecainide.If more episodes increase to 100 BID.  6.  Follow-up with me in 3 " months.

## 2021-09-22 NOTE — OP THERAPY DAILY TREATMENT
Outpatient Physical Therapy  DAILY TREATMENT     [facility] Outpatient Physical Therapy [place] [address] [facility], [address]  [place] [address]  Phone:  [phone]    Date: 09/23/2021    Patient: Lucía Rodriguez  YOB: 1950  MRN: [id number]     Time Calculation    Start time: 0945  Stop time: 1030 Time Calculation (min): 45 minutes     Chief Complaint: No chief complaint on file.    Visit #: 15    SUBJECTIVE:  No new complaints.     OBJECTIVE:  DOS - 5/3/21          Therapeutic Exercises (CPT 64972):     1. UPOC - 7/25/2021      Therapeutic Exercise Summary:   Rows - 3 sets purple/pink  Self SNAG into cervical extension - 3 sets (GTB)  Cervical Rotation to left - 30x   Chin tucks with SB - improves left rotation, but symptoms with C5 referral (NT)  Rear Delt Flies - 3 sets   Shrugs - 10# 2 x 6  Rear delt flies - 3 sets   Resisted Chin Tucks - 3 sets   SL ER -  3 sets at 1 pound (next visit)  W's - GTB 3 sets   Serratus press with chin tuck holds GTB - 3 sets     Therapeutic Treatments and Modalities:     1. Manual Therapy (CPT 53624)    Therapeutic Treatment and Modalities Summary:   SNAG lower cervical spine with left rotation  IASTM upper trapezius    Time-based treatments/modalities:    Physical Therapy Timed Treatment Charges  Therapeutic exercise minutes (CPT 90460): 45 minutes    ASSESSMENT:   Continued to focus on DNF endurance and cervical conditioning.  Reinforced importance to prevent future pain or injury.  Patient receptive and compliant with HEP. Overall good tolerance.  Chin tuck with SB restored full left rotation.  Will hold as patient will be out of town for next two weeks.  Will re-introduce next time, and likely d/c in 1-2 visits.      PLAN/RECOMMENDATIONS:   Plan for treatment: therapy treatment to continue next visit.  Planned interventions for next visit: continue with current treatment.

## 2021-09-23 ENCOUNTER — PHYSICAL THERAPY (OUTPATIENT)
Dept: PHYSICAL THERAPY | Facility: REHABILITATION | Age: 71
End: 2021-09-23
Attending: NEUROLOGICAL SURGERY
Payer: MEDICARE

## 2021-09-23 DIAGNOSIS — M50.00 CERVICAL DISC DISORDER WITH MYELOPATHY, UNSPECIFIED CERVICAL REGION: ICD-10-CM

## 2021-09-23 PROCEDURE — 97110 THERAPEUTIC EXERCISES: CPT

## 2021-10-01 ENCOUNTER — TELEPHONE (OUTPATIENT)
Dept: VASCULAR LAB | Facility: MEDICAL CENTER | Age: 71
End: 2021-10-01

## 2021-10-01 NOTE — TELEPHONE ENCOUNTER
Msg to MA to request [place] [facility] [place] difficult to retreive IVC filter.    Michelle Dee APRARIE  [facility]

## 2021-10-13 ENCOUNTER — PRE-ADMISSION TESTING (OUTPATIENT)
Dept: ADMISSIONS | Facility: MEDICAL CENTER | Age: 71
End: 2021-10-13
Attending: SURGERY
Payer: MEDICARE

## 2021-10-13 DIAGNOSIS — Z01.812 PRE-OPERATIVE LABORATORY EXAMINATION: ICD-10-CM

## 2021-10-13 RX ORDER — MULTIVIT-MIN/IRON/FOLIC/HRB186 3.3 MG-25
1 TABLET ORAL DAILY
COMMUNITY
End: 2022-07-13

## 2021-10-14 ENCOUNTER — PHYSICAL THERAPY (OUTPATIENT)
Dept: PHYSICAL THERAPY | Facility: REHABILITATION | Age: 71
End: 2021-10-14
Attending: NEUROLOGICAL SURGERY
Payer: MEDICARE

## 2021-10-14 DIAGNOSIS — M50.00 CERVICAL DISC DISORDER WITH MYELOPATHY, UNSPECIFIED CERVICAL REGION: ICD-10-CM

## 2021-10-14 PROCEDURE — 97110 THERAPEUTIC EXERCISES: CPT

## 2021-10-18 ENCOUNTER — PRE-ADMISSION TESTING (OUTPATIENT)
Dept: ADMISSIONS | Facility: MEDICAL CENTER | Age: 71
End: 2021-10-18
Attending: SURGERY
Payer: MEDICARE

## 2021-10-18 DIAGNOSIS — Z01.812 PRE-OPERATIVE LABORATORY EXAMINATION: ICD-10-CM

## 2021-10-18 PROCEDURE — U0005 INFEC AGEN DETEC AMPLI PROBE: HCPCS

## 2021-10-18 PROCEDURE — U0003 INFECTIOUS AGENT DETECTION BY NUCLEIC ACID (DNA OR RNA); SEVERE ACUTE RESPIRATORY SYNDROME CORONAVIRUS 2 (SARS-COV-2) (CORONAVIRUS DISEASE [COVID-19]), AMPLIFIED PROBE TECHNIQUE, MAKING USE OF HIGH THROUGHPUT TECHNOLOGIES AS DESCRIBED BY CMS-2020-01-R: HCPCS

## 2021-10-21 ENCOUNTER — APPOINTMENT (OUTPATIENT)
Dept: RADIOLOGY | Facility: MEDICAL CENTER | Age: 71
End: 2021-10-21
Attending: SURGERY
Payer: MEDICARE

## 2021-10-21 ENCOUNTER — HOSPITAL ENCOUNTER (OUTPATIENT)
Facility: MEDICAL CENTER | Age: 71
End: 2021-10-21
Attending: SURGERY | Admitting: SURGERY
Payer: MEDICARE

## 2021-10-21 VITALS
DIASTOLIC BLOOD PRESSURE: 53 MMHG | SYSTOLIC BLOOD PRESSURE: 138 MMHG | RESPIRATION RATE: 16 BRPM | BODY MASS INDEX: 29.83 KG/M2 | OXYGEN SATURATION: 92 % | HEART RATE: 61 BPM | TEMPERATURE: 97.8 F | WEIGHT: 179.01 LBS | HEIGHT: 65 IN

## 2021-10-21 DIAGNOSIS — Z86.718 PERSONAL HISTORY OF VENOUS THROMBOSIS AND EMBOLISM: ICD-10-CM

## 2021-10-21 LAB
ANION GAP SERPL CALC-SCNC: 12 MMOL/L (ref 7–16)
BUN SERPL-MCNC: 17 MG/DL (ref 8–22)
CALCIUM SERPL-MCNC: 9.9 MG/DL (ref 8.5–10.5)
CHLORIDE SERPL-SCNC: 105 MMOL/L (ref 96–112)
CO2 SERPL-SCNC: 22 MMOL/L (ref 20–33)
CREAT SERPL-MCNC: 0.81 MG/DL (ref 0.5–1.4)
ERYTHROCYTE [DISTWIDTH] IN BLOOD BY AUTOMATED COUNT: 46.8 FL (ref 35.9–50)
GLUCOSE SERPL-MCNC: 93 MG/DL (ref 65–99)
HCT VFR BLD AUTO: 44.9 % (ref 37–47)
HGB BLD-MCNC: 13.9 G/DL (ref 12–16)
MCH RBC QN AUTO: 29 PG (ref 27–33)
MCHC RBC AUTO-ENTMCNC: 31 G/DL (ref 33.6–35)
MCV RBC AUTO: 93.7 FL (ref 81.4–97.8)
PLATELET # BLD AUTO: 233 K/UL (ref 164–446)
PMV BLD AUTO: 10.8 FL (ref 9–12.9)
POTASSIUM SERPL-SCNC: 5.1 MMOL/L (ref 3.6–5.5)
RBC # BLD AUTO: 4.79 M/UL (ref 4.2–5.4)
SODIUM SERPL-SCNC: 139 MMOL/L (ref 135–145)
WBC # BLD AUTO: 7.6 K/UL (ref 4.8–10.8)

## 2021-10-21 PROCEDURE — 160002 HCHG RECOVERY MINUTES (STAT)

## 2021-10-21 PROCEDURE — 80048 BASIC METABOLIC PNL TOTAL CA: CPT

## 2021-10-21 PROCEDURE — 700117 HCHG RX CONTRAST REV CODE 255: Performed by: SURGERY

## 2021-10-21 PROCEDURE — 700111 HCHG RX REV CODE 636 W/ 250 OVERRIDE (IP)

## 2021-10-21 PROCEDURE — 700111 HCHG RX REV CODE 636 W/ 250 OVERRIDE (IP): Performed by: SURGERY

## 2021-10-21 PROCEDURE — 99153 MOD SED SAME PHYS/QHP EA: CPT

## 2021-10-21 PROCEDURE — 85027 COMPLETE CBC AUTOMATED: CPT

## 2021-10-21 RX ORDER — MIDAZOLAM HYDROCHLORIDE 1 MG/ML
.5-2 INJECTION INTRAMUSCULAR; INTRAVENOUS PRN
Status: DISCONTINUED | OUTPATIENT
Start: 2021-10-21 | End: 2021-10-21 | Stop reason: HOSPADM

## 2021-10-21 RX ORDER — MIDAZOLAM HYDROCHLORIDE 1 MG/ML
INJECTION INTRAMUSCULAR; INTRAVENOUS
Status: COMPLETED
Start: 2021-10-21 | End: 2021-10-21

## 2021-10-21 RX ORDER — IODIXANOL 270 MG/ML
8 INJECTION, SOLUTION INTRAVASCULAR ONCE
Status: COMPLETED | OUTPATIENT
Start: 2021-10-21 | End: 2021-10-21

## 2021-10-21 RX ORDER — ONDANSETRON 2 MG/ML
4 INJECTION INTRAMUSCULAR; INTRAVENOUS PRN
Status: DISCONTINUED | OUTPATIENT
Start: 2021-10-21 | End: 2021-10-21 | Stop reason: HOSPADM

## 2021-10-21 RX ORDER — SODIUM CHLORIDE 9 MG/ML
500 INJECTION, SOLUTION INTRAVENOUS
Status: DISCONTINUED | OUTPATIENT
Start: 2021-10-21 | End: 2021-10-21 | Stop reason: HOSPADM

## 2021-10-21 RX ADMIN — MIDAZOLAM HYDROCHLORIDE 1 MG: 1 INJECTION, SOLUTION INTRAMUSCULAR; INTRAVENOUS at 14:32

## 2021-10-21 RX ADMIN — MIDAZOLAM HYDROCHLORIDE 1 MG: 1 INJECTION, SOLUTION INTRAMUSCULAR; INTRAVENOUS at 14:18

## 2021-10-21 RX ADMIN — IODIXANOL 8 ML: 270 INJECTION, SOLUTION INTRAVASCULAR at 15:45

## 2021-10-21 RX ADMIN — MIDAZOLAM 1 MG: 1 INJECTION, SOLUTION INTRAMUSCULAR; INTRAVENOUS at 14:18

## 2021-10-21 RX ADMIN — FENTANYL CITRATE 50 MCG: 50 INJECTION, SOLUTION INTRAMUSCULAR; INTRAVENOUS at 14:33

## 2021-10-21 RX ADMIN — FENTANYL CITRATE 50 MCG: 50 INJECTION, SOLUTION INTRAMUSCULAR; INTRAVENOUS at 14:18

## 2021-10-21 ASSESSMENT — PAIN DESCRIPTION - PAIN TYPE
TYPE: SURGICAL PAIN
TYPE: ACUTE PAIN

## 2021-10-21 ASSESSMENT — FIBROSIS 4 INDEX: FIB4 SCORE: 1.28

## 2021-10-21 NOTE — Clinical Note
Pt presented to IR via [facility].  Pt alert and oriented x 4, denies pain, pt presents for IVC filter removal with Dr. Ash, Pt transferred self to IR table without difficulty.  Safety, warming and comfort measures in place.  Pt consent and code status verified.  Pt in poc.  Pt prepped and draped in sterile fashion.

## 2021-10-21 NOTE — OP REPORT
-------------------------------------------------    Vascular Surgery Operative Note  --------------------------------------------    Date of Service: 10/21/2021    Patient Name:  Lucía Rodriguez    Patient MRN:  [id number]    -------------------------------------------------------------------------------------------------    Preoperative Diagnosis:  - IVC filter  - DVT    Postoperative Diagnosis:  - IVC filter  - DVT    Procedure:  92966 Removal of IVC filter with fluoroscopy including inferior venacavogram  22996 Percutaneous access of the right internal jugular vein with ultrasound guidance  -------------------------------------------------------------------------------------------------    Surgeon:   John Ash MD    Assistant:   None    Anesthesia:   sedation plus local anesthetic    EBL:    minimal    Findings:   Venacavogram showed no thrombus in filter    Complications:  none    Disposition:   PACU stable    -----------------------------------------------------------------------------------------------------    Procedure Summary:  The patient was properly identified in the preoperative area, taken to the operating room, and placed in a supine position where  anesthesia was smoothly induced. The right neck was prepped and draped in the usual sterile fashion.  Surgical timeout was called to identify the correct patient, procedure, site, and equipment.  Everyone was in agreement.    The vein was accessed percutaneously with ultrasound guidance and a microaccess sheath.  A J-wire passed easily to the level of the filter.  The retrieval sheath was then passed to the level of the filter and venacavogram was performed which showed no thrombus in the filter. We then removed the inner portion of the retrieval sheath and passed the snare to the level of the filter. The snare was unable to engage the hook on the filter. At this point we deployed a cone retrieval device with a guidewire and were able to  Patient scheduled today at 3:30p, ok per Dr. Anna Marie Rodriguez attached to the hook.  The filter and sheath then removed easily from the insertion site. Direct pressure was held for 5 minutes to achieve hemostasis and then a dry sterile bandage was placed.    Patient tolerated the procedure well and was sent to recovery in stable condition.      John Ash MD  General and Vascular Surgery  [facility]  Office: [phone]

## 2021-10-21 NOTE — Clinical Note
Report to Heaven MCCULLOUGH for PACU 2A. Pt remains on 3L N/C.  Pt tolerated procedure. Dressing applied, gauze and IV 3000 to right neck.

## 2021-10-21 NOTE — DISCHARGE INSTRUCTIONS
SPECIAL INSTRUCTIONS:   Discharge Instructions  Procedure: IVC Filter removal    1) Activity:  Ok to resume normal activities.  No strenuous activity or lifting over 15 pounds for 1 week.    2) Bandage: Remove your bandage 2 days after the procedure.  A new bandage is not necessary.    3) Bathing:  It is OK to bathe and shower after dressing removed.    4) Diet:  You can resume your normal diet after your procedure.    5) Medications:  You may resume all of your normal home medications.  You will be given a prescription for pain medication and stool softener.  You should take the stool softener as long as you are taking the pain medication.  If you are not having much pain you can take Tylenol or Ibuprofen or similar over-the-counter medications for pain instead of the prescription pain medication.      6) Follow-up appointments: Please call [phone] to schedule an appointment with me in my clinic in 1-2 weeks to discuss the results of the procedure.     7) Call [phone] if you have any concerns after the procedure, such as increased pain, swelling, drainage, or other symptoms.  You should go to the ER if you are having severe symptoms, such as chest pain, shortness of breath, bleeding, or other similar symptoms.    Our office address is:  [address] [place] [id number]    John Ash MD  General and Vascular Surgery  [facility]  [phone]      ACTIVITY: Rest and take it easy for the first 24 hours.  A responsible adult is recommended to remain with you during that time.  It is normal to feel sleepy.  We encourage you to not do anything that requires balance, judgment or coordination.    MILD FLU-LIKE SYMPTOMS ARE NORMAL. YOU MAY EXPERIENCE GENERALIZED MUSCLE ACHES, THROAT IRRITATION, HEADACHE AND/OR SOME NAUSEA.    FOR 24 HOURS DO NOT:  Drive, operate machinery or run household appliances.  Drink beer or alcoholic beverages.   Make important decisions or sign legal  documents.      DIET: To avoid nausea, slowly advance diet as tolerated, avoiding spicy or greasy foods for the first day.  Add more substantial food to your diet according to your physician's instructions.  INCREASE FLUIDS AND FIBER TO AVOID CONSTIPATION.    SURGICAL DRESSING/BATHING: Bandage: Remove your bandage 2 days after the procedure.  A new bandage is not necessary.   Bathing:  It is OK to bathe and shower after dressing removed.    FOLLOW-UP APPOINTMENT:  A follow-up appointment should be arranged with your doctor in 1-2 weeks; call to schedule.    You should CALL YOUR PHYSICIAN if you develop:  Fever greater than 101 degrees F.  Pain not relieved by medication, or persistent nausea or vomiting.  Excessive bleeding (blood soaking through dressing) or unexpected drainage from the wound.  Extreme redness or swelling around the incision site, drainage of pus or foul smelling drainage.  Inability to urinate or empty your bladder within 8 hours.  Problems with breathing or chest pain.    You should call 911 if you develop problems with breathing or chest pain.  If you are unable to contact your doctor or surgical center, you should go to the nearest emergency room or urgent care center.  Physician's telephone #: Dr. Ash, [phone]    If any questions arise, call your doctor.  If your doctor is not available, please feel free to call the Surgical Center at [phone]. The Contact Center is open Monday through Friday 7AM to 5PM and may speak to a nurse at [phone], or toll free at [phone].     A registered nurse may call you a few days after your surgery to see how you are doing after your procedure.    MEDICATIONS: Resume taking daily medication.  Take prescribed pain medication with food.  If no medication is prescribed, you may take non-aspirin pain medication if needed.  PAIN MEDICATION CAN BE VERY CONSTIPATING.  Take a stool softener or laxative such as senokot, pericolace, or milk of  magnesia if needed.    If your physician has prescribed pain medication that includes Acetaminophen (Tylenol), do not take additional Acetaminophen (Tylenol) while taking the prescribed medication.    Depression / Suicide Risk    As you are discharged from this [facility] facility, it is important to learn how to keep safe from harming yourself.    Recognize the warning signs:  · Abrupt changes in personality, positive or negative- including increase in energy   · Giving away possessions  · Change in eating patterns- significant weight changes-  positive or negative  · Change in sleeping patterns- unable to sleep or sleeping all the time   · Unwillingness or inability to communicate  · Depression  · Unusual sadness, discouragement and loneliness  · Talk of wanting to die  · Neglect of personal appearance   · Rebelliousness- reckless behavior  · Withdrawal from people/activities they love  · Confusion- inability to concentrate     If you or a loved one observes any of these behaviors or has concerns about self-harm, here's what you can do:  · Talk about it- your feelings and reasons for harming yourself  · Remove any means that you might use to hurt yourself (examples: pills, rope, extension cords, firearm)  · Get professional help from the community (Mental Health, Substance Abuse, psychological counseling)  · Do not be alone:Call your Safe Contact- someone whom you trust who will be there for you.  · Call your local CRISIS HOTLINE 854-9558 or 568-931-5923  · Call your local Children's Mobile Crisis Response Team [place] (682) 475-0391 or www.[employer]  · Call the toll free National Suicide Prevention Hotlines   · National Suicide Prevention Lifeline 902-963-AZKD (5421)  · National Hope Line Network 800-SUICIDE (647-1434)

## 2021-10-21 NOTE — Clinical Note
IVC Filter removed by Dr. Ash.  Pt tolerated procedure well. Pressure right neck held by Joey CHRISTINE

## 2021-10-21 NOTE — PROGRESS NOTES
Pt transported to PACU via gurney, alert and oriented, no signs of bleeding or swelling right neck. Report to Sammi MCCULLOUGH. Pt remains on oxygen at handoff.

## 2021-10-22 ENCOUNTER — TELEPHONE (OUTPATIENT)
Dept: VASCULAR LAB | Facility: MEDICAL CENTER | Age: 71
End: 2021-10-22

## 2021-10-22 PROBLEM — Z95.828 PRESENCE OF IVC FILTER: Status: RESOLVED | Noted: 2021-05-20 | Resolved: 2021-10-22

## 2021-10-22 NOTE — TELEPHONE ENCOUNTER
Vasc surgery able to removed IVC filter successfully.  Will update chart.    Michelle Dee APRARIE  [place] [facility]

## 2021-11-05 ENCOUNTER — APPOINTMENT (RX ONLY)
Dept: URBAN - METROPOLITAN AREA CLINIC 4 | Facility: CLINIC | Age: 71
Setting detail: DERMATOLOGY
End: 2021-11-05

## 2021-11-05 DIAGNOSIS — D18.0 HEMANGIOMA: ICD-10-CM

## 2021-11-05 DIAGNOSIS — L73.8 OTHER SPECIFIED FOLLICULAR DISORDERS: ICD-10-CM

## 2021-11-05 DIAGNOSIS — L81.4 OTHER MELANIN HYPERPIGMENTATION: ICD-10-CM

## 2021-11-05 DIAGNOSIS — L82.1 OTHER SEBORRHEIC KERATOSIS: ICD-10-CM

## 2021-11-05 DIAGNOSIS — Z85.828 PERSONAL HISTORY OF OTHER MALIGNANT NEOPLASM OF SKIN: ICD-10-CM

## 2021-11-05 DIAGNOSIS — D22 MELANOCYTIC NEVI: ICD-10-CM

## 2021-11-05 DIAGNOSIS — L57.8 OTHER SKIN CHANGES DUE TO CHRONIC EXPOSURE TO NONIONIZING RADIATION: ICD-10-CM

## 2021-11-05 PROBLEM — D22.5 MELANOCYTIC NEVI OF TRUNK: Status: ACTIVE | Noted: 2021-11-05

## 2021-11-05 PROBLEM — D18.01 HEMANGIOMA OF SKIN AND SUBCUTANEOUS TISSUE: Status: ACTIVE | Noted: 2021-11-05

## 2021-11-05 PROBLEM — D48.5 NEOPLASM OF UNCERTAIN BEHAVIOR OF SKIN: Status: ACTIVE | Noted: 2021-11-05

## 2021-11-05 PROCEDURE — 11102 TANGNTL BX SKIN SINGLE LES: CPT

## 2021-11-05 PROCEDURE — 11103 TANGNTL BX SKIN EA SEP/ADDL: CPT

## 2021-11-05 PROCEDURE — ? COUNSELING

## 2021-11-05 PROCEDURE — ? BIOPSY BY SHAVE METHOD

## 2021-11-05 PROCEDURE — 99213 OFFICE O/P EST LOW 20 MIN: CPT | Mod: 25

## 2021-11-05 ASSESSMENT — LOCATION DETAILED DESCRIPTION DERM
LOCATION DETAILED: INFERIOR THORACIC SPINE
LOCATION DETAILED: LEFT SUPERIOR UPPER BACK
LOCATION DETAILED: RIGHT INFERIOR MEDIAL UPPER BACK
LOCATION DETAILED: RIGHT INFERIOR CENTRAL MALAR CHEEK
LOCATION DETAILED: LEFT RADIAL DORSAL HAND
LOCATION DETAILED: RIGHT INFERIOR ANTERIOR NECK
LOCATION DETAILED: RIGHT RADIAL DORSAL HAND
LOCATION DETAILED: LEFT RIB CAGE
LOCATION DETAILED: RIGHT SUPERIOR UPPER BACK
LOCATION DETAILED: LEFT MEDIAL MALAR CHEEK

## 2021-11-05 ASSESSMENT — LOCATION ZONE DERM
LOCATION ZONE: HAND
LOCATION ZONE: NECK
LOCATION ZONE: FACE
LOCATION ZONE: TRUNK

## 2021-11-05 ASSESSMENT — LOCATION SIMPLE DESCRIPTION DERM
LOCATION SIMPLE: RIGHT UPPER BACK
LOCATION SIMPLE: RIGHT ANTERIOR NECK
LOCATION SIMPLE: LEFT UPPER BACK
LOCATION SIMPLE: ABDOMEN
LOCATION SIMPLE: RIGHT CHEEK
LOCATION SIMPLE: UPPER BACK
LOCATION SIMPLE: LEFT CHEEK
LOCATION SIMPLE: LEFT HAND
LOCATION SIMPLE: RIGHT HAND

## 2021-11-18 ENCOUNTER — APPOINTMENT (RX ONLY)
Dept: URBAN - METROPOLITAN AREA CLINIC 4 | Facility: CLINIC | Age: 71
Setting detail: DERMATOLOGY
End: 2021-11-18

## 2021-11-18 DIAGNOSIS — L259 CONTACT DERMATITIS AND OTHER ECZEMA, UNSPECIFIED CAUSE: ICD-10-CM

## 2021-11-18 PROBLEM — L23.9 ALLERGIC CONTACT DERMATITIS, UNSPECIFIED CAUSE: Status: ACTIVE | Noted: 2021-11-18

## 2021-11-18 PROCEDURE — 99213 OFFICE O/P EST LOW 20 MIN: CPT

## 2021-11-18 PROCEDURE — ? ADDITIONAL NOTES

## 2021-11-18 PROCEDURE — ? COUNSELING

## 2021-11-18 PROCEDURE — ? PRESCRIPTION

## 2021-11-18 RX ORDER — CLOBETASOL PROPIONATE 0.5 MG/G
1 OINTMENT TOPICAL BID
Qty: 60 | Refills: 3 | Status: ERX | COMMUNITY
Start: 2021-11-18

## 2021-11-18 RX ADMIN — CLOBETASOL PROPIONATE 1: 0.5 OINTMENT TOPICAL at 00:00

## 2021-11-18 ASSESSMENT — LOCATION SIMPLE DESCRIPTION DERM: LOCATION SIMPLE: LEFT UPPER BACK

## 2021-11-18 ASSESSMENT — LOCATION ZONE DERM: LOCATION ZONE: TRUNK

## 2021-11-18 ASSESSMENT — LOCATION DETAILED DESCRIPTION DERM: LOCATION DETAILED: LEFT MEDIAL UPPER BACK

## 2021-11-18 NOTE — PROCEDURE: ADDITIONAL NOTES
Render Risk Assessment In Note?: no
Detail Level: Simple
Additional Notes: Patient instructed to take antihistamines. Claritin in the morning and Benadryl at night \\nIf not better consider patch testing with doctor Heaphy

## 2021-12-01 ENCOUNTER — HOSPITAL ENCOUNTER (OUTPATIENT)
Dept: RADIOLOGY | Facility: MEDICAL CENTER | Age: 71
End: 2021-12-01
Attending: STUDENT IN AN ORGANIZED HEALTH CARE EDUCATION/TRAINING PROGRAM
Payer: MEDICARE

## 2021-12-01 DIAGNOSIS — M50.00 CERVICAL DISC DISORDER WITH MYELOPATHY: ICD-10-CM

## 2021-12-01 PROCEDURE — 72050 X-RAY EXAM NECK SPINE 4/5VWS: CPT

## 2021-12-15 DIAGNOSIS — E78.1 HYPERTRIGLYCERIDEMIA: ICD-10-CM

## 2021-12-15 RX ORDER — ATORVASTATIN CALCIUM 20 MG/1
20 TABLET, FILM COATED ORAL DAILY
Qty: 90 TABLET | Refills: 0 | Status: SHIPPED | OUTPATIENT
Start: 2021-12-15 | End: 2022-03-11 | Stop reason: SDUPTHER

## 2022-01-01 NOTE — OR NURSING
Ambulance Patient allergies and NPO status verified, home medication reconciliation completed and belongings secured. Patient verbalizes understanding of pain scale, expected course of stay and plan of care. Surgical site verified with patient. IV access established; call light within reach. No further needs at this time; hourly rounding.      EMS

## 2022-02-10 ENCOUNTER — OFFICE VISIT (OUTPATIENT)
Dept: CARDIOLOGY | Facility: MEDICAL CENTER | Age: 72
End: 2022-02-10
Payer: MEDICARE

## 2022-02-10 VITALS
SYSTOLIC BLOOD PRESSURE: 122 MMHG | BODY MASS INDEX: 30.49 KG/M2 | DIASTOLIC BLOOD PRESSURE: 62 MMHG | OXYGEN SATURATION: 98 % | RESPIRATION RATE: 12 BRPM | HEART RATE: 61 BPM | HEIGHT: 65 IN | WEIGHT: 183 LBS

## 2022-02-10 DIAGNOSIS — Z79.01 ANTICOAGULATED: ICD-10-CM

## 2022-02-10 DIAGNOSIS — G62.9 NEUROPATHY: ICD-10-CM

## 2022-02-10 DIAGNOSIS — E78.2 MIXED HYPERLIPIDEMIA: ICD-10-CM

## 2022-02-10 DIAGNOSIS — I51.81 TAKOTSUBO CARDIOMYOPATHY: ICD-10-CM

## 2022-02-10 DIAGNOSIS — I48.91 ATRIAL FIBRILLATION, UNSPECIFIED TYPE (HCC): ICD-10-CM

## 2022-02-10 DIAGNOSIS — Z86.718 HISTORY OF DVT (DEEP VEIN THROMBOSIS): ICD-10-CM

## 2022-02-10 LAB — EKG IMPRESSION: NORMAL

## 2022-02-10 PROCEDURE — 99214 OFFICE O/P EST MOD 30 MIN: CPT | Mod: 25 | Performed by: INTERNAL MEDICINE

## 2022-02-10 PROCEDURE — 93000 ELECTROCARDIOGRAM COMPLETE: CPT | Performed by: INTERNAL MEDICINE

## 2022-02-10 ASSESSMENT — FIBROSIS 4 INDEX: FIB4 SCORE: 1.44

## 2022-02-11 NOTE — PROGRESS NOTES
Chief Complaint   Patient presents with   • Atrial Fibrillation     Fv Dx: PAF (paroxysmal atrial fibrillation)       Subjective     Lucía Rodriguez is a 71 y.o. female who presents today with history of paroxysmal atrial fibrillation with good control on flecainide 50/100.  Back on Eliquis.  IVC filter has been removed.  No complaints, but some difficulty with neuropathy.  On gabapentin.  On statins.    Past Medical History:   Diagnosis Date   • Anesthesia     Delayed emergence; Hair loss   • Arthritis     Throughout her body   • Atrial fibrillation (HCC)     Atrial fibrillation   • Calculus of gallbladder without cholecystitis 1/9/2017   • CATARACT     Bilateral IOL; loss of vision in Right eye starting in 2000.   • Delayed emergence from general anesthesia    • Glaucoma     right eye   • High cholesterol    • Hyperlipidemia    • Macular cyst, hole, or pseudohole of retina    • Presence of IVC filter 2021   • Pulmonary nodules/lesions, multiple 07/06/2016    stable on CTs, no further work up needed     Past Surgical History:   Procedure Laterality Date   • CERVICAL DISK AND FUSION ANTERIOR N/A 5/3/2021    Procedure: DISCECTOMY, SPINE, CERVICAL, ANTERIOR APPROACH, WITH FUSION - C4-7 WITH PLATE.;  Surgeon: Heri Greene M.D.;  Location: SURGERY [facility];  Service: Neurosurgery   • OTHER  04/2021    IVC filter placement   • GYN SURGERY      HYSTERECTOMY   • MENISCUS REPAIR Right    • OTHER      right eye cataract sx   • OTHER      multiple surgeries to left eye     Family History   Problem Relation Age of Onset   • Heart Disease Father         triple bypass   • Cancer Father    • Cancer Mother    • Clotting Disorder Neg Hx      Social History     Socioeconomic History   • Marital status:      Spouse name: Not on file   • Number of children: Not on file   • Years of education: Not on file   • Highest education level: Not on file   Occupational History   • Not on file   Tobacco Use   • Smoking status:  Never Smoker   • Smokeless tobacco: Never Used   Vaping Use   • Vaping Use: Never used   Substance and Sexual Activity   • Alcohol use: No   • Drug use: No   • Sexual activity: Yes     Partners: Male   Other Topics Concern   • Not on file   Social History Narrative   • Not on file     Social Determinants of Health     Financial Resource Strain:    • Difficulty of Paying Living Expenses: Not on file   Food Insecurity:    • Worried About Running Out of Food in the Last Year: Not on file   • Ran Out of Food in the Last Year: Not on file   Transportation Needs:    • Lack of Transportation (Medical): Not on file   • Lack of Transportation (Non-Medical): Not on file   Physical Activity:    • Days of Exercise per Week: Not on file   • Minutes of Exercise per Session: Not on file   Stress:    • Feeling of Stress : Not on file   Social Connections:    • Frequency of Communication with Friends and Family: Not on file   • Frequency of Social Gatherings with Friends and Family: Not on file   • Attends Church Services: Not on file   • Active Member of Clubs or Organizations: Not on file   • Attends Club or Organization Meetings: Not on file   • Marital Status: Not on file   Intimate Partner Violence:    • Fear of Current or Ex-Partner: Not on file   • Emotionally Abused: Not on file   • Physically Abused: Not on file   • Sexually Abused: Not on file   Housing Stability:    • Unable to Pay for Housing in the Last Year: Not on file   • Number of Places Lived in the Last Year: Not on file   • Unstable Housing in the Last Year: Not on file     Allergies   Allergen Reactions   • Neosporin [Neomycin-Polymyxin B] Rash     Rxn = 6/2016   • Tape      Severe blister reaction to adhesive from tape on skin (TEGADERM if left >24 hours); Paper tape ok     Outpatient Encounter Medications as of 2/10/2022   Medication Sig Dispense Refill   • atorvastatin (LIPITOR) 20 MG Tab TAKE 1 TABLET BY MOUTH EVERY DAY 90 Tablet 0   • Ca  "Phosphate-Cholecalciferol ([employer] CALCIUM-VITAMIN D PO) Take 1 Tablet by mouth every day.     • Multiple Vitamins-Minerals (CENTRUM SILVER 50+WOMEN) Tab Take 1 Tablet by mouth every day.     • Multiple Vitamins-Minerals (HAIR SKIN & NAILS ADVANCED) Tab Take 1 Tablet by mouth every day.     • apixaban (ELIQUIS) 5mg Tab Take 1 Tablet by mouth 2 times a day. 180 Tablet 3   • metoprolol tartrate (LOPRESSOR) 25 MG Tab Take 0.5 Tablets by mouth 2 times a day. 90 tablet 3   • flecainide (TAMBOCOR) 50 MG tablet Take 1-2 Tablets by mouth 2 times a day. 50 mg in am and 100 mg in pm 270 tablet 3   • Psyllium (METAMUCIL PO) Take 1 Scoop by mouth every day.     • gabapentin (NEURONTIN) 300 MG Cap Take 1 Cap by mouth 2 Times a Day.       No facility-administered encounter medications on file as of 2/10/2022.     ROS           Objective     /62 (BP Location: Left arm, Patient Position: Sitting, BP Cuff Size: Adult)   Pulse 61   Resp 12   Ht 1.651 m (5' 5\")   Wt 83 kg (183 lb)   LMP 12/28/2003   SpO2 98%   BMI 30.45 kg/m²     Physical Exam  Constitutional:       Appearance: She is well-developed.   HENT:      Head: Normocephalic and atraumatic.   Eyes:      Pupils: Pupils are equal, round, and reactive to light.   Cardiovascular:      Rate and Rhythm: Normal rate and regular rhythm.      Heart sounds: Normal heart sounds. No murmur heard.  No friction rub. No gallop.    Pulmonary:      Effort: Pulmonary effort is normal.      Breath sounds: Normal breath sounds.   Abdominal:      General: Bowel sounds are normal.      Palpations: Abdomen is soft.   Musculoskeletal:         General: Normal range of motion.      Cervical back: Normal range of motion and neck supple.   Skin:     General: Skin is warm.   Neurological:      Mental Status: She is alert and oriented to person, place, and time.      Cranial Nerves: No cranial nerve deficit.   Psychiatric:         Behavior: Behavior normal.         Thought Content: Thought content " normal.         Judgment: Judgment normal.                Assessment & Plan     1. Atrial fibrillation, unspecified type (HCC)  EKG   2. Anticoagulated     3. Takotsubo cardiomyopathy     4. History of DVT (deep vein thrombosis)     5. Mixed hyperlipidemia     6. Neuropathy  Referral to Neurology       Medical Decision Making: Today's Assessment/Status/Plan:   1.  Paroxysmal atrial fibrillation continue flecainide.  2.  Anticoagulation continue Eliquis.  3.  Neuropathy referral to neuropathy clinic.  4.  Previous DVT status post IVC filter removal.  5.  Follow-up in 1 year.

## 2022-02-14 ENCOUNTER — APPOINTMENT (RX ONLY)
Dept: URBAN - METROPOLITAN AREA CLINIC 4 | Facility: CLINIC | Age: 72
Setting detail: DERMATOLOGY
End: 2022-02-14

## 2022-02-14 DIAGNOSIS — L82.0 INFLAMED SEBORRHEIC KERATOSIS: ICD-10-CM

## 2022-02-14 DIAGNOSIS — Z85.828 PERSONAL HISTORY OF OTHER MALIGNANT NEOPLASM OF SKIN: ICD-10-CM

## 2022-02-14 DIAGNOSIS — L82.1 OTHER SEBORRHEIC KERATOSIS: ICD-10-CM

## 2022-02-14 DIAGNOSIS — L57.0 ACTINIC KERATOSIS: ICD-10-CM

## 2022-02-14 DIAGNOSIS — D18.0 HEMANGIOMA: ICD-10-CM

## 2022-02-14 DIAGNOSIS — L81.4 OTHER MELANIN HYPERPIGMENTATION: ICD-10-CM

## 2022-02-14 DIAGNOSIS — D485 NEOPLASM OF UNCERTAIN BEHAVIOR OF SKIN: ICD-10-CM | Status: RESOLVED

## 2022-02-14 DIAGNOSIS — D22 MELANOCYTIC NEVI: ICD-10-CM

## 2022-02-14 DIAGNOSIS — L57.8 OTHER SKIN CHANGES DUE TO CHRONIC EXPOSURE TO NONIONIZING RADIATION: ICD-10-CM

## 2022-02-14 PROBLEM — D48.5 NEOPLASM OF UNCERTAIN BEHAVIOR OF SKIN: Status: ACTIVE | Noted: 2022-02-14

## 2022-02-14 PROBLEM — D22.5 MELANOCYTIC NEVI OF TRUNK: Status: ACTIVE | Noted: 2022-02-14

## 2022-02-14 PROBLEM — D22.9 MELANOCYTIC NEVI, UNSPECIFIED: Status: ACTIVE | Noted: 2022-02-14

## 2022-02-14 PROBLEM — D18.01 HEMANGIOMA OF SKIN AND SUBCUTANEOUS TISSUE: Status: ACTIVE | Noted: 2022-02-14

## 2022-02-14 PROCEDURE — 17110 DESTRUCTION B9 LES UP TO 14: CPT

## 2022-02-14 PROCEDURE — ? ADDITIONAL NOTES

## 2022-02-14 PROCEDURE — ? COUNSELING

## 2022-02-14 PROCEDURE — ? LIQUID NITROGEN

## 2022-02-14 PROCEDURE — 99213 OFFICE O/P EST LOW 20 MIN: CPT | Mod: 25

## 2022-02-14 PROCEDURE — 17000 DESTRUCT PREMALG LESION: CPT | Mod: 59

## 2022-02-14 ASSESSMENT — LOCATION SIMPLE DESCRIPTION DERM
LOCATION SIMPLE: LEFT SCALP
LOCATION SIMPLE: LEFT CHEEK
LOCATION SIMPLE: RIGHT TEMPLE
LOCATION SIMPLE: RIGHT UPPER BACK
LOCATION SIMPLE: NOSE
LOCATION SIMPLE: ABDOMEN
LOCATION SIMPLE: RIGHT HAND
LOCATION SIMPLE: LEFT ANTERIOR NECK
LOCATION SIMPLE: LEFT NOSE
LOCATION SIMPLE: LEFT HAND
LOCATION SIMPLE: RIGHT CHEEK

## 2022-02-14 ASSESSMENT — LOCATION DETAILED DESCRIPTION DERM
LOCATION DETAILED: LEFT SUPERIOR ANTERIOR NECK
LOCATION DETAILED: RIGHT SUPERIOR UPPER BACK
LOCATION DETAILED: LEFT MEDIAL FRONTAL SCALP
LOCATION DETAILED: RIGHT ULNAR DORSAL HAND
LOCATION DETAILED: RIGHT INFERIOR TEMPLE
LOCATION DETAILED: LEFT SUPERIOR MEDIAL MALAR CHEEK
LOCATION DETAILED: LEFT NASAL SIDEWALL
LOCATION DETAILED: RIGHT INFERIOR CENTRAL MALAR CHEEK
LOCATION DETAILED: LEFT ULNAR DORSAL HAND
LOCATION DETAILED: LEFT RIB CAGE
LOCATION DETAILED: RIGHT SUPERIOR MEDIAL UPPER BACK
LOCATION DETAILED: NASAL ROOT

## 2022-02-14 ASSESSMENT — LOCATION ZONE DERM
LOCATION ZONE: TRUNK
LOCATION ZONE: SCALP
LOCATION ZONE: FACE
LOCATION ZONE: NOSE
LOCATION ZONE: NECK
LOCATION ZONE: HAND

## 2022-02-14 NOTE — PROCEDURE: LIQUID NITROGEN
Show Aperture Variable?: Yes
Consent: The patient's consent was obtained including but not limited to risks of crusting, scabbing, blistering, scarring, darker or lighter pigmentary change, recurrence, incomplete removal and infection.
Number Of Freeze-Thaw Cycles: 2 freeze-thaw cycles
Render Post-Care Instructions In Note?: no
Duration Of Freeze Thaw-Cycle (Seconds): 3
Post-Care Instructions: I reviewed with the patient in detail post-care instructions. Patient is to wear sunprotection, and avoid picking at any of the treated lesions. Pt may apply Vaseline to crusted or scabbing areas.
Aperture Size (Optional): C
Detail Level: Simple
Spray Paint Text: The liquid nitrogen was applied to the skin utilizing a spray paint frosting technique.
Medical Necessity Information: It is in your best interest to select a reason for this procedure from the list below. All of these items fulfill various CMS LCD requirements except the new and changing color options.
Number Of Freeze-Thaw Cycles: 1 freeze-thaw cycle
Detail Level: Detailed
Medical Necessity Clause: This procedure was medically necessary because the lesions that were treated were:

## 2022-03-03 ENCOUNTER — TELEPHONE (OUTPATIENT)
Dept: PHYSICAL THERAPY | Facility: REHABILITATION | Age: 72
End: 2022-03-03
Payer: MEDICARE

## 2022-03-03 NOTE — OP THERAPY DISCHARGE SUMMARY
Outpatient Physical Therapy  DISCHARGE SUMMARY NOTE      [facility] [address]  [address]  [place] [address]  Phone:  [phone]  Fax:  [phone]    Date of Visit: 03/03/2022    Patient: Lucía Rodriguez  YOB: 1950  MRN: [id number]     Referring Provider: No referring provider defined for this encounter.   Referring Diagnosis No admission diagnoses are documented for this encounter.     Your patient is being discharged from Physical Therapy with the following comments:   · Goals met    Comments:  D/c on 10/14/2021 due to goals met.  No functional limits at that time, and recommend continued strength/endurance training of cervical spine.  Unable to comment on function at this time.    Javier Morse, PT    Date: 3/3/2022

## 2022-03-11 ENCOUNTER — OFFICE VISIT (OUTPATIENT)
Dept: MEDICAL GROUP | Facility: MEDICAL CENTER | Age: 72
End: 2022-03-11
Payer: MEDICARE

## 2022-03-11 ENCOUNTER — HOSPITAL ENCOUNTER (OUTPATIENT)
Dept: LAB | Facility: MEDICAL CENTER | Age: 72
End: 2022-03-11
Attending: FAMILY MEDICINE
Payer: MEDICARE

## 2022-03-11 VITALS
TEMPERATURE: 97.6 F | SYSTOLIC BLOOD PRESSURE: 124 MMHG | DIASTOLIC BLOOD PRESSURE: 70 MMHG | HEART RATE: 66 BPM | BODY MASS INDEX: 29.66 KG/M2 | OXYGEN SATURATION: 96 % | HEIGHT: 65 IN | RESPIRATION RATE: 16 BRPM | WEIGHT: 178 LBS

## 2022-03-11 DIAGNOSIS — N18.30 STAGE 3 CHRONIC KIDNEY DISEASE, UNSPECIFIED WHETHER STAGE 3A OR 3B CKD: ICD-10-CM

## 2022-03-11 DIAGNOSIS — E55.9 VITAMIN D DEFICIENCY: ICD-10-CM

## 2022-03-11 DIAGNOSIS — Z86.718 HISTORY OF DVT (DEEP VEIN THROMBOSIS): ICD-10-CM

## 2022-03-11 DIAGNOSIS — D68.318 HEMORRHAGIC DISORDER DUE TO CIRCULATING ANTICOAGULANTS (HCC): ICD-10-CM

## 2022-03-11 DIAGNOSIS — E78.2 MIXED HYPERLIPIDEMIA: ICD-10-CM

## 2022-03-11 DIAGNOSIS — G62.9 PERIPHERAL POLYNEUROPATHY: ICD-10-CM

## 2022-03-11 DIAGNOSIS — E05.90 SUBCLINICAL HYPERTHYROIDISM: ICD-10-CM

## 2022-03-11 DIAGNOSIS — M51.36 DEGENERATIVE DISC DISEASE, LUMBAR: ICD-10-CM

## 2022-03-11 DIAGNOSIS — I48.91 ATRIAL FIBRILLATION, UNSPECIFIED TYPE (HCC): ICD-10-CM

## 2022-03-11 PROBLEM — I82.491 ACUTE DEEP VEIN THROMBOSIS (DVT) OF OTHER SPECIFIED VEIN OF RIGHT LOWER EXTREMITY (HCC): Status: RESOLVED | Noted: 2021-04-01 | Resolved: 2022-03-11

## 2022-03-11 PROBLEM — R00.1 BRADYCARDIA: Status: RESOLVED | Noted: 2021-05-05 | Resolved: 2022-03-11

## 2022-03-11 PROBLEM — Z79.01 ANTICOAGULATED: Status: RESOLVED | Noted: 2021-03-09 | Resolved: 2022-03-11

## 2022-03-11 PROBLEM — R55 SYNCOPE AND COLLAPSE: Status: RESOLVED | Noted: 2021-03-09 | Resolved: 2022-03-11

## 2022-03-11 PROBLEM — M48.00 CENTRAL STENOSIS OF SPINAL CANAL: Status: RESOLVED | Noted: 2021-01-18 | Resolved: 2022-03-11

## 2022-03-11 LAB
25(OH)D3 SERPL-MCNC: 38 NG/ML (ref 30–100)
ALBUMIN SERPL BCP-MCNC: 4.8 G/DL (ref 3.2–4.9)
ALBUMIN/GLOB SERPL: 2.2 G/DL
ALP SERPL-CCNC: 126 U/L (ref 30–99)
ALT SERPL-CCNC: 16 U/L (ref 2–50)
ANION GAP SERPL CALC-SCNC: 13 MMOL/L (ref 7–16)
AST SERPL-CCNC: 20 U/L (ref 12–45)
BILIRUB SERPL-MCNC: 0.4 MG/DL (ref 0.1–1.5)
BUN SERPL-MCNC: 18 MG/DL (ref 8–22)
CALCIUM SERPL-MCNC: 10.1 MG/DL (ref 8.5–10.5)
CHLORIDE SERPL-SCNC: 106 MMOL/L (ref 96–112)
CHOLEST SERPL-MCNC: 144 MG/DL (ref 100–199)
CO2 SERPL-SCNC: 24 MMOL/L (ref 20–33)
CREAT SERPL-MCNC: 0.85 MG/DL (ref 0.5–1.4)
ERYTHROCYTE [DISTWIDTH] IN BLOOD BY AUTOMATED COUNT: 50.3 FL (ref 35.9–50)
GLOBULIN SER CALC-MCNC: 2.2 G/DL (ref 1.9–3.5)
GLUCOSE SERPL-MCNC: 88 MG/DL (ref 65–99)
HCT VFR BLD AUTO: 46.2 % (ref 37–47)
HDLC SERPL-MCNC: 47 MG/DL
HGB BLD-MCNC: 14.2 G/DL (ref 12–16)
LDLC SERPL CALC-MCNC: 47 MG/DL
MCH RBC QN AUTO: 29.4 PG (ref 27–33)
MCHC RBC AUTO-ENTMCNC: 30.7 G/DL (ref 33.6–35)
MCV RBC AUTO: 95.7 FL (ref 81.4–97.8)
PLATELET # BLD AUTO: 251 K/UL (ref 164–446)
PMV BLD AUTO: 11.3 FL (ref 9–12.9)
POTASSIUM SERPL-SCNC: 5.2 MMOL/L (ref 3.6–5.5)
PROT SERPL-MCNC: 7 G/DL (ref 6–8.2)
RBC # BLD AUTO: 4.83 M/UL (ref 4.2–5.4)
SODIUM SERPL-SCNC: 143 MMOL/L (ref 135–145)
TRIGL SERPL-MCNC: 248 MG/DL (ref 0–149)
TSH SERPL DL<=0.005 MIU/L-ACNC: 2.01 UIU/ML (ref 0.38–5.33)
VIT B12 SERPL-MCNC: 846 PG/ML (ref 211–911)
WBC # BLD AUTO: 7.2 K/UL (ref 4.8–10.8)

## 2022-03-11 PROCEDURE — 82306 VITAMIN D 25 HYDROXY: CPT

## 2022-03-11 PROCEDURE — 82607 VITAMIN B-12: CPT

## 2022-03-11 PROCEDURE — 80053 COMPREHEN METABOLIC PANEL: CPT

## 2022-03-11 PROCEDURE — 99214 OFFICE O/P EST MOD 30 MIN: CPT | Performed by: FAMILY MEDICINE

## 2022-03-11 PROCEDURE — 80061 LIPID PANEL: CPT

## 2022-03-11 PROCEDURE — 85027 COMPLETE CBC AUTOMATED: CPT

## 2022-03-11 PROCEDURE — 84443 ASSAY THYROID STIM HORMONE: CPT

## 2022-03-11 PROCEDURE — 36415 COLL VENOUS BLD VENIPUNCTURE: CPT

## 2022-03-11 RX ORDER — ATORVASTATIN CALCIUM 20 MG/1
20 TABLET, FILM COATED ORAL DAILY
Qty: 90 TABLET | Refills: 3 | Status: SHIPPED | OUTPATIENT
Start: 2022-03-11 | End: 2023-01-10

## 2022-03-11 ASSESSMENT — FIBROSIS 4 INDEX: FIB4 SCORE: 1.44

## 2022-03-11 ASSESSMENT — PATIENT HEALTH QUESTIONNAIRE - PHQ9: CLINICAL INTERPRETATION OF PHQ2 SCORE: 0

## 2022-03-11 NOTE — PROGRESS NOTES
"  Subjective:     Lucía Rodriguez is a 71 y.o. female presenting for a follow up      Current Outpatient Medications:   •  atorvastatin (LIPITOR) 20 MG Tab, Take 1 Tablet by mouth every day., Disp: 90 Tablet, Rfl: 3  •  Ca Phosphate-Cholecalciferol ([employer]S CALCIUM-VITAMIN D PO), Take 1 Tablet by mouth every day., Disp: , Rfl:   •  Multiple Vitamins-Minerals (CENTRUM SILVER 50+WOMEN) Tab, Take 1 Tablet by mouth every day., Disp: , Rfl:   •  Multiple Vitamins-Minerals (HAIR SKIN & NAILS ADVANCED) Tab, Take 1 Tablet by mouth every day., Disp: , Rfl:   •  apixaban (ELIQUIS) 5mg Tab, Take 1 Tablet by mouth 2 times a day., Disp: 180 Tablet, Rfl: 3  •  metoprolol tartrate (LOPRESSOR) 25 MG Tab, Take 0.5 Tablets by mouth 2 times a day., Disp: 90 tablet, Rfl: 3  •  flecainide (TAMBOCOR) 50 MG tablet, Take 1-2 Tablets by mouth 2 times a day. 50 mg in am and 100 mg in pm, Disp: 270 tablet, Rfl: 3  •  Psyllium (METAMUCIL PO), Take 1 Scoop by mouth every day., Disp: , Rfl:   •  gabapentin (NEURONTIN) 300 MG Cap, Take 1 Cap by mouth 2 Times a Day., Disp: , Rfl:     Objective:     Vitals: /70   Pulse 66   Temp 36.4 °C (97.6 °F)   Resp 16   Ht 1.651 m (5' 5\")   Wt 80.7 kg (178 lb)   LMP 12/28/2003   SpO2 96%   BMI 29.62 kg/m²   General: Alert  HEENT: Normocephalic.  Heart: Regular rate and rhythm.  S1 and S2 normal.  No murmurs appreciated.  Respiratory: Normal respiratory effort.  Clear to auscultation bilaterally.  Abdomen: Non-distended, soft  Extremities: No leg edema.    Assessment/Plan:     Lucía was seen today for follow-up.    Diagnoses and all orders for this visit:    Peripheral polyneuropathy  Chronic, worsening.  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    Stage 3 chronic kidney disease, unspecified whether stage 3a or 3b CKD (HCC)  Chronic, stable, continue to monitor  -     Comp Metabolic Panel; Future    Vitamin D deficiency  Chronic, stable, continue to monitor  -     VITAMIN D,25 HYDROXY; Future    Degenerative disc disease, lumbar  Chronic, stable.  We discussed a referral to physiatry, she plans to follow-up with her neck surgeon soon and will ask for recommendations.        Return in about 1 year (around 3/11/2023) for routine follow up.

## 2022-04-13 ENCOUNTER — OFFICE VISIT (OUTPATIENT)
Dept: NEUROLOGY | Facility: MEDICAL CENTER | Age: 72
End: 2022-04-13
Attending: PSYCHIATRY & NEUROLOGY
Payer: MEDICARE

## 2022-04-13 VITALS
BODY MASS INDEX: 30.63 KG/M2 | WEIGHT: 183.86 LBS | DIASTOLIC BLOOD PRESSURE: 80 MMHG | TEMPERATURE: 97.1 F | HEIGHT: 65 IN | OXYGEN SATURATION: 96 % | SYSTOLIC BLOOD PRESSURE: 130 MMHG | HEART RATE: 68 BPM

## 2022-04-13 DIAGNOSIS — G62.9 PERIPHERAL POLYNEUROPATHY: ICD-10-CM

## 2022-04-13 PROCEDURE — 99204 OFFICE O/P NEW MOD 45 MIN: CPT | Performed by: PSYCHIATRY & NEUROLOGY

## 2022-04-13 PROCEDURE — 99212 OFFICE O/P EST SF 10 MIN: CPT | Performed by: PSYCHIATRY & NEUROLOGY

## 2022-04-13 ASSESSMENT — FIBROSIS 4 INDEX: FIB4 SCORE: 1.43

## 2022-04-14 ASSESSMENT — ENCOUNTER SYMPTOMS
FALLS: 0
SENSORY CHANGE: 1
TINGLING: 1
FOCAL WEAKNESS: 1

## 2022-04-14 NOTE — PROGRESS NOTES
Subjective     Lucía Rodriguez is a 71 y.o. female who presents from the office of Dr.Chiyo Ray MD, for consultation, with a history of a peripheral sensory polyneuropathy.    AYDEE Castrejon is a pleasant 71-year-old right-handed  woman whose neuropathic symptoms were diagnosed as a neuropathy about 20 years ago, though the symptoms themselves have probably started several years prior.  At this time she has a constant sense of numbness and tingling with occasional painful burning dysesthesias in the feet.  Over time it has ascended from the soles to about the level of the ankle bilaterally.  Though from day today the intensity will vary, she also can have weeks where it is barely present.    The symptoms will always worsen over the course of the day, not positionally worsened when seated.  She has no pain when she actually walks, though she prefers soft soled shoes to do so.  There is no analgesia, she has not been injuring her feet.  She denies an actual weakness with foot dropping and frequent tripping.  She does not fatigue, there is no cramping in the feet.  Her balance is off to a slight degree, she does not fall with any kind of frequency.  She does not note if the imbalance worsens when it is darker, but walking uneven surfaces proves a little bit more difficult for her.    Her diagnosis was established when she saw neurologist in 2004.  She does not remember EMG/NCV studies being done at the time, nor blood work, her diagnosis established based on clinical grounds.  Since that time things have slowly progressed.  She has not been treated specifically.    In the interim she did develop bilateral plantar fasciitis, for this she was placed on gabapentin 300 mg, twice daily where she has stayed ever since.  She is not convinced this is helping her sensory symptoms.    More recently, vitamin B12, vitamin D and TSH values have been checked, all normal.    She also has a history of atrial  "fibrillation and dyslipidemia.  She denies any history of autoimmune disease, diabetes, CAD, CVA, PVD, MS, seizure, thyroid disease, pulmonary disease, hypertension, migraine or blood dyscrasia.  There is no surgical history of note from my standpoint.    Her father did suffer from a neuropathy, though he  due to complications of alcohol use.  Her mother also was an alcoholic who suffered from cancer.  Her brother and both her children are all alive and well.    She does not smoke or drink.  She denies any atypical stressors.    She is on Neurontin 300 mg, twice daily, Lipitor, Eliquis 5 mg, twice daily, flecainide, Lopressor 12.5 mg, twice daily, vitamin D with calcium supplement, multivitamin and Metamucil.    Review of Systems   Musculoskeletal: Negative for falls.   Neurological: Positive for tingling, sensory change and focal weakness.   All other systems reviewed and are negative.    Objective     /80 (BP Location: Right arm, Patient Position: Sitting, BP Cuff Size: Adult)   Pulse 68   Temp 36.2 °C (97.1 °F) (Temporal)   Ht 1.651 m (5' 5\")   Wt 83.4 kg (183 lb 13.8 oz)   LMP 2003   SpO2 96%   BMI 30.60 kg/m²      Physical Exam    She appears in no acute distress.  Vital signs are stable.  There is no malar rash, jaw or temporal tenderness, jaw claudication, or allodynia.  The neck is supple, range of motion is full.  Cardiac evaluation reveals an irregular rhythm.  Straight leg raising is negative bilaterally.  Distal pulses are intact, the feet are cool to touch.     Neurological Exam    Fully oriented, there is no aphasia, apraxia, or inattention.    PERRLA/EOMI, visual fields are full, facial movements are symmetric.  There is no sensory loss to pinprick, temperature or light touch bilaterally.  The tongue and uvula are midline, shoulder shrug and head rotation are normal.    Musculoskeletal exam reveals normal tone throughout, there is no tremor, atrophy, asterixis, or drift.  " Strength is intact throughout except for mild weakness of dorsiflexion and EHL bilaterally.  He has some difficulty wiggling the toes as well.    Reflexes are easily elicited throughout except at the knees where they are relatively diminished.  The ankle jerks are absent, even with distraction.  Both toes are downgoing.    She stands easily, gait is normal and station and stride length.  There is no appendicular dystaxia.  She does not look at the ground to maintain balance.  Repetitive movements are slowed to a degree in both feet.    Sensory exam reveals a stocking pattern decrement of vibration below the ankles, though JPS is intact.  Pinprick is curtailed below the midshin, temperature is intact.  Romberg is absent.    Assessment & Plan     1. Peripheral polyneuropathy  The symptoms she describes as well as the findings on exam are consistent with asymmetric polyneuropathy.  What is unusual is the family history of what sounds like is a similar process though her father evidently was a heavy drinker, so he may have been dealing with something symptomatic.  For her the symptoms suggest a small fiber sensory process, though there may be some weakness now being seen.  This suggests a possible mixed process of though severe axonal polyneuropathy is can be seen showing weakness later in the progression.    For now EMG/NCV studies of the first step, depending on these results, subsequent medical test can be ordered including serologies, etc.  It is unlikely we are going to see an abnormality that would explain symptoms that have persisted for this long and with only minimal progression.  It all looks to be a length-dependent process.  We will contact her with the results if they are significant and warrant a different clinical approach, otherwise we talk specifics in the interim.    Symptomatically, I would like to get her off the gabapentin to see if in fact there has been some benefit provided.  Thus she will go to  a 300 mg daily dosing for 1 week and then stop.  She will notify the office if symptoms increase again, thus we will go further with the dosing until adequate response is achieved.  If there is no change in her symptoms off the drug, additional medications are available.  We will discuss this further via MyChart, we will follow-up in about 4 months.    - Referral to Neurodiagnostics (EEG,EP,EMG/NCS/DBS)    Time: 50 minutes in total spent on patient care including precharting, record review, discussions with healthcare staff and documentation.  This includes face-to-face time with the patient for exam, review, discussion, as well as counseling and coordinating care.

## 2022-04-26 NOTE — OP THERAPY DAILY TREATMENT
Outpatient Physical Therapy  DAILY TREATMENT     [facility] [place] [id number], [address]  [place] [address]  Phone:  [phone]    Date: 10/14/2021    Patient: Lucía Rodriguez  YOB: 1950  MRN: [id number]     Time Calculation    Start time: 1115  Stop time: 1200 Time Calculation (min): 45 minutes     Chief Complaint: No chief complaint on file.    Visit #: 16    SUBJECTIVE:  Some soreness post vacation, especially turning head and lifting luggage, was painful for 2-3 days, but then good.  Inconsistent with HEP due to vacation and more active, but return to it when we got home.       OBJECTIVE:  DOS - 5/3/21  Full AROM otherwise than extension 55 degrees, left rotation - 63 degrees.  Passive extension and left rotation significantly improved ROM      DNF endurance - 10 seconds           Therapeutic Exercises (CPT 46148):     1. UPOC - 7/25/2021      Therapeutic Exercise Summary:   Reviewed HEP   Rear Delt Flies - 1 sets   Shrugs - 10# 1 set  Resisted Chin Tucks - 1 sets   Rows - 1 sets   W's - GTB 1 sets       Time-based treatments/modalities:    Physical Therapy Timed Treatment Charges  Therapeutic exercise minutes (CPT 68888): 38 minutes    ASSESSMENT:   Patient has been consistently I with HEP, outside of vacation, and currently primarily limited by strength and endurance.  Patient does have limitations in ROM, however consistently improving with strength and activity.  Patient and PT agree to d/c, and return if needed.  Anticipate patient will need more time out of surgery for full recovery, but encourage continued I HEP.     PLAN/RECOMMENDATIONS:   Discharge to I HEP.        ----- Message from Jaylyn Rosen MD sent at 4/26/2022 11:00 AM CDT -----  Normal pap, repeat in 3 hrs

## 2022-05-27 ENCOUNTER — HOSPITAL ENCOUNTER (OUTPATIENT)
Dept: RADIOLOGY | Facility: MEDICAL CENTER | Age: 72
End: 2022-05-27
Attending: NEUROLOGICAL SURGERY
Payer: MEDICARE

## 2022-05-27 ENCOUNTER — NON-PROVIDER VISIT (OUTPATIENT)
Dept: NEUROLOGY | Facility: MEDICAL CENTER | Age: 72
End: 2022-05-27
Attending: SPECIALIST
Payer: MEDICARE

## 2022-05-27 DIAGNOSIS — G62.89 AXONAL NEUROPATHY: ICD-10-CM

## 2022-05-27 DIAGNOSIS — G62.9 PERIPHERAL POLYNEUROPATHY: ICD-10-CM

## 2022-05-27 DIAGNOSIS — M50.00 CERVICAL DISC DISEASE WITH MYELOPATHY: ICD-10-CM

## 2022-05-27 PROCEDURE — 95911 NRV CNDJ TEST 9-10 STUDIES: CPT | Mod: 26 | Performed by: SPECIALIST

## 2022-05-27 PROCEDURE — 95911 NRV CNDJ TEST 9-10 STUDIES: CPT | Performed by: SPECIALIST

## 2022-05-27 PROCEDURE — 95886 MUSC TEST DONE W/N TEST COMP: CPT | Mod: 26 | Performed by: SPECIALIST

## 2022-05-27 PROCEDURE — 95886 MUSC TEST DONE W/N TEST COMP: CPT | Performed by: SPECIALIST

## 2022-05-27 PROCEDURE — 72050 X-RAY EXAM NECK SPINE 4/5VWS: CPT

## 2022-05-27 NOTE — PROCEDURES
NERVE CONDUCTION STUDIES AND ELECTROMYOGRAPHY REPORT        05/27/22      Referring provider: Harvinder Bell M.D.      SUMMARY OF PATIENT'S CLINICAL HISTORY,PHYSICAL EXAM, AND RATIONALE FOR TESTING:    Ms. Lucía Haywood Rodriguez 71 y.o. presenting with chronic history of lower extremity numbness and pain.  Patient has a family history as her father reportedly had a neuropathy.    Past Medical History is significant for :   Past Medical History:   Diagnosis Date   • Anesthesia     Delayed emergence; Hair loss   • Arthritis     Throughout her body   • Atrial fibrillation (HCC)     Atrial fibrillation   • Calculus of gallbladder without cholecystitis 1/9/2017   • CATARACT     Bilateral IOL; loss of vision in Right eye starting in 2000.   • Central stenosis of spinal canal 1/18/2021   • Delayed emergence from general anesthesia    • Glaucoma     right eye   • High cholesterol    • Hyperlipidemia    • Macular cyst, hole, or pseudohole of retina    • Presence of IVC filter 2021   • Pulmonary nodules/lesions, multiple 07/06/2016    stable on CTs, no further work up needed       A brief general examination was remarkable for atrophy of her bilateral lower extremity intrinsic foot muscles.  Upper extremity intrinsic hand muscles were intact.    The electrodiagnostic studies were performed to evaluate for possible peripheral neuropathy.      ELECTRODIAGNOSTIC EXAMINATION:  Nerve conduction studies (NCS) and electromyography (EMG) are utilized to evaluate direct or indirect damage to the peripheral nervous system. NCS are performed to measure the nerve(s) response(s) to electrostimulation across a given nerve segment. EMG evaluates the passive and active electrical activity of the muscle(s) in question.  Muscles are innervated by specific peripheral nerves and roots. Often times, several nerves the muscle to be examined in order to determine the presence or absence of the disease process. Furthermore, nerves and muscles may  need to be tested in a [id number] comparison, as well as in additional extremities, as this may be crucial in characterizing the extent of the disease process, which may be diffuse or isolated and of varying degree of severity. The extent of the neurodiagnostic exam is justified as it may help arrive to a proper diagnosis, which ultimately may contribute to better management of the patient. Therefore, the nerves to muscles examined during the study were medically necessary.    Unless otherwise noted, temperature of the extremity(s) study was monitored before and during the examination and remained between 32 and 36 degrees C for the upper extremities, and between 30 and 36 degrees C for the lower extremities.      NERVE CONDUCTION STUDIES:  Sensory nerves:   -Right median sensory nerve was examined.The response was within normal limits.  -Right ulnar sensory nerve was examined. The response was within normal limits.  - Bilateral Sural nerves were tested. The responses were not elicitable with antidromic stimuli bilaterally.    Motor nerves:   -Right median motor nerve was examined. Recording electrodes placed at the Abductor Pollicis Brevis muscles. The response was within normal limits.  -Right ulnar motor nerve was examined. Recording electrodes placed at the Abductor Digiti Minimi muscles. The response was within normal limits.  - Bilateral Tibial nerves were examined. Recording electrodes placed at the Abductor Hallucis muscles. The responses were abnormal bilaterally.  Onset latency was normal bilaterally, on the left amplitude was decreased to 3.2 mV and conduction velocity was decreased to 36 m/s, on the right amplitude was decreased to 1.5 mV and conduction velocity was decreased to 33 m/s.  - Bilateral Deep Peroneal motor nerves were examined. Recording electrodes were placed at the Extensor Digitorum Brevis muscles.The responses were abnormal bilaterally.  Onset latency was normal bilaterally, amplitude  was decreased to 0.3 mV bilaterally, conduction velocity was normal on the left and decreased to 37 m/s on the right.        ELECTROMYOGRAPHY:  The study was performed the concentric needle electrode. Fibrillation and fasciculation activity is graded by convention from none (0) to continuous (4+).  Needle electrode examination was performed in the following muscles: Right deltoid, biceps, triceps, first dorsal interosseous, abductor pollicis brevis, bilateral vastus lateralis, tibialis anterior, gastrocnemius, extensor digitorum brevis, abductor hallucis.  Acute denervation changes with fibrillation potentials were noted in the left extensor digitorum brevis and right abductor hallucis and gastrocnemius.  Chronic neuropathic changes indicating chronic denervation reinnervation with increased amplitude potentials with decreased recruitment were noted in the left extensor digitorum brevis and abductor hallucis in the right gastrocnemius extensor digitorum brevis and abductor hallucis.  The bilateral vastus lateralis, tibialis anterior and left gastrocnemius and all right upper extremity muscle studied were normal electrophysiologically.      Nerve Conduction Studies     Stim Site NR Onset (ms) Norm Onset (ms) O-P Amp (µV) Norm O-P Amp Site1 Site2 Delta-P (ms) Dist (cm) Donnie (m/s) Norm Donnie (m/s)   Right Median Anti Sensory (2nd Digit)   Wrist    3.2 <3.8 20.3 >10 Wrist 2nd Digit 4.1 14.0 *34 >50   Left Sural Anti Sensory (Lat Mall)   Calf *NR  <4.6  >3 Calf Lat Mall  14.0  >40   Right Sural Anti Sensory (Lat Mall)   Calf *NR  <4.6  >3 Calf Lat Mall  14.0  >40   Right Ulnar Anti Sensory (5th Digit)   Wrist    2.9 <3.2 34.3 >5 Wrist 5th Digit 3.9 14.0 *36 >50        Stim Site NR Onset (ms) Norm Onset (ms) O-P Amp (mV) Norm O-P Amp Site1 Site2 Delta-0 (ms) Dist (cm) Donnie (m/s) Norm Donnie (m/s)   Right Median Motor (Abd Poll Brev)   Wrist    2.9 <4 9.0 >5 Elbow Wrist 4.6 25.0 54 >50   Elbow    7.5  8.9          Left Peroneal  EDB Motor (Ext Dig Brev)   Ankle    *7.3 <6 *0.3 >2.5 B Fib Ankle 7.0 30.0 43 >40   B Fib    14.3  0.3  Poplt B Fib 1.6 10.0 63    Poplt    15.9  0.3          Right Peroneal EDB Motor (Ext Dig Brev)   Ankle    5.2 <6 *0.3 >2.5 B Fib Ankle 8.6 32.0 *37 >40   B Fib    13.8  0.3  Poplt B Fib 2.0 10.0 50    Poplt    15.8  0.4          Left Tibial Motor (Abd Valentin Brev)   Ankle    5.5 <6 *3.2 >4 Knee Ankle 10.1 36.0 *36 >40   Knee    15.6  3.0          Right Tibial Motor (Abd Valentin Brev)   Ankle    4.8 <6 *1.5 >4 Knee Ankle 10.4 34.0 *33 >40   Knee    15.2  0.5          Right Ulnar Motor (Abd Dig Min)   Wrist    3.1 <3.1 10.5 >7 B Elbow Wrist 3.4 20.0 59 >50   B Elbow    6.5  8.0  A Elbow B Elbow 1.9 10.0 53    A Elbow    8.4  10.0                                      Electromyography     Side Muscle Nerve Root Ins Act Fibs Psw Amp Dur Poly Recrt Int Pat Comment   Left VastusLat Femoral L2-4 Nml Nml Nml Nml Nml 0 Nml Nml    Left AntTibialis Dp Br Fibular L4-5 Nml Nml Nml Nml Nml 0 Nml Nml    Left Gastroc Tibial S1-2 Nml Nml Nml Nml Nml 0 Nml Nml    Left Ext Dig Brev Dp Br Fibular L5, S1 *Incr *2+ Nml *Decr Nml 0 *Reduced *25%    Left AbdHallucis MedPlantar S1-2 Nml Nml Nml *Decr Nml 0 *Reduced *25%    Right VastusLat Femoral L2-4 Nml Nml Nml Nml Nml 0 Nml Nml    Right AntTibialis Dp Br Fibular L4-5 Nml Nml Nml Nml Nml 0 Nml Nml    Right Gastroc Tibial S1-2 *Incr *2+ Nml Nml Nml 0 *Reduced *50%    Right Ext Dig Brev Dp Br Fibular L5, S1 Nml Nml Nml Nml Nml 0 *Reduced *50%    Right AbdHallucis MedPlantar S1-2 *Incr *2+ Nml *Incr Nml 0 *Reduced *50%    Right Deltoid Axillary C5-6 Nml Nml Nml Nml Nml 0 Nml Nml    Right Biceps Musculocut C5-6 Nml Nml Nml Nml Nml 0 Nml Nml    Right Triceps Radial C6-7-8 Nml Nml Nml Nml Nml 0 Nml Nml    Right 1stDorInt Ulnar C8-T1 Nml Nml Nml Nml Nml 0 Nml Nml    Right Abd Poll Brev Median C8-T1 Nml Nml Nml Nml Nml 0 Nml Nml            DIAGNOSTIC INTERPRETATION:   Extensive electrodiagnostic  studies were performed to the right upper extremity and bilateral lower extremities.  Results are as follows:    1.  Normal EMG and nerve conduction study right upper extremity.  Specifically right median and ulnar motor and sensory conduction studies were within normal limits and no acute or chronic denervation changes were noted in selected muscles studied in the right upper extremity.    2.  Bilateral low amplitude tibial and peroneal motor conduction studies, many with slightly slowed conduction velocity.    3.  Absent sural sensory responses bilaterally.    4.  Both acute denervation changes with increased insertional activity fibrillation potentials and positive sharp waves and chronic neuropathic changes with decreased recruitment of increased amplitude potentials were noted in bilateral distal lower extremity muscles.    CLINICAL DISCUSSION:   Taken as a whole these results are most consistent with a motor/sensory, axonal and demyelinating but predominantly axonal polyneuropathy which is of moderate severity electrophysiologically and associated with acute and chronic denervation changes in distal lower extremity muscles bilaterally.  Clinical correlation is suggested.      AKIRA Douglass M.D.

## 2022-05-31 ENCOUNTER — APPOINTMENT (OUTPATIENT)
Dept: RADIOLOGY | Facility: MEDICAL CENTER | Age: 72
End: 2022-05-31
Attending: NEUROLOGICAL SURGERY
Payer: MEDICARE

## 2022-07-06 RX ORDER — FLECAINIDE ACETATE 50 MG/1
50-100 TABLET ORAL 2 TIMES DAILY
Qty: 270 TABLET | Refills: 3 | Status: SHIPPED | OUTPATIENT
Start: 2022-07-06 | End: 2023-11-16

## 2022-07-06 RX ORDER — APIXABAN 5 MG/1
TABLET, FILM COATED ORAL
Qty: 180 TABLET | Refills: 3 | Status: SHIPPED | OUTPATIENT
Start: 2022-07-06 | End: 2023-08-01

## 2022-07-06 NOTE — TELEPHONE ENCOUNTER
Please advice:    apixaban (ELIQUIS) 5mg Tab 180 Tablet 3/3 9/20/2021    Pharmacy    metoprolol tartrate (LOPRESSOR) 25 MG Tab 90 tablet 3/3 8/6/2021  Pharmacy    flecainide (TAMBOCOR) 50 MG tablet 270 tablet 3/3 8/6/2021      Pharmacy    [facility] #[id number] - [place], [place] - [address]

## 2022-07-13 ENCOUNTER — OFFICE VISIT (OUTPATIENT)
Dept: NEUROLOGY | Facility: MEDICAL CENTER | Age: 72
End: 2022-07-13
Attending: PSYCHIATRY & NEUROLOGY
Payer: MEDICARE

## 2022-07-13 VITALS
HEART RATE: 60 BPM | RESPIRATION RATE: 16 BRPM | BODY MASS INDEX: 30.16 KG/M2 | OXYGEN SATURATION: 95 % | HEIGHT: 65 IN | SYSTOLIC BLOOD PRESSURE: 104 MMHG | WEIGHT: 181 LBS | DIASTOLIC BLOOD PRESSURE: 66 MMHG | TEMPERATURE: 97.3 F

## 2022-07-13 DIAGNOSIS — G60.8 PERIPHERAL SENSORY-MOTOR AXONAL POLYNEUROPATHY: ICD-10-CM

## 2022-07-13 PROCEDURE — 99212 OFFICE O/P EST SF 10 MIN: CPT | Performed by: PSYCHIATRY & NEUROLOGY

## 2022-07-13 PROCEDURE — 99213 OFFICE O/P EST LOW 20 MIN: CPT | Performed by: PSYCHIATRY & NEUROLOGY

## 2022-07-13 ASSESSMENT — ENCOUNTER SYMPTOMS
TREMORS: 0
SENSORY CHANGE: 1
FALLS: 0
BACK PAIN: 1
FOCAL WEAKNESS: 0
TINGLING: 1

## 2022-07-13 ASSESSMENT — FIBROSIS 4 INDEX: FIB4 SCORE: 1.43

## 2022-07-13 NOTE — PROGRESS NOTES
Subjective     Brynn Rodriguez is a 71 y.o. female who presents for follow-up, with a history of a peripheral polyneuropathy, now status post EMG/NCV and off gabapentin.     HPI    Symptomatically, Lucía states that the symptoms have remained in her feet, and though there had been a relatively noticeable bump up as she was coming down off the gabapentin, even this reversed itself.  In fact, symptoms are mild enough that she does not think symptomatic relief may be necessary.  It is still mostly in numbness and tingling that is not painful, though she can get stabbing pains and aching throughout the day and especially into the evenings.  Still it is not as severe as it once had been.  The symptoms no longer are affecting sleep hygiene.    The unsteadiness is persistent, she is not falling with regularity.  She denies a foot drop and frequent tripping.  There are no sensory distortions in her hands and fingers.    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  There is no evidence of a lumbosacral polyradiculopathy.    Off the gabapentin, she actually finds herself feeling better.  There was a bit of a twinge with the symptoms increasing briefly as she was titrating off the drug, this improved.    Medical, surgical and family histories are reviewed, there are no new drug allergies.  In the family it is her father who suffers from some type of neuropathy, but again he has the confounding issue of alcohol overuse.  No one else in the family has a history of similar symptoms.    She is on Lopressor, Tambocor, Eliquis 5 mg twice daily, vitamin D with calcium and Lipitor.    Review of Systems   Musculoskeletal: Positive for back pain. Negative for falls.   Neurological: Positive for tingling and sensory change. Negative for tremors and  "focal weakness.   All other systems reviewed and are negative.    Objective     /66 (BP Location: Right arm, Patient Position: Sitting)   Pulse 60   Temp 36.3 °C (97.3 °F)   Resp 16   Ht 1.651 m (5' 5\")   Wt 82.1 kg (181 lb)   LMP 12/28/2003   SpO2 95%   BMI 30.12 kg/m²      Physical Exam    She appears in no acute distress.  Vital signs are stable.  There is no malar rash.  Her neck is supple.  Cardiac evaluation reveals a regular rhythm.  Straight leg raising is negative bilaterally.  There is no evidence of lower extremity edema or significant vascular insufficiency changes.     Neurological Exam    Fully oriented, there is no aphasia, apraxia, or inattention.    PERRLA/EOMI, visual fields are full, facial movements are symmetric.  Sensory exam is intact to light touch and temperature.  There is no bulbar dysfunction.  Shoulder shrug and head rotation are normal.    Musculoskeletal exam again reveals no tremor, asterixis, or drift.  Tone is normal.  Strength is 5/5 throughout, attention paid to the lower extremities distally.  Reflexes are absent at the ankles, trace present at the knees, more easily elicited without asymmetries in the upper extremities.  Both toes are downgoing.    She stands easily, gait is normal and station and stride length.  She does not need to look at the ground to maintain balance.  Repetitive movements are may be slightly slowed with the feet, this is subtle at worst.  These movements are intact in the upper extremities.  There is no appendicular dystaxia.    Sensory exam reveals a mild stocking pattern decrement of vibration below the ankles, temperature is intact.  These modalities are intact in the upper extremities.    Assessment & Plan     1. Peripheral sensory-motor axonal polyneuropathy  Clinically we will simply follow along.  Given the very long history (likely more than 25 years) of these types of sensory distortions, the likelihood of an infectious or " inflammatory, paraneoplastic or autoimmune process is much less likely.  Genetics could be playing a role given her father's history, but again because this is most likely an axonal process, most of the genetically related conditions are demyelinating in nature, I think even genetic screens are going to prove low yield.  She is comfortable with this.    It is not likely this condition will suddenly progress, she was encouraged to remain active, nothing that she does, or does not do, will make this change or progress at a more rapid pace.  Unfortunately it also means we have nothing that might slow it down.  She was told that weakness can develop over time since the motor nerves are already involved on her NCV studies.    Symptomatically she is stable, she was told that the symptoms will fluctuate, we are interested in progression over a longer interval time.  If things like sleep hygiene, daily routines, etc. are becoming compromised, we can retry something else.  Otherwise we will follow-up in 1 year.    Time: 20 minutes in total spent on patient care including per charting, record review, discussion with healthcare staff and documentation.  This includes face-to-face time with the patient for exam, review, discussion, as well as counseling and coordinating care.

## 2022-11-09 ENCOUNTER — APPOINTMENT (RX ONLY)
Dept: URBAN - METROPOLITAN AREA CLINIC 4 | Facility: CLINIC | Age: 72
Setting detail: DERMATOLOGY
End: 2022-11-09

## 2022-11-09 DIAGNOSIS — L82.1 OTHER SEBORRHEIC KERATOSIS: ICD-10-CM

## 2022-11-09 DIAGNOSIS — Z85.828 PERSONAL HISTORY OF OTHER MALIGNANT NEOPLASM OF SKIN: ICD-10-CM

## 2022-11-09 DIAGNOSIS — L81.4 OTHER MELANIN HYPERPIGMENTATION: ICD-10-CM

## 2022-11-09 DIAGNOSIS — D22 MELANOCYTIC NEVI: ICD-10-CM

## 2022-11-09 DIAGNOSIS — D18.0 HEMANGIOMA: ICD-10-CM

## 2022-11-09 DIAGNOSIS — L57.8 OTHER SKIN CHANGES DUE TO CHRONIC EXPOSURE TO NONIONIZING RADIATION: ICD-10-CM

## 2022-11-09 PROBLEM — D48.5 NEOPLASM OF UNCERTAIN BEHAVIOR OF SKIN: Status: ACTIVE | Noted: 2022-11-09

## 2022-11-09 PROBLEM — D22.5 MELANOCYTIC NEVI OF TRUNK: Status: ACTIVE | Noted: 2022-11-09

## 2022-11-09 PROBLEM — D18.01 HEMANGIOMA OF SKIN AND SUBCUTANEOUS TISSUE: Status: ACTIVE | Noted: 2022-11-09

## 2022-11-09 PROCEDURE — 99213 OFFICE O/P EST LOW 20 MIN: CPT | Mod: 25

## 2022-11-09 PROCEDURE — 11102 TANGNTL BX SKIN SINGLE LES: CPT

## 2022-11-09 PROCEDURE — ? COUNSELING

## 2022-11-09 PROCEDURE — ? BIOPSY BY SHAVE METHOD

## 2022-11-09 ASSESSMENT — LOCATION SIMPLE DESCRIPTION DERM
LOCATION SIMPLE: RIGHT HAND
LOCATION SIMPLE: RIGHT ANTERIOR NECK
LOCATION SIMPLE: RIGHT CHEEK
LOCATION SIMPLE: LEFT HAND
LOCATION SIMPLE: LEFT UPPER BACK
LOCATION SIMPLE: RIGHT UPPER BACK

## 2022-11-09 ASSESSMENT — LOCATION ZONE DERM
LOCATION ZONE: TRUNK
LOCATION ZONE: NECK
LOCATION ZONE: HAND
LOCATION ZONE: FACE

## 2022-11-09 ASSESSMENT — LOCATION DETAILED DESCRIPTION DERM
LOCATION DETAILED: RIGHT INFERIOR ANTERIOR NECK
LOCATION DETAILED: LEFT SUPERIOR UPPER BACK
LOCATION DETAILED: LEFT RADIAL DORSAL HAND
LOCATION DETAILED: RIGHT INFERIOR MEDIAL UPPER BACK
LOCATION DETAILED: RIGHT INFERIOR CENTRAL MALAR CHEEK
LOCATION DETAILED: RIGHT SUPERIOR UPPER BACK
LOCATION DETAILED: RIGHT RADIAL DORSAL HAND

## 2022-11-11 ENCOUNTER — PATIENT MESSAGE (OUTPATIENT)
Dept: HEALTH INFORMATION MANAGEMENT | Facility: OTHER | Age: 72
End: 2022-11-11

## 2022-12-13 ENCOUNTER — APPOINTMENT (RX ONLY)
Dept: URBAN - METROPOLITAN AREA CLINIC 36 | Facility: CLINIC | Age: 72
Setting detail: DERMATOLOGY
End: 2022-12-13

## 2022-12-13 PROBLEM — C44.41 BASAL CELL CARCINOMA OF SKIN OF SCALP AND NECK: Status: ACTIVE | Noted: 2022-12-13

## 2022-12-13 PROCEDURE — 17311 MOHS 1 STAGE H/N/HF/G: CPT

## 2022-12-13 PROCEDURE — ? MOHS SURGERY

## 2022-12-13 PROCEDURE — 17312 MOHS ADDL STAGE: CPT

## 2022-12-13 PROCEDURE — 13121 CMPLX RPR S/A/L 2.6-7.5 CM: CPT

## 2022-12-13 NOTE — PROCEDURE: MOHS SURGERY
Mohs Case Number: [id number]
Previous Accession (Optional): [id number]
Biopsy Photograph Reviewed: Yes
Referring Physician (Optional): Dr. Kapadia
Consent Type: Consent 1 (Standard)
Eye Shield Used: No
Surgeon Performing Repair (Optional): Anastasiia
Initial Size Of Lesion: 1
X Size Of Lesion In Cm (Optional): 0.7
Number Of Stages: 2
Primary Defect Length In Cm (Final Defect Size - Required For Flaps/Grafts): 1.6
Primary Defect Width In Cm (Final Defect Size - Required For Flaps/Grafts): 1.3
Repair Type: Complex Repair
Oculoplastic Surgeon (A): Phuc
Oculoplastic Surgeon Procedure Text (A): After obtaining clear surgical margins the patient was sent to oculoplastics for surgical repair.  The patient understands they will receive post-surgical care and follow-up from the referring physician's office.
Otolaryngologist Procedure Text (A): After obtaining clear surgical margins the patient was sent to otolaryngology for surgical repair.  The patient understands they will receive post-surgical care and follow-up from the referring physician's office.
Plastic Surgeon Procedure Text (A): After obtaining clear surgical margins the patient was sent to plastics for surgical repair.  The patient understands they will receive post-surgical care and follow-up from the referring physician's office.
Mid-Level (A): did
Mid-Level Procedure Text (A): After obtaining clear surgical margins the patient was sent to a mid-level provider for surgical repair.  The patient understands they will receive post-surgical care and follow-up from the mid-level provider.
Provider Procedure Text (A): After obtaining clear surgical margins the defect was repaired by another provider.
Asc Procedure Text (A): After obtaining clear surgical margins the patient was sent to an ASC for surgical repair.  The patient understands they will receive post-surgical care and follow-up from the ASC physician.
Simple / Intermediate / Complex Repair - Final Wound Length In Cm: 3.4
Suturegard Retention Suture: 2-0 Nylon
Retention Suture Bite Size: 3 mm
Length To Time In Minutes Device Was In Place: 10
Complex/Intermediate Repair Variations: M-plasty
Distance Of Undermining In Cm (Required): 1.5
Undermining Type: Entire Wound
Debridement Text: The wound edges were debrided prior to proceeding with the closure to facilitate wound healing.
Helical Rim Text: The closure involved the helical rim.
Vermilion Border Text: The closure involved the vermilion border.
Nostril Rim Text: The closure involved the nostril rim.
Retention Suture Text: Retention sutures were placed to support the closure and prevent dehiscence.
Secondary Defect Length In Cm (Required For Flaps): 0
Area H Indication Text: Tumors in this location are included in Area H (eyelids, eyebrows, nose, lips, chin, ear, pre-auricular, post-auricular, temple, genitalia, hands, feet, ankles and areola).  Tissue conservation is critical in these anatomic locations.
Area M Indication Text: Tumors in this location are included in Area M (cheek, forehead, scalp, neck, jawline and pretibial skin).  Mohs surgery is indicated for tumors in these anatomic locations.
Area L Indication Text: Tumors in this location are included in Area L (trunk and extremities).  Mohs surgery is indicated for larger tumors, or tumors with aggressive histologic features, in these anatomic locations.
Depth Of Tumor Invasion (For Histology): fascia
Perineural Invasion (For Histology - Be Specific If Possible): absent
Presence Of Scar Tissue (For Histology): present
Surgical Defect Width In Cm (Optional): 1.0
Special Stains Stage 1 - Results: Base On Clearance Noted Above
Stage 2: Additional Anesthesia Type: 1% lidocaine with 1:100,000 epinephrine and 408mcg clindamycin/ml and a 1:10 solution of 8.4% sodium bicarbonate
Stage 4: Additional Anesthesia Type: 1% lidocaine with epinephrine
Include Tumor Staging In Mohs Note?: Please Select the Appropriate Response
Staging Info: By selecting yes to the question above you will include information on AJCC 8 tumor staging in your Mohs note. Information on tumor staging will be automatically added for SCCs on the head and neck. AJCC 8 includes tumor size, tumor depth, perineural involvement and bone invasion.
Tumor Depth: Less than 6mm from granular layer and no invasion beyond the subcutaneous fat
Medical Necessity Statement: Based on my medical judgement, Mohs surgery is the most appropriate treatment for this cancer compared to other treatments.
Alternatives Discussed Intro (Do Not Add Period): I discussed alternative treatments to Mohs surgery and specifically discussed the risks and benefits of
Consent 1/Introductory Paragraph: The rationale for Mohs was explained to the patient and consent was obtained. The risks, benefits and alternatives to therapy were discussed in detail. Specifically, the risks of infection, scarring, bleeding, prolonged wound healing, incomplete removal, allergy to anesthesia, nerve injury and recurrence were addressed. Prior to the procedure, the treatment site was clearly identified and confirmed by the patient. All components of Universal Protocol/PAUSE Rule completed.
Consent 2/Introductory Paragraph: Mohs surgery was explained to the patient and consent was obtained. The risks, benefits and alternatives to therapy were discussed in detail. Specifically, the risks of infection, scarring, bleeding, prolonged wound healing, incomplete removal, allergy to anesthesia, nerve injury and recurrence were addressed. Prior to the procedure, the treatment site was clearly identified and confirmed by the patient. All components of Universal Protocol/PAUSE Rule completed.
Consent 3/Introductory Paragraph: I gave the patient a chance to ask questions they had about the procedure.  Following this I explained the Mohs procedure and consent was obtained. The risks, benefits and alternatives to therapy were discussed in detail. Specifically, the risks of infection, scarring, bleeding, prolonged wound healing, incomplete removal, allergy to anesthesia, nerve injury and recurrence were addressed. Prior to the procedure, the treatment site was clearly identified and confirmed by the patient. All components of Universal Protocol/PAUSE Rule completed.
Consent (Temporal Branch)/Introductory Paragraph: The rationale for Mohs was explained to the patient and consent was obtained. The risks, benefits and alternatives to therapy were discussed in detail. Specifically, the risks of damage to the temporal branch of the facial nerve, infection, scarring, bleeding, prolonged wound healing, incomplete removal, allergy to anesthesia, and recurrence were addressed. Prior to the procedure, the treatment site was clearly identified and confirmed by the patient. All components of Universal Protocol/PAUSE Rule completed.
Consent (Marginal Mandibular)/Introductory Paragraph: The rationale for Mohs was explained to the patient and consent was obtained. The risks, benefits and alternatives to therapy were discussed in detail. Specifically, the risks of damage to the marginal mandibular branch of the facial nerve, infection, scarring, bleeding, prolonged wound healing, incomplete removal, allergy to anesthesia, and recurrence were addressed. Prior to the procedure, the treatment site was clearly identified and confirmed by the patient. All components of Universal Protocol/PAUSE Rule completed.
Consent (Spinal Accessory)/Introductory Paragraph: The rationale for Mohs was explained to the patient and consent was obtained. The risks, benefits and alternatives to therapy were discussed in detail. Specifically, the risks of damage to the spinal accessory nerve, infection, scarring, bleeding, prolonged wound healing, incomplete removal, allergy to anesthesia, and recurrence were addressed. Prior to the procedure, the treatment site was clearly identified and confirmed by the patient. All components of Universal Protocol/PAUSE Rule completed.
Consent (Near Eyelid Margin)/Introductory Paragraph: The rationale for Mohs was explained to the patient and consent was obtained. The risks, benefits and alternatives to therapy were discussed in detail. Specifically, the risks of ectropion or eyelid deformity, infection, scarring, bleeding, prolonged wound healing, incomplete removal, allergy to anesthesia, nerve injury and recurrence were addressed. Prior to the procedure, the treatment site was clearly identified and confirmed by the patient. All components of Universal Protocol/PAUSE Rule completed.
Consent (Ear)/Introductory Paragraph: The rationale for Mohs was explained to the patient and consent was obtained. The risks, benefits and alternatives to therapy were discussed in detail. Specifically, the risks of ear deformity, infection, scarring, bleeding, prolonged wound healing, incomplete removal, allergy to anesthesia, nerve injury and recurrence were addressed. Prior to the procedure, the treatment site was clearly identified and confirmed by the patient. All components of Universal Protocol/PAUSE Rule completed.
Consent (Nose)/Introductory Paragraph: The rationale for Mohs was explained to the patient and consent was obtained. The risks, benefits and alternatives to therapy were discussed in detail. Specifically, the risks of nasal deformity, changes in the flow of air through the nose, infection, scarring, bleeding, prolonged wound healing, incomplete removal, allergy to anesthesia, nerve injury and recurrence were addressed. Prior to the procedure, the treatment site was clearly identified and confirmed by the patient. All components of Universal Protocol/PAUSE Rule completed.
Consent (Lip)/Introductory Paragraph: The rationale for Mohs was explained to the patient and consent was obtained. The risks, benefits and alternatives to therapy were discussed in detail. Specifically, the risks of lip deformity, changes in the oral aperture, infection, scarring, bleeding, prolonged wound healing, incomplete removal, allergy to anesthesia, nerve injury and recurrence were addressed. Prior to the procedure, the treatment site was clearly identified and confirmed by the patient. All components of Universal Protocol/PAUSE Rule completed.
Consent (Scalp)/Introductory Paragraph: The rationale for Mohs was explained to the patient and consent was obtained. The risks, benefits and alternatives to therapy were discussed in detail. Specifically, the risks of changes in hair growth pattern secondary to repair, infection, scarring, bleeding, prolonged wound healing, incomplete removal, allergy to anesthesia, nerve injury and recurrence were addressed. Prior to the procedure, the treatment site was clearly identified and confirmed by the patient. All components of Universal Protocol/PAUSE Rule completed.
Detail Level: Detailed
Postop Diagnosis: same
Additional Anesthesia Type: 0.5% bupivacaine with 1:200,000 epinephrine
Hemostasis: Electrocautery
Estimated Blood Loss (Cc): less than 5 cc
Repair Anesthesia Method: local infiltration
Anesthesia Volume In Cc: 6
Brow Lift Text: A midfrontal incision was made medially to the defect to allow access to the tissues just superior to the left eyebrow. Following careful dissection inferiorly in a supraperiosteal plane to the level of the left eyebrow, several 3-0 monocryl sutures were used to resuspend the eyebrow orbicularis oculi muscular unit to the superior frontal bone periosteum. This resulted in an appropriate reapproximation of static eyebrow symmetry and correction of the left brow ptosis.
Deep Sutures: 5-0 Polysorb
Epidermal Sutures: 5-0 Prolene
Epidermal Closure: running cuticular
Suturegard Intro: Intraoperative tissue expansion was performed, utilizing the SUTUREGARD device, in order to reduce wound tension.
Suturegard Body: The suture ends were repeatedly re-tightened and re-clamped to achieve the desired tissue expansion.
Hemigard Intro: Due to skin fragility and wound tension, it was decided to use HEMIGARD adhesive retention suture devices to permit a linear closure. The skin was cleaned and dried for a 6cm distance away from the wound. Excessive hair, if present, was removed to allow for adhesion.
Hemigard Postcare Instructions: The HEMIGARD strips are to remain completely dry for at least 5-7 days.
Donor Site Anesthesia Type: same as repair anesthesia
Graft Basting Suture (Optional): 5-0 Fast Absorbing Gut
Graft Donor Site Epidermal Sutures (Optional): 5-0 Ethibond
Epidermal Closure Graft Donor Site (Optional): simple interrupted
Graft Donor Site Bandage (Optional-Leave Blank If You Don't Want In Note): Aquaphor and telefa placed on wound. Pressure dressing applied to donor site
Closure 2 Information: This tab is for additional flaps and grafts, including complex repair and grafts and complex repair and flaps. You can also specify a different location for the additional defect, if the location is the same you do not need to select a new one. We will insert the automated text for the repair you select below just as we do for solitary flaps and grafts. Please note that at this time if you select a location with a different insurance zone you will need to override the ICD10 and CPT if appropriate.
Closure 3 Information: This tab is for additional flaps and grafts above and beyond our usual structured repairs.  Please note if you enter information here it will not currently bill and you will need to add the billing information manually.
Wound Care: Aquaphor
Dressing: dry sterile dressing
Wound Care (No Sutures): Petrolatum
Suture Removal: 7 days
Unna Boot Text: An Unna boot was placed to help immobilize the limb and facilitate more rapid healing.
Home Suture Removal Text: Patient was provided instructions on removing sutures and will remove their sutures at home.  If they have any questions or difficulties they will call the office.
Post-Care Instructions: I reviewed with the patient in detail post-care instructions. Patient is not to engage in any heavy lifting, exercise, or swimming for the next 14 days. Should the patient develop any fevers, chills, bleeding, severe pain patient will contact the office immediately.
Pain Refusal Text: I offered to prescribe pain medication but the patient refused to take this medication.
Mauc Instructions: By selecting yes to the question below the MAUC number will be added into the note.  This will be calculated automatically based on the diagnosis chosen, the size entered, the body zone selected (H,M,L) and the specific indications you chose. You will also have the option to override the Mohs AUC if you disagree with the automatically calculated number and this option is found in the Case Summary tab.
Where Do You Want The Question To Include Opioid Counseling Located?: Case Summary Tab
Eye Protection Verbiage: Before proceeding with the stage, a plastic scleral shield was inserted. The globe was anesthetized with a few drops of 1% lidocaine with 1:100,000 epinephrine. Then, an appropriate sized scleral shield was chosen and coated with lacrilube ointment. The shield was gently inserted and left in place for the duration of each stage. After the stage was completed, the shield was gently removed.
Mohs Method Verbiage: An incision at a 45 degree angle following the standard Mohs approach was done and the specimen was harvested as a microscopic controlled layer.
Surgeon/Pathologist Verbiage (Will Incorporate Name Of Surgeon From Intro If Not Blank): operated in two distinct and integrated capacities as the surgeon and pathologist.
Mohs Histo Method Verbiage: Each section was then chromacoded and processed in the Mohs lab using the Mohs protocol and submitted for frozen section.
Subsequent Stages Histo Method Verbiage: Using a similar technique to that described above, a thin layer of tissue was removed from all areas where tumor was visible on the previous stage.  The tissue was again oriented, mapped, dyed, and processed as above.
Mohs Rapid Report Verbiage: The area of clinically evident tumor was marked with skin marking ink and appropriately hatched.  The initial incision was made following the Mohs approach through the skin.  The specimen was taken to the lab, divided into the necessary number of pieces, chromacoded and processed according to the Mohs protocol.  This was repeated in successive stages until a tumor free defect was achieved.
Complex Repair Preamble Text (Leave Blank If You Do Not Want): Extensive wide undermining was performed at least 2 cm in all directions.
Intermediate Repair Preamble Text (Leave Blank If You Do Not Want): Undermining was performed with blunt dissection.
M-Plasty Complex Repair Preamble Text (Leave Blank If You Do Not Want): Extensive wide undermining was performed.
Non-Graft Cartilage Fenestration Text: The cartilage was fenestrated with a 2mm punch biopsy to help facilitate healing.
Graft Cartilage Fenestration Text: The cartilage was fenestrated with a 2mm punch biopsy to help facilitate graft survival and healing.
Secondary Intention Text (Leave Blank If You Do Not Want): The defect will heal with secondary intention.
No Repair - Repaired With Adjacent Surgical Defect Text (Leave Blank If You Do Not Want): After obtaining clear surgical margins the defect was repaired concurrently with another surgical defect which was in close approximation.
Adjacent Tissue Transfer Text: The defect edges were debeveled with a #15 scalpel blade.  Given the location of the defect and the proximity to free margins an adjacent tissue transfer was deemed most appropriate.  Using a sterile surgical marker, an appropriate flap was drawn incorporating the defect and placing the expected incisions within the relaxed skin tension lines where possible.    The area thus outlined was incised deep to adipose tissue with a #15 scalpel blade.  The skin margins were undermined to an appropriate distance in all directions utilizing iris scissors.
Advancement Flap (Single) Text: The defect edges were debeveled with a #15 scalpel blade.  Given the location of the defect and the proximity to free margins a single advancement flap was deemed most appropriate.  Using a sterile surgical marker, an appropriate advancement flap was drawn incorporating the defect and placing the expected incisions within the relaxed skin tension lines where possible.    The area thus outlined was incised deep to adipose tissue with a #15 scalpel blade.  The skin margins were undermined to an appropriate distance in all directions utilizing iris scissors.
Advancement Flap (Double) Text: The defect edges were debeveled with a #15 scalpel blade.  Given the location of the defect and the proximity to free margins a double advancement flap was deemed most appropriate.  Using a sterile surgical marker, the appropriate advancement flaps were drawn incorporating the defect and placing the expected incisions within the relaxed skin tension lines where possible.    The area thus outlined was incised deep to adipose tissue with a #15 scalpel blade.  The skin margins were undermined to an appropriate distance in all directions utilizing iris scissors.
Burow's Advancement Flap Text: The defect edges were debeveled with a #15 scalpel blade.  Given the location of the defect and the proximity to free margins a Burow's advancement flap was deemed most appropriate.  Using a sterile surgical marker, the appropriate advancement flap was drawn incorporating the defect and placing the expected incisions within the relaxed skin tension lines where possible.    The area thus outlined was incised deep to adipose tissue with a #15 scalpel blade.  The skin margins were undermined to an appropriate distance in all directions utilizing iris scissors.
Chonodrocutaneous Helical Advancement Flap Text: The defect edges were debeveled with a #15 scalpel blade.  Given the location of the defect and the proximity to free margins a chondrocutaneous helical advancement flap was deemed most appropriate.  Using a sterile surgical marker, the appropriate advancement flap was drawn incorporating the defect and placing the expected incisions within the relaxed skin tension lines where possible.    The area thus outlined was incised deep to adipose tissue with a #15 scalpel blade.  The skin margins were undermined to an appropriate distance in all directions utilizing iris scissors.
Crescentic Advancement Flap Text: The defect edges were debeveled with a #15 scalpel blade.  Given the location of the defect and the proximity to free margins a crescentic advancement flap was deemed most appropriate.  Using a sterile surgical marker, the appropriate advancement flap was drawn incorporating the defect and placing the expected incisions within the relaxed skin tension lines where possible.    The area thus outlined was incised deep to adipose tissue with a #15 scalpel blade.  The skin margins were undermined to an appropriate distance in all directions utilizing iris scissors.
A-T Advancement Flap Text: The defect edges were debeveled with a #15 scalpel blade.  Given the location of the defect, shape of the defect and the proximity to free margins an A-T advancement flap was deemed most appropriate.  Using a sterile surgical marker, an appropriate advancement flap was drawn incorporating the defect and placing the expected incisions within the relaxed skin tension lines where possible.    The area thus outlined was incised deep to adipose tissue with a #15 scalpel blade.  The skin margins were undermined to an appropriate distance in all directions utilizing iris scissors.
O-T Advancement Flap Text: The defect edges were debeveled with a #15 scalpel blade.  Given the location of the defect, shape of the defect and the proximity to free margins an O-T advancement flap was deemed most appropriate.  Using a sterile surgical marker, an appropriate advancement flap was drawn incorporating the defect and placing the expected incisions within the relaxed skin tension lines where possible.    The area thus outlined was incised deep to adipose tissue with a #15 scalpel blade.  The skin margins were undermined to an appropriate distance in all directions utilizing iris scissors.
O-L Flap Text: The defect edges were debeveled with a #15 scalpel blade.  Given the location of the defect, shape of the defect and the proximity to free margins an O-L flap was deemed most appropriate.  Using a sterile surgical marker, an appropriate advancement flap was drawn incorporating the defect and placing the expected incisions within the relaxed skin tension lines where possible.    The area thus outlined was incised deep to adipose tissue with a #15 scalpel blade.  The skin margins were undermined to an appropriate distance in all directions utilizing iris scissors.
O-Z Flap Text: The defect edges were debeveled with a #15 scalpel blade.  Given the location of the defect, shape of the defect and the proximity to free margins an O-Z flap was deemed most appropriate.  Using a sterile surgical marker, an appropriate transposition flap was drawn incorporating the defect and placing the expected incisions within the relaxed skin tension lines where possible. The area thus outlined was incised deep to adipose tissue with a #15 scalpel blade.  The skin margins were undermined to an appropriate distance in all directions utilizing iris scissors.
Double O-Z Flap Text: The defect edges were debeveled with a #15 scalpel blade.  Given the location of the defect, shape of the defect and the proximity to free margins a Double O-Z flap was deemed most appropriate.  Using a sterile surgical marker, an appropriate transposition flap was drawn incorporating the defect and placing the expected incisions within the relaxed skin tension lines where possible. The area thus outlined was incised deep to adipose tissue with a #15 scalpel blade.  The skin margins were undermined to an appropriate distance in all directions utilizing iris scissors.
V-Y Flap Text: The defect edges were debeveled with a #15 scalpel blade.  Given the location of the defect, shape of the defect and the proximity to free margins a V-Y flap was deemed most appropriate.  Using a sterile surgical marker, an appropriate advancement flap was drawn incorporating the defect and placing the expected incisions within the relaxed skin tension lines where possible.    The area thus outlined was incised deep to adipose tissue with a #15 scalpel blade.  The skin margins were undermined to an appropriate distance in all directions utilizing iris scissors.
Advancement-Rotation Flap Text: The defect edges were debeveled with a #15 scalpel blade.  Given the location of the defect, shape of the defect and the proximity to free margins an advancement-rotation flap was deemed most appropriate.  Using a sterile surgical marker, an appropriate flap was drawn incorporating the defect and placing the expected incisions within the relaxed skin tension lines where possible. The area thus outlined was incised deep to adipose tissue with a #15 scalpel blade.  The skin margins were undermined to an appropriate distance in all directions utilizing iris scissors.
Mercedes Flap Text: The defect edges were debeveled with a #15 scalpel blade.  Given the location of the defect, shape of the defect and the proximity to free margins a Mercedes flap was deemed most appropriate.  Using a sterile surgical marker, an appropriate advancement flap was drawn incorporating the defect and placing the expected incisions within the relaxed skin tension lines where possible. The area thus outlined was incised deep to adipose tissue with a #15 scalpel blade.  The skin margins were undermined to an appropriate distance in all directions utilizing iris scissors.
Modified Advancement Flap Text: The defect edges were debeveled with a #15 scalpel blade.  Given the location of the defect, shape of the defect and the proximity to free margins a modified advancement flap was deemed most appropriate.  Using a sterile surgical marker, an appropriate advancement flap was drawn incorporating the defect and placing the expected incisions within the relaxed skin tension lines where possible.    The area thus outlined was incised deep to adipose tissue with a #15 scalpel blade.  The skin margins were undermined to an appropriate distance in all directions utilizing iris scissors.
Mucosal Advancement Flap Text: Given the location of the defect, shape of the defect and the proximity to free margins a mucosal advancement flap was deemed most appropriate. Incisions were made with a 15 blade scalpel in the appropriate fashion along the cutaneous vermilion border and the mucosal lip. The remaining actinically damaged mucosal tissue was excised.  The mucosal advancement flap was then elevated to the gingival sulcus with care taken to preserve the neurovascular structures and advanced into the primary defect. Care was taken to ensure that precise realignment of the vermilion border was achieved.
Peng Advancement Flap Text: The defect edges were debeveled with a #15 scalpel blade.  Given the location of the defect, shape of the defect and the proximity to free margins a Peng advancement flap was deemed most appropriate.  Using a sterile surgical marker, an appropriate advancement flap was drawn incorporating the defect and placing the expected incisions within the relaxed skin tension lines where possible. The area thus outlined was incised deep to adipose tissue with a #15 scalpel blade.  The skin margins were undermined to an appropriate distance in all directions utilizing iris scissors.
Hatchet Flap Text: The defect edges were debeveled with a #15 scalpel blade.  Given the location of the defect, shape of the defect and the proximity to free margins a hatchet flap based from the glabella was deemed most appropriate.  Using a sterile surgical marker, an appropriate glabellar hatchet flap was drawn incorporating the defect and placing the expected incisions within the relaxed skin tension lines where possible.    The area thus outlined was incised deep to adipose tissue with a #15 scalpel blade.  The skin margins were undermined to an appropriate distance in all directions utilizing iris scissors.
Rotation Flap Text: The defect edges were debeveled with a #15 scalpel blade.  Given the location of the defect, shape of the defect and the proximity to free margins a rotation flap was deemed most appropriate.  Using a sterile surgical marker, an appropriate rotation flap was drawn incorporating the defect and placing the expected incisions within the relaxed skin tension lines where possible.    The area thus outlined was incised deep to adipose tissue with a #15 scalpel blade.  The skin margins were undermined to an appropriate distance in all directions utilizing iris scissors.
Spiral Flap Text: The defect edges were debeveled with a #15 scalpel blade.  Given the location of the defect, shape of the defect and the proximity to free margins a spiral flap was deemed most appropriate.  Using a sterile surgical marker, an appropriate rotation flap was drawn incorporating the defect and placing the expected incisions within the relaxed skin tension lines where possible. The area thus outlined was incised deep to adipose tissue with a #15 scalpel blade.  The skin margins were undermined to an appropriate distance in all directions utilizing iris scissors.
Staged Advancement Flap Text: The defect edges were debeveled with a #15 scalpel blade.  Given the location of the defect, shape of the defect and the proximity to free margins a staged advancement flap was deemed most appropriate.  Using a sterile surgical marker, an appropriate advancement flap was drawn incorporating the defect and placing the expected incisions within the relaxed skin tension lines where possible. The area thus outlined was incised deep to adipose tissue with a #15 scalpel blade.  The skin margins were undermined to an appropriate distance in all directions utilizing iris scissors.
Star Wedge Flap Text: The defect edges were debeveled with a #15 scalpel blade.  Given the location of the defect, shape of the defect and the proximity to free margins a star wedge flap was deemed most appropriate.  Using a sterile surgical marker, an appropriate rotation flap was drawn incorporating the defect and placing the expected incisions within the relaxed skin tension lines where possible. The area thus outlined was incised deep to adipose tissue with a #15 scalpel blade.  The skin margins were undermined to an appropriate distance in all directions utilizing iris scissors.
Transposition Flap Text: The defect edges were debeveled with a #15 scalpel blade.  Given the location of the defect and the proximity to free margins a transposition flap was deemed most appropriate.  Using a sterile surgical marker, an appropriate transposition flap was drawn incorporating the defect.    The area thus outlined was incised deep to adipose tissue with a #15 scalpel blade.  The skin margins were undermined to an appropriate distance in all directions utilizing iris scissors.
Muscle Hinge Flap Text: The defect edges were debeveled with a #15 scalpel blade.  Given the size, depth and location of the defect and the proximity to free margins a muscle hinge flap was deemed most appropriate.  Using a sterile surgical marker, an appropriate hinge flap was drawn incorporating the defect. The area thus outlined was incised with a #15 scalpel blade.  The skin margins were undermined to an appropriate distance in all directions utilizing iris scissors.
Mustarde Flap Text: The defect edges were debeveled with a #15 scalpel blade.  Given the size, depth and location of the defect and the proximity to free margins a Mustarde flap was deemed most appropriate.  Using a sterile surgical marker, an appropriate flap was drawn incorporating the defect. The area thus outlined was incised with a #15 scalpel blade.  The skin margins were undermined to an appropriate distance in all directions utilizing iris scissors.
Nasal Turnover Hinge Flap Text: The defect edges were debeveled with a #15 scalpel blade.  Given the size, depth, location of the defect and the defect being full thickness a nasal turnover hinge flap was deemed most appropriate.  Using a sterile surgical marker, an appropriate hinge flap was drawn incorporating the defect. The area thus outlined was incised with a #15 scalpel blade. The flap was designed to recreate the nasal mucosal lining and the alar rim. The skin margins were undermined to an appropriate distance in all directions utilizing iris scissors.
Nasalis-Muscle-Based Myocutaneous Island Pedicle Flap Text: Using a #15 blade, an incision was made around the donor flap to the level of the nasalis muscle. Wide lateral undermining was then performed in both the subcutaneous plane above the nasalis muscle, and in a submuscular plane just above periosteum. This allowed the formation of a free nasalis muscle axial pedicle (based on the angular artery) which was still attached to the actual cutaneous flap, increasing its mobility and vascular viability. Hemostasis was obtained with pinpoint electrocoagulation. The flap was mobilized into position and the pivotal anchor points positioned and stabilized with buried interrupted sutures. Subcutaneous and dermal tissues were closed in a multilayered fashion with sutures. Tissue redundancies were excised, and the epidermal edges were apposed without significant tension and sutured with sutures.
Orbicularis Oris Muscle Flap Text: The defect edges were debeveled with a #15 scalpel blade.  Given that the defect affected the competency of the oral sphincter an orbicularis oris muscle flap was deemed most appropriate to restore this competency and normal muscle function.  Using a sterile surgical marker, an appropriate flap was drawn incorporating the defect. The area thus outlined was incised with a #15 scalpel blade.
Melolabial Transposition Flap Text: The defect edges were debeveled with a #15 scalpel blade.  Given the location of the defect and the proximity to free margins a melolabial flap was deemed most appropriate.  Using a sterile surgical marker, an appropriate melolabial transposition flap was drawn incorporating the defect.    The area thus outlined was incised deep to adipose tissue with a #15 scalpel blade.  The skin margins were undermined to an appropriate distance in all directions utilizing iris scissors.
Rhombic Flap Text: The defect edges were debeveled with a #15 scalpel blade.  Given the location of the defect and the proximity to free margins a rhombic flap was deemed most appropriate.  Using a sterile surgical marker, an appropriate rhombic flap was drawn incorporating the defect.    The area thus outlined was incised deep to adipose tissue with a #15 scalpel blade.  The skin margins were undermined to an appropriate distance in all directions utilizing iris scissors.
Rhomboid Transposition Flap Text: The defect edges were debeveled with a #15 scalpel blade.  Given the location of the defect and the proximity to free margins a rhomboid transposition flap was deemed most appropriate.  Using a sterile surgical marker, an appropriate rhomboid flap was drawn incorporating the defect.    The area thus outlined was incised deep to adipose tissue with a #15 scalpel blade.  The skin margins were undermined to an appropriate distance in all directions utilizing iris scissors.
Bi-Rhombic Flap Text: The defect edges were debeveled with a #15 scalpel blade.  Given the location of the defect and the proximity to free margins a bi-rhombic flap was deemed most appropriate.  Using a sterile surgical marker, an appropriate rhombic flap was drawn incorporating the defect. The area thus outlined was incised deep to adipose tissue with a #15 scalpel blade.  The skin margins were undermined to an appropriate distance in all directions utilizing iris scissors.
Helical Rim Advancement Flap Text: The defect edges were debeveled with a #15 blade scalpel.  Given the location of the defect and the proximity to free margins (helical rim) a double helical rim advancement flap was deemed most appropriate.  Using a sterile surgical marker, the appropriate advancement flaps were drawn incorporating the defect and placing the expected incisions between the helical rim and antihelix where possible.  The area thus outlined was incised through and through with a #15 scalpel blade.  With a skin hook and iris scissors, the flaps were gently and sharply undermined and freed up.
Bilateral Helical Rim Advancement Flap Text: The defect edges were debeveled with a #15 blade scalpel.  Given the location of the defect and the proximity to free margins (helical rim) a bilateral helical rim advancement flap was deemed most appropriate.  Using a sterile surgical marker, the appropriate advancement flaps were drawn incorporating the defect and placing the expected incisions between the helical rim and antihelix where possible.  The area thus outlined was incised through and through with a #15 scalpel blade.  With a skin hook and iris scissors, the flaps were gently and sharply undermined and freed up.
Ear Star Wedge Flap Text: The defect edges were debeveled with a #15 blade scalpel.  Given the location of the defect and the proximity to free margins (helical rim) an ear star wedge flap was deemed most appropriate.  Using a sterile surgical marker, the appropriate flap was drawn incorporating the defect and placing the expected incisions between the helical rim and antihelix where possible.  The area thus outlined was incised through and through with a #15 scalpel blade.
Banner Transposition Flap Text: The defect edges were debeveled with a #15 scalpel blade.  Given the location of the defect and the proximity to free margins a Banner transposition flap was deemed most appropriate.  Using a sterile surgical marker, an appropriate flap drawn around the defect. The area thus outlined was incised deep to adipose tissue with a #15 scalpel blade.  The skin margins were undermined to an appropriate distance in all directions utilizing iris scissors.
Bilobed Flap Text: The defect edges were debeveled with a #15 scalpel blade.  Given the location of the defect and the proximity to free margins a bilobe flap was deemed most appropriate.  Using a sterile surgical marker, an appropriate bilobe flap drawn around the defect.    The area thus outlined was incised deep to adipose tissue with a #15 scalpel blade.  The skin margins were undermined to an appropriate distance in all directions utilizing iris scissors.
Bilobed Transposition Flap Text: The defect edges were debeveled with a #15 scalpel blade.  Given the location of the defect and the proximity to free margins a bilobed transposition flap was deemed most appropriate.  Using a sterile surgical marker, an appropriate bilobe flap drawn around the defect.    The area thus outlined was incised deep to adipose tissue with a #15 scalpel blade.  The skin margins were undermined to an appropriate distance in all directions utilizing iris scissors.
Trilobed Flap Text: The defect edges were debeveled with a #15 scalpel blade.  Given the location of the defect and the proximity to free margins a trilobed flap was deemed most appropriate.  Using a sterile surgical marker, an appropriate trilobed flap drawn around the defect.    The area thus outlined was incised deep to adipose tissue with a #15 scalpel blade.  The skin margins were undermined to an appropriate distance in all directions utilizing iris scissors.
Dorsal Nasal Flap Text: The defect edges were debeveled with a #15 scalpel blade.  Given the location of the defect and the proximity to free margins a dorsal nasal flap,based upon the glabellar folds, was deemed most appropriate.  Using a sterile surgical marker, an appropriate dorsal nasal flap was drawn around the defect.    The area thus outlined was incised deep to adipose tissue with a #15 scalpel blade.  The skin margins were undermined to an appropriate distance in all directions utilizing iris scissors.
Island Pedicle Flap Text: The defect edges were debeveled with a #15 scalpel blade.  Given the location of the defect, shape of the defect and the proximity to free margins an island pedicle advancement flap was deemed most appropriate.  Using a sterile surgical marker, an appropriate advancement flap was drawn incorporating the defect, outlining the appropriate donor tissue and placing the expected incisions within the relaxed skin tension lines where possible.    The area thus outlined was incised deep to adipose tissue with a #15 scalpel blade.  The skin margins were undermined to an appropriate distance in all directions around the primary defect and laterally outward around the island pedicle utilizing iris scissors.  There was minimal undermining beneath the pedicle flap.
Island Pedicle Flap With Canthal Suspension Text: The defect edges were debeveled with a #15 scalpel blade.  Given the location of the defect, shape of the defect and the proximity to free margins an island pedicle advancement flap was deemed most appropriate.  Using a sterile surgical marker, an appropriate advancement flap was drawn incorporating the defect, outlining the appropriate donor tissue and placing the expected incisions within the relaxed skin tension lines where possible. The area thus outlined was incised deep to adipose tissue with a #15 scalpel blade.  The skin margins were undermined to an appropriate distance in all directions around the primary defect and laterally outward around the island pedicle utilizing iris scissors.  There was minimal undermining beneath the pedicle flap. A suspension suture was placed in the canthal tendon to prevent tension and prevent ectropion.
Alar Island Pedicle Flap Text: The defect edges were debeveled with a #15 scalpel blade.  Given the location of the defect, shape of the defect and the proximity to the alar rim an island pedicle advancement flap was deemed most appropriate.  Using a sterile surgical marker, an appropriate advancement flap was drawn incorporating the defect, outlining the appropriate donor tissue and placing the expected incisions within the nasal ala running parallel to the alar rim. The area thus outlined was incised with a #15 scalpel blade.  The skin margins were undermined minimally to an appropriate distance in all directions around the primary defect and laterally outward around the island pedicle utilizing iris scissors.  There was minimal undermining beneath the pedicle flap.
Double Island Pedicle Flap Text: The defect edges were debeveled with a #15 scalpel blade.  Given the location of the defect, shape of the defect and the proximity to free margins a double island pedicle advancement flap was deemed most appropriate.  Using a sterile surgical marker, an appropriate advancement flap was drawn incorporating the defect, outlining the appropriate donor tissue and placing the expected incisions within the relaxed skin tension lines where possible.    The area thus outlined was incised deep to adipose tissue with a #15 scalpel blade.  The skin margins were undermined to an appropriate distance in all directions around the primary defect and laterally outward around the island pedicle utilizing iris scissors.  There was minimal undermining beneath the pedicle flap.
Island Pedicle Flap-Requiring Vessel Identification Text: The defect edges were debeveled with a #15 scalpel blade.  Given the location of the defect, shape of the defect and the proximity to free margins an island pedicle advancement flap was deemed most appropriate.  Using a sterile surgical marker, an appropriate advancement flap was drawn, based on the axial vessel mentioned above, incorporating the defect, outlining the appropriate donor tissue and placing the expected incisions within the relaxed skin tension lines where possible.    The area thus outlined was incised deep to adipose tissue with a #15 scalpel blade.  The skin margins were undermined to an appropriate distance in all directions around the primary defect and laterally outward around the island pedicle utilizing iris scissors.  There was minimal undermining beneath the pedicle flap.
Keystone Flap Text: The defect edges were debeveled with a #15 scalpel blade.  Given the location of the defect, shape of the defect a keystone flap was deemed most appropriate.  Using a sterile surgical marker, an appropriate keystone flap was drawn incorporating the defect, outlining the appropriate donor tissue and placing the expected incisions within the relaxed skin tension lines where possible. The area thus outlined was incised deep to adipose tissue with a #15 scalpel blade.  The skin margins were undermined to an appropriate distance in all directions around the primary defect and laterally outward around the flap utilizing iris scissors.
O-T Plasty Text: The defect edges were debeveled with a #15 scalpel blade.  Given the location of the defect, shape of the defect and the proximity to free margins an O-T plasty was deemed most appropriate.  Using a sterile surgical marker, an appropriate O-T plasty was drawn incorporating the defect and placing the expected incisions within the relaxed skin tension lines where possible.    The area thus outlined was incised deep to adipose tissue with a #15 scalpel blade.  The skin margins were undermined to an appropriate distance in all directions utilizing iris scissors.
O-Z Plasty Text: The defect edges were debeveled with a #15 scalpel blade.  Given the location of the defect, shape of the defect and the proximity to free margins an O-Z plasty (double transposition flap) was deemed most appropriate.  Using a sterile surgical marker, the appropriate transposition flaps were drawn incorporating the defect and placing the expected incisions within the relaxed skin tension lines where possible.    The area thus outlined was incised deep to adipose tissue with a #15 scalpel blade.  The skin margins were undermined to an appropriate distance in all directions utilizing iris scissors.  Hemostasis was achieved with electrocautery.  The flaps were then transposed into place, one clockwise and the other counterclockwise, and anchored with interrupted buried subcutaneous sutures.
Double O-Z Plasty Text: The defect edges were debeveled with a #15 scalpel blade.  Given the location of the defect, shape of the defect and the proximity to free margins a Double O-Z plasty (double transposition flap) was deemed most appropriate.  Using a sterile surgical marker, the appropriate transposition flaps were drawn incorporating the defect and placing the expected incisions within the relaxed skin tension lines where possible. The area thus outlined was incised deep to adipose tissue with a #15 scalpel blade.  The skin margins were undermined to an appropriate distance in all directions utilizing iris scissors.  Hemostasis was achieved with electrocautery.  The flaps were then transposed into place, one clockwise and the other counterclockwise, and anchored with interrupted buried subcutaneous sutures.
V-Y Plasty Text: The defect edges were debeveled with a #15 scalpel blade.  Given the location of the defect, shape of the defect and the proximity to free margins an V-Y advancement flap was deemed most appropriate.  Using a sterile surgical marker, an appropriate advancement flap was drawn incorporating the defect and placing the expected incisions within the relaxed skin tension lines where possible.    The area thus outlined was incised deep to adipose tissue with a #15 scalpel blade.  The skin margins were undermined to an appropriate distance in all directions utilizing iris scissors.
H Plasty Text: Given the location of the defect, shape of the defect and the proximity to free margins a H-plasty was deemed most appropriate for repair.  Using a sterile surgical marker, the appropriate advancement arms of the H-plasty were drawn incorporating the defect and placing the expected incisions within the relaxed skin tension lines where possible. The area thus outlined was incised deep to adipose tissue with a #15 scalpel blade. The skin margins were undermined to an appropriate distance in all directions utilizing iris scissors.  The opposing advancement arms were then advanced into place in opposite direction and anchored with interrupted buried subcutaneous sutures.
W Plasty Text: The lesion was extirpated to the level of the fat with a #15 scalpel blade.  Given the location of the defect, shape of the defect and the proximity to free margins a W-plasty was deemed most appropriate for repair.  Using a sterile surgical marker, the appropriate transposition arms of the W-plasty were drawn incorporating the defect and placing the expected incisions within the relaxed skin tension lines where possible.    The area thus outlined was incised deep to adipose tissue with a #15 scalpel blade.  The skin margins were undermined to an appropriate distance in all directions utilizing iris scissors.  The opposing transposition arms were then transposed into place in opposite direction and anchored with interrupted buried subcutaneous sutures.
Z Plasty Text: The lesion was extirpated to the level of the fat with a #15 scalpel blade.  Given the location of the defect, shape of the defect and the proximity to free margins a Z-plasty was deemed most appropriate for repair.  Using a sterile surgical marker, the appropriate transposition arms of the Z-plasty were drawn incorporating the defect and placing the expected incisions within the relaxed skin tension lines where possible.    The area thus outlined was incised deep to adipose tissue with a #15 scalpel blade.  The skin margins were undermined to an appropriate distance in all directions utilizing iris scissors.  The opposing transposition arms were then transposed into place in opposite direction and anchored with interrupted buried subcutaneous sutures.
Zygomaticofacial Flap Text: Given the location of the defect, shape of the defect and the proximity to free margins a zygomaticofacial flap was deemed most appropriate for repair.  Using a sterile surgical marker, the appropriate flap was drawn incorporating the defect and placing the expected incisions within the relaxed skin tension lines where possible. The area thus outlined was incised deep to adipose tissue with a #15 scalpel blade with preservation of a vascular pedicle.  The skin margins were undermined to an appropriate distance in all directions utilizing iris scissors.  The flap was then placed into the defect and anchored with interrupted buried subcutaneous sutures.
Cheek Interpolation Flap Text: A decision was made to reconstruct the defect utilizing an interpolation axial flap and a staged reconstruction.  A telfa template was made of the defect.  This telfa template was then used to outline the Cheek Interpolation flap.  The donor area for the pedicle flap was then injected with anesthesia.  The flap was excised through the skin and subcutaneous tissue down to the layer of the underlying musculature.  The interpolation flap was carefully excised within this deep plane to maintain its blood supply.  The edges of the donor site were undermined.   The donor site was closed in a primary fashion.  The pedicle was then rotated into position and sutured.  Once the tube was sutured into place, adequate blood supply was confirmed with blanching and refill.  The pedicle was then wrapped with xeroform gauze and dressed appropriately with a telfa and gauze bandage to ensure continued blood supply and protect the attached pedicle.
Cheek-To-Nose Interpolation Flap Text: A decision was made to reconstruct the defect utilizing an interpolation axial flap and a staged reconstruction.  A telfa template was made of the defect.  This telfa template was then used to outline the Cheek-To-Nose Interpolation flap.  The donor area for the pedicle flap was then injected with anesthesia.  The flap was excised through the skin and subcutaneous tissue down to the layer of the underlying musculature.  The interpolation flap was carefully excised within this deep plane to maintain its blood supply.  The edges of the donor site were undermined.   The donor site was closed in a primary fashion.  The pedicle was then rotated into position and sutured.  Once the tube was sutured into place, adequate blood supply was confirmed with blanching and refill.  The pedicle was then wrapped with xeroform gauze and dressed appropriately with a telfa and gauze bandage to ensure continued blood supply and protect the attached pedicle.
Interpolation Flap Text: A decision was made to reconstruct the defect utilizing an interpolation axial flap and a staged reconstruction.  A telfa template was made of the defect.  This telfa template was then used to outline the interpolation flap.  The donor area for the pedicle flap was then injected with anesthesia.  The flap was excised through the skin and subcutaneous tissue down to the layer of the underlying musculature.  The interpolation flap was carefully excised within this deep plane to maintain its blood supply.  The edges of the donor site were undermined.   The donor site was closed in a primary fashion.  The pedicle was then rotated into position and sutured.  Once the tube was sutured into place, adequate blood supply was confirmed with blanching and refill.  The pedicle was then wrapped with xeroform gauze and dressed appropriately with a telfa and gauze bandage to ensure continued blood supply and protect the attached pedicle.
Melolabial Interpolation Flap Text: A decision was made to reconstruct the defect utilizing an interpolation axial flap and a staged reconstruction.  A telfa template was made of the defect.  This telfa template was then used to outline the melolabial interpolation flap.  The donor area for the pedicle flap was then injected with anesthesia.  The flap was excised through the skin and subcutaneous tissue down to the layer of the underlying musculature.  The pedicle flap was carefully excised within this deep plane to maintain its blood supply.  The edges of the donor site were undermined.   The donor site was closed in a primary fashion.  The pedicle was then rotated into position and sutured.  Once the tube was sutured into place, adequate blood supply was confirmed with blanching and refill.  The pedicle was then wrapped with xeroform gauze and dressed appropriately with a telfa and gauze bandage to ensure continued blood supply and protect the attached pedicle.
Mastoid Interpolation Flap Text: A decision was made to reconstruct the defect utilizing an interpolation axial flap and a staged reconstruction.  A telfa template was made of the defect.  This telfa template was then used to outline the mastoid interpolation flap.  The donor area for the pedicle flap was then injected with anesthesia.  The flap was excised through the skin and subcutaneous tissue down to the layer of the underlying musculature.  The pedicle flap was carefully excised within this deep plane to maintain its blood supply.  The edges of the donor site were undermined.   The donor site was closed in a primary fashion.  The pedicle was then rotated into position and sutured.  Once the tube was sutured into place, adequate blood supply was confirmed with blanching and refill.  The pedicle was then wrapped with xeroform gauze and dressed appropriately with a telfa and gauze bandage to ensure continued blood supply and protect the attached pedicle.
Posterior Auricular Interpolation Flap Text: A decision was made to reconstruct the defect utilizing an interpolation axial flap and a staged reconstruction.  A telfa template was made of the defect.  This telfa template was then used to outline the posterior auricular interpolation flap.  The donor area for the pedicle flap was then injected with anesthesia.  The flap was excised through the skin and subcutaneous tissue down to the layer of the underlying musculature.  The pedicle flap was carefully excised within this deep plane to maintain its blood supply.  The edges of the donor site were undermined.   The donor site was closed in a primary fashion.  The pedicle was then rotated into position and sutured.  Once the tube was sutured into place, adequate blood supply was confirmed with blanching and refill.  The pedicle was then wrapped with xeroform gauze and dressed appropriately with a telfa and gauze bandage to ensure continued blood supply and protect the attached pedicle.
Paramedian Forehead Flap Text: A decision was made to reconstruct the defect utilizing an interpolation axial flap and a staged reconstruction.  A telfa template was made of the defect.  This telfa template was then used to outline the paramedian forehead pedicle flap.  The donor area for the pedicle flap was then injected with anesthesia.  The flap was excised through the skin and subcutaneous tissue down to the layer of the underlying musculature.  The pedicle flap was carefully excised within this deep plane to maintain its blood supply.  The edges of the donor site were undermined.   The donor site was closed in a primary fashion.  The pedicle was then rotated into position and sutured.  Once the tube was sutured into place, adequate blood supply was confirmed with blanching and refill.  The pedicle was then wrapped with xeroform gauze and dressed appropriately with a telfa and gauze bandage to ensure continued blood supply and protect the attached pedicle.
Abbe Flap (Upper To Lower Lip) Text: The defect of the lower lip was assessed and measured.  Given the location and size of the defect, an Abbe flap was deemed most appropriate.  Using a sterile surgical marker, an appropriate Abbe flap was measured and drawn on the upper lip. Local anesthesia was then infiltrated.  A scalpel was then used to incise the upper lip through and through the skin, vermilion, muscle and mucosa, leaving the flap pedicled on the opposite side.  The flap was then rotated and transferred to the lower lip defect.  The flap was then sutured into place with a three layer technique, closing the orbicularis oris muscle layer with subcutaneous buried sutures, followed by a mucosal layer and an epidermal layer.
Abbe Flap (Lower To Upper Lip) Text: The defect of the upper lip was assessed and measured.  Given the location and size of the defect, an Abbe flap was deemed most appropriate.  Using a sterile surgical marker, an appropriate Abbe flap was measured and drawn on the lower lip. Local anesthesia was then infiltrated. A scalpel was then used to incise the upper lip through and through the skin, vermilion, muscle and mucosa, leaving the flap pedicled on the opposite side.  The flap was then rotated and transferred to the lower lip defect.  The flap was then sutured into place with a three layer technique, closing the orbicularis oris muscle layer with subcutaneous buried sutures, followed by a mucosal layer and an epidermal layer.
Estlander Flap (Upper To Lower Lip) Text: The defect of the lower lip was assessed and measured.  Given the location and size of the defect, an Estlander flap was deemed most appropriate.  Using a sterile surgical marker, an appropriate Estlander flap was measured and drawn on the upper lip. Local anesthesia was then infiltrated. A scalpel was then used to incise the lateral aspect of the flap, through skin, muscle and mucosa, leaving the flap pedicled medially.  The flap was then rotated and positioned to fill the lower lip defect.  The flap was then sutured into place with a three layer technique, closing the orbicularis oris muscle layer with subcutaneous buried sutures, followed by a mucosal layer and an epidermal layer.
Cheiloplasty (Less Than 50%) Text: A decision was made to reconstruct the defect with a  cheiloplasty.  The defect was undermined extensively.  Additional obicularis oris muscle was excised with a 15 blade scalpel.  The defect was converted into a full thickness wedge, of less than 50% of the vertical height of the lip, to facilite a better cosmetic result.  Small vessels were then tied off with 5-0 monocyrl. The obicularis oris, superficial fascia, adipose and dermis were then reapproximated.  After the deeper layers were approximated the epidermis was reapproximated with particular care given to realign the vermilion border.
Cheiloplasty (Complex) Text: A decision was made to reconstruct the defect with a  cheiloplasty.  The defect was undermined extensively.  Additional obicularis oris muscle was excised with a 15 blade scalpel.  The defect was converted into a full thickness wedge to facilite a better cosmetic result.  Small vessels were then tied off with 5-0 monocyrl. The obicularis oris, superficial fascia, adipose and dermis were then reapproximated.  After the deeper layers were approximated the epidermis was reapproximated with particular care given to realign the vermilion border.
Ear Wedge Repair Text: A wedge excision was completed by carrying down an excision through the full thickness of the ear and cartilage with an inward facing Burow's triangle. The wound was then closed in a layered fashion.
Full Thickness Lip Wedge Repair (Flap) Text: Given the location of the defect and the proximity to free margins a full thickness wedge repair was deemed most appropriate.  Using a sterile surgical marker, the appropriate repair was drawn incorporating the defect and placing the expected incisions perpendicular to the vermilion border.  The vermilion border was also meticulously outlined to ensure appropriate reapproximation during the repair.  The area thus outlined was incised through and through with a #15 scalpel blade.  The muscularis and dermis were reaproximated with deep sutures following hemostasis. Care was taken to realign the vermilion border before proceeding with the superficial closure.  Once the vermilion was realigned the superfical and mucosal closure was finished.
Ftsg Text: The defect edges were debeveled with a #15 scalpel blade.  Given the location of the defect, shape of the defect and the proximity to free margins a full thickness skin graft was deemed most appropriate.  Using a sterile surgical marker, the primary defect shape was transferred to the donor site. The area thus outlined was incised deep to adipose tissue with a #15 scalpel blade.  The harvested graft was then trimmed of adipose tissue until only dermis and epidermis was left.  The skin margins of the secondary defect were undermined to an appropriate distance in all directions utilizing iris scissors.  The secondary defect was closed with interrupted buried subcutaneous sutures.  The skin edges were then re-apposed with running  sutures.  The skin graft was then placed in the primary defect and oriented appropriately.
Split-Thickness Skin Graft Text: The defect edges were debeveled with a #15 scalpel blade.  Given the location of the defect, shape of the defect and the proximity to free margins a split thickness skin graft was deemed most appropriate.  Using a sterile surgical marker, the primary defect shape was transferred to the donor site. The split thickness graft was then harvested.  The skin graft was then placed in the primary defect and oriented appropriately.
Burow's Graft Text: The defect edges were debeveled with a #15 scalpel blade.  Given the location of the defect, shape of the defect, the proximity to free margins and the presence of a standing cone deformity a Burow's skin graft was deemed most appropriate. The standing cone was removed and this tissue was then trimmed to the shape of the primary defect. The adipose tissue was also removed until only dermis and epidermis were left.  The skin margins of the secondary defect were undermined to an appropriate distance in all directions utilizing iris scissors.  The secondary defect was closed with interrupted buried subcutaneous sutures.  The skin edges were then re-apposed with running  sutures.  The skin graft was then placed in the primary defect and oriented appropriately.
Cartilage Graft Text: The defect edges were debeveled with a #15 scalpel blade.  Given the location of the defect, shape of the defect, the fact the defect involved a full thickness cartilage defect a cartilage graft was deemed most appropriate.  An appropriate donor site was identified, cleansed, and anesthetized. The cartilage graft was then harvested and transferred to the recipient site, oriented appropriately and then sutured into place.  The secondary defect was then repaired using a primary closure.
Composite Graft Text: The defect edges were debeveled with a #15 scalpel blade.  Given the location of the defect, shape of the defect, the proximity to free margins and the fact the defect was full thickness a composite graft was deemed most appropriate.  The defect was outline and then transferred to the donor site.  A full thickness graft was then excised from the donor site. The graft was then placed in the primary defect, oriented appropriately and then sutured into place.  The secondary defect was then repaired using a primary closure.
Epidermal Autograft Text: The defect edges were debeveled with a #15 scalpel blade.  Given the location of the defect, shape of the defect and the proximity to free margins an epidermal autograft was deemed most appropriate.  Using a sterile surgical marker, the primary defect shape was transferred to the donor site. The epidermal graft was then harvested.  The skin graft was then placed in the primary defect and oriented appropriately.
Dermal Autograft Text: The defect edges were debeveled with a #15 scalpel blade.  Given the location of the defect, shape of the defect and the proximity to free margins a dermal autograft was deemed most appropriate.  Using a sterile surgical marker, the primary defect shape was transferred to the donor site. The area thus outlined was incised deep to adipose tissue with a #15 scalpel blade.  The harvested graft was then trimmed of adipose and epidermal tissue until only dermis was left.  The skin graft was then placed in the primary defect and oriented appropriately.
Skin Substitute Text: The defect edges were debeveled with a #15 scalpel blade.  Given the location of the defect, shape of the defect and the proximity to free margins a skin substitute graft was deemed most appropriate.  The graft material was trimmed to fit the size of the defect. The graft was then placed in the primary defect and oriented appropriately.
Tissue Cultured Epidermal Autograft Text: The defect edges were debeveled with a #15 scalpel blade.  Given the location of the defect, shape of the defect and the proximity to free margins a tissue cultured epidermal autograft was deemed most appropriate.  The graft was then trimmed to fit the size of the defect.  The graft was then placed in the primary defect and oriented appropriately.
Xenograft Text: The defect edges were debeveled with a #15 scalpel blade.  Given the location of the defect, shape of the defect and the proximity to free margins a xenograft was deemed most appropriate.  The graft was then trimmed to fit the size of the defect.  The graft was then placed in the primary defect and oriented appropriately.
Purse String (Simple) Text: Given the location of the defect and the characteristics of the surrounding skin a purse string closure was deemed most appropriate.  Undermining was performed circumfirentially around the surgical defect.  A purse string suture was then placed and tightened.
Purse String (Intermediate) Text: Given the location of the defect and the characteristics of the surrounding skin a purse string intermediate closure was deemed most appropriate.  Undermining was performed circumfirentially around the surgical defect.  A purse string suture was then placed and tightened.
Partial Purse String (Simple) Text: Given the location of the defect and the characteristics of the surrounding skin a simple purse string closure was deemed most appropriate.  Undermining was performed circumfirentially around the surgical defect.  A purse string suture was then placed and tightened. Wound tension only allowed a partial closure of the circular defect.
Partial Purse String (Intermediate) Text: Given the location of the defect and the characteristics of the surrounding skin an intermediate purse string closure was deemed most appropriate.  Undermining was performed circumfirentially around the surgical defect.  A purse string suture was then placed and tightened. Wound tension only allowed a partial closure of the circular defect.
Localized Dermabrasion With Wire Brush Text: The patient was draped in routine manner.  Localized dermabrasion using 3 x 17 mm wire brush was performed in routine manner to papillary dermis. This spot dermabrasion is being performed to complete skin cancer reconstruction. It also will eliminate the other sun damaged precancerous cells that are known to be part of the regional effect of a lifetime's worth of sun exposure. This localized dermabrasion is therapeutic and should not be considered cosmetic in any regard.
Tarsorrhaphy Text: A tarsorrhaphy was performed using Frost sutures.
Complex Repair And Flap Additional Text (Will Appearing After The Standard Complex Repair Text): The complex repair was not sufficient to completely close the primary defect. The remaining additional defect was repaired with the flap mentioned below.
Complex Repair And Graft Additional Text (Will Appearing After The Standard Complex Repair Text): The complex repair was not sufficient to completely close the primary defect. The remaining additional defect was repaired with the graft mentioned below.
Unique Flap 1 Name: Myocutaneous Island pedicle Flap
Unique Flap 2 Name: Peng Flap
Unique Flap 3 Name: Mercedes Flap
Unique Flap 4 Name: Banner Flap
Unique Flap 5 Name: tunneled myocutaneous flap
Unique Flap 6 Name: Melissa-B?ch flap
Unique Flap 7 Name: Mustarde flap
Unique Flap 8 Name: East to West Flap
Unique Flap 1 Text: A decision was made to reconstruct the defect utilizing a myocutaneous Island pedicle Flap based on the levator labii superioris muscle.  A telfa template was made of the defect.  This telfa template was then used to outline the myocutaneous flap, based along the meilolabial fold.  The donor area for the pedicle flap was then injected with anesthesia.  The flap was excised through the skin and subcutaneous tissue down to the layer of the underlying musculature.  The myocutaneous flap was carefully excised within this deep plane to maintain its blood supply. Based on the muscle. The edges of the donor site were undermined.   The donor site was closed in a primary fashion to the point of transposition.  The pedicle was then transposed into position and sutured.  Once the flap was sutured into place, adequate blood supply was confirmed with blanching and refill.
Unique Flap 2 Text: A decision was made to reconstruct the defect utilizing a Peng Flap (Bilateral Advancement Rotation Flap). Given the location of the defect and the proximity to free margins, this flap was deemed most appropriate.  Using a sterile surgical marker, the appropriate rotation flaps were drawn incorporating the defect and placing the expected incisions within the relaxed skin tension lines where possible.    The area thus outlined was incised deep to adipose tissue with a #15 scalpel blade.  The skin margins were undermined to an appropriate distance in all directions utilizing iris scissors.
Unique Flap 3 Text: The defect edges were debeveled with a #15 scalpel blade.  Given the location of the defect, shape of the defect and the proximity to free margins a Mercedes (double advancement flap) was deemed most appropriate.  Using a sterile surgical marker, the appropriate transposition flaps were drawn incorporating the defect and placing the expected incisions within the relaxed skin tension lines where possible.    The area thus outlined was incised deep to adipose tissue with a #15 scalpel blade.  The skin margins were undermined to an appropriate distance in all directions utilizing iris scissors.  Hemostasis was achieved with electrocautery.  The flaps were then advanced into the defect and anchored with interrupted buried subcutaneous sutures.
Unique Flap 4 Text: The defect edges were debeveled with a #15 scalpel blade.  Given the location of the defect and the proximity to free margins a Banner transposition flap was deemed most appropriate.  Using a sterile surgical marker, an appropriate Banner transposition flap was drawn incorporating the defect.    The area thus outlined was incised deep to adipose tissue with a #15 scalpel blade.  The skin margins were undermined to an appropriate distance in all directions utilizing iris scissors.
Unique Flap 5 Text: A decision was made to reconstruct the defect utilizing a tunneled myocutaneous Island pedicle Flap based on the anterior auricularis muscle.  A telfa template was made of the defect.  This telfa template was then used to outline the myocutaneous flap, based along the preauricular fold.  The donor area for the pedicle flap was then injected with anesthesia.  The flap was excised through the skin and subcutaneous tissue down to the layer of the underlying musculature.  The myocutaneous flap was carefully excised within this deep plane to maintain its blood supply based on the muscle. The edges of the donor site were undermined.   The donor site was closed in a primary fashion to the point of transposition.  The pedicle was then transposed through a tunnel into position and sutured.  Once the flap was sutured into place, adequate blood supply was confirmed with blanching and refill.
Unique Flap 6 Text: A decision was made to reconstruct the defect utilizing an Anti-aging-B?ch Flap (Bilateral helical Advancement Rotation Flap). Given the location of the defect and the proximity to free margins, this flap was deemed most appropriate.  Using a sterile surgical marker, the appropriate flaps were drawn incorporating the defect and placing the expected incisions within the relaxed skin tension lines where possible.  The area thus outlined was incised deep to adipose tissue with a #15 scalpel blade.  The skin margins were undermined to an appropriate distance in all directions utilizing iris scissors. Cartilage was incorporated into the flap arms to maintain helical anatomy.
Unique Flap 7 Text: A decision was made to reconstruct the defect utilizing a Mustarde Flap (Advancement Rotation Flap). Given the location of the defect and the proximity to free margins, this flap was deemed most appropriate.  Using a sterile surgical marker, the appropriate rotation flap was drawn incorporating the defect and placing the expected incisions within the relaxed skin tension lines where possible.    The area thus outlined was incised deep to adipose tissue with a #15 scalpel blade.  The skin margins were undermined to an appropriate distance in all directions utilizing iris scissors. The flap was advanced and rotated under the eyelid with a sling created laterally to keep ectropion minimal.
Unique Flap 8 Text: A decision was made to reconstruct the defect utilizing an East to West Flap (Modified Burows Advancement Flap). Given the location of the defect and the proximity to free margins, this flap was deemed most appropriate.  Using a sterile surgical marker, the appropriate advancement flaps were drawn incorporating the defect and placing the expected incisions within the relaxed skin tension lines where possible.    The area thus outlined was incised deep to adipose tissue with a #15 scalpel blade.  The skin margins were undermined to an appropriate distance in all directions utilizing iris scissors. Minimal alar distortion was created with flap approximation.
Manual Repair Warning Statement: We plan on removing the manually selected variable below in favor of our much easier automatic structured text blocks found in the previous tab. We decided to do this to help make the flow better and give you the full power of structured data. Manual selection is never going to be ideal in our platform and I would encourage you to avoid using manual selection from this point on, especially since I will be sunsetting this feature. It is important that you do one of two things with the customized text below. First, you can save all of the text in a word file so you can have it for future reference. Second, transfer the text to the appropriate area in the Library tab. Lastly, if there is a flap or graft type which we do not have you need to let us know right away so I can add it in before the variable is hidden. No need to panic, we plan to give you roughly 6 months to make the change.
Same Histology In Subsequent Stages Text: The pattern and morphology of the tumor is as described in the first stage.
No Residual Tumor Seen Histology Text: There were no malignant cells seen in the sections examined.
Inflammation Suggestive Of Cancer Camouflage Histology Text: There was a dense lymphocytic infiltrate which prevented adequate histologic evaluation of adjacent structures.
Bcc Histology Text: There were numerous aggregates of basaloid cells.
Bcc Infiltrative Histology Text: There were numerous aggregates of basaloid cells demonstrating an infiltrative pattern.
Mart-1 - Positive Histology Text: MART-1 staining demonstrates areas of higher density and clustering of melanocytes with Pagetoid spread upwards within the epidermis. The surgical margins are positive for tumor cells.
Mart-1 - Negative Histology Text: MART-1 staining demonstrates a normal density and pattern of melanocytes along the dermal-epidermal junction. The surgical margins are negative for tumor cells.
Information: Selecting Yes will display possible errors in your note based on the variables you have selected. This validation is only offered as a suggestion for you. PLEASE NOTE THAT THE VALIDATION TEXT WILL BE REMOVED WHEN YOU FINALIZE YOUR NOTE. IF YOU WANT TO FAX A PRELIMINARY NOTE YOU WILL NEED TO TOGGLE THIS TO 'NO' IF YOU DO NOT WANT IT IN YOUR FAXED NOTE.

## 2022-12-20 ENCOUNTER — APPOINTMENT (RX ONLY)
Dept: URBAN - METROPOLITAN AREA CLINIC 36 | Facility: CLINIC | Age: 72
Setting detail: DERMATOLOGY
End: 2022-12-20

## 2022-12-20 DIAGNOSIS — Z48.02 ENCOUNTER FOR REMOVAL OF SUTURES: ICD-10-CM

## 2022-12-20 PROCEDURE — ? SUTURE REMOVAL (GLOBAL PERIOD)

## 2022-12-20 ASSESSMENT — LOCATION DETAILED DESCRIPTION DERM: LOCATION DETAILED: MEDIAL FRONTAL SCALP

## 2022-12-20 ASSESSMENT — LOCATION ZONE DERM: LOCATION ZONE: SCALP

## 2022-12-20 ASSESSMENT — LOCATION SIMPLE DESCRIPTION DERM: LOCATION SIMPLE: FRONTAL SCALP

## 2022-12-20 NOTE — PROCEDURE: SUTURE REMOVAL (GLOBAL PERIOD)
Detail Level: Detailed
Add 73623 Cpt? (Important Note: In 2017 The Use Of 02390 Is Being Tracked By Cms To Determine Future Global Period Reimbursement For Global Periods): no

## 2023-01-09 ENCOUNTER — HOSPITAL ENCOUNTER (OUTPATIENT)
Dept: CARDIOLOGY | Facility: MEDICAL CENTER | Age: 73
End: 2023-01-09
Attending: ANESTHESIOLOGY
Payer: MEDICARE

## 2023-01-09 ENCOUNTER — HOSPITAL ENCOUNTER (OUTPATIENT)
Dept: LAB | Facility: MEDICAL CENTER | Age: 73
End: 2023-01-09
Attending: ANESTHESIOLOGY
Payer: MEDICARE

## 2023-01-09 DIAGNOSIS — E78.2 MIXED HYPERLIPIDEMIA: ICD-10-CM

## 2023-01-09 LAB
BASOPHILS # BLD AUTO: 0.7 % (ref 0–1.8)
BASOPHILS # BLD: 0.06 K/UL (ref 0–0.12)
EKG IMPRESSION: NORMAL
EOSINOPHIL # BLD AUTO: 0.09 K/UL (ref 0–0.51)
EOSINOPHIL NFR BLD: 1 % (ref 0–6.9)
ERYTHROCYTE [DISTWIDTH] IN BLOOD BY AUTOMATED COUNT: 47.2 FL (ref 35.9–50)
HCT VFR BLD AUTO: 45.6 % (ref 37–47)
HGB BLD-MCNC: 14.4 G/DL (ref 12–16)
IMM GRANULOCYTES # BLD AUTO: 0.03 K/UL (ref 0–0.11)
IMM GRANULOCYTES NFR BLD AUTO: 0.3 % (ref 0–0.9)
LYMPHOCYTES # BLD AUTO: 2.52 K/UL (ref 1–4.8)
LYMPHOCYTES NFR BLD: 28.2 % (ref 22–41)
MCH RBC QN AUTO: 30.5 PG (ref 27–33)
MCHC RBC AUTO-ENTMCNC: 31.6 G/DL (ref 33.6–35)
MCV RBC AUTO: 96.6 FL (ref 81.4–97.8)
MONOCYTES # BLD AUTO: 0.73 K/UL (ref 0–0.85)
MONOCYTES NFR BLD AUTO: 8.2 % (ref 0–13.4)
NEUTROPHILS # BLD AUTO: 5.52 K/UL (ref 2–7.15)
NEUTROPHILS NFR BLD: 61.6 % (ref 44–72)
NRBC # BLD AUTO: 0 K/UL
NRBC BLD-RTO: 0 /100 WBC
PLATELET # BLD AUTO: 240 K/UL (ref 164–446)
PMV BLD AUTO: 11.4 FL (ref 9–12.9)
RBC # BLD AUTO: 4.72 M/UL (ref 4.2–5.4)
WBC # BLD AUTO: 9 K/UL (ref 4.8–10.8)

## 2023-01-09 PROCEDURE — 85025 COMPLETE CBC W/AUTO DIFF WBC: CPT

## 2023-01-09 PROCEDURE — 93005 ELECTROCARDIOGRAM TRACING: CPT | Performed by: ANESTHESIOLOGY

## 2023-01-09 PROCEDURE — 36415 COLL VENOUS BLD VENIPUNCTURE: CPT

## 2023-01-09 PROCEDURE — 93010 ELECTROCARDIOGRAM REPORT: CPT | Performed by: INTERNAL MEDICINE

## 2023-01-10 RX ORDER — ATORVASTATIN CALCIUM 20 MG/1
20 TABLET, FILM COATED ORAL DAILY
Qty: 90 TABLET | Refills: 0 | Status: SHIPPED | OUTPATIENT
Start: 2023-01-10 | End: 2023-03-22

## 2023-01-26 ENCOUNTER — APPOINTMENT (RX ONLY)
Dept: URBAN - METROPOLITAN AREA CLINIC 36 | Facility: CLINIC | Age: 73
Setting detail: DERMATOLOGY
End: 2023-01-26

## 2023-01-26 DIAGNOSIS — Z41.9 ENCOUNTER FOR PROCEDURE FOR PURPOSES OTHER THAN REMEDYING HEALTH STATE, UNSPECIFIED: ICD-10-CM

## 2023-01-26 DIAGNOSIS — Z48.817 ENCOUNTER FOR SURGICAL AFTERCARE FOLLOWING SURGERY ON THE SKIN AND SUBCUTANEOUS TISSUE: ICD-10-CM

## 2023-01-26 PROCEDURE — ? POST-OP WOUND CHECK

## 2023-01-26 PROCEDURE — ? FRAXEL

## 2023-01-26 ASSESSMENT — LOCATION ZONE DERM: LOCATION ZONE: FACE

## 2023-01-26 ASSESSMENT — LOCATION SIMPLE DESCRIPTION DERM: LOCATION SIMPLE: SUPERIOR FOREHEAD

## 2023-01-26 ASSESSMENT — LOCATION DETAILED DESCRIPTION DERM: LOCATION DETAILED: SUPERIOR MID FOREHEAD

## 2023-01-26 NOTE — PROCEDURE: FRAXEL
Medium Plastic Eye Shield Text: The ocular mucosa was anesthetized with tetracaine. Once adequate anesthesia was optained, medium plastic eye shields were inserted and remained in place until the procedure was completed.
Number Of Passes: 1
Wavelength: 1550nm
Add Post-Care Below To The Note: No
Small Metal Eye Shield Text: The ocular mucosa was anesthetized with tetracaine. Once adequate anesthesia was optained, small metal eye shields were inserted and remained in place until the procedure was completed.
Detail Level: Zone
Large Plastic Eye Shield Text: The ocular mucosa was anesthetized with tetracaine. Once adequate anesthesia was optained, large plastic eye shields were inserted and remained in place until the procedure was completed.
Medium Metal Eye Shield Text: The ocular mucosa was anesthetized with tetracaine. Once adequate anesthesia was optained, medium metal eye shields were inserted and remained in place until the procedure was completed.
Post-Care Instructions: I reviewed with the patient in detail post-care instructions. Patient should avoid sun until area fully healed.
Indication: surgical scars
Energy(Mj/Cm2): 70
Location: forehead
Large Metal Eye Shield Text: The ocular mucosa was anesthetized with tetracaine. Once adequate anesthesia was optained, large metal eye shields were inserted and remained in place until the procedure was completed.
External Cooling Fan Speed: 5
Treatment Level: 4
Small Plastic Eye Shield Text: The ocular mucosa was anesthetized with tetracaine. Once adequate anesthesia was optained, small plastic eye shields were inserted and remained in place until the procedure was completed.
Price (Use Numbers Only, No Special Characters Or $): 0
Consent: Written consent obtained, risks reviewed including but not limited to pain and incomplete improvement.

## 2023-01-26 NOTE — PROCEDURE: POST-OP WOUND CHECK
Body Location Override (Optional - Billing Will Still Be Based On Selected Body Map Location If Applicable): medial frontal scalp
Detail Level: Detailed
Add 16623 Cpt? (Important Note: In 2017 The Use Of 62357 Is Being Tracked By Cms To Determine Future Global Period Reimbursement For Global Periods): no
Wound Evaluated By: RAN

## 2023-01-26 NOTE — PROCEDURE: MIPS QUALITY
Quality 111:Pneumonia Vaccination Status For Older Adults: Pneumococcal vaccine (PPSV23) administered on or after patient’s 60th birthday and before the end of the measurement period
Detail Level: Detailed
Quality 130: Documentation Of Current Medications In The Medical Record: Current Medications Documented
Quality 402: Tobacco Use And Help With Quitting Among Adolescents: Patient screened for tobacco and never smoked
Quality 431: Preventive Care And Screening: Unhealthy Alcohol Use - Screening: Patient not identified as an unhealthy alcohol user when screened for unhealthy alcohol use using a systematic screening method

## 2023-01-30 ENCOUNTER — APPOINTMENT (RX ONLY)
Dept: URBAN - METROPOLITAN AREA CLINIC 36 | Facility: CLINIC | Age: 73
Setting detail: DERMATOLOGY
End: 2023-01-30

## 2023-01-30 DIAGNOSIS — L90.5 SCAR CONDITIONS AND FIBROSIS OF SKIN: ICD-10-CM

## 2023-01-30 DIAGNOSIS — Z48.817 ENCOUNTER FOR SURGICAL AFTERCARE FOLLOWING SURGERY ON THE SKIN AND SUBCUTANEOUS TISSUE: ICD-10-CM

## 2023-01-30 PROCEDURE — ? INTRALESIONAL KENALOG

## 2023-01-30 PROCEDURE — ? POST-OP WOUND CHECK

## 2023-01-30 PROCEDURE — 99024 POSTOP FOLLOW-UP VISIT: CPT

## 2023-01-30 ASSESSMENT — LOCATION SIMPLE DESCRIPTION DERM
LOCATION SIMPLE: INFERIOR FOREHEAD
LOCATION SIMPLE: LEFT SCALP

## 2023-01-30 ASSESSMENT — LOCATION ZONE DERM
LOCATION ZONE: SCALP
LOCATION ZONE: FACE

## 2023-01-30 ASSESSMENT — LOCATION DETAILED DESCRIPTION DERM
LOCATION DETAILED: INFERIOR MID FOREHEAD
LOCATION DETAILED: LEFT MEDIAL FRONTAL SCALP

## 2023-01-30 NOTE — PROCEDURE: POST-OP WOUND CHECK
Detail Level: Generalized
Add 27274 Cpt? (Important Note: In 2017 The Use Of 30368 Is Being Tracked By Cms To Determine Future Global Period Reimbursement For Global Periods): yes

## 2023-01-30 NOTE — PROCEDURE: INTRALESIONAL KENALOG
Concentration Of Kenalog Solution Injected (Mg/Ml): 40.0
X Size Of Lesion In Cm (Optional): 0
Detail Level: Generalized
Consent: The risks of atrophy were reviewed with the patient.
Kenalog Preparation: Kenalog
Total Volume (Ccs): 0.2
Include Z78.9 (Other Specified Conditions Influencing Health Status) As An Associated Diagnosis?: No
Medical Necessity Clause: This procedure was medically necessary because the lesions that were treated were:

## 2023-02-01 ENCOUNTER — OFFICE VISIT (OUTPATIENT)
Dept: MEDICAL GROUP | Facility: MEDICAL CENTER | Age: 73
End: 2023-02-01
Payer: MEDICARE

## 2023-02-01 ENCOUNTER — HOSPITAL ENCOUNTER (OUTPATIENT)
Dept: LAB | Facility: MEDICAL CENTER | Age: 73
End: 2023-02-01
Attending: FAMILY MEDICINE
Payer: MEDICARE

## 2023-02-01 VITALS
TEMPERATURE: 96.1 F | OXYGEN SATURATION: 98 % | HEART RATE: 58 BPM | BODY MASS INDEX: 30.34 KG/M2 | WEIGHT: 182.1 LBS | DIASTOLIC BLOOD PRESSURE: 60 MMHG | HEIGHT: 65 IN | SYSTOLIC BLOOD PRESSURE: 118 MMHG

## 2023-02-01 DIAGNOSIS — Z86.718 HISTORY OF DVT (DEEP VEIN THROMBOSIS): ICD-10-CM

## 2023-02-01 DIAGNOSIS — K80.20 CALCULUS OF GALLBLADDER WITHOUT CHOLECYSTITIS WITHOUT OBSTRUCTION: ICD-10-CM

## 2023-02-01 DIAGNOSIS — R10.11 RUQ PAIN: ICD-10-CM

## 2023-02-01 DIAGNOSIS — D68.318 HEMORRHAGIC DISORDER DUE TO CIRCULATING ANTICOAGULANTS (HCC): ICD-10-CM

## 2023-02-01 DIAGNOSIS — N18.30 STAGE 3 CHRONIC KIDNEY DISEASE, UNSPECIFIED WHETHER STAGE 3A OR 3B CKD: ICD-10-CM

## 2023-02-01 DIAGNOSIS — E78.2 MIXED HYPERLIPIDEMIA: ICD-10-CM

## 2023-02-01 DIAGNOSIS — I48.91 ATRIAL FIBRILLATION, UNSPECIFIED TYPE (HCC): ICD-10-CM

## 2023-02-01 DIAGNOSIS — E66.9 OBESITY (BMI 30-39.9): ICD-10-CM

## 2023-02-01 PROBLEM — E05.90 SUBCLINICAL HYPERTHYROIDISM: Status: RESOLVED | Noted: 2021-05-06 | Resolved: 2023-02-01

## 2023-02-01 LAB
ALBUMIN SERPL BCP-MCNC: 4.7 G/DL (ref 3.2–4.9)
ALBUMIN/GLOB SERPL: 1.7 G/DL
ALP SERPL-CCNC: 144 U/L (ref 30–99)
ALT SERPL-CCNC: 21 U/L (ref 2–50)
ANION GAP SERPL CALC-SCNC: 12 MMOL/L (ref 7–16)
APPEARANCE UR: NORMAL
AST SERPL-CCNC: 18 U/L (ref 12–45)
BILIRUB SERPL-MCNC: 0.6 MG/DL (ref 0.1–1.5)
BILIRUB UR STRIP-MCNC: NORMAL MG/DL
BUN SERPL-MCNC: 24 MG/DL (ref 8–22)
CALCIUM ALBUM COR SERPL-MCNC: 9.4 MG/DL (ref 8.5–10.5)
CALCIUM SERPL-MCNC: 10 MG/DL (ref 8.5–10.5)
CHLORIDE SERPL-SCNC: 107 MMOL/L (ref 96–112)
CHOLEST SERPL-MCNC: 149 MG/DL (ref 100–199)
CO2 SERPL-SCNC: 25 MMOL/L (ref 20–33)
COLOR UR AUTO: YELLOW
CREAT SERPL-MCNC: 0.89 MG/DL (ref 0.5–1.4)
GFR SERPLBLD CREATININE-BSD FMLA CKD-EPI: 68 ML/MIN/1.73 M 2
GLOBULIN SER CALC-MCNC: 2.7 G/DL (ref 1.9–3.5)
GLUCOSE SERPL-MCNC: 89 MG/DL (ref 65–99)
GLUCOSE UR STRIP.AUTO-MCNC: NORMAL MG/DL
HDLC SERPL-MCNC: 44 MG/DL
KETONES UR STRIP.AUTO-MCNC: NORMAL MG/DL
LDLC SERPL CALC-MCNC: 63 MG/DL
LEUKOCYTE ESTERASE UR QL STRIP.AUTO: NORMAL
LIPASE SERPL-CCNC: 34 U/L (ref 11–82)
NITRITE UR QL STRIP.AUTO: NORMAL
PH UR STRIP.AUTO: 5.5 [PH] (ref 5–8)
POTASSIUM SERPL-SCNC: 4.4 MMOL/L (ref 3.6–5.5)
PROT SERPL-MCNC: 7.4 G/DL (ref 6–8.2)
PROT UR QL STRIP: NORMAL MG/DL
RBC UR QL AUTO: NORMAL
SODIUM SERPL-SCNC: 144 MMOL/L (ref 135–145)
SP GR UR STRIP.AUTO: 1.03
TRIGL SERPL-MCNC: 211 MG/DL (ref 0–149)
TSH SERPL DL<=0.005 MIU/L-ACNC: 2.55 UIU/ML (ref 0.38–5.33)
UROBILINOGEN UR STRIP-MCNC: 0.2 MG/DL

## 2023-02-01 PROCEDURE — 36415 COLL VENOUS BLD VENIPUNCTURE: CPT

## 2023-02-01 PROCEDURE — 84443 ASSAY THYROID STIM HORMONE: CPT

## 2023-02-01 PROCEDURE — 81002 URINALYSIS NONAUTO W/O SCOPE: CPT | Performed by: FAMILY MEDICINE

## 2023-02-01 PROCEDURE — 80061 LIPID PANEL: CPT

## 2023-02-01 PROCEDURE — 83690 ASSAY OF LIPASE: CPT

## 2023-02-01 PROCEDURE — 80053 COMPREHEN METABOLIC PANEL: CPT

## 2023-02-01 PROCEDURE — 99214 OFFICE O/P EST MOD 30 MIN: CPT | Performed by: FAMILY MEDICINE

## 2023-02-01 RX ORDER — PREDNISOLONE ACETATE 10 MG/ML
1 SUSPENSION/ DROPS OPHTHALMIC 4 TIMES DAILY
COMMUNITY
Start: 2023-01-09

## 2023-02-01 ASSESSMENT — PATIENT HEALTH QUESTIONNAIRE - PHQ9: CLINICAL INTERPRETATION OF PHQ2 SCORE: 0

## 2023-02-01 ASSESSMENT — FIBROSIS 4 INDEX: FIB4 SCORE: 1.52

## 2023-02-01 NOTE — PROGRESS NOTES
"  Subjective:     Lucía Rodriguez is a 72 y.o. female presenting to discuss right upper quadrant pressure and mid back pain.  Has had symptoms intermittently for years, most recent episode started a couple weeks ago.  Symptoms are worse with laying down.  No other associated symptoms.  Denies any nausea, vomiting, fevers, chest pain, shortness of breath, abdominal pain, urinary symptoms.  Appetite is good, weight has been stable.  No new changes to diet or medications.  She did have an ultrasound several years ago that showed cholelithiasis and hepatic steatosis.        Current Outpatient Medications:     prednisoLONE acetate (PRED FORTE) 1 % Suspension, PLACE 1 DROP INTO THE POST OPERATIVE EYE 4 TIMES A DAY, Disp: , Rfl:     atorvastatin (LIPITOR) 20 MG Tab, Take 1 Tablet by mouth every day., Disp: 90 Tablet, Rfl: 0    ELIQUIS 5 MG Tab, TAKE 1 TABLET BY MOUTH TWICE A DAY, Disp: 180 Tablet, Rfl: 3    metoprolol tartrate (LOPRESSOR) 25 MG Tab, TAKE 1/2 TABLET BY MOUTH 2 TIMES A DAY, Disp: 90 Tablet, Rfl: 3    flecainide (TAMBOCOR) 50 MG tablet, TAKE 1-2 TABLETS BY MOUTH 2 TIMES A DAY. 50 MG IN AM  MG IN PM, Disp: 270 Tablet, Rfl: 3    Ca Phosphate-Cholecalciferol (CVS CALCIUM-VITAMIN D PO), Take 1 Tablet by mouth every day., Disp: , Rfl:     Psyllium (METAMUCIL PO), Take 1 Scoop by mouth every day., Disp: , Rfl:     Objective:     Vitals: /60 (BP Location: Left arm, Patient Position: Sitting, BP Cuff Size: Adult)   Pulse (!) 58   Temp (!) 35.6 °C (96.1 °F) (Temporal)   Ht 1.651 m (5' 5\")   Wt 82.6 kg (182 lb 1.6 oz)   LMP 12/28/2003   SpO2 98%   BMI 30.30 kg/m²   General: Alert  HEENT: Normocephalic.   Heart: Regular rate and rhythm.  S1 and S2 normal.  No murmurs appreciated.  Respiratory: Normal respiratory effort.  Clear to auscultation bilaterally.  Abdomen: Non-distended, soft  Extremities: No leg edema.    Point-of-care UA: normal    Assessment/Plan:     Lucía was seen today for gi " problem.    Diagnoses and all orders for this visit:    RUQ pain  Calculus of gallbladder without cholecystitis without obstruction chronic, worsening.  We discussed checking the following labs, ultrasound.  We discussed a referral to surgery if interested in the future.  She will let us know.  -     Comp Metabolic Panel; Future  -     LIPASE; Future  -     US-RUQ; Future  -     POCT Urinalysis    Mixed hyperlipidemia  Chronic, stable, continue atorvastatin  -     Comp Metabolic Panel; Future  -     Lipid Profile; Future    Stage 3 chronic kidney disease, unspecified whether stage 3a or 3b CKD (HCC)  Chronic, stable, continue to monitor  -     Comp Metabolic Panel; Future    Atrial fibrillation, unspecified type (HCC)  Chronic, stable, sees cardiology  -     Comp Metabolic Panel; Future  -     TSH WITH REFLEX TO FT4; Future    Hemorrhagic disorder due to circulating anticoagulants (HCC)  Chronic, stable, continue Eliquis  -     Comp Metabolic Panel; Future    History of DVT (deep vein thrombosis)  Chronic, stable, continue Eliquis  -     Comp Metabolic Panel; Future    Obesity (BMI 30-39.9)  -     Patient identified as having weight management issue.  Appropriate orders and counseling given.        Return in about 6 months (around 8/1/2023) for routine follow up.

## 2023-02-13 ENCOUNTER — APPOINTMENT (RX ONLY)
Dept: URBAN - METROPOLITAN AREA CLINIC 36 | Facility: CLINIC | Age: 73
Setting detail: DERMATOLOGY
End: 2023-02-13

## 2023-02-13 DIAGNOSIS — Z48.817 ENCOUNTER FOR SURGICAL AFTERCARE FOLLOWING SURGERY ON THE SKIN AND SUBCUTANEOUS TISSUE: ICD-10-CM

## 2023-02-13 PROCEDURE — ? POST-OP WOUND CHECK

## 2023-02-13 PROCEDURE — 99024 POSTOP FOLLOW-UP VISIT: CPT

## 2023-02-13 ASSESSMENT — LOCATION SIMPLE DESCRIPTION DERM: LOCATION SIMPLE: SUPERIOR FOREHEAD

## 2023-02-13 ASSESSMENT — LOCATION ZONE DERM: LOCATION ZONE: FACE

## 2023-02-13 ASSESSMENT — LOCATION DETAILED DESCRIPTION DERM: LOCATION DETAILED: SUPERIOR MID FOREHEAD

## 2023-02-15 ENCOUNTER — APPOINTMENT (RX ONLY)
Dept: URBAN - METROPOLITAN AREA CLINIC 4 | Facility: CLINIC | Age: 73
Setting detail: DERMATOLOGY
End: 2023-02-15

## 2023-02-15 DIAGNOSIS — D22 MELANOCYTIC NEVI: ICD-10-CM

## 2023-02-15 DIAGNOSIS — L72.0 EPIDERMAL CYST: ICD-10-CM

## 2023-02-15 DIAGNOSIS — L57.8 OTHER SKIN CHANGES DUE TO CHRONIC EXPOSURE TO NONIONIZING RADIATION: ICD-10-CM

## 2023-02-15 DIAGNOSIS — L82.1 OTHER SEBORRHEIC KERATOSIS: ICD-10-CM

## 2023-02-15 DIAGNOSIS — Z85.828 PERSONAL HISTORY OF OTHER MALIGNANT NEOPLASM OF SKIN: ICD-10-CM

## 2023-02-15 DIAGNOSIS — L81.4 OTHER MELANIN HYPERPIGMENTATION: ICD-10-CM

## 2023-02-15 DIAGNOSIS — D18.0 HEMANGIOMA: ICD-10-CM

## 2023-02-15 PROBLEM — D18.01 HEMANGIOMA OF SKIN AND SUBCUTANEOUS TISSUE: Status: ACTIVE | Noted: 2023-02-15

## 2023-02-15 PROBLEM — D22.5 MELANOCYTIC NEVI OF TRUNK: Status: ACTIVE | Noted: 2023-02-15

## 2023-02-15 PROCEDURE — ? COUNSELING

## 2023-02-15 PROCEDURE — 99213 OFFICE O/P EST LOW 20 MIN: CPT

## 2023-02-15 ASSESSMENT — LOCATION SIMPLE DESCRIPTION DERM
LOCATION SIMPLE: RIGHT UPPER BACK
LOCATION SIMPLE: PREPUCE OF CLITORIS
LOCATION SIMPLE: LABIA MAJORA
LOCATION SIMPLE: RIGHT CHEEK
LOCATION SIMPLE: LEFT UPPER BACK
LOCATION SIMPLE: RIGHT ANTERIOR NECK
LOCATION SIMPLE: LEFT HAND
LOCATION SIMPLE: RIGHT HAND

## 2023-02-15 ASSESSMENT — LOCATION ZONE DERM
LOCATION ZONE: VULVA
LOCATION ZONE: CLITORIS
LOCATION ZONE: NECK
LOCATION ZONE: FACE
LOCATION ZONE: HAND
LOCATION ZONE: TRUNK

## 2023-02-15 ASSESSMENT — LOCATION DETAILED DESCRIPTION DERM
LOCATION DETAILED: LEFT RADIAL DORSAL HAND
LOCATION DETAILED: LEFT SUPERIOR UPPER BACK
LOCATION DETAILED: RIGHT SUPERIOR UPPER BACK
LOCATION DETAILED: RIGHT INFERIOR ANTERIOR NECK
LOCATION DETAILED: RIGHT RADIAL DORSAL HAND
LOCATION DETAILED: RIGHT LABIUM MAJUS
LOCATION DETAILED: RIGHT INFERIOR MEDIAL UPPER BACK
LOCATION DETAILED: RIGHT INFERIOR CENTRAL MALAR CHEEK
LOCATION DETAILED: PREPUCE OF CLITORIS
LOCATION DETAILED: LEFT LABIUM MAJUS

## 2023-02-22 ENCOUNTER — OFFICE VISIT (OUTPATIENT)
Dept: CARDIOLOGY | Facility: MEDICAL CENTER | Age: 73
End: 2023-02-22
Payer: MEDICARE

## 2023-02-22 VITALS
BODY MASS INDEX: 30.99 KG/M2 | RESPIRATION RATE: 14 BRPM | OXYGEN SATURATION: 98 % | HEIGHT: 65 IN | SYSTOLIC BLOOD PRESSURE: 126 MMHG | HEART RATE: 70 BPM | WEIGHT: 186 LBS | DIASTOLIC BLOOD PRESSURE: 80 MMHG

## 2023-02-22 DIAGNOSIS — I48.91 ATRIAL FIBRILLATION, UNSPECIFIED TYPE (HCC): ICD-10-CM

## 2023-02-22 DIAGNOSIS — E78.2 MIXED HYPERLIPIDEMIA: ICD-10-CM

## 2023-02-22 DIAGNOSIS — Z79.01 ANTICOAGULATED: ICD-10-CM

## 2023-02-22 LAB — EKG IMPRESSION: NORMAL

## 2023-02-22 PROCEDURE — 93000 ELECTROCARDIOGRAM COMPLETE: CPT | Performed by: INTERNAL MEDICINE

## 2023-02-22 PROCEDURE — 99214 OFFICE O/P EST MOD 30 MIN: CPT | Performed by: INTERNAL MEDICINE

## 2023-02-22 ASSESSMENT — FIBROSIS 4 INDEX: FIB4 SCORE: 1.19

## 2023-02-23 ENCOUNTER — APPOINTMENT (RX ONLY)
Dept: URBAN - METROPOLITAN AREA CLINIC 36 | Facility: CLINIC | Age: 73
Setting detail: DERMATOLOGY
End: 2023-02-23

## 2023-02-23 DIAGNOSIS — Z48.817 ENCOUNTER FOR SURGICAL AFTERCARE FOLLOWING SURGERY ON THE SKIN AND SUBCUTANEOUS TISSUE: ICD-10-CM

## 2023-02-23 PROCEDURE — ? LASER RESURFACING

## 2023-02-23 PROCEDURE — ? POST-OP WOUND CHECK

## 2023-02-23 ASSESSMENT — LOCATION DETAILED DESCRIPTION DERM: LOCATION DETAILED: RIGHT MEDIAL FRONTAL SCALP

## 2023-02-23 ASSESSMENT — LOCATION SIMPLE DESCRIPTION DERM: LOCATION SIMPLE: RIGHT SCALP

## 2023-02-23 ASSESSMENT — LOCATION ZONE DERM: LOCATION ZONE: SCALP

## 2023-02-23 NOTE — PROGRESS NOTES
Chief Complaint   Patient presents with    Atrial Fibrillation     F/V Dx: Atrial fibrillation, unspecified type (HCC)       Subjective     Siena Rodriguez is a 72 y.o. female who presents today with paroxysmal atrial fibrillation on flecainide.  On Eliquis.  Previous DVT.  Filter removed. Previous Takotsubo resolved.  Overall stable.  Does have a Kardia.  Recorded PVCs.       Past Medical History:   Diagnosis Date    Anesthesia     Delayed emergence; Hair loss    Arthritis     Throughout her body    Atrial fibrillation (HCC)     Atrial fibrillation    Calculus of gallbladder without cholecystitis 1/9/2017    CATARACT     Bilateral IOL; loss of vision in Right eye starting in 2000.    Central stenosis of spinal canal 1/18/2021    Delayed emergence from general anesthesia     Glaucoma     right eye    High cholesterol     Hyperlipidemia     Macular cyst, hole, or pseudohole of retina     Presence of IVC filter 2021    Pulmonary nodules/lesions, multiple 07/06/2016    stable on CTs, no further work up needed     Past Surgical History:   Procedure Laterality Date    CERVICAL DISK AND FUSION ANTERIOR N/A 5/3/2021    Procedure: DISCECTOMY, SPINE, CERVICAL, ANTERIOR APPROACH, WITH FUSION - C4-7 WITH PLATE.;  Surgeon: Heri Greene M.D.;  Location: SURGERY [facility];  Service: Neurosurgery    OTHER  04/2021    IVC filter placement    GYN SURGERY      HYSTERECTOMY    MENISCUS REPAIR Right     OTHER      right eye cataract sx    OTHER      multiple surgeries to left eye     Family History   Problem Relation Age of Onset    Heart Disease Father         triple bypass    Cancer Father     Cancer Mother     Clotting Disorder Neg Hx      Social History     Socioeconomic History    Marital status:      Spouse name: Not on file    Number of children: Not on file    Years of education: Not on file    Highest education level: Not on file   Occupational History    Not on file   Tobacco Use    Smoking status: Never     "Smokeless tobacco: Never   Vaping Use    Vaping Use: Never used   Substance and Sexual Activity    Alcohol use: No    Drug use: No    Sexual activity: Yes     Partners: Male   Other Topics Concern    Not on file   Social History Narrative    Not on file     Social Determinants of Health     Financial Resource Strain: Not on file   Food Insecurity: Not on file   Transportation Needs: Not on file   Physical Activity: Not on file   Stress: Not on file   Social Connections: Not on file   Intimate Partner Violence: Not on file   Housing Stability: Not on file     Allergies   Allergen Reactions    Neosporin [Neomycin-Polymyxin B] Rash     Rxn = 6/2016    Tape      Severe blister reaction to adhesive from tape on skin (TEGADERM if left >24 hours); Paper tape ok     Outpatient Encounter Medications as of 2/22/2023   Medication Sig Dispense Refill    prednisoLONE acetate (PRED FORTE) 1 % Suspension PLACE 1 DROP INTO THE POST OPERATIVE EYE 4 TIMES A DAY      atorvastatin (LIPITOR) 20 MG Tab Take 1 Tablet by mouth every day. 90 Tablet 0    ELIQUIS 5 MG Tab TAKE 1 TABLET BY MOUTH TWICE A  Tablet 3    metoprolol tartrate (LOPRESSOR) 25 MG Tab TAKE 1/2 TABLET BY MOUTH 2 TIMES A DAY 90 Tablet 3    flecainide (TAMBOCOR) 50 MG tablet TAKE 1-2 TABLETS BY MOUTH 2 TIMES A DAY. 50 MG IN AM  MG IN  Tablet 3    Ca Phosphate-Cholecalciferol (CVS CALCIUM-VITAMIN D PO) Take 1 Tablet by mouth every day.      Psyllium (METAMUCIL PO) Take 1 Scoop by mouth every day.       No facility-administered encounter medications on file as of 2/22/2023.     ROS           Objective     /80 (BP Location: Left arm, Patient Position: Sitting, BP Cuff Size: Adult)   Pulse 70   Resp 14   Ht 1.651 m (5' 5\")   Wt 84.4 kg (186 lb)   LMP 12/28/2003   SpO2 98%   BMI 30.95 kg/m²     Physical Exam  Constitutional:       Appearance: She is well-developed.   HENT:      Head: Normocephalic and atraumatic.   Eyes:      Pupils: Pupils are " equal, round, and reactive to light.   Cardiovascular:      Rate and Rhythm: Normal rate and regular rhythm.      Heart sounds: Normal heart sounds. No murmur heard.    No friction rub. No gallop.   Pulmonary:      Effort: Pulmonary effort is normal.      Breath sounds: Normal breath sounds.   Abdominal:      General: Bowel sounds are normal.      Palpations: Abdomen is soft.   Musculoskeletal:         General: Normal range of motion.      Cervical back: Normal range of motion and neck supple.   Skin:     General: Skin is warm.   Neurological:      Mental Status: She is alert and oriented to person, place, and time.      Cranial Nerves: No cranial nerve deficit.   Psychiatric:         Behavior: Behavior normal.         Thought Content: Thought content normal.         Judgment: Judgment normal.              Assessment & Plan     1. Atrial fibrillation, unspecified type (HCC)  EKG      2. Mixed hyperlipidemia        3. Anticoagulated            Medical Decision Making: Today's Assessment/Status/Plan:   1.  Paroxysmal atrial fibrillation continue current regimen.  2.  Anticoagulation continue Eliquis  3.  Mild hyperlipidemia with hypertriglyceridemia.  On statins  4.  Follow-up with me in 1 year

## 2023-02-23 NOTE — PROCEDURE: LASER RESURFACING
Power (Brizuela): 70
Wavelength: 2,940nm
Percent Coverage Per Pass (%): 0
Number Of Passes: 1
Was A Corneal Shield Used?: No
Consent: Written consent obtained, risks reviewed including but not limited to crusting, scabbing, blistering, scarring, darker or lighter pigmentary change, incomplete improvement of dyschromia, wrinkles, prolonged erythema and facial swelling, infection and bleeding.
External Cooling Fan Speed: 5
Corneal Shield Text: A corneal shield was inserted after appropriate application of topical anesthesia.
Detail Level: Detailed
Energy(Mj): 900
Laser Type: CO2Re laser (Deep)
Post-Care Instructions: I reviewed with the patient in detail post-care instructions. Patient should avoid sun until area fully healed. Pt should apply vaseline to treated areas, and remove crusts gently with water-vinegar soaks.

## 2023-03-16 ENCOUNTER — HOSPITAL ENCOUNTER (OUTPATIENT)
Dept: RADIOLOGY | Facility: MEDICAL CENTER | Age: 73
End: 2023-03-16
Attending: FAMILY MEDICINE
Payer: MEDICARE

## 2023-03-16 DIAGNOSIS — R10.11 RUQ PAIN: ICD-10-CM

## 2023-03-16 DIAGNOSIS — K80.20 CALCULUS OF GALLBLADDER WITHOUT CHOLECYSTITIS WITHOUT OBSTRUCTION: ICD-10-CM

## 2023-03-16 PROCEDURE — 76705 ECHO EXAM OF ABDOMEN: CPT

## 2023-03-22 DIAGNOSIS — E78.2 MIXED HYPERLIPIDEMIA: ICD-10-CM

## 2023-03-22 RX ORDER — ATORVASTATIN CALCIUM 20 MG/1
20 TABLET, FILM COATED ORAL DAILY
Qty: 90 TABLET | Refills: 3 | Status: SHIPPED | OUTPATIENT
Start: 2023-03-22 | End: 2024-01-03 | Stop reason: SDUPTHER

## 2023-03-29 ENCOUNTER — APPOINTMENT (RX ONLY)
Dept: URBAN - METROPOLITAN AREA CLINIC 36 | Facility: CLINIC | Age: 73
Setting detail: DERMATOLOGY
End: 2023-03-29

## 2023-03-29 DIAGNOSIS — R22.9 LOCALIZED SWELLING, MASS AND LUMP, UNSPECIFIED: ICD-10-CM

## 2023-03-29 PROCEDURE — ? PHOTO-DOCUMENTATION

## 2023-03-29 PROCEDURE — ? FRAXEL RESTORE DUAL

## 2023-03-29 ASSESSMENT — LOCATION SIMPLE DESCRIPTION DERM: LOCATION SIMPLE: FRONTAL SCALP

## 2023-03-29 ASSESSMENT — LOCATION DETAILED DESCRIPTION DERM: LOCATION DETAILED: MEDIAL FRONTAL SCALP

## 2023-03-29 ASSESSMENT — LOCATION ZONE DERM: LOCATION ZONE: SCALP

## 2023-03-29 NOTE — PROCEDURE: FRAXEL RESTORE DUAL
Treatment Number (Optional): 1
Detail Level: Detailed
Consent: Written consent obtained, risks reviewed including but not limited to crusting, scabbing, blistering, scarring, darker or lighter pigmentary change, and/or incomplete removal. Advised that more than one treatment may be needed to obtain results  and that the first two treatments are free but subsequent treatments will require a $50 fee per treatment.
Price (Use Numbers Only, No Special Characters Or $): 0
Treatment Level (Optional): 4
Post-Care Instructions: I reviewed with the patient in detail post-care instructions. The patient was given written and verbal instructions for wound care. Reviewed skin hydration, soaks if needed, strict sun protection with a physical blocking sun screen. Pt is too avoid picking, excess picking can lead to scarring. Pt will be seen for follow up after treatment. Call with any concerns. Pt recommended to use biocorneum scar cream BID for up to three months.
Eye Shielding Text (Leave Blank If Unwanted- Will Be Inserted If Selecting Eye Shields): The intraocular eye shields were placed. 2 drops of intraocular tetracaine HCL ophthalmic 0.5% solution was administered. The eye shields were coated with ophthalmic bacitracin prior to insertion. After the shields were removed the eyes were flushed with normal saline.
Energy In Mj (Optional): 70mj

## 2023-04-03 ENCOUNTER — TELEPHONE (OUTPATIENT)
Dept: CARDIOLOGY | Facility: MEDICAL CENTER | Age: 73
End: 2023-04-03
Payer: MEDICARE

## 2023-04-03 NOTE — TELEPHONE ENCOUNTER
MEDICATION REFILL : DS    Caller: Lucía Rodriguez    Medication Name and Dosage:     METOPROLOL 25MG     Please call your pharmacy and have them send us a refill request or speak to a live representative, RX number may have changed.    Medication amount left: NONE    Preferred Pharmacy:     [id number] - [place] - [phone]    Other questions (Topic): NONE    Callback Number (Will only call for issues): [phone] (home)

## 2023-04-05 NOTE — TELEPHONE ENCOUNTER
Is the patient due for a refill? Yes    Was the patient seen the past year? Yes    Date of last office visit: 2/22/2023    Does the patient have an upcoming appointment?  No   If yes, When?     Provider to refill:DS    Does the patients insurance require a 100 day supply?  No

## 2023-04-13 ENCOUNTER — OFFICE VISIT (OUTPATIENT)
Dept: MEDICAL GROUP | Facility: MEDICAL CENTER | Age: 73
End: 2023-04-13
Payer: MEDICARE

## 2023-04-13 VITALS
HEART RATE: 68 BPM | SYSTOLIC BLOOD PRESSURE: 124 MMHG | OXYGEN SATURATION: 96 % | RESPIRATION RATE: 16 BRPM | DIASTOLIC BLOOD PRESSURE: 74 MMHG | HEIGHT: 65 IN | BODY MASS INDEX: 30.16 KG/M2 | WEIGHT: 181 LBS | TEMPERATURE: 97.6 F

## 2023-04-13 DIAGNOSIS — K80.20 CALCULUS OF GALLBLADDER WITHOUT CHOLECYSTITIS WITHOUT OBSTRUCTION: ICD-10-CM

## 2023-04-13 DIAGNOSIS — M85.80 OSTEOPENIA, UNSPECIFIED LOCATION: ICD-10-CM

## 2023-04-13 DIAGNOSIS — Z78.0 POSTMENOPAUSAL: ICD-10-CM

## 2023-04-13 DIAGNOSIS — Z71.84 TRAVEL ADVICE ENCOUNTER: ICD-10-CM

## 2023-04-13 DIAGNOSIS — K76.0 HEPATIC STEATOSIS: ICD-10-CM

## 2023-04-13 DIAGNOSIS — E66.9 OBESITY (BMI 30-39.9): ICD-10-CM

## 2023-04-13 PROCEDURE — 99214 OFFICE O/P EST MOD 30 MIN: CPT | Performed by: FAMILY MEDICINE

## 2023-04-13 RX ORDER — ALPRAZOLAM 0.25 MG/1
0.25 TABLET ORAL NIGHTLY PRN
Qty: 14 TABLET | Refills: 0 | Status: SHIPPED | OUTPATIENT
Start: 2023-04-13 | End: 2023-04-27

## 2023-04-13 ASSESSMENT — FIBROSIS 4 INDEX: FIB4 SCORE: 1.19

## 2023-06-15 ENCOUNTER — HOSPITAL ENCOUNTER (OUTPATIENT)
Dept: RADIOLOGY | Facility: MEDICAL CENTER | Age: 73
End: 2023-06-15
Attending: FAMILY MEDICINE
Payer: MEDICARE

## 2023-06-15 DIAGNOSIS — M85.80 OSTEOPENIA, UNSPECIFIED LOCATION: ICD-10-CM

## 2023-06-15 DIAGNOSIS — Z78.0 POSTMENOPAUSAL: ICD-10-CM

## 2023-06-15 PROCEDURE — 77080 DXA BONE DENSITY AXIAL: CPT

## 2023-06-16 PROBLEM — M81.0 AGE-RELATED OSTEOPOROSIS WITHOUT CURRENT PATHOLOGICAL FRACTURE: Status: ACTIVE | Noted: 2023-06-16

## 2023-06-26 ENCOUNTER — TELEPHONE (OUTPATIENT)
Dept: MEDICAL GROUP | Facility: MEDICAL CENTER | Age: 73
End: 2023-06-26
Payer: MEDICARE

## 2023-06-26 NOTE — TELEPHONE ENCOUNTER
VOICEMAIL  1. Caller Name: Lucía Rodriguez                        Call Back Number: [phone] (home)       2. Message: patient called on Friday about a sinus infection and would like a call back     3. Patient approves office to leave a detailed voicemail/MyChart message: N\A

## 2023-06-26 NOTE — TELEPHONE ENCOUNTER
Patient thinks that she has a sinus infection and would like some medication to help. Made patient an appointment for tomorrow, however she would like to know if you can send her in something today

## 2023-06-27 ENCOUNTER — OFFICE VISIT (OUTPATIENT)
Dept: MEDICAL GROUP | Facility: MEDICAL CENTER | Age: 73
End: 2023-06-27
Payer: MEDICARE

## 2023-06-27 VITALS
SYSTOLIC BLOOD PRESSURE: 126 MMHG | HEART RATE: 69 BPM | DIASTOLIC BLOOD PRESSURE: 70 MMHG | OXYGEN SATURATION: 96 % | BODY MASS INDEX: 30.52 KG/M2 | HEIGHT: 65 IN | TEMPERATURE: 97.4 F | WEIGHT: 183.2 LBS

## 2023-06-27 DIAGNOSIS — M81.0 AGE-RELATED OSTEOPOROSIS WITHOUT CURRENT PATHOLOGICAL FRACTURE: ICD-10-CM

## 2023-06-27 DIAGNOSIS — J01.90 ACUTE NON-RECURRENT SINUSITIS, UNSPECIFIED LOCATION: ICD-10-CM

## 2023-06-27 PROCEDURE — 3074F SYST BP LT 130 MM HG: CPT | Performed by: FAMILY MEDICINE

## 2023-06-27 PROCEDURE — 99214 OFFICE O/P EST MOD 30 MIN: CPT | Performed by: FAMILY MEDICINE

## 2023-06-27 PROCEDURE — 3078F DIAST BP <80 MM HG: CPT | Performed by: FAMILY MEDICINE

## 2023-06-27 RX ORDER — FLUTICASONE PROPIONATE 50 MCG
1 SPRAY, SUSPENSION (ML) NASAL DAILY
Qty: 16 G | Refills: 1 | Status: SHIPPED | OUTPATIENT
Start: 2023-06-27 | End: 2023-07-19

## 2023-06-27 RX ORDER — ALBUTEROL SULFATE 90 UG/1
2 AEROSOL, METERED RESPIRATORY (INHALATION) PRN
OUTPATIENT
Start: 2023-07-25

## 2023-06-27 RX ORDER — EPINEPHRINE 1 MG/ML(1)
0.5 AMPUL (ML) INJECTION PRN
OUTPATIENT
Start: 2023-07-25

## 2023-06-27 RX ORDER — DIPHENHYDRAMINE HYDROCHLORIDE 50 MG/ML
50 INJECTION INTRAMUSCULAR; INTRAVENOUS PRN
OUTPATIENT
Start: 2023-07-25

## 2023-06-27 RX ORDER — METHYLPREDNISOLONE SODIUM SUCCINATE 125 MG/2ML
125 INJECTION, POWDER, LYOPHILIZED, FOR SOLUTION INTRAMUSCULAR; INTRAVENOUS PRN
OUTPATIENT
Start: 2023-07-25

## 2023-06-27 RX ORDER — AMOXICILLIN AND CLAVULANATE POTASSIUM 875; 125 MG/1; MG/1
1 TABLET, FILM COATED ORAL 2 TIMES DAILY
Qty: 14 TABLET | Refills: 0 | Status: SHIPPED | OUTPATIENT
Start: 2023-06-27 | End: 2023-07-04

## 2023-06-27 ASSESSMENT — FIBROSIS 4 INDEX: FIB4 SCORE: 1.19

## 2023-06-27 NOTE — PROGRESS NOTES
"  Subjective:     Lucía Rodriguez is a 72 y.o. female presenting with a possible sinus infection.  She reports testing positive for COVID on 6/12.  Initially had a sore throat and headache.  For the past 2 weeks, she has started to develop sinus pressure, drainage, and cheek pain, gum numbness, painful teeth.  Symptoms have been stable.  Denies any fevers, cough, shortness of breath.  She has been taking pseudoephedrine, Tylenol, Flonase with some benefit.        Current Outpatient Medications:     fluticasone (FLONASE) 50 MCG/ACT nasal spray, Administer 1 Spray into affected nostril(S) every day., Disp: 16 g, Rfl: 1    amoxicillin-clavulanate (AUGMENTIN) 875-125 MG Tab, Take 1 Tablet by mouth 2 times a day for 7 days., Disp: 14 Tablet, Rfl: 0    metoprolol tartrate (LOPRESSOR) 25 MG Tab, TAKE 1/2 TABLET BY MOUTH TWICE A DAY, Disp: 90 Tablet, Rfl: 3    atorvastatin (LIPITOR) 20 MG Tab, Take 1 Tablet by mouth every day., Disp: 90 Tablet, Rfl: 3    prednisoLONE acetate (PRED FORTE) 1 % Suspension, PLACE 1 DROP INTO THE POST OPERATIVE EYE 4 TIMES A DAY, Disp: , Rfl:     ELIQUIS 5 MG Tab, TAKE 1 TABLET BY MOUTH TWICE A DAY, Disp: 180 Tablet, Rfl: 3    flecainide (TAMBOCOR) 50 MG tablet, TAKE 1-2 TABLETS BY MOUTH 2 TIMES A DAY. 50 MG IN AM  MG IN PM, Disp: 270 Tablet, Rfl: 3    Ca Phosphate-Cholecalciferol ([employer] CALCIUM-VITAMIN D PO), Take 1 Tablet by mouth every day., Disp: , Rfl:     Psyllium (METAMUCIL PO), Take 1 Scoop by mouth every day., Disp: , Rfl:     Objective:     Vitals: /70 (BP Location: Right arm, Patient Position: Sitting, BP Cuff Size: Small adult)   Pulse 69   Temp 36.3 °C (97.4 °F) (Temporal)   Ht 1.651 m (5' 5\")   Wt 83.1 kg (183 lb 3.2 oz)   LMP 12/28/2003   SpO2 96%   BMI 30.49 kg/m²   General: Alert  HEENT: Normocephalic. Tympanic membranes pearly, translucent bilaterally.  Neck supple.  No thyromegaly or masses palpated. No cervical or supraclavicular lymphadenopathy.  Heart: " Regular rate and rhythm.  S1 and S2 normal.  No murmurs appreciated.  Respiratory: Normal respiratory effort.  Clear to auscultation bilaterally.  Abdomen: Non-distended, soft  Extremities: No leg edema.    Assessment/Plan:     Lucía was seen today for sinusitis.    Diagnoses and all orders for this visit:    Acute non-recurrent sinusitis, unspecified location  Acute, uncomplicated issue.  We discussed starting a course of Augmentin for sinusitis.  Also recommended Flonase daily  -     fluticasone (FLONASE) 50 MCG/ACT nasal spray; Administer 1 Spray into affected nostril(S) every day.  -     amoxicillin-clavulanate (AUGMENTIN) 875-125 MG Tab; Take 1 Tablet by mouth 2 times a day for 7 days.    Age-related osteoporosis without current pathological fracture  Chronic, worsening.  We discussed her recent bone density scan.  She reports having side effects with Fosamax in the past.  She was interested in trying Prolia.  We will order this for the infusion center.        Return in about 6 months (around 12/27/2023) for routine follow up.

## 2023-07-14 ENCOUNTER — TELEPHONE (OUTPATIENT)
Dept: ONCOLOGY | Facility: MEDICAL CENTER | Age: 73
End: 2023-07-14
Payer: MEDICARE

## 2023-07-14 NOTE — TELEPHONE ENCOUNTER
Phone Number Called: [phone] (home)       Call outcome: Left detailed message for patient. Informed to call back with any additional questions.    Message: Left vm for patient to call [facility] to schedule appt. Stated was our second attempt.

## 2023-07-19 RX ORDER — FLUTICASONE PROPIONATE 50 MCG
1 SPRAY, SUSPENSION (ML) NASAL DAILY
Qty: 16 ML | Refills: 11 | Status: SHIPPED | OUTPATIENT
Start: 2023-07-19 | End: 2023-09-21

## 2023-07-31 DIAGNOSIS — I48.91 ATRIAL FIBRILLATION, UNSPECIFIED TYPE (HCC): ICD-10-CM

## 2023-08-01 RX ORDER — APIXABAN 5 MG/1
TABLET, FILM COATED ORAL
Qty: 180 TABLET | Refills: 1 | Status: SHIPPED | OUTPATIENT
Start: 2023-08-01 | End: 2023-12-18

## 2023-08-02 ENCOUNTER — OFFICE VISIT (OUTPATIENT)
Dept: MEDICAL GROUP | Facility: MEDICAL CENTER | Age: 73
End: 2023-08-02
Payer: MEDICARE

## 2023-08-02 VITALS
HEART RATE: 68 BPM | HEIGHT: 65 IN | WEIGHT: 181 LBS | DIASTOLIC BLOOD PRESSURE: 74 MMHG | RESPIRATION RATE: 16 BRPM | SYSTOLIC BLOOD PRESSURE: 114 MMHG | TEMPERATURE: 97.6 F | BODY MASS INDEX: 30.16 KG/M2 | OXYGEN SATURATION: 96 %

## 2023-08-02 DIAGNOSIS — Z12.11 SCREEN FOR COLON CANCER: ICD-10-CM

## 2023-08-02 DIAGNOSIS — Z12.31 BREAST CANCER SCREENING BY MAMMOGRAM: ICD-10-CM

## 2023-08-02 DIAGNOSIS — M81.0 AGE-RELATED OSTEOPOROSIS WITHOUT CURRENT PATHOLOGICAL FRACTURE: ICD-10-CM

## 2023-08-02 PROBLEM — N81.4 UTERINE PROLAPSE: Status: ACTIVE | Noted: 2023-08-02

## 2023-08-02 PROBLEM — H35.9 RETINAL DISORDER: Status: ACTIVE | Noted: 2023-08-02

## 2023-08-02 PROBLEM — C44.91 BASAL CELL CARCINOMA OF SKIN: Status: ACTIVE | Noted: 2023-08-02

## 2023-08-02 PROCEDURE — 3074F SYST BP LT 130 MM HG: CPT

## 2023-08-02 PROCEDURE — 99214 OFFICE O/P EST MOD 30 MIN: CPT

## 2023-08-02 PROCEDURE — 3078F DIAST BP <80 MM HG: CPT

## 2023-08-02 ASSESSMENT — ENCOUNTER SYMPTOMS
SHORTNESS OF BREATH: 0
FEVER: 0
ORTHOPNEA: 0
CHILLS: 0
COUGH: 0
PALPITATIONS: 0

## 2023-08-02 ASSESSMENT — FIBROSIS 4 INDEX: FIB4 SCORE: 1.19

## 2023-08-02 NOTE — PROGRESS NOTES
"Subjective:     CC: Establish Care      HPI:   Lucía is a 72 y.o. female who presents today for:     Osteoporosis: she has stopped fosamax due to side effects from the medication. She was prescribed Prolia by her previous PCP, but has not started treatment. She was reading up about the medication and became nervous about the side effects.    Allergies: Neosporin [neomycin-polymyxin b], Tape, and Ofloxacin     Medications:   Current Outpatient Medications:     ELIQUIS 5 MG Tab, TAKE 1 TABLET BY MOUTH TWICE A DAY, Disp: 180 Tablet, Rfl: 1    fluticasone (FLONASE) 50 MCG/ACT nasal spray, ADMINISTER 1 SPRAY INTO AFFECTED NOSTRIL(S) EVERY DAY., Disp: 16 mL, Rfl: 11    metoprolol tartrate (LOPRESSOR) 25 MG Tab, TAKE 1/2 TABLET BY MOUTH TWICE A DAY, Disp: 90 Tablet, Rfl: 3    atorvastatin (LIPITOR) 20 MG Tab, Take 1 Tablet by mouth every day., Disp: 90 Tablet, Rfl: 3    prednisoLONE acetate (PRED FORTE) 1 % Suspension, PLACE 1 DROP INTO THE POST OPERATIVE EYE 4 TIMES A DAY, Disp: , Rfl:     flecainide (TAMBOCOR) 50 MG tablet, TAKE 1-2 TABLETS BY MOUTH 2 TIMES A DAY. 50 MG IN AM  MG IN PM, Disp: 270 Tablet, Rfl: 3    Ca Phosphate-Cholecalciferol ([employer] CALCIUM-VITAMIN D PO), Take 1 Tablet by mouth every day., Disp: , Rfl:     Psyllium (METAMUCIL PO), Take 1 Scoop by mouth every day., Disp: , Rfl:       ROS:  Review of Systems   Constitutional:  Negative for chills and fever.   Respiratory:  Negative for cough and shortness of breath.    Cardiovascular:  Negative for chest pain, palpitations, orthopnea and leg swelling.       Objective:     Exam:  /74   Pulse 68   Temp 36.4 °C (97.6 °F)   Resp 16   Ht 1.651 m (5' 5\")   Wt 82.1 kg (181 lb)   LMP 12/28/2003   SpO2 96%   BMI 30.12 kg/m²  Body mass index is 30.12 kg/m².    Physical Exam  Constitutional:       Appearance: Normal appearance.   Eyes:      Pupils: Pupils are equal, round, and reactive to light.   Cardiovascular:      Rate and Rhythm: Normal rate " and regular rhythm.      Pulses: Normal pulses.      Heart sounds: Normal heart sounds.   Pulmonary:      Effort: Pulmonary effort is normal.      Breath sounds: Normal breath sounds.   Abdominal:      General: Bowel sounds are normal.      Palpations: Abdomen is soft.   Neurological:      Mental Status: She is alert and oriented to person, place, and time.   Psychiatric:         Mood and Affect: Mood normal.         Behavior: Behavior normal.           Assessment & Plan:     Lucía a 72 y.o. female with the following -     1. Age-related osteoporosis without current pathological fracture  Chronic, worsening.  We discussed her recent bone density scan.  She reports having side effects with Fosamax in the past.She is taking a calcium and vitamin D supplement. We discussed prolia and the risks/ benefits of the medications. Additional reading material provided to patient to help her decide if she would like like to proceed with treatment. We also discussed strength training to assist with maintaining bone mass.     2. Screen for colon cancer  - COLOGUARD COLON CANCER SCREENING    3. Breast cancer screening by mammogram  - MA-SCREENING MAMMO BILAT W/TOMOSYNTHESIS W/CAD; Future        Anticipatory guidance included the following: Patient counseled about skin care, diet, supplements, smoking, drugs/alcohol use, safe sex and exercise.     Return in about 1 year (around 8/2/2024), or if symptoms worsen or fail to improve.    Please note that this dictation was created using voice recognition software. I have made every reasonable attempt to correct obvious errors, but I expect that there are errors of grammar and possibly content that I did not discover before finalizing the note.

## 2023-08-14 ENCOUNTER — APPOINTMENT (RX ONLY)
Dept: URBAN - METROPOLITAN AREA CLINIC 4 | Facility: CLINIC | Age: 73
Setting detail: DERMATOLOGY
End: 2023-08-14

## 2023-08-14 DIAGNOSIS — L82.1 OTHER SEBORRHEIC KERATOSIS: ICD-10-CM

## 2023-08-14 DIAGNOSIS — D22 MELANOCYTIC NEVI: ICD-10-CM

## 2023-08-14 DIAGNOSIS — D18.0 HEMANGIOMA: ICD-10-CM

## 2023-08-14 DIAGNOSIS — L57.8 OTHER SKIN CHANGES DUE TO CHRONIC EXPOSURE TO NONIONIZING RADIATION: ICD-10-CM

## 2023-08-14 DIAGNOSIS — L81.4 OTHER MELANIN HYPERPIGMENTATION: ICD-10-CM

## 2023-08-14 DIAGNOSIS — Z85.828 PERSONAL HISTORY OF OTHER MALIGNANT NEOPLASM OF SKIN: ICD-10-CM

## 2023-08-14 DIAGNOSIS — L57.0 ACTINIC KERATOSIS: ICD-10-CM | Status: INADEQUATELY CONTROLLED

## 2023-08-14 PROBLEM — D18.01 HEMANGIOMA OF SKIN AND SUBCUTANEOUS TISSUE: Status: ACTIVE | Noted: 2023-08-14

## 2023-08-14 PROBLEM — D22.5 MELANOCYTIC NEVI OF TRUNK: Status: ACTIVE | Noted: 2023-08-14

## 2023-08-14 PROCEDURE — 17000 DESTRUCT PREMALG LESION: CPT

## 2023-08-14 PROCEDURE — ? MEDICATION COUNSELING

## 2023-08-14 PROCEDURE — 17003 DESTRUCT PREMALG LES 2-14: CPT

## 2023-08-14 PROCEDURE — ? PRESCRIPTION

## 2023-08-14 PROCEDURE — 99214 OFFICE O/P EST MOD 30 MIN: CPT | Mod: 25

## 2023-08-14 PROCEDURE — ? COUNSELING

## 2023-08-14 PROCEDURE — ? LIQUID NITROGEN

## 2023-08-14 RX ORDER — IMIQUIMOD 12.5 MG/.25G
1 CREAM TOPICAL
Qty: 12 | Refills: 0 | Status: ERX | COMMUNITY
Start: 2023-08-14

## 2023-08-14 RX ADMIN — IMIQUIMOD 1: 12.5 CREAM TOPICAL at 00:00

## 2023-08-14 ASSESSMENT — LOCATION SIMPLE DESCRIPTION DERM
LOCATION SIMPLE: LEFT UPPER BACK
LOCATION SIMPLE: SCALP
LOCATION SIMPLE: RIGHT CHEEK
LOCATION SIMPLE: RIGHT HAND
LOCATION SIMPLE: RIGHT UPPER BACK
LOCATION SIMPLE: LEFT HAND
LOCATION SIMPLE: RIGHT ANTERIOR NECK
LOCATION SIMPLE: INFERIOR FOREHEAD
LOCATION SIMPLE: FRONTAL SCALP
LOCATION SIMPLE: LEFT UPPER ARM

## 2023-08-14 ASSESSMENT — LOCATION DETAILED DESCRIPTION DERM
LOCATION DETAILED: RIGHT INFERIOR MEDIAL UPPER BACK
LOCATION DETAILED: RIGHT SUPERIOR UPPER BACK
LOCATION DETAILED: LEFT SUPERIOR UPPER BACK
LOCATION DETAILED: RIGHT SUPERIOR PARIETAL SCALP
LOCATION DETAILED: MEDIAL FRONTAL SCALP
LOCATION DETAILED: RIGHT INFERIOR ANTERIOR NECK
LOCATION DETAILED: LEFT RADIAL DORSAL HAND
LOCATION DETAILED: RIGHT INFERIOR CENTRAL MALAR CHEEK
LOCATION DETAILED: RIGHT RADIAL DORSAL HAND
LOCATION DETAILED: LEFT ANTERIOR PROXIMAL UPPER ARM
LOCATION DETAILED: INFERIOR MID FOREHEAD

## 2023-08-14 ASSESSMENT — LOCATION ZONE DERM
LOCATION ZONE: TRUNK
LOCATION ZONE: HAND
LOCATION ZONE: FACE
LOCATION ZONE: SCALP
LOCATION ZONE: ARM
LOCATION ZONE: NECK

## 2023-08-14 NOTE — PROCEDURE: LIQUID NITROGEN
Render Note In Bullet Format When Appropriate: No
Show Applicator Variable?: Yes
Aperture Size (Optional): C
Number Of Freeze-Thaw Cycles: 2 freeze-thaw cycles
Detail Level: Simple
Duration Of Freeze Thaw-Cycle (Seconds): 3
Post-Care Instructions: I reviewed with the patient in detail post-care instructions. Patient is to wear sunprotection, and avoid picking at any of the treated lesions. Pt may apply Vaseline to crusted or scabbing areas.
Consent: The patient's consent was obtained including but not limited to risks of crusting, scabbing, blistering, scarring, darker or lighter pigmentary change, recurrence, incomplete removal and infection.

## 2023-08-14 NOTE — PROCEDURE: MEDICATION COUNSELING
Cosentyx Pregnancy And Lactation Text: This medication is Pregnancy Category B and is considered safe during pregnancy. It is unknown if this medication is excreted in breast milk.
Wartpeel Pregnancy And Lactation Text: This medication is Pregnancy Category X and contraindicated in pregnancy and in women who may become pregnant. It is unknown if this medication is excreted in breast milk.
Rhofade Counseling: Rhofade is a topical medication which can decrease superficial blood flow where applied. Side effects are uncommon and include stinging, redness and allergic reactions.
Niacinamide Counseling: I recommended taking niacin or niacinamide, also know as vitamin B3, twice daily. Recent evidence suggests that taking vitamin B3 (500 mg twice daily) can reduce the risk of actinic keratoses and non-melanoma skin cancers. Side effects of vitamin B3 include flushing and headache.
High Dose Vitamin A Pregnancy And Lactation Text: High dose vitamin A therapy is contraindicated during pregnancy and breast feeding.
Hydroxyzine Counseling: Patient advised that the medication is sedating and not to drive a car after taking this medication.  Patient informed of potential adverse effects including but not limited to dry mouth, urinary retention, and blurry vision.  The patient verbalized understanding of the proper use and possible adverse effects of hydroxyzine.  All of the patient's questions and concerns were addressed.
Calcipotriene Counseling:  I discussed with the patient the risks of calcipotriene including but not limited to erythema, scaling, itching, and irritation.
Stelara Counseling:  I discussed with the patient the risks of ustekinumab including but not limited to immunosuppression, malignancy, posterior leukoencephalopathy syndrome, and serious infections.  The patient understands that monitoring is required including a PPD at baseline and must alert us or the primary physician if symptoms of infection or other concerning signs are noted.
Tazorac Pregnancy And Lactation Text: This medication is not safe during pregnancy. It is unknown if this medication is excreted in breast milk.
Eucrisa Pregnancy And Lactation Text: This medication has not been assigned a Pregnancy Risk Category but animal studies failed to show danger with the topical medication. It is unknown if the medication is excreted in breast milk.
Dapsone Pregnancy And Lactation Text: This medication is Pregnancy Category C and is not considered safe during pregnancy or breast feeding.
Tranexamic Acid Pregnancy And Lactation Text: It is unknown if this medication is safe during pregnancy or breast feeding.
Opioid Counseling: I discussed with the patient the potential side effects of opioids including but not limited to addiction, altered mental status, and depression. I stressed avoiding alcohol, benzodiazepines, muscle relaxants and sleep aids unless specifically okayed by a physician. The patient verbalized understanding of the proper use and possible adverse effects of opioids. All of the patient's questions and concerns were addressed. They were instructed to flush the remaining pills down the toilet if they did not need them for pain.
Erythromycin Pregnancy And Lactation Text: This medication is Pregnancy Category B and is considered safe during pregnancy. It is also excreted in breast milk.
Olumiant Counseling: I discussed with the patient the risks of Olumiant therapy including but not limited to upper respiratory tract infections, shingles, cold sores, and nausea. Live vaccines should be avoided.  This medication has been linked to serious infections; higher rate of mortality; malignancy and lymphoproliferative disorders; major adverse cardiovascular events; thrombosis; gastrointestinal perforations; neutropenia; lymphopenia; anemia; liver enzyme elevations; and lipid elevations.
Oxybutynin Counseling:  I discussed with the patient the risks of oxybutynin including but not limited to skin rash, drowsiness, dry mouth, difficulty urinating, and blurred vision.
Ketoconazole Counseling:   Patient counseled regarding improving absorption with orange juice.  Adverse effects include but are not limited to breast enlargement, headache, diarrhea, nausea, upset stomach, liver function test abnormalities, taste disturbance, and stomach pain.  There is a rare possibility of liver failure that can occur when taking ketoconazole. The patient understands that monitoring of LFTs may be required, especially at baseline. The patient verbalized understanding of the proper use and possible adverse effects of ketoconazole.  All of the patient's questions and concerns were addressed.
Cyclophosphamide Pregnancy And Lactation Text: This medication is Pregnancy Category D and it isn't considered safe during pregnancy. This medication is excreted in breast milk.
Mirvaso Pregnancy And Lactation Text: This medication has not been assigned a Pregnancy Risk Category. It is unknown if the medication is excreted in breast milk.
Hydroxyzine Pregnancy And Lactation Text: This medication is not safe during pregnancy and should not be taken. It is also excreted in breast milk and breast feeding isn't recommended.
Bactrim Counseling:  I discussed with the patient the risks of sulfa antibiotics including but not limited to GI upset, allergic reaction, drug rash, diarrhea, dizziness, photosensitivity, and yeast infections.  Rarely, more serious reactions can occur including but not limited to aplastic anemia, agranulocytosis, methemoglobinemia, blood dyscrasias, liver or kidney failure, lung infiltrates or desquamative/blistering drug rashes.
Birth Control Pills Counseling: Birth Control Pill Counseling: I discussed with the patient the potential side effects of OCPs including but not limited to increased risk of stroke, heart attack, thrombophlebitis, deep venous thrombosis, hepatic adenomas, breast changes, GI upset, headaches, and depression.  The patient verbalized understanding of the proper use and possible adverse effects of OCPs. All of the patient's questions and concerns were addressed.
Topical Clindamycin Counseling: Patient counseled that this medication may cause skin irritation or allergic reactions.  In the event of skin irritation, the patient was advised to reduce the amount of the drug applied or use it less frequently.   The patient verbalized understanding of the proper use and possible adverse effects of clindamycin.  All of the patient's questions and concerns were addressed.
Hydroquinone Counseling:  Patient advised that medication may result in skin irritation, lightening (hypopigmentation), dryness, and burning.  In the event of skin irritation, the patient was advised to reduce the amount of the drug applied or use it less frequently.  Rarely, spots that are treated with hydroquinone can become darker (pseudoochronosis).  Should this occur, patient instructed to stop medication and call the office. The patient verbalized understanding of the proper use and possible adverse effects of hydroquinone.  All of the patient's questions and concerns were addressed.
Erivedge Pregnancy And Lactation Text: This medication is Pregnancy Category X and is absolutely contraindicated during pregnancy. It is unknown if it is excreted in breast milk.
Calcipotriene Pregnancy And Lactation Text: The use of this medication during pregnancy or lactation is not recommended as there is insufficient data.
Xelcharliez Pregnancy And Lactation Text: This medication is Pregnancy Category D and is not considered safe during pregnancy.  The risk during breast feeding is also uncertain.
Sarecycline Pregnancy And Lactation Text: This medication is Pregnancy Category D and not consider safe during pregnancy. It is also excreted in breast milk.
Dupixent Counseling: I discussed with the patient the risks of dupilumab including but not limited to eye infection and irritation, cold sores, injection site reactions, worsening of asthma, allergic reactions and increased risk of parasitic infection.  Live vaccines should be avoided while taking dupilumab. Dupilumab will also interact with certain medications such as warfarin and cyclosporine. The patient understands that monitoring is required and they must alert us or the primary physician if symptoms of infection or other concerning signs are noted.
Opioid Pregnancy And Lactation Text: These medications can lead to premature delivery and should be avoided during pregnancy. These medications are also present in breast milk in small amounts.
Cantharidin Counseling:  I discussed with the patient the risks of Cantharidin including but not limited to pain, redness, burning, itching, and blistering.
Valtrex Counseling: I discussed with the patient the risks of valacyclovir including but not limited to kidney damage, nausea, vomiting and severe allergy.  The patient understands that if the infection seems to be worsening or is not improving, they are to call.
Winlevi Counseling:  I discussed with the patient the risks of topical clascoterone including but not limited to erythema, scaling, itching, and stinging. Patient voiced their understanding.
Niacinamide Pregnancy And Lactation Text: These medications are considered safe during pregnancy.
Topical Ketoconazole Pregnancy And Lactation Text: This medication is Pregnancy Category B and is considered safe during pregnancy. It is unknown if it is excreted in breast milk.
Cyclosporine Counseling:  I discussed with the patient the risks of cyclosporine including but not limited to hypertension, gingival hyperplasia,myelosuppression, immunosuppression, liver damage, kidney damage, neurotoxicity, lymphoma, and serious infections. The patient understands that monitoring is required including baseline blood pressure, CBC, CMP, lipid panel and uric acid, and then 1-2 times monthly CMP and blood pressure.
Gabapentin Counseling: I discussed with the patient the risks of gabapentin including but not limited to dizziness, somnolence, fatigue and ataxia.
Bactrim Pregnancy And Lactation Text: This medication is Pregnancy Category D and is known to cause fetal risk.  It is also excreted in breast milk.
Metronidazole Counseling:  I discussed with the patient the risks of metronidazole including but not limited to seizures, nausea/vomiting, a metallic taste in the mouth, nausea/vomiting and severe allergy.
Birth Control Pills Pregnancy And Lactation Text: This medication should be avoided if pregnant and for the first 30 days post-partum.
Libtayo Counseling- I discussed with the patient the risks of Libtayo including but not limited to nausea, vomiting, diarrhea, and bone or muscle pain.  The patient verbalized understanding of the proper use and possible adverse effects of Libtayo.  All of the patient's questions and concerns were addressed.
Aklief counseling:  Patient advised to apply a pea-sized amount only at bedtime and wait 30 minutes after washing their face before applying.  If too drying, patient may add a non-comedogenic moisturizer.  The most commonly reported side effects including irritation, redness, scaling, dryness, stinging, burning, itching, and increased risk of sunburn.  The patient verbalized understanding of the proper use and possible adverse effects of retinoids.  All of the patient's questions and concerns were addressed.
Rituxan Counseling:  I discussed with the patient the risks of Rituxan infusions. Side effects can include infusion reactions, severe drug rashes including mucocutaneous reactions, reactivation of latent hepatitis and other infections and rarely progressive multifocal leukoencephalopathy.  All of the patient's questions and concerns were addressed.
Metronidazole Pregnancy And Lactation Text: This medication is Pregnancy Category B and considered safe during pregnancy.  It is also excreted in breast milk.
Olumiant Pregnancy And Lactation Text: Based on animal studies, Olumiant may cause embryo-fetal harm when administered to pregnant women.  The medication should not be used in pregnancy.  Breastfeeding is not recommended during treatment.
Valtrex Pregnancy And Lactation Text: this medication is Pregnancy Category B and is considered safe during pregnancy. This medication is not directly found in breast milk but it's metabolite acyclovir is present.
Tetracycline Counseling: Patient counseled regarding possible photosensitivity and increased risk for sunburn.  Patient instructed to avoid sunlight, if possible.  When exposed to sunlight, patients should wear protective clothing, sunglasses, and sunscreen.  The patient was instructed to call the office immediately if the following severe adverse effects occur:  hearing changes, easy bruising/bleeding, severe headache, or vision changes.  The patient verbalized understanding of the proper use and possible adverse effects of tetracycline.  All of the patient's questions and concerns were addressed. Patient understands to avoid pregnancy while on therapy due to potential birth defects.
Winlevi Pregnancy And Lactation Text: This medication is considered safe during pregnancy and breastfeeding.
Ketoconazole Pregnancy And Lactation Text: This medication is Pregnancy Category C and it isn't know if it is safe during pregnancy. It is also excreted in breast milk and breast feeding isn't recommended.
Opzelura Counseling:  I discussed with the patient the risks of Opzelura including but not limited to nasopharngitis, bronchitis, ear infection, eosinophila, hives, diarrhea, folliculitis, tonsillitis, and rhinorrhea.  Taken orally, this medication has been linked to serious infections; higher rate of mortality; malignancy and lymphoproliferative disorders; major adverse cardiovascular events; thrombosis; thrombocytopenia, anemia, and neutropenia; and lipid elevations.
Nsaids Counseling: NSAID Counseling: I discussed with the patient that NSAIDs should be taken with food. Prolonged use of NSAIDs can result in the development of stomach ulcers.  Patient advised to stop taking NSAIDs if abdominal pain occurs.  The patient verbalized understanding of the proper use and possible adverse effects of NSAIDs.  All of the patient's questions and concerns were addressed.
Cantharidin Pregnancy And Lactation Text: This medication has not been proven safe during pregnancy. It is unknown if this medication is excreted in breast milk.
Libtayo Pregnancy And Lactation Text: This medication is contraindicated in pregnancy and when breast feeding.
Gabapentin Pregnancy And Lactation Text: This medication is Pregnancy Category C and isn't considered safe during pregnancy. It is excreted in breast milk.
Taltz Counseling: I discussed with the patient the risks of ixekizumab including but not limited to immunosuppression, serious infections, worsening of inflammatory bowel disease and drug reactions.  The patient understands that monitoring is required including a PPD at baseline and must alert us or the primary physician if symptoms of infection or other concerning signs are noted.
5-Fu Counseling: 5-Fluorouracil Counseling:  I discussed with the patient the risks of 5-fluorouracil including but not limited to erythema, scaling, itching, weeping, crusting, and pain.
Dupixent Pregnancy And Lactation Text: This medication likely crosses the placenta but the risk for the fetus is uncertain. This medication is excreted in breast milk.
Terbinafine Counseling: Patient counseling regarding adverse effects of terbinafine including but not limited to headache, diarrhea, rash, upset stomach, liver function test abnormalities, itching, taste/smell disturbance, nausea, abdominal pain, and flatulence.  There is a rare possibility of liver failure that can occur when taking terbinafine.  The patient understands that a baseline LFT and kidney function test may be required. The patient verbalized understanding of the proper use and possible adverse effects of terbinafine.  All of the patient's questions and concerns were addressed.
Acitretin Counseling:  I discussed with the patient the risks of acitretin including but not limited to hair loss, dry lips/skin/eyes, liver damage, hyperlipidemia, depression/suicidal ideation, photosensitivity.  Serious rare side effects can include but are not limited to pancreatitis, pseudotumor cerebri, bony changes, clot formation/stroke/heart attack.  Patient understands that alcohol is contraindicated since it can result in liver toxicity and significantly prolong the elimination of the drug by many years.
Opzelura Pregnancy And Lactation Text: There is insufficient data to evaluate drug-associated risk for major birth defects, miscarriage, or other adverse maternal or fetal outcomes.  There is a pregnancy registry that monitors pregnancy outcomes in pregnant persons exposed to the medication during pregnancy.  It is unknown if this medication is excreted in breast milk.  Do not breastfeed during treatment and for about 4 weeks after the last dose.
Rituxan Pregnancy And Lactation Text: This medication is Pregnancy Category C and it isn't know if it is safe during pregnancy. It is unknown if this medication is excreted in breast milk but similar antibodies are known to be excreted.
Albendazole Counseling:  I discussed with the patient the risks of albendazole including but not limited to cytopenia, kidney damage, nausea/vomiting and severe allergy.  The patient understands that this medication is being used in an off-label manner.
Rinvoq Counseling: I discussed with the patient the risks of Rinvoq therapy including but not limited to upper respiratory tract infections, shingles, cold sores, bronchitis, nausea, cough, fever, acne, and headache. Live vaccines should be avoided.  This medication has been linked to serious infections; higher rate of mortality; malignancy and lymphoproliferative disorders; major adverse cardiovascular events; thrombosis; thrombocytopenia, anemia, and neutropenia; lipid elevations; liver enzyme elevations; and gastrointestinal perforations.
Solaraze Counseling:  I discussed with the patient the risks of Solaraze including but not limited to erythema, scaling, itching, weeping, crusting, and pain.
Propranolol Counseling:  I discussed with the patient the risks of propranolol including but not limited to low heart rate, low blood pressure, low blood sugar, restlessness and increased cold sensitivity. They should call the office if they experience any of these side effects.
Cyclosporine Pregnancy And Lactation Text: This medication is Pregnancy Category C and it isn't know if it is safe during pregnancy. This medication is excreted in breast milk.
Topical Metronidazole Counseling: Metronidazole is a topical antibiotic medication. You may experience burning, stinging, redness, or allergic reactions.  Please call our office if you develop any problems from using this medication.
Spironolactone Counseling: Patient advised regarding risks of diarrhea, abdominal pain, hyperkalemia, birth defects (for female patients), liver toxicity and renal toxicity. The patient may need blood work to monitor liver and kidney function and potassium levels while on therapy. The patient verbalized understanding of the proper use and possible adverse effects of spironolactone.  All of the patient's questions and concerns were addressed.
Cephalexin Counseling: I counseled the patient regarding use of cephalexin as an antibiotic for prophylactic and/or therapeutic purposes. Cephalexin (commonly prescribed under brand name Keflex) is a cephalosporin antibiotic which is active against numerous classes of bacteria, including most skin bacteria. Side effects may include nausea, diarrhea, gastrointestinal upset, rash, hives, yeast infections, and in rare cases, hepatitis, kidney disease, seizures, fever, confusion, neurologic symptoms, and others. Patients with severe allergies to penicillin medications are cautioned that there is about a 10% incidence of cross-reactivity with cephalosporins. When possible, patients with penicillin allergies should use alternatives to cephalosporins for antibiotic therapy.
Nsaids Pregnancy And Lactation Text: These medications are considered safe up to 30 weeks gestation. It is excreted in breast milk.
VTAMA Counseling: I discussed with the patient that VTAMA is not for use in the eyes, mouth or mouth. They should call the office if they develop any signs of allergic reactions to VTAMA. The patient verbalized understanding of the proper use and possible adverse effects of VTAMA.  All of the patient's questions and concerns were addressed.
Imiquimod Counseling:  I discussed with the patient the risks of imiquimod including but not limited to erythema, scaling, itching, weeping, crusting, and pain.  Patient understands that the inflammatory response to imiquimod is variable from person to person and was educated regarded proper titration schedule.  If flu-like symptoms develop, patient knows to discontinue the medication and contact us.
Arava Counseling:  Patient counseled regarding adverse effects of Arava including but not limited to nausea, vomiting, abnormalities in liver function tests. Patients may develop mouth sores, rash, diarrhea, and abnormalities in blood counts. The patient understands that monitoring is required including LFTs and blood counts.  There is a rare possibility of scarring of the liver and lung problems that can occur when taking methotrexate. Persistent nausea, loss of appetite, pale stools, dark urine, cough, and shortness of breath should be reported immediately. Patient advised to discontinue Arava treatment and consult with a physician prior to attempting conception. The patient will have to undergo a treatment to eliminate Arava from the body prior to conception.
Albendazole Pregnancy And Lactation Text: This medication is Pregnancy Category C and it isn't known if it is safe during pregnancy. It is also excreted in breast milk.
Solaraze Pregnancy And Lactation Text: This medication is Pregnancy Category B and is considered safe. There is some data to suggest avoiding during the third trimester. It is unknown if this medication is excreted in breast milk.
Aklief Pregnancy And Lactation Text: It is unknown if this medication is safe to use during pregnancy.  It is unknown if this medication is excreted in breast milk.  Breastfeeding women should use the topical cream on the smallest area of the skin for the shortest time needed while breastfeeding.  Do not apply to nipple and areola.
Taltz Pregnancy And Lactation Text: The risk during pregnancy and breastfeeding is uncertain with this medication.
Topical Ketoconazole Counseling: Patient counseled that this medication may cause skin irritation or allergic reactions.  In the event of skin irritation, the patient was advised to reduce the amount of the drug applied or use it less frequently.   The patient verbalized understanding of the proper use and possible adverse effects of ketoconazole.  All of the patient's questions and concerns were addressed.
Enbrel Counseling:  I discussed with the patient the risks of etanercept including but not limited to myelosuppression, immunosuppression, autoimmune hepatitis, demyelinating diseases, lymphoma, and infections.  The patient understands that monitoring is required including a PPD at baseline and must alert us or the primary physician if symptoms of infection or other concerning signs are noted.
Acitretin Pregnancy And Lactation Text: This medication is Pregnancy Category X and should not be given to women who are pregnant or may become pregnant in the future. This medication is excreted in breast milk.
Use Enhanced Medication Counseling?: No
Minocycline Counseling: Patient advised regarding possible photosensitivity and discoloration of the teeth, skin, lips, tongue and gums.  Patient instructed to avoid sunlight, if possible.  When exposed to sunlight, patients should wear protective clothing, sunglasses, and sunscreen.  The patient was instructed to call the office immediately if the following severe adverse effects occur:  hearing changes, easy bruising/bleeding, severe headache, or vision changes.  The patient verbalized understanding of the proper use and possible adverse effects of minocycline.  All of the patient's questions and concerns were addressed.
Terbinafine Pregnancy And Lactation Text: This medication is Pregnancy Category B and is considered safe during pregnancy. It is also excreted in breast milk and breast feeding isn't recommended.
Picato Counseling:  I discussed with the patient the risks of Picato including but not limited to erythema, scaling, itching, weeping, crusting, and pain.
Propranolol Pregnancy And Lactation Text: This medication is Pregnancy Category C and it isn't known if it is safe during pregnancy. It is excreted in breast milk.
Methotrexate Counseling:  Patient counseled regarding adverse effects of methotrexate including but not limited to nausea, vomiting, abnormalities in liver function tests. Patients may develop mouth sores, rash, diarrhea, and abnormalities in blood counts. The patient understands that monitoring is required including LFT's and blood counts.  There is a rare possibility of scarring of the liver and lung problems that can occur when taking methotrexate. Persistent nausea, loss of appetite, pale stools, dark urine, cough, and shortness of breath should be reported immediately. Patient advised to discontinue methotrexate treatment at least three months before attempting to become pregnant.  I discussed the need for folate supplements while taking methotrexate.  These supplements can decrease side effects during methotrexate treatment. The patient verbalized understanding of the proper use and possible adverse effects of methotrexate.  All of the patient's questions and concerns were addressed.
Spironolactone Pregnancy And Lactation Text: This medication can cause feminization of the male fetus and should be avoided during pregnancy. The active metabolite is also found in breast milk.
Cephalexin Pregnancy And Lactation Text: This medication is Pregnancy Category B and considered safe during pregnancy.  It is also excreted in breast milk but can be used safely for shorter doses.
Odomzo Counseling- I discussed with the patient the risks of Odomzo including but not limited to nausea, vomiting, diarrhea, constipation, weight loss, changes in the sense of taste, decreased appetite, muscle spasms, and hair loss.  The patient verbalized understanding of the proper use and possible adverse effects of Odomzo.  All of the patient's questions and concerns were addressed.
Glycopyrrolate Counseling:  I discussed with the patient the risks of glycopyrrolate including but not limited to skin rash, drowsiness, dry mouth, difficulty urinating, and blurred vision.
Topical Metronidazole Pregnancy And Lactation Text: This medication is Pregnancy Category B and considered safe during pregnancy.  It is also considered safe to use while breastfeeding.
Detail Level: Zone
Siliq Counseling:  I discussed with the patient the risks of Siliq including but not limited to new or worsening depression, suicidal thoughts and behavior, immunosuppression, malignancy, posterior leukoencephalopathy syndrome, and serious infections.  The patient understands that monitoring is required including a PPD at baseline and must alert us or the primary physician if symptoms of infection or other concerning signs are noted. There is also a special program designed to monitor depression which is required with Siliq.
Soolantra Counseling: I discussed with the patients the risks of topial Soolantra. This is a medicine which decreases the number of mites and inflammation in the skin. You experience burning, stinging, eye irritation or allergic reactions.  Please call our office if you develop any problems from using this medication.
Bexarotene Counseling:  I discussed with the patient the risks of bexarotene including but not limited to hair loss, dry lips/skin/eyes, liver abnormalities, hyperlipidemia, pancreatitis, depression/suicidal ideation, photosensitivity, drug rash/allergic reactions, hypothyroidism, anemia, leukopenia, infection, cataracts, and teratogenicity.  Patient understands that they will need regular blood tests to check lipid profile, liver function tests, white blood cell count, thyroid function tests and pregnancy test if applicable.
Olanzapine Counseling- I discussed with the patient the common side effects of olanzapine including but are not limited to: lack of energy, dry mouth, increased appetite, sleepiness, tremor, constipation, dizziness, changes in behavior, or restlessness.  Explained that teenagers are more likely to experience headaches, abdominal pain, pain in the arms or legs, tiredness, and sleepiness.  Serious side effects include but are not limited: increased risk of death in elderly patients who are confused, have memory loss, or dementia-related psychosis; hyperglycemia; increased cholesterol and triglycerides; and weight gain.
Ivermectin Counseling:  Patient instructed to take medication on an empty stomach with a full glass of water.  Patient informed of potential adverse effects including but not limited to nausea, diarrhea, dizziness, itching, and swelling of the extremities or lymph nodes.  The patient verbalized understanding of the proper use and possible adverse effects of ivermectin.  All of the patient's questions and concerns were addressed.
Azelaic Acid Counseling: Patient counseled that medicine may cause skin irritation and to avoid applying near the eyes.  In the event of skin irritation, the patient was advised to reduce the amount of the drug applied or use it less frequently.   The patient verbalized understanding of the proper use and possible adverse effects of azelaic acid.  All of the patient's questions and concerns were addressed.
Vtama Pregnancy And Lactation Text: It is unknown if this medication can cause problems during pregnancy and breastfeeding.
Methotrexate Pregnancy And Lactation Text: This medication is Pregnancy Category X and is known to cause fetal harm. This medication is excreted in breast milk.
Topical Steroids Counseling: I discussed with the patient that prolonged use of topical steroids can result in the increased appearance of superficial blood vessels (telangiectasias), lightening (hypopigmentation) and thinning of the skin (atrophy).  Patient understands to avoid using high potency steroids in skin folds, the groin or the face.  The patient verbalized understanding of the proper use and possible adverse effects of topical steroids.  All of the patient's questions and concerns were addressed.
Glycopyrrolate Pregnancy And Lactation Text: This medication is Pregnancy Category B and is considered safe during pregnancy. It is unknown if it is excreted breast milk.
Clindamycin Counseling: I counseled the patient regarding use of clindamycin as an antibiotic for prophylactic and/or therapeutic purposes. Clindamycin is active against numerous classes of bacteria, including skin bacteria. Side effects may include nausea, diarrhea, gastrointestinal upset, rash, hives, yeast infections, and in rare cases, colitis.
Fluconazole Counseling:  Patient counseled regarding adverse effects of fluconazole including but not limited to headache, diarrhea, nausea, upset stomach, liver function test abnormalities, taste disturbance, and stomach pain.  There is a rare possibility of liver failure that can occur when taking fluconazole.  The patient understands that monitoring of LFTs and kidney function test may be required, especially at baseline. The patient verbalized understanding of the proper use and possible adverse effects of fluconazole.  All of the patient's questions and concerns were addressed.
Imiquimod Pregnancy And Lactation Text: This medication is Pregnancy Category C. It is unknown if this medication is excreted in breast milk.
Tremfya Counseling: I discussed with the patient the risks of guselkumab including but not limited to immunosuppression, serious infections, worsening of inflammatory bowel disease and drug reactions.  The patient understands that monitoring is required including a PPD at baseline and must alert us or the primary physician if symptoms of infection or other concerning signs are noted.
Clofazimine Counseling:  I discussed with the patient the risks of clofazimine including but not limited to skin and eye pigmentation, liver damage, nausea/vomiting, gastrointestinal bleeding and allergy.
Quinolones Counseling:  I discussed with the patient the risks of fluoroquinolones including but not limited to GI upset, allergic reaction, drug rash, diarrhea, dizziness, photosensitivity, yeast infections, liver function test abnormalities, tendonitis/tendon rupture.
Fluconazole Pregnancy And Lactation Text: This medication is Pregnancy Category C and it isn't know if it is safe during pregnancy. It is also excreted in breast milk.
Klisyri Counseling:  I discussed with the patient the risks of Klisyri including but not limited to erythema, scaling, itching, weeping, crusting, and pain.
SSKI Counseling:  I discussed with the patient the risks of SSKI including but not limited to thyroid abnormalities, metallic taste, GI upset, fever, headache, acne, arthralgias, paraesthesias, lymphadenopathy, easy bleeding, arrhythmias, and allergic reaction.
Drysol Counseling:  I discussed with the patient the risks of drysol/aluminum chloride including but not limited to skin rash, itching, irritation, burning.
Humira Counseling:  I discussed with the patient the risks of adalimumab including but not limited to myelosuppression, immunosuppression, autoimmune hepatitis, demyelinating diseases, lymphoma, and serious infections.  The patient understands that monitoring is required including a PPD at baseline and must alert us or the primary physician if symptoms of infection or other concerning signs are noted.
Azathioprine Counseling:  I discussed with the patient the risks of azathioprine including but not limited to myelosuppression, immunosuppression, hepatotoxicity, lymphoma, and infections.  The patient understands that monitoring is required including baseline LFTs, Creatinine, possible TPMP genotyping and weekly CBCs for the first month and then every 2 weeks thereafter.  The patient verbalized understanding of the proper use and possible adverse effects of azathioprine.  All of the patient's questions and concerns were addressed.
Azelaic Acid Pregnancy And Lactation Text: This medication is considered safe during pregnancy and breast feeding.
Olanzapine Pregnancy And Lactation Text: This medication is pregnancy category C.   There are no adequate and well controlled trials with olanzapine in pregnant females.  Olanzapine should be used during pregnancy only if the potential benefit justifies the potential risk to the fetus.   In a study in lactating healthy women, olanzapine was excreted in breast milk.  It is recommended that women taking olanzapine should not breast feed.
Hydroxychloroquine Counseling:  I discussed with the patient that a baseline ophthalmologic exam is needed at the start of therapy and every year thereafter while on therapy. A CBC may also be warranted for monitoring.  The side effects of this medication were discussed with the patient, including but not limited to agranulocytosis, aplastic anemia, seizures, rashes, retinopathy, and liver toxicity. Patient instructed to call the office should any adverse effect occur.  The patient verbalized understanding of the proper use and possible adverse effects of Plaquenil.  All the patient's questions and concerns were addressed.
Clindamycin Pregnancy And Lactation Text: This medication can be used in pregnancy if certain situations. Clindamycin is also present in breast milk.
Zoryve Counseling:  I discussed with the patient that Zoryve is not for use in the eyes, mouth or vagina. The most commonly reported side effects include diarrhea, headache, insomnia, application site pain, upper respiratory tract infections, and urinary tract infections.  All of the patient's questions and concerns were addressed.
Protopic Counseling: Patient may experience a mild burning sensation during topical application. Protopic is not approved in children less than 2 years of age. There have been case reports of hematologic and skin malignancies in patients using topical calcineurin inhibitors although causality is questionable.
Bexarotene Pregnancy And Lactation Text: This medication is Pregnancy Category X and should not be given to women who are pregnant or may become pregnant. This medication should not be used if you are breast feeding.
Simponi Counseling:  I discussed with the patient the risks of golimumab including but not limited to myelosuppression, immunosuppression, autoimmune hepatitis, demyelinating diseases, lymphoma, and serious infections.  The patient understands that monitoring is required including a PPD at baseline and must alert us or the primary physician if symptoms of infection or other concerning signs are noted.
Cimetidine Counseling:  I discussed with the patient the risks of Cimetidine including but not limited to gynecomastia, headache, diarrhea, nausea, drowsiness, arrhythmias, pancreatitis, skin rashes, psychosis, bone marrow suppression and kidney toxicity.
Prednisone Counseling:  I discussed with the patient the risks of prolonged use of prednisone including but not limited to weight gain, insomnia, osteoporosis, mood changes, diabetes, susceptibility to infection, glaucoma and high blood pressure.  In cases where prednisone use is prolonged, patients should be monitored with blood pressure checks, serum glucose levels and an eye exam.  Additionally, the patient may need to be placed on GI prophylaxis, PCP prophylaxis, and calcium and vitamin D supplementation and/or a bisphosphonate.  The patient verbalized understanding of the proper use and the possible adverse effects of prednisone.  All of the patient's questions and concerns were addressed.
Soolantra Pregnancy And Lactation Text: This medication is Pregnancy Category C. This medication is considered safe during breast feeding.
Topical Steroids Applications Pregnancy And Lactation Text: Most topical steroids are considered safe to use during pregnancy and lactation.  Any topical steroid applied to the breast or nipple should be washed off before breastfeeding.
Sski Pregnancy And Lactation Text: This medication is Pregnancy Category D and isn't considered safe during pregnancy. It is excreted in breast milk.
Adbry Pregnancy And Lactation Text: It is unknown if this medication will adversely affect pregnancy or breast feeding.
Griseofulvin Counseling:  I discussed with the patient the risks of griseofulvin including but not limited to photosensitivity, cytopenia, liver damage, nausea/vomiting and severe allergy.  The patient understands that this medication is best absorbed when taken with a fatty meal (e.g., ice cream or french fries).
Klisyri Pregnancy And Lactation Text: It is unknown if this medication can harm a developing fetus or if it is excreted in breast milk.
Benzoyl Peroxide Counseling: Patient counseled that medicine may cause skin irritation and bleach clothing.  In the event of skin irritation, the patient was advised to reduce the amount of the drug applied or use it less frequently.   The patient verbalized understanding of the proper use and possible adverse effects of benzoyl peroxide.  All of the patient's questions and concerns were addressed.
Oral Minoxidil Counseling- I discussed with the patient the risks of oral minoxidil including but not limited to shortness of breath, swelling of the feet or ankles, dizziness, lightheadedness, unwanted hair growth and allergic reaction.  The patient verbalized understanding of the proper use and possible adverse effects of oral minoxidil.  All of the patient's questions and concerns were addressed.
Dutasteride Male Counseling: Dustasteride Counseling:  I discussed with the patient the risks of use of dutasteride including but not limited to decreased libido, decreased ejaculate volume, and gynecomastia. Women who can become pregnant should not handle medication.  All of the patient's questions and concerns were addressed.
Topical Retinoid counseling:  Patient advised to apply a pea-sized amount only at bedtime and wait 30 minutes after washing their face before applying.  If too drying, patient may add a non-comedogenic moisturizer. The patient verbalized understanding of the proper use and possible adverse effects of retinoids.  All of the patient's questions and concerns were addressed.
Xolair Counseling:  Patient informed of potential adverse effects including but not limited to fever, muscle aches, rash and allergic reactions.  The patient verbalized understanding of the proper use and possible adverse effects of Xolair.  All of the patient's questions and concerns were addressed.
Azathioprine Pregnancy And Lactation Text: This medication is Pregnancy Category D and isn't considered safe during pregnancy. It is unknown if this medication is excreted in breast milk.
Rinvoq Pregnancy And Lactation Text: Based on animal studies, Rinvoq may cause embryo-fetal harm when administered to pregnant women.  The medication should not be used in pregnancy.  Breastfeeding is not recommended during treatment and for 6 days after the last dose.
Cimzia Counseling:  I discussed with the patient the risks of Cimzia including but not limited to immunosuppression, allergic reactions and infections.  The patient understands that monitoring is required including a PPD at baseline and must alert us or the primary physician if symptoms of infection or other concerning signs are noted.
Protopic Pregnancy And Lactation Text: This medication is Pregnancy Category C. It is unknown if this medication is excreted in breast milk when applied topically.
Thalidomide Counseling: I discussed with the patient the risks of thalidomide including but not limited to birth defects, anxiety, weakness, chest pain, dizziness, cough and severe allergy.
Doxycycline Counseling:  Patient counseled regarding possible photosensitivity and increased risk for sunburn.  Patient instructed to avoid sunlight, if possible.  When exposed to sunlight, patients should wear protective clothing, sunglasses, and sunscreen.  The patient was instructed to call the office immediately if the following severe adverse effects occur:  hearing changes, easy bruising/bleeding, severe headache, or vision changes.  The patient verbalized understanding of the proper use and possible adverse effects of doxycycline.  All of the patient's questions and concerns were addressed.
Isotretinoin Counseling: Patient should get monthly blood tests, not donate blood, not drive at night if vision affected, not share medication, and not undergo elective surgery for 6 months after tx completed. Side effects reviewed, pt to contact office should one occur.
Hydroxychloroquine Pregnancy And Lactation Text: This medication has been shown to cause fetal harm but it isn't assigned a Pregnancy Risk Category. There are small amounts excreted in breast milk.
Colchicine Counseling:  Patient counseled regarding adverse effects including but not limited to stomach upset (nausea, vomiting, stomach pain, or diarrhea).  Patient instructed to limit alcohol consumption while taking this medication.  Colchicine may reduce blood counts especially with prolonged use.  The patient understands that monitoring of kidney function and blood counts may be required, especially at baseline. The patient verbalized understanding of the proper use and possible adverse effects of colchicine.  All of the patient's questions and concerns were addressed.
Elidel Counseling: Patient may experience a mild burning sensation during topical application. Elidel is not approved in children less than 2 years of age. There have been case reports of hematologic and skin malignancies in patients using topical calcineurin inhibitors although causality is questionable.
Topical Sulfur Applications Counseling: Topical Sulfur Counseling: Patient counseled that this medication may cause skin irritation or allergic reactions.  In the event of skin irritation, the patient was advised to reduce the amount of the drug applied or use it less frequently.   The patient verbalized understanding of the proper use and possible adverse effects of topical sulfur application.  All of the patient's questions and concerns were addressed.
Griseofulvin Pregnancy And Lactation Text: This medication is Pregnancy Category X and is known to cause serious birth defects. It is unknown if this medication is excreted in breast milk but breast feeding should be avoided.
Zyclara Counseling:  I discussed with the patient the risks of imiquimod including but not limited to erythema, scaling, itching, weeping, crusting, and pain.  Patient understands that the inflammatory response to imiquimod is variable from person to person and was educated regarded proper titration schedule.  If flu-like symptoms develop, patient knows to discontinue the medication and contact us.
Ilumya Counseling: I discussed with the patient the risks of tildrakizumab including but not limited to immunosuppression, malignancy, posterior leukoencephalopathy syndrome, and serious infections.  The patient understands that monitoring is required including a PPD at baseline and must alert us or the primary physician if symptoms of infection or other concerning signs are noted.
Cellcept Counseling:  I discussed with the patient the risks of mycophenolate mofetil including but not limited to infection/immunosuppression, GI upset, hypokalemia, hypercholesterolemia, bone marrow suppression, lymphoproliferative disorders, malignancy, GI ulceration/bleed/perforation, colitis, interstitial lung disease, kidney failure, progressive multifocal leukoencephalopathy, and birth defects.  The patient understands that monitoring is required including a baseline creatinine and regular CBC testing. In addition, patient must alert us immediately if symptoms of infection or other concerning signs are noted.
Doxepin Counseling:  Patient advised that the medication is sedating and not to drive a car after taking this medication. Patient informed of potential adverse effects including but not limited to dry mouth, urinary retention, and blurry vision.  The patient verbalized understanding of the proper use and possible adverse effects of doxepin.  All of the patient's questions and concerns were addressed.
Doxycycline Pregnancy And Lactation Text: This medication is Pregnancy Category D and not consider safe during pregnancy. It is also excreted in breast milk but is considered safe for shorter treatment courses.
Dutasteride Pregnancy And Lactation Text: This medication is absolutely contraindicated in women, especially during pregnancy and breast feeding. Feminization of male fetuses is possible if taking while pregnant.
Oral Minoxidil Pregnancy And Lactation Text: This medication should only be used when clearly needed if you are pregnant, attempting to become pregnant or breast feeding.
Benzoyl Peroxide Pregnancy And Lactation Text: This medication is Pregnancy Category C. It is unknown if benzoyl peroxide is excreted in breast milk.
Adbry Counseling: I discussed with the patient the risks of tralokinumab including but not limited to eye infection and irritation, cold sores, injection site reactions, worsening of asthma, allergic reactions and increased risk of parasitic infection.  Live vaccines should be avoided while taking tralokinumab. The patient understands that monitoring is required and they must alert us or the primary physician if symptoms of infection or other concerning signs are noted.
Rifampin Counseling: I discussed with the patient the risks of rifampin including but not limited to liver damage, kidney damage, red-orange body fluids, nausea/vomiting and severe allergy.
Cimzia Pregnancy And Lactation Text: This medication crosses the placenta but can be considered safe in certain situations. Cimzia may be excreted in breast milk.
Isotretinoin Pregnancy And Lactation Text: This medication is Pregnancy Category X and is considered extremely dangerous during pregnancy. It is unknown if it is excreted in breast milk.
Minoxidil Counseling: Minoxidil is a topical medication which can increase blood flow where it is applied. It is uncertain how this medication increases hair growth. Side effects are uncommon and include stinging and allergic reactions.
Low Dose Naltrexone Counseling- I discussed with the patient the potential risks and side effects of low dose naltrexone including but not limited to: more vivid dreams, headaches, nausea, vomiting, abdominal pain, fatigue, dizziness, and anxiety.
Xolair Pregnancy And Lactation Text: This medication is Pregnancy Category B and is considered safe during pregnancy. This medication is excreted in breast milk.
Sotyktu Counseling:  I discussed the most common side effects of Sotyktu including: common cold, sore throat, sinus infections, cold sores, canker sores, folliculitis, and acne.  I also discussed more serious side effects of Sotyktu including but not limited to: serious allergic reactions; increased risk for infections such as TB; cancers such as lymphomas; rhabdomyolysis and elevated CPK; and elevated triglycerides and liver enzymes. 
Azithromycin Counseling:  I discussed with the patient the risks of azithromycin including but not limited to GI upset, allergic reaction, drug rash, diarrhea, and yeast infections.
Rifampin Pregnancy And Lactation Text: This medication is Pregnancy Category C and it isn't know if it is safe during pregnancy. It is also excreted in breast milk and should not be used if you are breast feeding.
Topical Sulfur Applications Pregnancy And Lactation Text: This medication is Pregnancy Category C and has an unknown safety profile during pregnancy. It is unknown if this topical medication is excreted in breast milk.
Skyrizi Counseling: I discussed with the patient the risks of risankizumab-rzaa including but not limited to immunosuppression, and serious infections.  The patient understands that monitoring is required including a PPD at baseline and must alert us or the primary physician if symptoms of infection or other concerning signs are noted.
Finasteride Male Counseling: Finasteride Counseling:  I discussed with the patient the risks of use of finasteride including but not limited to decreased libido, decreased ejaculate volume, gynecomastia, and depression. Women should not handle medication.  All of the patient's questions and concerns were addressed.
Qbrexza Counseling:  I discussed with the patient the risks of Qbrexza including but not limited to headache, mydriasis, blurred vision, dry eyes, nasal dryness, dry mouth, dry throat, dry skin, urinary hesitation, and constipation.  Local skin reactions including erythema, burning, stinging, and itching can also occur.
Otezla Counseling: The side effects of Otezla were discussed with the patient, including but not limited to worsening or new depression, weight loss, diarrhea, nausea, upper respiratory tract infection, and headache. Patient instructed to call the office should any adverse effect occur.  The patient verbalized understanding of the proper use and possible adverse effects of Otezla.  All the patient's questions and concerns were addressed.
Carac Counseling:  I discussed with the patient the risks of Carac including but not limited to erythema, scaling, itching, weeping, crusting, and pain.
Low Dose Naltrexone Pregnancy And Lactation Text: Naltrexone is pregnancy category C.  There have been no adequate and well-controlled studies in pregnant women.  It should be used in pregnancy only if the potential benefit justifies the potential risk to the fetus.   Limited data indicates that naltrexone is minimally excreted into breastmilk.
High Dose Vitamin A Counseling: Side effects reviewed, pt to contact office should one occur.
Tazorac Counseling:  Patient advised that medication is irritating and drying.  Patient may need to apply sparingly and wash off after an hour before eventually leaving it on overnight.  The patient verbalized understanding of the proper use and possible adverse effects of tazorac.  All of the patient's questions and concerns were addressed.
Eucrisa Counseling: Patient may experience a mild burning sensation during topical application. Eucrisa is not approved in children less than 2 years of age.
Sotyktu Pregnancy And Lactation Text: There is insufficient data to evaluate whether or not Sotyktu is safe to use during pregnancy.   It is not known if Sotyktu passes into breast milk and whether or not it is safe to use when breastfeeding.  
Cibinqo Counseling: I discussed with the patient the risks of Cibinqo therapy including but not limited to common cold, nausea, headache, cold sores, increased blood CPK levels, dizziness, UTIs, fatigue, acne, and vomitting. Live vaccines should be avoided.  This medication has been linked to serious infections; higher rate of mortality; malignancy and lymphoproliferative disorders; major adverse cardiovascular events; thrombosis; thrombocytopenia and lymphopenia; lipid elevations; and retinal detachment.
Qbrexza Pregnancy And Lactation Text: There is no available data on Qbrexza use in pregnant women.  There is no available data on Qbrexza use in lactation.
Wartpeel Counseling:  I discussed with the patient the risks of Wartpeel including but not limited to erythema, scaling, itching, weeping, crusting, and pain.
Itraconazole Counseling:  I discussed with the patient the risks of itraconazole including but not limited to liver damage, nausea/vomiting, neuropathy, and severe allergy.  The patient understands that this medication is best absorbed when taken with acidic beverages such as non-diet cola or ginger ale.  The patient understands that monitoring is required including baseline LFTs and repeat LFTs at intervals.  The patient understands that they are to contact us or the primary physician if concerning signs are noted.
Doxepin Pregnancy And Lactation Text: This medication is Pregnancy Category C and it isn't known if it is safe during pregnancy. It is also excreted in breast milk and breast feeding isn't recommended.
Erythromycin Counseling:  I discussed with the patient the risks of erythromycin including but not limited to GI upset, allergic reaction, drug rash, diarrhea, increase in liver enzymes, and yeast infections.
Cosentyx Counseling:  I discussed with the patient the risks of Cosentyx including but not limited to worsening of Crohn's disease, immunosuppression, allergic reactions and infections.  The patient understands that monitoring is required including a PPD at baseline and must alert us or the primary physician if symptoms of infection or other concerning signs are noted.
Erivedge Counseling- I discussed with the patient the risks of Erivedge including but not limited to nausea, vomiting, diarrhea, constipation, weight loss, changes in the sense of taste, decreased appetite, muscle spasms, and hair loss.  The patient verbalized understanding of the proper use and possible adverse effects of Erivedge.  All of the patient's questions and concerns were addressed.
Mirvaso Counseling: Mirvaso is a topical medication which can decrease superficial blood flow where applied. Side effects are uncommon and include stinging, redness and allergic reactions.
Otezla Pregnancy And Lactation Text: This medication is Pregnancy Category C and it isn't known if it is safe during pregnancy. It is unknown if it is excreted in breast milk.
Sarecycline Counseling: Patient advised regarding possible photosensitivity and discoloration of the teeth, skin, lips, tongue and gums.  Patient instructed to avoid sunlight, if possible.  When exposed to sunlight, patients should wear protective clothing, sunglasses, and sunscreen.  The patient was instructed to call the office immediately if the following severe adverse effects occur:  hearing changes, easy bruising/bleeding, severe headache, or vision changes.  The patient verbalized understanding of the proper use and possible adverse effects of sarecycline.  All of the patient's questions and concerns were addressed.
Azithromycin Pregnancy And Lactation Text: This medication is considered safe during pregnancy and is also secreted in breast milk.
Tranexamic Acid Counseling:  Patient advised of the small risk of bleeding problems with tranexamic acid. They were also instructed to call if they developed any nausea, vomiting or diarrhea. All of the patient's questions and concerns were addressed.
Dapsone Counseling: I discussed with the patient the risks of dapsone including but not limited to hemolytic anemia, agranulocytosis, rashes, methemoglobinemia, kidney failure, peripheral neuropathy, headaches, GI upset, and liver toxicity.  Patients who start dapsone require monitoring including baseline LFTs and weekly CBCs for the first month, then every month thereafter.  The patient verbalized understanding of the proper use and possible adverse effects of dapsone.  All of the patient's questions and concerns were addressed.
Xeljanz Counseling: I discussed with the patient the risks of Xeljanz therapy including increased risk of infection, liver issues, headache, diarrhea, or cold symptoms. Live vaccines should be avoided. They were instructed to call if they have any problems.
Cibinqo Pregnancy And Lactation Text: It is unknown if this medication will adversely affect pregnancy or breast feeding.  You should not take this medication if you are currently pregnant or planning a pregnancy or while breastfeeding.
Cyclophosphamide Counseling:  I discussed with the patient the risks of cyclophosphamide including but not limited to hair loss, hormonal abnormalities, decreased fertility, abdominal pain, diarrhea, nausea and vomiting, bone marrow suppression and infection. The patient understands that monitoring is required while taking this medication.
Finasteride Pregnancy And Lactation Text: This medication is absolutely contraindicated during pregnancy. It is unknown if it is excreted in breast milk.
Infliximab Counseling:  I discussed with the patient the risks of infliximab including but not limited to myelosuppression, immunosuppression, autoimmune hepatitis, demyelinating diseases, lymphoma, and serious infections.  The patient understands that monitoring is required including a PPD at baseline and must alert us or the primary physician if symptoms of infection or other concerning signs are noted.

## 2023-08-23 NOTE — PROGRESS NOTES
"  Subjective:     Lucía Rodriguez is a 72 y.o. female presenting for a follow up          Current Outpatient Medications:     ALPRAZolam (XANAX) 0.25 MG Tab, Take 1 Tablet by mouth at bedtime as needed for Sleep for up to 14 days., Disp: 14 Tablet, Rfl: 0    metoprolol tartrate (LOPRESSOR) 25 MG Tab, TAKE 1/2 TABLET BY MOUTH TWICE A DAY, Disp: 90 Tablet, Rfl: 3    atorvastatin (LIPITOR) 20 MG Tab, Take 1 Tablet by mouth every day., Disp: 90 Tablet, Rfl: 3    prednisoLONE acetate (PRED FORTE) 1 % Suspension, PLACE 1 DROP INTO THE POST OPERATIVE EYE 4 TIMES A DAY, Disp: , Rfl:     ELIQUIS 5 MG Tab, TAKE 1 TABLET BY MOUTH TWICE A DAY, Disp: 180 Tablet, Rfl: 3    flecainide (TAMBOCOR) 50 MG tablet, TAKE 1-2 TABLETS BY MOUTH 2 TIMES A DAY. 50 MG IN AM  MG IN PM, Disp: 270 Tablet, Rfl: 3    Ca Phosphate-Cholecalciferol ([employer] CALCIUM-VITAMIN D PO), Take 1 Tablet by mouth every day., Disp: , Rfl:     Psyllium (METAMUCIL PO), Take 1 Scoop by mouth every day., Disp: , Rfl:     Objective:     Vitals: /74   Pulse 68   Temp 36.4 °C (97.6 °F)   Resp 16   Ht 1.651 m (5' 5\")   Wt 82.1 kg (181 lb)   LMP 12/28/2003   SpO2 96%   BMI 30.12 kg/m²   General: Alert  HEENT: Normocephalic.    Assessment/Plan:     Diagnoses and all orders for this visit:    Calculus of gallbladder without cholecystitis without obstruction  Chronic stable. Has symptoms intermittently. We discussed a referral to surgery for an evaluation. She was also interested in seeing a dietician.   -     Referral to Nutrition Services  -     Referral to General Surgery    Hepatic steatosis  Chronic, stable, will continue to monitor. Discussed healthy diet, regular exercise, weight loss.  -     Referral to Nutrition Services    Obesity (BMI 30-39.9)  -     Referral to Nutrition Services    Travel advice encounter  Chronic, stable.  I have reviewed the medical records and have determined that a controlled substance treatment is medically indicated.  " Alternatives considered and discussed with patient.   She reports doing well with xanax in the past for travel/sleep.  Discussed risks of xanax use. NV  checked, appropriate.   -     ALPRAZolam (XANAX) 0.25 MG Tab; Take 1 Tablet by mouth at bedtime as needed for Sleep for up to 14 days.    Osteopenia, unspecified location  Postmenopausal  -     DS-BONE DENSITY STUDY (DEXA); Future          Return in about 6 months (around 10/13/2023) for routine follow up.       no

## 2023-08-28 DIAGNOSIS — R19.5 POSITIVE COLORECTAL CANCER SCREENING USING COLOGUARD TEST: ICD-10-CM

## 2023-09-21 RX ORDER — FLUTICASONE PROPIONATE 50 MCG
1 SPRAY, SUSPENSION (ML) NASAL DAILY
Qty: 16 ML | Refills: 11 | Status: SHIPPED | OUTPATIENT
Start: 2023-09-21 | End: 2024-01-03

## 2023-09-22 ENCOUNTER — TELEPHONE (OUTPATIENT)
Dept: CARDIOLOGY | Facility: MEDICAL CENTER | Age: 73
End: 2023-09-22
Payer: MEDICARE

## 2023-09-22 NOTE — LETTER
PROCEDURE/SURGERY CLEARANCE FORM      Encounter Date: 9/22/2023    Patient: Lucía Rodriguez  YOB: 1950    CARDIOLOGIST:  Jose Thakkar M.D.    REFERRING DOCTOR:  No ref. provider found    The above patient is cleared to have the following procedure/surgery: Colonoscopy with Deep Sedation                                             Electronically signed        MD Christie Thakkar M.D.    PROCEDURE/SURGERY CLEARANCE FORM    Date: 9/22/2023   Patient Name: Lucía Rodriguez    Dear Surgeon or Proceduralist,      Thank you for your request for cardiac stratification of our mutual patient Lucía Rodriguez 1950. We have reviewed their [facility] records; and to the best of our understanding this patient has not had stenting, ablation, cardiothoracic surgery or hospitalization for cardiovascular reasons in the past 6 months.  Lucía Rodriguez has been seen within the past 18 months and is considered to have non-modifiable cardiac risk for this low-risk procedure/surgery. They may proceed from a cardiovascular standpoint and may hold their antiplatelet/anticoagulation as briefly as possible. Please have patient resume this medication when hemodynamically stable to do so.     Aspirin or Prasugrel   - hold 7 days prior to procedure/surgery, resume when hemodynamically stable      Clopidrogrel or Ticagrelor  - hold 7 days for all neurological procedures, hold 5 days prior to all other procedure/surgery,  resume when hemodynamically stable     Warfarin - hold 7 days for all neurological procedures, hold 5 days prior to all other procedure/surgery and coordinate with [facility] ([phone]) INR testing and dose management.      Pradaxa/Xarelto/Eliquis/Savesya - hold 1 day prior to procedure for low bleeding risk procedure, 2 days for high bleeding risk procedure, or consider holding 3 days or longer for patients with reduced kidney function (CrCl <30mL/min) or  spinal/cranial surgeries/procedures.      If they have a mechanical heart valve, please coordinate with [facility] ([phone]) the proper management of their anticoagulant in the periprocedural or perioperative period.      Some patients have higher risk for cardiovascular complications or holding medication. If our patient has had prior complications of holding antiplatelet or anticoagulants in the past and we have seen them after these events, we have addressed these concerns with the patient. They are at an unknown degree of increased risk for recurrent complication.  You may hold anticoagulation/antiplatelets for the procedure or surgery if the benefits of the procedure or surgery outweigh this nonmodifiable risk.      If Lucía Rodriguez 1950 has new symptoms of heart failure decompensation, unstable arrythmia, or angina please reach out and we will assess the patient.      If you have other patient-specific concerns, please feel free to reach out to the patient's cardiologist directly at [phone].     Thank you,       [facility]

## 2023-09-22 NOTE — TELEPHONE ENCOUNTER
Last OV: 2/22/23  Proposed Surgery: Colonoscopy with Deep Sedation  Surgery Date: 10/27/2023  Requesting Office Name: [facility]  Fax Number: [phone]  Preference of Location (default is surgery center unless specified by Cardiologist or [facility])  Prior Clearance Addressed: No      Anticoags/Antiplatelets: Apixaban   Outstanding Cardiac Imaging : No  Stent, Cardiac Devices, or Catheterization: Yes  Date : 3/21/12   Ablation, TAVR/Valve (including open heart), Cardioversion: No  Recent Cardiac Hospitalization: No            When: N/A  History (cardiac history):   Past Medical History:   Diagnosis Date    Anesthesia     Delayed emergence; Hair loss    Arthritis     Throughout her body    Atrial fibrillation (HCC)     Atrial fibrillation    Calculus of gallbladder without cholecystitis 1/9/2017    CATARACT     Bilateral IOL; loss of vision in Right eye starting in 2000.    Central stenosis of spinal canal 1/18/2021    Delayed emergence from general anesthesia     Glaucoma     right eye    High cholesterol     Hyperlipidemia     Macular cyst, hole, or pseudohole of retina     Presence of IVC filter 2021    Pulmonary nodules/lesions, multiple 07/06/2016    stable on CTs, no further work up needed             Surgical Clearance Letter Sent: YES   **[facility]

## 2023-10-31 ENCOUNTER — TELEPHONE (OUTPATIENT)
Dept: HEALTH INFORMATION MANAGEMENT | Facility: OTHER | Age: 73
End: 2023-10-31
Payer: MEDICARE

## 2023-11-16 DIAGNOSIS — I48.91 ATRIAL FIBRILLATION, UNSPECIFIED TYPE (HCC): ICD-10-CM

## 2023-11-16 RX ORDER — FLECAINIDE ACETATE 50 MG/1
TABLET ORAL
Qty: 270 TABLET | Refills: 0 | Status: SHIPPED | OUTPATIENT
Start: 2023-11-16 | End: 2024-02-06 | Stop reason: SDUPTHER

## 2023-11-29 ENCOUNTER — PATIENT MESSAGE (OUTPATIENT)
Dept: HEALTH INFORMATION MANAGEMENT | Facility: OTHER | Age: 73
End: 2023-11-29

## 2023-12-13 DIAGNOSIS — N63.11 MASS OF UPPER OUTER QUADRANT OF RIGHT BREAST: ICD-10-CM

## 2023-12-16 DIAGNOSIS — I48.91 ATRIAL FIBRILLATION, UNSPECIFIED TYPE (HCC): ICD-10-CM

## 2023-12-18 ENCOUNTER — HOSPITAL ENCOUNTER (OUTPATIENT)
Dept: RADIOLOGY | Facility: MEDICAL CENTER | Age: 73
End: 2023-12-18
Payer: MEDICARE

## 2023-12-18 RX ORDER — APIXABAN 5 MG/1
TABLET, FILM COATED ORAL
Qty: 180 TABLET | Refills: 0 | Status: SHIPPED | OUTPATIENT
Start: 2023-12-18 | End: 2024-02-06 | Stop reason: SDUPTHER

## 2023-12-19 ENCOUNTER — HOSPITAL ENCOUNTER (OUTPATIENT)
Dept: RADIOLOGY | Facility: MEDICAL CENTER | Age: 73
End: 2023-12-19
Payer: MEDICARE

## 2023-12-19 DIAGNOSIS — N63.11 MASS OF UPPER OUTER QUADRANT OF RIGHT BREAST: ICD-10-CM

## 2023-12-19 PROCEDURE — G0279 TOMOSYNTHESIS, MAMMO: HCPCS

## 2023-12-19 PROCEDURE — 76642 ULTRASOUND BREAST LIMITED: CPT | Mod: RT

## 2023-12-20 ENCOUNTER — TELEPHONE (OUTPATIENT)
Dept: CARDIOLOGY | Facility: MEDICAL CENTER | Age: 73
End: 2023-12-20
Payer: MEDICARE

## 2023-12-20 NOTE — TELEPHONE ENCOUNTER
Last OV: 2.22.2023  Proposed Surgery: Breast Biopsy   Surgery Date: 12.26.2023  Requesting Office Name: [facility]  Fax Number: [phone]  Preference of Location (default is surgery center unless specified by Cardiologist or [facility])  Prior Clearance Addressed: No      Anticoags/Antiplatelets: Apixaban   Anticoags/Antiplatelet managed by Cardiology? YES    Outstanding Cardiac Imaging : No  Stent, Cardiac Devices, or Catheterization: No  Ablation, TAVR/Valve (including open heart), Cardioversion: No  Recent Cardiac Hospitalization: No            When: Greater than 6 months since hospitalization.   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    Central stenosis of spinal canal 1/18/2021    Delayed emergence from general anesthesia     Glaucoma     right eye    High cholesterol     Hyperlipidemia     Macular cyst, hole, or pseudohole of retina     Presence of IVC filter 2021    Pulmonary nodules/lesions, multiple 07/06/2016    stable on CTs, no further work up needed             Surgical Clearance Letter Sent: YES   **Scan clearance request letter into [employer].**

## 2023-12-20 NOTE — LETTER
PROCEDURE/SURGERY CLEARANCE FORM    Date: 12/20/2023   Patient Name: Lucía Rodriguez    Dear Surgeon or Proceduralist,      Thank you for your request for cardiac stratification of our mutual patient Lucía Rodrigeuz 1950. We have reviewed their [facility] records; and to the best of our understanding this patient has not had stenting, ablation, cardiothoracic surgery or hospitalization for cardiovascular reasons in the past 6 months.  Lucía Rodriguez has been seen within the past 18 months and is considered to have non-modifiable cardiac risk for this low-risk procedure/surgery (Breast Biopsy). They may proceed from a cardiovascular standpoint and may hold their antiplatelet/anticoagulation as briefly as possible. Please have patient resume this medication when hemodynamically stable to do so.     Aspirin or Prasugrel   - hold 7 days prior to procedure/surgery, resume when hemodynamically stable      Clopidrogrel or Ticagrelor  - hold 7 days for all neurological procedures, hold 5 days prior to all other procedure/surgery,  resume when hemodynamically stable     Warfarin - hold 7 days for all neurological procedures, hold 5 days prior to all other procedure/surgery and coordinate with [facility] ([phone]) INR testing and dose management.      Pradaxa/Xarelto/Eliquis/Savesya - hold 1 day prior to procedure for low bleeding risk procedure, 2 days for high bleeding risk procedure, or consider holding 3 days or longer for patients with reduced kidney function (CrCl <30mL/min) or spinal/cranial surgeries/procedures.      If they have a mechanical heart valve, please coordinate with [facility]tico[facility] ([phone]) the proper management of their anticoagulant in the periprocedural or perioperative period.      Some patients have higher risk for cardiovascular complications or holding medication. If our patient has had prior complications of holding antiplatelet or  anticoagulants in the past and we have seen them after these events, we have addressed these concerns with the patient. They are at an unknown degree of increased risk for recurrent complication.  You may hold anticoagulation/antiplatelets for the procedure or surgery if the benefits of the procedure or surgery outweigh this nonmodifiable risk.      If Lucía [place] 1950 has new symptoms of heart failure decompensation, unstable arrythmia, or angina please reach out and we will assess the patient.      If you have other patient-specific concerns, please feel free to reach out to the patient's cardiologist directly at [phone].     Thank you,       [facility]

## 2023-12-20 NOTE — LETTER
PROCEDURE/SURGERY CLEARANCE FORM    Date: 12/20/2023   Patient Name: Lucía Rodriguez    Dear Surgeon or Proceduralist,      Thank you for your request for cardiac stratification of our mutual patient Lucía Rodriguez 1950. We have reviewed their [facility] records; and to the best of our understanding this patient has not had stenting, ablation, cardiothoracic surgery or hospitalization for cardiovascular reasons in the past 6 months.  Lucía Rodriguez has been seen within the past 18 months and is considered to have non-modifiable cardiac risk for this low-risk procedure/surgery(Breast Biopsy). They may proceed from a cardiovascular standpoint and may hold their antiplatelet/anticoagulation as briefly as possible. Please have patient resume this medication when hemodynamically stable to do so.     Aspirin or Prasugrel   - hold 7 days prior to procedure/surgery, resume when hemodynamically stable      Clopidrogrel or Ticagrelor  - hold 7 days for all neurological procedures, hold 5 days prior to all other procedure/surgery,  resume when hemodynamically stable     Warfarin - hold 7 days for all neurological procedures, hold 5 days prior to all other procedure/surgery and coordinate with [facility]tico[facility] ([phone]) INR testing and dose management.      Pradaxa/Xarelto/Eliquis/Savesya - hold 1 day prior to procedure for low bleeding risk procedure, 2 days for high bleeding risk procedure, or consider holding 3 days or longer for patients with reduced kidney function (CrCl <30mL/min) or spinal/cranial surgeries/procedures.      If they have a mechanical heart valve, please coordinate with [facility] Anticoagulation Service ([phone]) the proper management of their anticoagulant in the periprocedural or perioperative period.      Some patients have higher risk for cardiovascular complications or holding medication. If our patient has had prior complications of holding antiplatelet or  anticoagulants in the past and we have seen them after these events, we have addressed these concerns with the patient. They are at an unknown degree of increased risk for recurrent complication.  You may hold anticoagulation/antiplatelets for the procedure or surgery if the benefits of the procedure or surgery outweigh this nonmodifiable risk.      If Lucía [place] 1950 has new symptoms of heart failure decompensation, unstable arrythmia, or angina please reach out and we will assess the patient.      If you have other patient-specific concerns, please feel free to reach out to the patient's cardiologist directly at [phone].     Thank you,       [facility]

## 2023-12-26 ENCOUNTER — HOSPITAL ENCOUNTER (OUTPATIENT)
Dept: RADIOLOGY | Facility: MEDICAL CENTER | Age: 73
End: 2023-12-26
Payer: MEDICARE

## 2023-12-26 DIAGNOSIS — R92.8 ABNORMAL FINDINGS ON DIAGNOSTIC IMAGING OF BREAST: ICD-10-CM

## 2023-12-26 LAB — PATHOLOGY CONSULT NOTE: NORMAL

## 2023-12-26 PROCEDURE — 19083 BX BREAST 1ST LESION US IMAG: CPT | Mod: RT

## 2023-12-26 PROCEDURE — 88305 TISSUE EXAM BY PATHOLOGIST: CPT

## 2023-12-26 PROCEDURE — 88341 IMHCHEM/IMCYTCHM EA ADD ANTB: CPT | Mod: 91

## 2023-12-26 PROCEDURE — 88342 IMHCHEM/IMCYTCHM 1ST ANTB: CPT

## 2023-12-27 DIAGNOSIS — I48.91 ATRIAL FIBRILLATION, UNSPECIFIED TYPE (HCC): ICD-10-CM

## 2023-12-28 ENCOUNTER — TELEPHONE (OUTPATIENT)
Dept: RADIOLOGY | Facility: MEDICAL CENTER | Age: 73
End: 2023-12-28
Payer: MEDICARE

## 2023-12-28 RX ORDER — FLECAINIDE ACETATE 50 MG/1
TABLET ORAL
Qty: 270 TABLET | Refills: 0 | OUTPATIENT
Start: 2023-12-28

## 2023-12-28 NOTE — TELEPHONE ENCOUNTER
Left msg for ASHLY Cates's MA: make sure office received breast biopsy results nad to have provider place an referral to a breast surgeon.

## 2023-12-29 ENCOUNTER — TELEPHONE (OUTPATIENT)
Dept: MEDICAL GROUP | Facility: MEDICAL CENTER | Age: 73
End: 2023-12-29
Payer: MEDICARE

## 2023-12-29 DIAGNOSIS — D24.9 INTRADUCTAL PAPILLOMA WITH ATYPICAL DUCTAL HYPERPLASIA OF BREAST: ICD-10-CM

## 2023-12-29 DIAGNOSIS — N60.99 INTRADUCTAL PAPILLOMA WITH ATYPICAL DUCTAL HYPERPLASIA OF BREAST: ICD-10-CM

## 2023-12-29 NOTE — TELEPHONE ENCOUNTER
VOICEMAIL  1. Caller Name: Imaging                      Call Back Number: [phone]    2. Message: Imaging called and stated patients biopsy results came back abnormal and that pt needs an urgent referral to a breast surgeon.    3. Patient approves office to leave a detailed voicemail/MyChart message: yes

## 2024-01-03 ENCOUNTER — OFFICE VISIT (OUTPATIENT)
Dept: MEDICAL GROUP | Facility: MEDICAL CENTER | Age: 74
End: 2024-01-03
Payer: MEDICARE

## 2024-01-03 VITALS
TEMPERATURE: 97.9 F | HEIGHT: 65 IN | HEART RATE: 75 BPM | DIASTOLIC BLOOD PRESSURE: 72 MMHG | WEIGHT: 181.8 LBS | BODY MASS INDEX: 30.29 KG/M2 | OXYGEN SATURATION: 97 % | SYSTOLIC BLOOD PRESSURE: 112 MMHG

## 2024-01-03 DIAGNOSIS — M81.0 AGE-RELATED OSTEOPOROSIS WITHOUT CURRENT PATHOLOGICAL FRACTURE: ICD-10-CM

## 2024-01-03 DIAGNOSIS — D68.318 HEMORRHAGIC DISORDER DUE TO CIRCULATING ANTICOAGULANTS (HCC): ICD-10-CM

## 2024-01-03 DIAGNOSIS — E66.9 OBESITY (BMI 30-39.9): ICD-10-CM

## 2024-01-03 DIAGNOSIS — D24.9 INTRADUCTAL PAPILLOMA WITH ATYPICAL DUCTAL HYPERPLASIA OF BREAST: ICD-10-CM

## 2024-01-03 DIAGNOSIS — E78.2 MIXED HYPERLIPIDEMIA: ICD-10-CM

## 2024-01-03 DIAGNOSIS — N18.31 STAGE 3A CHRONIC KIDNEY DISEASE: ICD-10-CM

## 2024-01-03 DIAGNOSIS — N60.99 INTRADUCTAL PAPILLOMA WITH ATYPICAL DUCTAL HYPERPLASIA OF BREAST: ICD-10-CM

## 2024-01-03 DIAGNOSIS — I48.91 ATRIAL FIBRILLATION, UNSPECIFIED TYPE (HCC): ICD-10-CM

## 2024-01-03 PROBLEM — K58.0 IRRITABLE BOWEL SYNDROME WITH DIARRHEA: Status: ACTIVE | Noted: 2024-01-03

## 2024-01-03 PROCEDURE — 3078F DIAST BP <80 MM HG: CPT

## 2024-01-03 PROCEDURE — 3074F SYST BP LT 130 MM HG: CPT

## 2024-01-03 PROCEDURE — 99214 OFFICE O/P EST MOD 30 MIN: CPT

## 2024-01-03 RX ORDER — ATORVASTATIN CALCIUM 20 MG/1
20 TABLET, FILM COATED ORAL DAILY
Qty: 90 TABLET | Refills: 3 | Status: SHIPPED | OUTPATIENT
Start: 2024-01-03

## 2024-01-03 ASSESSMENT — ENCOUNTER SYMPTOMS
FEVER: 0
SHORTNESS OF BREATH: 0
PALPITATIONS: 0
CHILLS: 0
ORTHOPNEA: 0
COUGH: 0

## 2024-01-03 ASSESSMENT — FIBROSIS 4 INDEX: FIB4 SCORE: 1.21

## 2024-01-03 ASSESSMENT — PATIENT HEALTH QUESTIONNAIRE - PHQ9: CLINICAL INTERPRETATION OF PHQ2 SCORE: 0

## 2024-01-03 NOTE — PROGRESS NOTES
Subjective:     CC: Follow-Up (Follow up on Breast Biopsy Results)      HPI:   Lucía is a 73 y.o. female who presents today for:     Breast mass: Patient recently underwent a breast biopsy for a mass in her right breast on 12/26/2023.  Biopsy results showed partially sampled intraductal papilloma, partially sampled and atypical ductal hyperplasia.  She was referred to general surgeon, and has has not followed up with them yet.    Hyperlipidemia: Patient requesting refill of her atorvastatin today.    Osteoporosis: Patient is taking calcium and vitamin D supplement.  Wishing to discuss alternatives, such as Prolia and alendronate.  Patient believes she has been on alendronate in the past, and believes she stopped it because of upset stomach.  She is curious if she should start taking Prolia, but is worried about the potential side effects.    Atrial fibrillation/hemorrhagic disorder due to circulating anticoagulants: Patient currently on flecainide, metoprolol, and Eliquis for A-fib management.  No recent signs of bleeding.  Tolerating it well.  She is established with cardiology, and has an appointment next month.    CKD: Kidney function stable at this time.    Allergies: Neosporin [neomycin-polymyxin b], Tape, and Ofloxacin     Medications:   Current Outpatient Medications:     atorvastatin (LIPITOR) 20 MG Tab, Take 1 Tablet by mouth every day., Disp: 90 Tablet, Rfl: 3    ELIQUIS 5 MG Tab, TAKE 1 TABLET BY MOUTH TWICE A DAY, Disp: 180 Tablet, Rfl: 0    flecainide (TAMBOCOR) 50 MG tablet, Take 1 Tablet by mouth every morning AND 2 Tablets every evening., Disp: 270 Tablet, Rfl: 0    metoprolol tartrate (LOPRESSOR) 25 MG Tab, TAKE 1/2 TABLET BY MOUTH TWICE A DAY, Disp: 90 Tablet, Rfl: 3    prednisoLONE acetate (PRED FORTE) 1 % Suspension, PLACE 1 DROP INTO THE POST OPERATIVE EYE 4 TIMES A DAY, Disp: , Rfl:     Ca Phosphate-Cholecalciferol ([employer] CALCIUM-VITAMIN D PO), Take 1 Tablet by mouth every day., Disp: , Rfl:      "Psyllium (METAMUCIL PO), Take 1 Scoop by mouth every day., Disp: , Rfl:       ROS:  Review of Systems   Constitutional:  Negative for chills and fever.   Respiratory:  Negative for cough and shortness of breath.    Cardiovascular:  Negative for chest pain, palpitations, orthopnea and leg swelling.       Objective:     Exam:  /72   Pulse 75   Temp 36.6 °C (97.9 °F) (Temporal)   Ht 1.651 m (5' 5\")   Wt 82.5 kg (181 lb 12.8 oz)   LMP 12/28/2003   SpO2 97%   BMI 30.25 kg/m²  Body mass index is 30.25 kg/m².    Physical Exam  Constitutional:       Appearance: Normal appearance.   Eyes:      Pupils: Pupils are equal, round, and reactive to light.   Cardiovascular:      Rate and Rhythm: Normal rate and regular rhythm.      Pulses: Normal pulses.      Heart sounds: Normal heart sounds.   Pulmonary:      Effort: Pulmonary effort is normal.      Breath sounds: Normal breath sounds.   Abdominal:      General: Bowel sounds are normal.      Palpations: Abdomen is soft.   Neurological:      Mental Status: She is alert and oriented to person, place, and time.   Psychiatric:         Mood and Affect: Mood normal.         Behavior: Behavior normal.           Assessment & Plan:     Lucía a 73 y.o. female with the following -     1. 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 Fuller to discuss further management.    2. Mixed hyperlipidemia  Chronic, stable  Continue to work on diet and exercise to help with cholesterol management.  - atorvastatin (LIPITOR) 20 MG Tab; Take 1 Tablet by mouth every day.  Dispense: 90 Tablet; Refill: 3  - Lipid Profile; Future    3. Age-related osteoporosis without current pathological fracture  Chronic, stable  We discussed starting Prolia. Patient would like to proceed with this. Recommended that she speak to breast surgeon first, before we begin infusion.   -ContinueCa Phosphate-Cholecalciferol " ([employer] CALCIUM-VITAMIN D PO), Take 1 Tablet by mouth every day., Disp: , Rfl:     4. Hemorrhagic disorder due to circulating anticoagulants (HCC)  5. Atrial fibrillation, unspecified type (HCC)  Chronic, stable  -Continue ELIQUIS 5 MG Tab, TAKE 1 TABLET BY MOUTH TWICE A DAY, Disp: 180 Tablet, Rfl: 0  -Continue flecainide (TAMBOCOR) 50 MG tablet, Take 1 Tablet by mouth every morning AND 2 Tablets every evening., Disp: 270 Tablet, Rfl: 0  -Continue  metoprolol tartrate (LOPRESSOR) 25 MG Tab, TAKE 1/2 TABLET BY MOUTH TWICE A DAY, Disp: 90 Tablet, Rfl: 3    6. Stage 3a chronic kidney disease (HCC)  Chronic, stable  - Comp Metabolic Panel; Future    7. Obesity (BMI 30-39.9)  Chronic, stable  - TSH WITH REFLEX TO FT4; Future  - Comp Metabolic Panel; Future  - CBC WITHOUT DIFFERENTIAL; Future  - Patient identified as having weight management issue.  Appropriate orders and counseling given.        Anticipatory guidance included the following: Patient counseled about skin care, diet, supplements, smoking, drugs/alcohol use, safe sex and exercise.     Return in about 3 months (around 4/3/2024).    Please note that this dictation was created using voice recognition software. I have made every reasonable attempt to correct obvious errors, but I expect that there are errors of grammar and possibly content that I did not discover before finalizing the note.

## 2024-01-11 ENCOUNTER — APPOINTMENT (OUTPATIENT)
Dept: ADMISSIONS | Facility: MEDICAL CENTER | Age: 74
End: 2024-01-11
Attending: SURGERY
Payer: MEDICARE

## 2024-01-15 ENCOUNTER — TELEPHONE (OUTPATIENT)
Dept: CARDIOLOGY | Facility: MEDICAL CENTER | Age: 74
End: 2024-01-15
Payer: MEDICARE

## 2024-01-15 NOTE — LETTER
PROCEDURE/SURGERY CLEARANCE FORM    Date: 1/15/2024   Patient Name: Lucía Rodriguez    Dear Surgeon or Proceduralist,      Thank you for your request for cardiac stratification of our mutual patient Lucía Rodriguez 1950. We have reviewed their [facility] records; and to the best of our understanding this patient has not had stenting, ablation, cardiothoracic surgery or hospitalization for cardiovascular reasons in the past 6 months.  Lucía Rodriguez has been seen within the past 18 months and is considered to have non-modifiable cardiac risk for this low-risk procedure/surgery (US-Localization breast single right). They may proceed from a cardiovascular standpoint and may hold their antiplatelet/anticoagulation as briefly as possible. Please have patient resume this medication when hemodynamically stable to do so.     Aspirin or Prasugrel   - hold 7 days prior to procedure/surgery, resume when hemodynamically stable      Clopidrogrel or Ticagrelor  - hold 7 days for all neurological procedures, hold 5 days prior to all other procedure/surgery,  resume when hemodynamically stable     Warfarin - hold 7 days for all neurological procedures, hold 5 days prior to all other procedure/surgery and coordinate with [facility]tico[facility] ([phone]) INR testing and dose management.      Pradaxa/Xarelto/Eliquis/Savesya - hold 1 day prior to procedure for low bleeding risk procedure, 2 days for high bleeding risk procedure, or consider holding 3 days or longer for patients with reduced kidney function (CrCl <30mL/min) or spinal/cranial surgeries/procedures.      If they have a mechanical heart valve, please coordinate with [facility] Anticoagulation Service ([phone]) the proper management of their anticoagulant in the periprocedural or perioperative period.      Some patients have higher risk for cardiovascular complications or holding medication. If our patient has had prior complications of holding  antiplatelet or anticoagulants in the past and we have seen them after these events, we have addressed these concerns with the patient. They are at an unknown degree of increased risk for recurrent complication.  You may hold anticoagulation/antiplatelets for the procedure or surgery if the benefits of the procedure or surgery outweigh this nonmodifiable risk.      If Lucía Rodriguez 1950 has new symptoms of heart failure decompensation, unstable arrythmia, or angina please reach out and we will assess the patient.      If you have other patient-specific concerns, please feel free to reach out to the patient's cardiologist directly at [phone].     Thank you,       [facility]

## 2024-01-15 NOTE — TELEPHONE ENCOUNTER
Last OV: 2.22.2023  Proposed Surgery: US-Localization breast single right  Surgery Date: 1.22.2024  Requesting Office Name: [facility]  Fax Number: [phone]  Preference of Location (default is surgery center unless specified by Cardiologist or [facility])  Prior Clearance Addressed: No      Anticoags/Antiplatelets: Apixaban   Anticoags/Antiplatelet managed by Cardiology? YES    Outstanding Cardiac Imaging : No  Stent, Cardiac Devices, or Catheterization: No  Ablation, TAVR/Valve (including open heart), Cardioversion: No  Recent Cardiac Hospitalization: No            When: N/A  History (cardiac history):   Past Medical History:   Diagnosis Date    Anesthesia     Delayed emergence; Hair loss    Arthritis     Throughout her body    Atrial fibrillation (HCC)     Atrial fibrillation    Calculus of gallbladder without cholecystitis 1/9/2017    CATARACT     Bilateral IOL; loss of vision in Right eye starting in 2000.    Central stenosis of spinal canal 1/18/2021    Delayed emergence from general anesthesia     Glaucoma     right eye    High cholesterol     Hyperlipidemia     Macular cyst, hole, or pseudohole of retina     Presence of IVC filter 2021    Pulmonary nodules/lesions, multiple 07/06/2016    stable on CTs, no further work up needed             Surgical Clearance Letter Sent: YES   **Scan clearance request letter into [facility].**

## 2024-01-17 ENCOUNTER — PRE-ADMISSION TESTING (OUTPATIENT)
Dept: ADMISSIONS | Facility: MEDICAL CENTER | Age: 74
End: 2024-01-17
Attending: SURGERY
Payer: MEDICARE

## 2024-01-18 ENCOUNTER — HOSPITAL ENCOUNTER (OUTPATIENT)
Dept: LAB | Facility: MEDICAL CENTER | Age: 74
End: 2024-01-18
Payer: MEDICARE

## 2024-01-18 DIAGNOSIS — N18.31 STAGE 3A CHRONIC KIDNEY DISEASE: ICD-10-CM

## 2024-01-18 DIAGNOSIS — E78.2 MIXED HYPERLIPIDEMIA: ICD-10-CM

## 2024-01-18 DIAGNOSIS — E66.9 OBESITY (BMI 30-39.9): ICD-10-CM

## 2024-01-18 LAB
ERYTHROCYTE [DISTWIDTH] IN BLOOD BY AUTOMATED COUNT: 48.3 FL (ref 35.9–50)
HCT VFR BLD AUTO: 48.7 % (ref 37–47)
HGB BLD-MCNC: 15.2 G/DL (ref 12–16)
MCH RBC QN AUTO: 29.6 PG (ref 27–33)
MCHC RBC AUTO-ENTMCNC: 31.2 G/DL (ref 32.2–35.5)
MCV RBC AUTO: 94.9 FL (ref 81.4–97.8)
PLATELET # BLD AUTO: 271 K/UL (ref 164–446)
PMV BLD AUTO: 11.6 FL (ref 9–12.9)
RBC # BLD AUTO: 5.13 M/UL (ref 4.2–5.4)
WBC # BLD AUTO: 7.6 K/UL (ref 4.8–10.8)

## 2024-01-18 PROCEDURE — 85027 COMPLETE CBC AUTOMATED: CPT

## 2024-01-18 PROCEDURE — 80053 COMPREHEN METABOLIC PANEL: CPT

## 2024-01-18 PROCEDURE — 80061 LIPID PANEL: CPT

## 2024-01-18 PROCEDURE — 84443 ASSAY THYROID STIM HORMONE: CPT

## 2024-01-18 PROCEDURE — 36415 COLL VENOUS BLD VENIPUNCTURE: CPT

## 2024-01-19 LAB
ALBUMIN SERPL BCP-MCNC: 4.6 G/DL (ref 3.2–4.9)
ALBUMIN/GLOB SERPL: 1.8 G/DL
ALP SERPL-CCNC: 150 U/L (ref 30–99)
ALT SERPL-CCNC: 26 U/L (ref 2–50)
ANION GAP SERPL CALC-SCNC: 13 MMOL/L (ref 7–16)
AST SERPL-CCNC: 25 U/L (ref 12–45)
BILIRUB SERPL-MCNC: 0.5 MG/DL (ref 0.1–1.5)
BUN SERPL-MCNC: 20 MG/DL (ref 8–22)
CALCIUM ALBUM COR SERPL-MCNC: 9.1 MG/DL (ref 8.5–10.5)
CALCIUM SERPL-MCNC: 9.6 MG/DL (ref 8.5–10.5)
CHLORIDE SERPL-SCNC: 103 MMOL/L (ref 96–112)
CHOLEST SERPL-MCNC: 138 MG/DL (ref 100–199)
CO2 SERPL-SCNC: 24 MMOL/L (ref 20–33)
CREAT SERPL-MCNC: 0.93 MG/DL (ref 0.5–1.4)
FASTING STATUS PATIENT QL REPORTED: NORMAL
GFR SERPLBLD CREATININE-BSD FMLA CKD-EPI: 64 ML/MIN/1.73 M 2
GLOBULIN SER CALC-MCNC: 2.6 G/DL (ref 1.9–3.5)
GLUCOSE SERPL-MCNC: 93 MG/DL (ref 65–99)
HDLC SERPL-MCNC: 42 MG/DL
LDLC SERPL CALC-MCNC: 43 MG/DL
POTASSIUM SERPL-SCNC: 4.4 MMOL/L (ref 3.6–5.5)
PROT SERPL-MCNC: 7.2 G/DL (ref 6–8.2)
SODIUM SERPL-SCNC: 140 MMOL/L (ref 135–145)
TRIGL SERPL-MCNC: 267 MG/DL (ref 0–149)
TSH SERPL DL<=0.005 MIU/L-ACNC: 1.7 UIU/ML (ref 0.38–5.33)

## 2024-01-22 ENCOUNTER — HOSPITAL ENCOUNTER (OUTPATIENT)
Dept: RADIOLOGY | Facility: MEDICAL CENTER | Age: 74
End: 2024-01-22
Attending: SURGERY | Admitting: SURGERY
Payer: MEDICARE

## 2024-01-22 ENCOUNTER — HOSPITAL ENCOUNTER (OUTPATIENT)
Dept: RADIOLOGY | Facility: MEDICAL CENTER | Age: 74
End: 2024-01-22
Attending: SURGERY
Payer: MEDICARE

## 2024-01-22 ENCOUNTER — APPOINTMENT (OUTPATIENT)
Dept: ADMISSIONS | Facility: MEDICAL CENTER | Age: 74
End: 2024-01-22
Attending: SURGERY
Payer: MEDICARE

## 2024-01-22 DIAGNOSIS — D24.1 BENIGN NEOPLASM OF RIGHT BREAST: ICD-10-CM

## 2024-01-22 DIAGNOSIS — Z01.810 PRE-OPERATIVE CARDIOVASCULAR EXAMINATION: ICD-10-CM

## 2024-01-22 LAB — EKG IMPRESSION: NORMAL

## 2024-01-22 PROCEDURE — A4648 IMPLANTABLE TISSUE MARKER: HCPCS

## 2024-01-22 PROCEDURE — 93010 ELECTROCARDIOGRAM REPORT: CPT | Performed by: INTERNAL MEDICINE

## 2024-01-22 PROCEDURE — 93005 ELECTROCARDIOGRAM TRACING: CPT

## 2024-01-26 ENCOUNTER — HOSPITAL ENCOUNTER (OUTPATIENT)
Facility: MEDICAL CENTER | Age: 74
End: 2024-01-26
Attending: SURGERY | Admitting: SURGERY
Payer: MEDICARE

## 2024-01-26 ENCOUNTER — ANESTHESIA EVENT (OUTPATIENT)
Dept: SURGERY | Facility: MEDICAL CENTER | Age: 74
End: 2024-01-26
Payer: MEDICARE

## 2024-01-26 ENCOUNTER — ANESTHESIA (OUTPATIENT)
Dept: SURGERY | Facility: MEDICAL CENTER | Age: 74
End: 2024-01-26
Payer: MEDICARE

## 2024-01-26 ENCOUNTER — APPOINTMENT (OUTPATIENT)
Dept: RADIOLOGY | Facility: MEDICAL CENTER | Age: 74
End: 2024-01-26
Attending: SURGERY
Payer: MEDICARE

## 2024-01-26 VITALS
WEIGHT: 182.54 LBS | SYSTOLIC BLOOD PRESSURE: 114 MMHG | OXYGEN SATURATION: 93 % | BODY MASS INDEX: 30.41 KG/M2 | TEMPERATURE: 98 F | HEIGHT: 65 IN | DIASTOLIC BLOOD PRESSURE: 56 MMHG | RESPIRATION RATE: 12 BRPM | HEART RATE: 56 BPM

## 2024-01-26 DIAGNOSIS — G89.18 POSTOPERATIVE PAIN: ICD-10-CM

## 2024-01-26 DIAGNOSIS — D24.1 BENIGN NEOPLASM OF RIGHT BREAST: ICD-10-CM

## 2024-01-26 LAB — PATHOLOGY CONSULT NOTE: NORMAL

## 2024-01-26 PROCEDURE — 160048 HCHG OR STATISTICAL LEVEL 1-5: Performed by: SURGERY

## 2024-01-26 PROCEDURE — 700105 HCHG RX REV CODE 258: Performed by: SURGERY

## 2024-01-26 PROCEDURE — 700111 HCHG RX REV CODE 636 W/ 250 OVERRIDE (IP): Performed by: ANESTHESIOLOGY

## 2024-01-26 PROCEDURE — 160025 RECOVERY II MINUTES (STATS): Performed by: SURGERY

## 2024-01-26 PROCEDURE — 700101 HCHG RX REV CODE 250: Performed by: ANESTHESIOLOGY

## 2024-01-26 PROCEDURE — 160002 HCHG RECOVERY MINUTES (STAT): Performed by: SURGERY

## 2024-01-26 PROCEDURE — 76098 X-RAY EXAM SURGICAL SPECIMEN: CPT | Mod: RT

## 2024-01-26 PROCEDURE — 700101 HCHG RX REV CODE 250: Performed by: SURGERY

## 2024-01-26 PROCEDURE — 160009 HCHG ANES TIME/MIN: Performed by: SURGERY

## 2024-01-26 PROCEDURE — 160035 HCHG PACU - 1ST 60 MINS PHASE I: Performed by: SURGERY

## 2024-01-26 PROCEDURE — 160028 HCHG SURGERY MINUTES - 1ST 30 MINS LEVEL 3: Performed by: SURGERY

## 2024-01-26 PROCEDURE — 88307 TISSUE EXAM BY PATHOLOGIST: CPT

## 2024-01-26 PROCEDURE — 160046 HCHG PACU - 1ST 60 MINS PHASE II: Performed by: SURGERY

## 2024-01-26 RX ORDER — HYDROMORPHONE HYDROCHLORIDE 1 MG/ML
0.2 INJECTION, SOLUTION INTRAMUSCULAR; INTRAVENOUS; SUBCUTANEOUS
Status: DISCONTINUED | OUTPATIENT
Start: 2024-01-26 | End: 2024-01-26 | Stop reason: HOSPADM

## 2024-01-26 RX ORDER — DEXAMETHASONE SODIUM PHOSPHATE 4 MG/ML
INJECTION, SOLUTION INTRA-ARTICULAR; INTRALESIONAL; INTRAMUSCULAR; INTRAVENOUS; SOFT TISSUE PRN
Status: DISCONTINUED | OUTPATIENT
Start: 2024-01-26 | End: 2024-01-26 | Stop reason: SURG

## 2024-01-26 RX ORDER — ONDANSETRON 2 MG/ML
4 INJECTION INTRAMUSCULAR; INTRAVENOUS
Status: DISCONTINUED | OUTPATIENT
Start: 2024-01-26 | End: 2024-01-26 | Stop reason: HOSPADM

## 2024-01-26 RX ORDER — KETOROLAC TROMETHAMINE 15 MG/ML
INJECTION, SOLUTION INTRAMUSCULAR; INTRAVENOUS PRN
Status: DISCONTINUED | OUTPATIENT
Start: 2024-01-26 | End: 2024-01-26 | Stop reason: SURG

## 2024-01-26 RX ORDER — BUPIVACAINE HYDROCHLORIDE AND EPINEPHRINE 5; 5 MG/ML; UG/ML
INJECTION, SOLUTION EPIDURAL; INTRACAUDAL; PERINEURAL
Status: DISCONTINUED
Start: 2024-01-26 | End: 2024-01-26 | Stop reason: HOSPADM

## 2024-01-26 RX ORDER — ONDANSETRON 2 MG/ML
INJECTION INTRAMUSCULAR; INTRAVENOUS PRN
Status: DISCONTINUED | OUTPATIENT
Start: 2024-01-26 | End: 2024-01-26 | Stop reason: SURG

## 2024-01-26 RX ORDER — OXYCODONE HCL 5 MG/5 ML
10 SOLUTION, ORAL ORAL
Status: DISCONTINUED | OUTPATIENT
Start: 2024-01-26 | End: 2024-01-26 | Stop reason: HOSPADM

## 2024-01-26 RX ORDER — BUPIVACAINE HYDROCHLORIDE AND EPINEPHRINE 5; 5 MG/ML; UG/ML
INJECTION, SOLUTION PERINEURAL
Status: DISCONTINUED | OUTPATIENT
Start: 2024-01-26 | End: 2024-01-26 | Stop reason: HOSPADM

## 2024-01-26 RX ORDER — HALOPERIDOL 5 MG/ML
1 INJECTION INTRAMUSCULAR
Status: DISCONTINUED | OUTPATIENT
Start: 2024-01-26 | End: 2024-01-26 | Stop reason: HOSPADM

## 2024-01-26 RX ORDER — DIPHENHYDRAMINE HYDROCHLORIDE 50 MG/ML
12.5 INJECTION INTRAMUSCULAR; INTRAVENOUS
Status: DISCONTINUED | OUTPATIENT
Start: 2024-01-26 | End: 2024-01-26 | Stop reason: HOSPADM

## 2024-01-26 RX ORDER — HYDROMORPHONE HYDROCHLORIDE 1 MG/ML
0.1 INJECTION, SOLUTION INTRAMUSCULAR; INTRAVENOUS; SUBCUTANEOUS
Status: DISCONTINUED | OUTPATIENT
Start: 2024-01-26 | End: 2024-01-26 | Stop reason: HOSPADM

## 2024-01-26 RX ORDER — OXYCODONE HCL 5 MG/5 ML
5 SOLUTION, ORAL ORAL
Status: DISCONTINUED | OUTPATIENT
Start: 2024-01-26 | End: 2024-01-26 | Stop reason: HOSPADM

## 2024-01-26 RX ORDER — HYDROCODONE BITARTRATE AND ACETAMINOPHEN 5; 325 MG/1; MG/1
1-2 TABLET ORAL EVERY 6 HOURS PRN
Qty: 15 TABLET | Refills: 0 | Status: SHIPPED | OUTPATIENT
Start: 2024-01-26 | End: 2024-01-29

## 2024-01-26 RX ORDER — EPHEDRINE SULFATE 50 MG/ML
5 INJECTION, SOLUTION INTRAVENOUS
Status: DISCONTINUED | OUTPATIENT
Start: 2024-01-26 | End: 2024-01-26 | Stop reason: HOSPADM

## 2024-01-26 RX ORDER — SODIUM CHLORIDE, SODIUM LACTATE, POTASSIUM CHLORIDE, CALCIUM CHLORIDE 600; 310; 30; 20 MG/100ML; MG/100ML; MG/100ML; MG/100ML
INJECTION, SOLUTION INTRAVENOUS CONTINUOUS
Status: DISCONTINUED | OUTPATIENT
Start: 2024-01-26 | End: 2024-01-26 | Stop reason: HOSPADM

## 2024-01-26 RX ORDER — CEFAZOLIN SODIUM 1 G/3ML
INJECTION, POWDER, FOR SOLUTION INTRAMUSCULAR; INTRAVENOUS PRN
Status: DISCONTINUED | OUTPATIENT
Start: 2024-01-26 | End: 2024-01-26 | Stop reason: SURG

## 2024-01-26 RX ORDER — LIDOCAINE HYDROCHLORIDE 20 MG/ML
INJECTION, SOLUTION EPIDURAL; INFILTRATION; INTRACAUDAL; PERINEURAL PRN
Status: DISCONTINUED | OUTPATIENT
Start: 2024-01-26 | End: 2024-01-26 | Stop reason: SURG

## 2024-01-26 RX ORDER — LABETALOL HYDROCHLORIDE 5 MG/ML
5 INJECTION, SOLUTION INTRAVENOUS
Status: DISCONTINUED | OUTPATIENT
Start: 2024-01-26 | End: 2024-01-26 | Stop reason: HOSPADM

## 2024-01-26 RX ORDER — HYDROMORPHONE HYDROCHLORIDE 1 MG/ML
0.4 INJECTION, SOLUTION INTRAMUSCULAR; INTRAVENOUS; SUBCUTANEOUS
Status: DISCONTINUED | OUTPATIENT
Start: 2024-01-26 | End: 2024-01-26 | Stop reason: HOSPADM

## 2024-01-26 RX ADMIN — CEFAZOLIN 2 G: 1 INJECTION, POWDER, FOR SOLUTION INTRAMUSCULAR; INTRAVENOUS at 09:27

## 2024-01-26 RX ADMIN — DEXAMETHASONE SODIUM PHOSPHATE 8 MG: 4 INJECTION INTRA-ARTICULAR; INTRALESIONAL; INTRAMUSCULAR; INTRAVENOUS; SOFT TISSUE at 09:25

## 2024-01-26 RX ADMIN — KETOROLAC TROMETHAMINE 15 MG: 15 INJECTION, SOLUTION INTRAMUSCULAR; INTRAVENOUS at 09:36

## 2024-01-26 RX ADMIN — SODIUM CHLORIDE, POTASSIUM CHLORIDE, SODIUM LACTATE AND CALCIUM CHLORIDE: 600; 310; 30; 20 INJECTION, SOLUTION INTRAVENOUS at 08:02

## 2024-01-26 RX ADMIN — SODIUM CHLORIDE, POTASSIUM CHLORIDE, SODIUM LACTATE AND CALCIUM CHLORIDE: 600; 310; 30; 20 INJECTION, SOLUTION INTRAVENOUS at 09:21

## 2024-01-26 RX ADMIN — ONDANSETRON 4 MG: 2 INJECTION INTRAMUSCULAR; INTRAVENOUS at 09:25

## 2024-01-26 RX ADMIN — PROPOFOL 60 MG: 10 INJECTION, EMULSION INTRAVENOUS at 09:25

## 2024-01-26 RX ADMIN — FENTANYL CITRATE 50 MCG: 50 INJECTION, SOLUTION INTRAMUSCULAR; INTRAVENOUS at 09:28

## 2024-01-26 RX ADMIN — LIDOCAINE HYDROCHLORIDE 100 MG: 20 INJECTION, SOLUTION EPIDURAL; INFILTRATION; INTRACAUDAL at 09:25

## 2024-01-26 ASSESSMENT — PAIN DESCRIPTION - PAIN TYPE
TYPE: SURGICAL PAIN

## 2024-01-26 ASSESSMENT — FIBROSIS 4 INDEX: FIB4 SCORE: 1.34

## 2024-01-26 ASSESSMENT — PAIN SCALES - GENERAL: PAIN_LEVEL: 2

## 2024-01-26 NOTE — ANESTHESIA POSTPROCEDURE EVALUATION
Patient: Lucía Rodriguez    Procedure Summary       Date: 01/26/24 Room / Location: [facility] ROOM 23 / SURGERY SAME DAY [facility]    Anesthesia Start: 0921 Anesthesia Stop: 0948    Procedure: RIGHT CHIQUIS LOCALIZED PARTIAL MASTECTOMY (Right: Breast) Diagnosis: (INTRADUCTAL PAPILLOMA OF RIGHT BREAST)    Surgeons: Shanna Fuller M.D. Responsible Provider: Shad Elias M.D.    Anesthesia Type: general ASA Status: 2            Final Anesthesia Type: general  Last vitals  BP   Blood Pressure : 105/54    Temp   36.7 °C (98 °F)    Pulse   61   Resp   (!) 10    SpO2   99 %      Anesthesia Post Evaluation    Patient location during evaluation: PACU  Patient participation: complete - patient participated  Level of consciousness: awake and alert  Pain score: 2    Airway patency: patent  Anesthetic complications: no  Cardiovascular status: adequate and hemodynamically stable  Respiratory status: acceptable  Hydration status: acceptable              No notable events documented.     Nurse Pain Score: 2 (NPRS)

## 2024-01-26 NOTE — PROGRESS NOTES
Discharge Instructions, Lumpectomy:    Care of Your Incisions:   You can shower with the dressing on as it is waterproof.  Avoid getting lotions, powders or deodorant on the incision while it is healing.   Don’t worry if the area under either incision feels firm or hard. This is normal and usually softens within a few months.    How to Manage Discomfort After Surgery:   It is normal to have tenderness, discomfort or mild swelling at the site of the incisions.     You may also feel:  - Numbness at the incisions.  - Occasional shooting pains in the breast as you heal.     You may be given a prescription for pain medication prior to being discharged from the hospital. If you are having pain, take the medication as directed by your physician and by the label directions. You should be comfortable and moving around. Most women use some prescription pain medication, though some need only over the counter pain medication, such as ibuprofen (Motrin) or acetaminophen (Tylenol). Some women have little pain and take nothing at all. Whatever amount of pain you have, it is important to listen to your body and use the medication if needed during your recovery.     Prescription pain medication may cause constipation. If you are having problems, use what you normally would or call your nurse for suggestions. It also helps to stay regular by including fiber in your diet (for example: bran or fruits and vegetables) and drink plenty of liquids (water, juice, etc.).    Activity:   You may resume your normal routine, but avoid heavy lifting (over 10 pounds), pushing or pulling until your first post-operative visit, unless otherwise specified by your surgeon.   Do not drive for at least 24-48 hours after surgery (unless otherwise directed by your surgeon), or while you are taking prescription pain medicine. Prescription pain medicine causes drowsiness (makes you sleepy) so you should avoid doing any tasks where you should be awake and  alert (driving, operating machinery, etc).    When to Call Your Doctor/Nurse:   If you have a fever greater than 100.5, increased pain, redness or warmth at the area around the incision, drainage from the incision or around the drain, or swelling of the arm. Call Dr. Fuller's office at [phone] with any other questions or concerns.    You should have an appointment to see Dr. Fuller about a week after surgery, call [phone] if you do not already have an appointment.    Office address:  [address] [place] [address] [address]  [place] [address]      Shanna Fuller M.D.  [facility]  [phone]

## 2024-01-26 NOTE — OR NURSING
0946 - Pt to PACU from OR. Report from anesthesia and OR RN. On 3L O2 via mask. Respirations even and unlabored. VSS. Oral airway in place. Incision to rt breast c/d/I.    0952 - Oral airway removed. Pt denies pain or nausea.    1021 - Update provided to pt spouse by telephone.    1036 - Phase II criteria met. Pt awake and tolerating sips of water and on RA. States pain is tolerable 5/10.    1100 - Discharge instructions reviewed with and signed by pt spouse. Pt discharged by wheelchair by RN to pt spouse. NAD. VSS. PIV removed.

## 2024-01-26 NOTE — ANESTHESIA PROCEDURE NOTES
Airway    Date/Time: 1/26/2024 9:26 AM    Performed by: Shad Elias M.D.  Authorized by: Shad Elias M.D.    Location:  OR  Urgency:  Elective  Indications for Airway Management:  Anesthesia      Spontaneous Ventilation: absent    Sedation Level:  Deep  Preoxygenated: Yes    Patient Position:  Sniffing  MILS Maintained Throughout: Yes    Mask Difficulty Assessment:  1 - vent by mask  Final Airway Type:  Supraglottic airway  Final Supraglottic Airway:  Standard LMA    SGA Size:  4  Number of Attempts at Approach:  1

## 2024-01-26 NOTE — DISCHARGE INSTRUCTIONS
If any questions arise, call your provider.  If your provider is not available, please feel free to call the Surgical Center at [phone].    MEDICATIONS: Resume taking daily medication.  Take prescribed pain medication with food.  If no medication is prescribed, you may take non-aspirin pain medication if needed.  PAIN MEDICATION CAN BE VERY CONSTIPATING.  Take a stool softener or laxative such as senokot, pericolace, or milk of magnesia if needed.    Toradol, an NSAID like ibuprofen was given at 0936. You may take a dose of ibuprofen after 3:30pm.    What to Expect Post Anesthesia    Rest and take it easy for the first 24 hours.  A responsible adult is recommended to remain with you during that time.  It is normal to feel sleepy.  We encourage you to not do anything that requires balance, judgment or coordination.    FOR 24 HOURS DO NOT:  Drive, operate machinery or run household appliances.  Drink beer or alcoholic beverages.  Make important decisions or sign legal documents.    To avoid nausea, slowly advance diet as tolerated, avoiding spicy or greasy foods for the first day.  Add more substantial food to your diet according to your provider's instructions.  Babies can be fed formula or breast milk as soon as they are hungry.  INCREASE FLUIDS AND FIBER TO AVOID CONSTIPATION.    MILD FLU-LIKE SYMPTOMS ARE NORMAL.  YOU MAY EXPERIENCE GENERALIZED MUSCLE ACHES, THROAT IRRITATION, HEADACHE AND/OR SOME NAUSEA.    Discharge Instructions, Lumpectomy:     Care of Your Incisions:   You can shower with the dressing on as it is waterproof.  Avoid getting lotions, powders or deodorant on the incision while it is healing.   Don’t worry if the area under either incision feels firm or hard. This is normal and usually softens within a few months.     How to Manage Discomfort After Surgery:   It is normal to have tenderness, discomfort or mild swelling at the site of the incisions.      You may also feel:  - Numbness at the  incisions.  - Occasional shooting pains in the breast as you heal.      You may be given a prescription for pain medication prior to being discharged from the hospital. If you are having pain, take the medication as directed by your physician and by the label directions. You should be comfortable and moving around. Most women use some prescription pain medication, though some need only over the counter pain medication, such as ibuprofen (Motrin) or acetaminophen (Tylenol). Some women have little pain and take nothing at all. Whatever amount of pain you have, it is important to listen to your body and use the medication if needed during your recovery.      Prescription pain medication may cause constipation. If you are having problems, use what you normally would or call your nurse for suggestions. It also helps to stay regular by including fiber in your diet (for example: bran or fruits and vegetables) and drink plenty of liquids (water, juice, etc.).     Activity:   You may resume your normal routine, but avoid heavy lifting (over 10 pounds), pushing or pulling until your first post-operative visit, unless otherwise specified by your surgeon.   Do not drive for at least 24-48 hours after surgery (unless otherwise directed by your surgeon), or while you are taking prescription pain medicine. Prescription pain medicine causes drowsiness (makes you sleepy) so you should avoid doing any tasks where you should be awake and alert (driving, operating machinery, etc).     When to Call Your Doctor/Nurse:   If you have a fever greater than 100.5, increased pain, redness or warmth at the area around the incision, drainage from the incision or around the drain, or swelling of the arm. Call Dr. Fuller's office at [phone] with any other questions or concerns.     You should have an appointment to see Dr. Fuller about a week after surgery, call [phone] if you do not already have an appointment.     Office address:  [address] [facility] [address]  [place] [address]        Shanna Fuller M.D.  [facility]  [phone]

## 2024-01-26 NOTE — ANESTHESIA PREPROCEDURE EVALUATION
Case: [id number] Date/Time: 01/26/24 0915    Procedure: RIGHT CHIQUIS LOCALIZED PARTIAL MASTECTOMY (Right: Breast)    Anesthesia type: General    Pre-op diagnosis: INTRADUCTAL PAPILLOMA OF RIGHT BREAST    Location: [facility] ROOM 23 / SURGERY SAME DAY [facility]    Surgeons: Shanna Fuller M.D.          73yoF with PMHx of afib, ckd stage 3, hld    +prolonged wakeup from anesthesia  NPO  Allergies neosporin, ofloxacin, tape    Relevant Problems   CARDIAC   (positive) Atrial fibrillation (HCC)         (positive) CKD (chronic kidney disease) stage 3, GFR 30-59 ml/min (HCC)   (positive) Hepatic steatosis       Physical Exam    Airway   Mallampati: II       Cardiovascular - normal exam     Dental - normal exam           Pulmonary - normal exam     Abdominal - normal exam     Neurological - normal exam                   Anesthesia Plan    ASA 2       Plan - general       Airway plan will be LMA          Induction: intravenous    Postoperative Plan: Postoperative administration of opioids is intended.    Pertinent diagnostic labs and testing reviewed    Informed Consent:    Anesthetic plan and risks discussed with patient.

## 2024-01-26 NOTE — ANESTHESIA TIME REPORT
Anesthesia Start and Stop Event Times       Date Time Event    1/26/2024 0909 Ready for Procedure     0921 Anesthesia Start     0948 Anesthesia Stop          Responsible Staff  01/26/24      Name Role Begin End    Shad Elias M.D. Anesth 0921 0948          Overtime Reason:  no overtime (within assigned shift)    Comments:

## 2024-01-26 NOTE — OP REPORT
Operative Report    Date: 1/26/2024    Surgeon: Shanna Fuller M.D.    Assistant: Breanne MARTINEZ    Anesthesiologist: Curt ROSALES    Pre-operative Diagnosis: right breast intraductal papilloma    Post-operative Diagnosis: same     Procedure: right breast ZAIDA localized excisional biopsy    Findings: biopsy clip and zaida within excised specimen.    Procedure in detail: The patient was identified in the pre-operative holding area and brought to the operating room. Prior to the procedure, she underwent ZAIDA localization with radiology due to the non-palpable nature of the lesion.    Correct side and site were identified. Pre-operative antibiotics of ancef were administered prior to the procedure. Anesthesia was smoothly induced. The patient was prepped and draped in the usual sterile fashion.    The skin was infiltrated with local anesthetic and a curvilinear incision was made in the areola of the right breast, using the ZAIDA probe to guide me. The breast tissue was grasped with Allis clamps and a core of tissue was removed around the ZAIDA. The specimen was then completely removed, marked for orientation, and was sent for specimen radiography; I confirmed the biopsy clip and zaida  to be within the excised specimen. Meticulous hemostasis was achieved with electrocautery. The area was irrigated and suctioned. The skin was closed in two layers with vicryl and monocryl. Sterile dressings were applied.     The patient was awakened from anesthetic, and was taken to the recovery room in stable condition.    Sponge and needle counts were correct at the end of the case.     Specimen:    1. right breast ZAIDA localized partial mastectomy    EBL: minimal    Dispo: stable, extubated, to PACU    Shanna Fuller M.D.  [facility]  [phone]

## 2024-02-05 DIAGNOSIS — N63.11 MASS OF UPPER OUTER QUADRANT OF RIGHT BREAST: ICD-10-CM

## 2024-02-06 ENCOUNTER — TELEPHONE (OUTPATIENT)
Dept: CARDIOLOGY | Facility: MEDICAL CENTER | Age: 74
End: 2024-02-06

## 2024-02-06 ENCOUNTER — OFFICE VISIT (OUTPATIENT)
Dept: CARDIOLOGY | Facility: MEDICAL CENTER | Age: 74
End: 2024-02-06
Attending: INTERNAL MEDICINE
Payer: MEDICARE

## 2024-02-06 VITALS
WEIGHT: 183 LBS | OXYGEN SATURATION: 96 % | DIASTOLIC BLOOD PRESSURE: 68 MMHG | RESPIRATION RATE: 16 BRPM | HEART RATE: 69 BPM | BODY MASS INDEX: 30.49 KG/M2 | HEIGHT: 65 IN | SYSTOLIC BLOOD PRESSURE: 110 MMHG

## 2024-02-06 DIAGNOSIS — I48.91 ATRIAL FIBRILLATION, UNSPECIFIED TYPE (HCC): ICD-10-CM

## 2024-02-06 DIAGNOSIS — D68.318 HEMORRHAGIC DISORDER DUE TO CIRCULATING ANTICOAGULANTS (HCC): ICD-10-CM

## 2024-02-06 DIAGNOSIS — Z79.01 ANTICOAGULATED: ICD-10-CM

## 2024-02-06 LAB — EKG IMPRESSION: NORMAL

## 2024-02-06 PROCEDURE — 93010 ELECTROCARDIOGRAM REPORT: CPT | Performed by: INTERNAL MEDICINE

## 2024-02-06 PROCEDURE — 99214 OFFICE O/P EST MOD 30 MIN: CPT | Performed by: INTERNAL MEDICINE

## 2024-02-06 PROCEDURE — 99212 OFFICE O/P EST SF 10 MIN: CPT | Performed by: INTERNAL MEDICINE

## 2024-02-06 PROCEDURE — 3078F DIAST BP <80 MM HG: CPT | Performed by: INTERNAL MEDICINE

## 2024-02-06 PROCEDURE — 93005 ELECTROCARDIOGRAM TRACING: CPT | Performed by: INTERNAL MEDICINE

## 2024-02-06 PROCEDURE — 3074F SYST BP LT 130 MM HG: CPT | Performed by: INTERNAL MEDICINE

## 2024-02-06 RX ORDER — FLECAINIDE ACETATE 50 MG/1
TABLET ORAL
Qty: 270 TABLET | Refills: 3 | Status: SHIPPED | OUTPATIENT
Start: 2024-02-06

## 2024-02-06 ASSESSMENT — FIBROSIS 4 INDEX: FIB4 SCORE: 1.34

## 2024-02-06 NOTE — PROGRESS NOTES
Chief Complaint   Patient presents with    Atrial Fibrillation     F/v Dx:Atrial fibrillation, unspecified type (HCC)       Subjective     Siena Rodriguez is a 73 y.o. female who presents today status post breast surgery.  Benign lesion.  On Eliquis.  Fairly expensive.  On flecainide and metoprolol.  Overall feels well.    Past Medical History:   Diagnosis Date    Anesthesia     Delayed emergence; Hair loss    Arthritis     Throughout her body    Atrial fibrillation (HCC)     Atrial fibrillation    Blood clotting disorder (HCC) 2020    DVT (right)    Calculus of gallbladder without cholecystitis 01/09/2017    Cancer (HCC)     skin cancer basal cell    CATARACT     Bilateral IOL; loss of vision in Right eye starting in 2000.    Central stenosis of spinal canal 01/18/2021    Delayed emergence from general anesthesia     Fatty liver     Glaucoma     right eye    High cholesterol     Hyperlipidemia     IBS (irritable bowel syndrome)     Macular cyst, hole, or pseudohole of retina     Presence of IVC filter 2021    Pulmonary nodules/lesions, multiple 07/06/2016    stable on CTs, no further work up needed     Past Surgical History:   Procedure Laterality Date    PB MASTECTOMY, PARTIAL Right 1/26/2024    Procedure: RIGHT CHIQUIS LOCALIZED PARTIAL MASTECTOMY;  Surgeon: Shanna Fuller M.D.;  Location: SURGERY SAME DAY [facility];  Service: General    CERVICAL DISK AND FUSION ANTERIOR N/A 5/3/2021    Procedure: DISCECTOMY, SPINE, CERVICAL, ANTERIOR APPROACH, WITH FUSION - C4-7 WITH PLATE.;  Surgeon: Heri Greene M.D.;  Location: SURGERY [facility];  Service: Neurosurgery    OTHER  04/2021    IVC filter placement    GYN SURGERY      HYSTERECTOMY    MENISCUS REPAIR Right     OTHER      right eye cataract sx    OTHER      multiple surgeries to left eye     Family History   Problem Relation Age of Onset    Heart Disease Father         triple bypass    Cancer Father     Cancer Mother     Clotting Disorder Neg Hx      Social  History     Socioeconomic History    Marital status:      Spouse name: Not on file    Number of children: Not on file    Years of education: Not on file    Highest education level: Not on file   Occupational History    Not on file   Tobacco Use    Smoking status: Never     Passive exposure: Past    Smokeless tobacco: Never   Vaping Use    Vaping Use: Never used   Substance and Sexual Activity    Alcohol use: No    Drug use: Never    Sexual activity: Yes     Partners: Male   Other Topics Concern    Not on file   Social History Narrative    Not on file     Social Determinants of Health     Financial Resource Strain: Not on file   Food Insecurity: Not on file   Transportation Needs: Not on file   Physical Activity: Not on file   Stress: Not on file   Social Connections: Not on file   Intimate Partner Violence: Not on file   Housing Stability: Not on file     Allergies   Allergen Reactions    Neosporin [Neomycin-Polymyxin B] Rash     Rxn = 6/2016    Tape Unspecified     Severe blister reaction to adhesive from tape on skin (TEGADERM if left >24 hours); Paper tape ok    Ofloxacin Swelling     Outpatient Encounter Medications as of 2/6/2024   Medication Sig Dispense Refill    apixaban (ELIQUIS) 5mg Tab Take 1 Tablet by mouth 2 times a day. 180 Tablet 3    flecainide (TAMBOCOR) 50 MG tablet Take 1 Tablet by mouth every morning AND 2 Tablets every evening. 270 Tablet 3    metoprolol tartrate (LOPRESSOR) 25 MG Tab Take 0.5 Tablets by mouth 2 times a day. 90 Tablet 3    atorvastatin (LIPITOR) 20 MG Tab Take 1 Tablet by mouth every day. 90 Tablet 3    prednisoLONE acetate (PRED FORTE) 1 % Suspension Administer 1 Drop into the right eye 4 times a day.      Ca Phosphate-Cholecalciferol ([employer] CALCIUM-VITAMIN D PO) Take 1 Tablet by mouth every day.      Psyllium (METAMUCIL PO) Take 1 Scoop by mouth every day.      [DISCONTINUED] ELIQUIS 5 MG Tab TAKE 1 TABLET BY MOUTH TWICE A  Tablet 0    [DISCONTINUED] flecainide  "(TAMBOCOR) 50 MG tablet Take 1 Tablet by mouth every morning AND 2 Tablets every evening. 270 Tablet 0    [DISCONTINUED] metoprolol tartrate (LOPRESSOR) 25 MG Tab TAKE 1/2 TABLET BY MOUTH TWICE A DAY 90 Tablet 3     No facility-administered encounter medications on file as of 2/6/2024.     ROS           Objective     /68 (BP Location: Left arm, Patient Position: Sitting, BP Cuff Size: Adult)   Pulse 69   Resp 16   Ht 1.651 m (5' 5\")   Wt 83 kg (183 lb)   LMP 12/28/2003   SpO2 96%   BMI 30.45 kg/m²     Physical Exam  Constitutional:       Appearance: She is well-developed.   HENT:      Head: Normocephalic and atraumatic.   Eyes:      Pupils: Pupils are equal, round, and reactive to light.   Cardiovascular:      Rate and Rhythm: Normal rate and regular rhythm.      Heart sounds: Normal heart sounds. No murmur heard.     No friction rub. No gallop.   Pulmonary:      Effort: Pulmonary effort is normal.      Breath sounds: Normal breath sounds.   Abdominal:      General: Bowel sounds are normal.      Palpations: Abdomen is soft.   Musculoskeletal:         General: Normal range of motion.      Cervical back: Normal range of motion and neck supple.   Skin:     General: Skin is warm.   Neurological:      Mental Status: She is alert and oriented to person, place, and time.      Cranial Nerves: No cranial nerve deficit.   Psychiatric:         Behavior: Behavior normal.         Thought Content: Thought content normal.         Judgment: Judgment normal.                Assessment & Plan     1. Atrial fibrillation, unspecified type (HCC)  EKG    apixaban (ELIQUIS) 5mg Tab    flecainide (TAMBOCOR) 50 MG tablet    metoprolol tartrate (LOPRESSOR) 25 MG Tab      2. Anticoagulated        3. 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  PAF continue current regimen.  2.  Refilled Eliquis.  Will discuss with pharmacy whether there is a " cheaper option with a different DOAC.  3.  Follow-up with me in 1 year.

## 2024-02-06 NOTE — TELEPHONE ENCOUNTER
Can you check to se if there is a cheaper option for her DOAC. I also told her about [facility]. Paying about 1800 per year.

## 2024-02-12 ENCOUNTER — TELEPHONE (OUTPATIENT)
Dept: PHARMACY | Facility: MEDICAL CENTER | Age: 74
End: 2024-02-12
Payer: MEDICARE

## 2024-02-12 ENCOUNTER — TELEPHONE (OUTPATIENT)
Dept: CARDIOLOGY | Facility: MEDICAL CENTER | Age: 74
End: 2024-02-12
Payer: MEDICARE

## 2024-02-12 PROCEDURE — RXMED WILLOW AMBULATORY MEDICATION CHARGE: Performed by: INTERNAL MEDICINE

## 2024-02-12 NOTE — TELEPHONE ENCOUNTER
Contact:  Phone number:[phone] (home)    Name of person spoken with and relationship to patient: Self   Patient’s Adherence:  How patient is doing on medication: Well    How many missed doses and reason: 0 N/A    Any new medications: No    Any new conditions: No    Any new allergies: No    Any new side effects: No    Any new diagnoses: No    How many doses remainin    Did patient want to speak with pharmacist: No   Delivery:  Delivery date and method: 24 via  4-7pm        Signature required: No     Any additional details for :  Can leave next to moose by the front door   Teach Appointment Date:  24   Shipping Address:  [address] [place] [id number]   Medication(name,strength and dose):  Eliquis 5mg tabs   Copay:  $203   Payment Method:  Credit card on file   Supplies:  Welcome Packet   Additional Information:  Patient is approved for 340B IM SPPROVED PRICING per [place] [facility]

## 2024-02-12 NOTE — TELEPHONE ENCOUNTER
Medication: Eliquis 5mg tabs  Type of Insurance: Government funded (Medicare/Medicare Advantage)  Type of Financial assistance requested: 340B  Source: [facility]  Source Phone #: [phone]  Outcome: Approved  Effective dates: 02/12/2024 until further notice  Details/Billing Information:   [employer] IM APPROVED PRICING  Final Copay: $203 / 90 days

## 2024-02-15 ENCOUNTER — PHARMACY VISIT (OUTPATIENT)
Dept: PHARMACY | Facility: MEDICAL CENTER | Age: 74
End: 2024-02-15
Payer: COMMERCIAL

## 2024-05-01 NOTE — DISCHARGE INSTRUCTIONS
Please follow-up with Dr. Thakkar.  You will likely need an event recorder.  Drink plenty of fluids and stay Covid safe  
Pt 
Pt refused insulin

## 2024-05-07 ENCOUNTER — TELEPHONE (OUTPATIENT)
Dept: CARDIOLOGY | Facility: MEDICAL CENTER | Age: 74
End: 2024-05-07
Payer: MEDICARE

## 2024-05-07 PROCEDURE — RXMED WILLOW AMBULATORY MEDICATION CHARGE: Performed by: INTERNAL MEDICINE

## 2024-05-07 NOTE — TELEPHONE ENCOUNTER
Contact:  Phone number:[phone] (home)    Name of person spoken with and relationship to patient: PATIENT   Patient’s Adherence:  How patient is doing on medication: Well    How many missed doses and reason: 0 N/A    Any new medications: No    Any new conditions: No    Any new allergies: No    Any new side effects: No    Any new diagnoses: No    How many doses remainin    Did patient want to speak with pharmacist: No   Delivery:  Delivery date and method: 5/10/24 via     Needs by Date: 5/10/24    Signature required: No     Any additional details for : N/A   Teach Appointment Date:  N/A   Shipping Address:  [address] [place] [id number]   Medication(name,strength and dose):  ELIQUIS 5MG TABLET   Copay:  $203.10   Payment Method:  Credit card on file   Supplies:  N/A   Additional Information:  N/A

## 2024-05-10 ENCOUNTER — PHARMACY VISIT (OUTPATIENT)
Dept: PHARMACY | Facility: MEDICAL CENTER | Age: 74
End: 2024-05-10
Payer: COMMERCIAL

## 2024-07-29 PROCEDURE — RXMED WILLOW AMBULATORY MEDICATION CHARGE: Performed by: INTERNAL MEDICINE

## 2024-07-30 ENCOUNTER — PHARMACY VISIT (OUTPATIENT)
Dept: PHARMACY | Facility: MEDICAL CENTER | Age: 74
End: 2024-07-30
Payer: COMMERCIAL

## 2024-08-01 PROCEDURE — RXMED WILLOW AMBULATORY MEDICATION CHARGE: Performed by: INTERNAL MEDICINE

## 2024-08-05 ENCOUNTER — PHARMACY VISIT (OUTPATIENT)
Dept: PHARMACY | Facility: MEDICAL CENTER | Age: 74
End: 2024-08-05
Payer: COMMERCIAL

## 2024-08-13 ENCOUNTER — APPOINTMENT (RX ONLY)
Dept: URBAN - METROPOLITAN AREA CLINIC 4 | Facility: CLINIC | Age: 74
Setting detail: DERMATOLOGY
End: 2024-08-13

## 2024-08-13 DIAGNOSIS — L81.4 OTHER MELANIN HYPERPIGMENTATION: ICD-10-CM

## 2024-08-13 DIAGNOSIS — D22 MELANOCYTIC NEVI: ICD-10-CM

## 2024-08-13 DIAGNOSIS — L82.1 OTHER SEBORRHEIC KERATOSIS: ICD-10-CM

## 2024-08-13 DIAGNOSIS — L57.0 ACTINIC KERATOSIS: ICD-10-CM

## 2024-08-13 DIAGNOSIS — D18.0 HEMANGIOMA: ICD-10-CM

## 2024-08-13 DIAGNOSIS — Z85.828 PERSONAL HISTORY OF OTHER MALIGNANT NEOPLASM OF SKIN: ICD-10-CM

## 2024-08-13 DIAGNOSIS — L57.8 OTHER SKIN CHANGES DUE TO CHRONIC EXPOSURE TO NONIONIZING RADIATION: ICD-10-CM

## 2024-08-13 PROBLEM — D22.5 MELANOCYTIC NEVI OF TRUNK: Status: ACTIVE | Noted: 2024-08-13

## 2024-08-13 PROBLEM — D18.01 HEMANGIOMA OF SKIN AND SUBCUTANEOUS TISSUE: Status: ACTIVE | Noted: 2024-08-13

## 2024-08-13 PROCEDURE — ? MEDICATION COUNSELING

## 2024-08-13 PROCEDURE — 17003 DESTRUCT PREMALG LES 2-14: CPT

## 2024-08-13 PROCEDURE — ? LIQUID NITROGEN

## 2024-08-13 PROCEDURE — 99213 OFFICE O/P EST LOW 20 MIN: CPT | Mod: 25

## 2024-08-13 PROCEDURE — ? ADDITIONAL NOTES

## 2024-08-13 PROCEDURE — 17000 DESTRUCT PREMALG LESION: CPT

## 2024-08-13 PROCEDURE — ? COUNSELING

## 2024-08-13 ASSESSMENT — LOCATION DETAILED DESCRIPTION DERM
LOCATION DETAILED: RIGHT SUPERIOR PARIETAL SCALP
LOCATION DETAILED: LEFT SUPERIOR UPPER BACK
LOCATION DETAILED: RIGHT INFERIOR CENTRAL MALAR CHEEK
LOCATION DETAILED: RIGHT SUPERIOR LATERAL MALAR CHEEK
LOCATION DETAILED: RIGHT INFERIOR ANTERIOR NECK
LOCATION DETAILED: RIGHT INFERIOR MEDIAL UPPER BACK
LOCATION DETAILED: RIGHT MEDIAL FRONTAL SCALP
LOCATION DETAILED: LEFT SUPERIOR MEDIAL FOREHEAD
LOCATION DETAILED: INFERIOR MID FOREHEAD
LOCATION DETAILED: RIGHT SUPERIOR UPPER BACK
LOCATION DETAILED: MEDIAL FRONTAL SCALP
LOCATION DETAILED: LEFT MEDIAL FRONTAL SCALP
LOCATION DETAILED: LEFT RADIAL DORSAL HAND
LOCATION DETAILED: LEFT CENTRAL FRONTAL SCALP
LOCATION DETAILED: RIGHT RADIAL DORSAL HAND

## 2024-08-13 ASSESSMENT — LOCATION SIMPLE DESCRIPTION DERM
LOCATION SIMPLE: FRONTAL SCALP
LOCATION SIMPLE: INFERIOR FOREHEAD
LOCATION SIMPLE: RIGHT HAND
LOCATION SIMPLE: LEFT HAND
LOCATION SIMPLE: SCALP
LOCATION SIMPLE: RIGHT UPPER BACK
LOCATION SIMPLE: RIGHT ANTERIOR NECK
LOCATION SIMPLE: LEFT UPPER BACK
LOCATION SIMPLE: RIGHT CHEEK
LOCATION SIMPLE: LEFT SCALP
LOCATION SIMPLE: RIGHT SCALP
LOCATION SIMPLE: LEFT FOREHEAD

## 2024-08-13 ASSESSMENT — LOCATION ZONE DERM
LOCATION ZONE: NECK
LOCATION ZONE: SCALP
LOCATION ZONE: TRUNK
LOCATION ZONE: FACE
LOCATION ZONE: HAND

## 2024-08-13 NOTE — PROCEDURE: ADDITIONAL NOTES
Additional Notes: Trade size of [place] was given to patient to use nightly for up to 2 weeks then stop,may repeat in 2-3 months
Render Risk Assessment In Note?: no
Detail Level: Simple

## 2024-08-13 NOTE — PROCEDURE: LIQUID NITROGEN
Show Applicator Variable?: Yes
Aperture Size (Optional): C
Detail Level: Simple
Number Of Freeze-Thaw Cycles: 2 freeze-thaw cycles
Render Post-Care Instructions In Note?: no
Duration Of Freeze Thaw-Cycle (Seconds): 3
Post-Care Instructions: I reviewed with the patient in detail post-care instructions. Patient is to wear sunprotection, and avoid picking at any of the treated lesions. Pt may apply Vaseline to crusted or scabbing areas.
Consent: The patient's verbal\\n consent was obtained including but not limited to risks of crusting, scabbing, blistering, scarring, darker or lighter pigmentary change, recurrence, incomplete removal and infection.

## 2024-08-13 NOTE — PROCEDURE: MEDICATION COUNSELING
Cosentyx Pregnancy And Lactation Text: This medication is Pregnancy Category B and is considered safe during pregnancy. It is unknown if this medication is excreted in breast milk.
Wartpeel Pregnancy And Lactation Text: This medication is Pregnancy Category X and contraindicated in pregnancy and in women who may become pregnant. It is unknown if this medication is excreted in breast milk.
Rhofade Counseling: Rhofade is a topical medication which can decrease superficial blood flow where applied. Side effects are uncommon and include stinging, redness and allergic reactions.
Niacinamide Counseling: I recommended taking niacin or niacinamide, also know as vitamin B3, twice daily. Recent evidence suggests that taking vitamin B3 (500 mg twice daily) can reduce the risk of actinic keratoses and non-melanoma skin cancers. Side effects of vitamin B3 include flushing and headache.
High Dose Vitamin A Pregnancy And Lactation Text: High dose vitamin A therapy is contraindicated during pregnancy and breast feeding.
Hydroxyzine Counseling: Patient advised that the medication is sedating and not to drive a car after taking this medication.  Patient informed of potential adverse effects including but not limited to dry mouth, urinary retention, and blurry vision.  The patient verbalized understanding of the proper use and possible adverse effects of hydroxyzine.  All of the patient's questions and concerns were addressed.
Calcipotriene Counseling:  I discussed with the patient the risks of calcipotriene including but not limited to erythema, scaling, itching, and irritation.
Stelara Counseling:  I discussed with the patient the risks of ustekinumab including but not limited to immunosuppression, malignancy, posterior leukoencephalopathy syndrome, and serious infections.  The patient understands that monitoring is required including a PPD at baseline and must alert us or the primary physician if symptoms of infection or other concerning signs are noted.
Tazorac Pregnancy And Lactation Text: This medication is not safe during pregnancy. It is unknown if this medication is excreted in breast milk.
Eucrisa Pregnancy And Lactation Text: This medication has not been assigned a Pregnancy Risk Category but animal studies failed to show danger with the topical medication. It is unknown if the medication is excreted in breast milk.
Dapsone Pregnancy And Lactation Text: This medication is Pregnancy Category C and is not considered safe during pregnancy or breast feeding.
Tranexamic Acid Pregnancy And Lactation Text: It is unknown if this medication is safe during pregnancy or breast feeding.
Opioid Counseling: I discussed with the patient the potential side effects of opioids including but not limited to addiction, altered mental status, and depression. I stressed avoiding alcohol, benzodiazepines, muscle relaxants and sleep aids unless specifically okayed by a physician. The patient verbalized understanding of the proper use and possible adverse effects of opioids. All of the patient's questions and concerns were addressed. They were instructed to flush the remaining pills down the toilet if they did not need them for pain.
Erythromycin Pregnancy And Lactation Text: This medication is Pregnancy Category B and is considered safe during pregnancy. It is also excreted in breast milk.
Olumiant Counseling: I discussed with the patient the risks of Olumiant therapy including but not limited to upper respiratory tract infections, shingles, cold sores, and nausea. Live vaccines should be avoided.  This medication has been linked to serious infections; higher rate of mortality; malignancy and lymphoproliferative disorders; major adverse cardiovascular events; thrombosis; gastrointestinal perforations; neutropenia; lymphopenia; anemia; liver enzyme elevations; and lipid elevations.
Oxybutynin Counseling:  I discussed with the patient the risks of oxybutynin including but not limited to skin rash, drowsiness, dry mouth, difficulty urinating, and blurred vision.
Ketoconazole Counseling:   Patient counseled regarding improving absorption with orange juice.  Adverse effects include but are not limited to breast enlargement, headache, diarrhea, nausea, upset stomach, liver function test abnormalities, taste disturbance, and stomach pain.  There is a rare possibility of liver failure that can occur when taking ketoconazole. The patient understands that monitoring of LFTs may be required, especially at baseline. The patient verbalized understanding of the proper use and possible adverse effects of ketoconazole.  All of the patient's questions and concerns were addressed.
Cyclophosphamide Pregnancy And Lactation Text: This medication is Pregnancy Category D and it isn't considered safe during pregnancy. This medication is excreted in breast milk.
Mirvaso Pregnancy And Lactation Text: This medication has not been assigned a Pregnancy Risk Category. It is unknown if the medication is excreted in breast milk.
Hydroxyzine Pregnancy And Lactation Text: This medication is not safe during pregnancy and should not be taken. It is also excreted in breast milk and breast feeding isn't recommended.
Bactrim Counseling:  I discussed with the patient the risks of sulfa antibiotics including but not limited to GI upset, allergic reaction, drug rash, diarrhea, dizziness, photosensitivity, and yeast infections.  Rarely, more serious reactions can occur including but not limited to aplastic anemia, agranulocytosis, methemoglobinemia, blood dyscrasias, liver or kidney failure, lung infiltrates or desquamative/blistering drug rashes.
Birth Control Pills Counseling: Birth Control Pill Counseling: I discussed with the patient the potential side effects of OCPs including but not limited to increased risk of stroke, heart attack, thrombophlebitis, deep venous thrombosis, hepatic adenomas, breast changes, GI upset, headaches, and depression.  The patient verbalized understanding of the proper use and possible adverse effects of OCPs. All of the patient's questions and concerns were addressed.
Topical Clindamycin Counseling: Patient counseled that this medication may cause skin irritation or allergic reactions.  In the event of skin irritation, the patient was advised to reduce the amount of the drug applied or use it less frequently.   The patient verbalized understanding of the proper use and possible adverse effects of clindamycin.  All of the patient's questions and concerns were addressed.
Hydroquinone Counseling:  Patient advised that medication may result in skin irritation, lightening (hypopigmentation), dryness, and burning.  In the event of skin irritation, the patient was advised to reduce the amount of the drug applied or use it less frequently.  Rarely, spots that are treated with hydroquinone can become darker (pseudoochronosis).  Should this occur, patient instructed to stop medication and call the office. The patient verbalized understanding of the proper use and possible adverse effects of hydroquinone.  All of the patient's questions and concerns were addressed.
Erivedge Pregnancy And Lactation Text: This medication is Pregnancy Category X and is absolutely contraindicated during pregnancy. It is unknown if it is excreted in breast milk.
Calcipotriene Pregnancy And Lactation Text: The use of this medication during pregnancy or lactation is not recommended as there is insufficient data.
Xelcharliez Pregnancy And Lactation Text: This medication is Pregnancy Category D and is not considered safe during pregnancy.  The risk during breast feeding is also uncertain.
Sarecycline Pregnancy And Lactation Text: This medication is Pregnancy Category D and not consider safe during pregnancy. It is also excreted in breast milk.
Dupixent Counseling: I discussed with the patient the risks of dupilumab including but not limited to eye infection and irritation, cold sores, injection site reactions, worsening of asthma, allergic reactions and increased risk of parasitic infection.  Live vaccines should be avoided while taking dupilumab. Dupilumab will also interact with certain medications such as warfarin and cyclosporine. The patient understands that monitoring is required and they must alert us or the primary physician if symptoms of infection or other concerning signs are noted.
Opioid Pregnancy And Lactation Text: These medications can lead to premature delivery and should be avoided during pregnancy. These medications are also present in breast milk in small amounts.
Cantharidin Counseling:  I discussed with the patient the risks of Cantharidin including but not limited to pain, redness, burning, itching, and blistering.
Valtrex Counseling: I discussed with the patient the risks of valacyclovir including but not limited to kidney damage, nausea, vomiting and severe allergy.  The patient understands that if the infection seems to be worsening or is not improving, they are to call.
Winlevi Counseling:  I discussed with the patient the risks of topical clascoterone including but not limited to erythema, scaling, itching, and stinging. Patient voiced their understanding.
Niacinamide Pregnancy And Lactation Text: These medications are considered safe during pregnancy.
Topical Ketoconazole Pregnancy And Lactation Text: This medication is Pregnancy Category B and is considered safe during pregnancy. It is unknown if it is excreted in breast milk.
Cyclosporine Counseling:  I discussed with the patient the risks of cyclosporine including but not limited to hypertension, gingival hyperplasia,myelosuppression, immunosuppression, liver damage, kidney damage, neurotoxicity, lymphoma, and serious infections. The patient understands that monitoring is required including baseline blood pressure, CBC, CMP, lipid panel and uric acid, and then 1-2 times monthly CMP and blood pressure.
Gabapentin Counseling: I discussed with the patient the risks of gabapentin including but not limited to dizziness, somnolence, fatigue and ataxia.
Bactrim Pregnancy And Lactation Text: This medication is Pregnancy Category D and is known to cause fetal risk.  It is also excreted in breast milk.
Metronidazole Counseling:  I discussed with the patient the risks of metronidazole including but not limited to seizures, nausea/vomiting, a metallic taste in the mouth, nausea/vomiting and severe allergy.
Birth Control Pills Pregnancy And Lactation Text: This medication should be avoided if pregnant and for the first 30 days post-partum.
Libtayo Counseling- I discussed with the patient the risks of Libtayo including but not limited to nausea, vomiting, diarrhea, and bone or muscle pain.  The patient verbalized understanding of the proper use and possible adverse effects of Libtayo.  All of the patient's questions and concerns were addressed.
Aklief counseling:  Patient advised to apply a pea-sized amount only at bedtime and wait 30 minutes after washing their face before applying.  If too drying, patient may add a non-comedogenic moisturizer.  The most commonly reported side effects including irritation, redness, scaling, dryness, stinging, burning, itching, and increased risk of sunburn.  The patient verbalized understanding of the proper use and possible adverse effects of retinoids.  All of the patient's questions and concerns were addressed.
Rituxan Counseling:  I discussed with the patient the risks of Rituxan infusions. Side effects can include infusion reactions, severe drug rashes including mucocutaneous reactions, reactivation of latent hepatitis and other infections and rarely progressive multifocal leukoencephalopathy.  All of the patient's questions and concerns were addressed.
Metronidazole Pregnancy And Lactation Text: This medication is Pregnancy Category B and considered safe during pregnancy.  It is also excreted in breast milk.
Olumiant Pregnancy And Lactation Text: Based on animal studies, Olumiant may cause embryo-fetal harm when administered to pregnant women.  The medication should not be used in pregnancy.  Breastfeeding is not recommended during treatment.
Valtrex Pregnancy And Lactation Text: this medication is Pregnancy Category B and is considered safe during pregnancy. This medication is not directly found in breast milk but it's metabolite acyclovir is present.
Tetracycline Counseling: Patient counseled regarding possible photosensitivity and increased risk for sunburn.  Patient instructed to avoid sunlight, if possible.  When exposed to sunlight, patients should wear protective clothing, sunglasses, and sunscreen.  The patient was instructed to call the office immediately if the following severe adverse effects occur:  hearing changes, easy bruising/bleeding, severe headache, or vision changes.  The patient verbalized understanding of the proper use and possible adverse effects of tetracycline.  All of the patient's questions and concerns were addressed. Patient understands to avoid pregnancy while on therapy due to potential birth defects.
Winlevi Pregnancy And Lactation Text: This medication is considered safe during pregnancy and breastfeeding.
Ketoconazole Pregnancy And Lactation Text: This medication is Pregnancy Category C and it isn't know if it is safe during pregnancy. It is also excreted in breast milk and breast feeding isn't recommended.
Opzelura Counseling:  I discussed with the patient the risks of Opzelura including but not limited to nasopharngitis, bronchitis, ear infection, eosinophila, hives, diarrhea, folliculitis, tonsillitis, and rhinorrhea.  Taken orally, this medication has been linked to serious infections; higher rate of mortality; malignancy and lymphoproliferative disorders; major adverse cardiovascular events; thrombosis; thrombocytopenia, anemia, and neutropenia; and lipid elevations.
Nsaids Counseling: NSAID Counseling: I discussed with the patient that NSAIDs should be taken with food. Prolonged use of NSAIDs can result in the development of stomach ulcers.  Patient advised to stop taking NSAIDs if abdominal pain occurs.  The patient verbalized understanding of the proper use and possible adverse effects of NSAIDs.  All of the patient's questions and concerns were addressed.
Cantharidin Pregnancy And Lactation Text: This medication has not been proven safe during pregnancy. It is unknown if this medication is excreted in breast milk.
Libtayo Pregnancy And Lactation Text: This medication is contraindicated in pregnancy and when breast feeding.
Gabapentin Pregnancy And Lactation Text: This medication is Pregnancy Category C and isn't considered safe during pregnancy. It is excreted in breast milk.
Taltz Counseling: I discussed with the patient the risks of ixekizumab including but not limited to immunosuppression, serious infections, worsening of inflammatory bowel disease and drug reactions.  The patient understands that monitoring is required including a PPD at baseline and must alert us or the primary physician if symptoms of infection or other concerning signs are noted.
5-Fu Counseling: 5-Fluorouracil Counseling:  I discussed with the patient the risks of 5-fluorouracil including but not limited to erythema, scaling, itching, weeping, crusting, and pain.
Dupixent Pregnancy And Lactation Text: This medication likely crosses the placenta but the risk for the fetus is uncertain. This medication is excreted in breast milk.
Terbinafine Counseling: Patient counseling regarding adverse effects of terbinafine including but not limited to headache, diarrhea, rash, upset stomach, liver function test abnormalities, itching, taste/smell disturbance, nausea, abdominal pain, and flatulence.  There is a rare possibility of liver failure that can occur when taking terbinafine.  The patient understands that a baseline LFT and kidney function test may be required. The patient verbalized understanding of the proper use and possible adverse effects of terbinafine.  All of the patient's questions and concerns were addressed.
Acitretin Counseling:  I discussed with the patient the risks of acitretin including but not limited to hair loss, dry lips/skin/eyes, liver damage, hyperlipidemia, depression/suicidal ideation, photosensitivity.  Serious rare side effects can include but are not limited to pancreatitis, pseudotumor cerebri, bony changes, clot formation/stroke/heart attack.  Patient understands that alcohol is contraindicated since it can result in liver toxicity and significantly prolong the elimination of the drug by many years.
Opzelura Pregnancy And Lactation Text: There is insufficient data to evaluate drug-associated risk for major birth defects, miscarriage, or other adverse maternal or fetal outcomes.  There is a pregnancy registry that monitors pregnancy outcomes in pregnant persons exposed to the medication during pregnancy.  It is unknown if this medication is excreted in breast milk.  Do not breastfeed during treatment and for about 4 weeks after the last dose.
Rituxan Pregnancy And Lactation Text: This medication is Pregnancy Category C and it isn't know if it is safe during pregnancy. It is unknown if this medication is excreted in breast milk but similar antibodies are known to be excreted.
Albendazole Counseling:  I discussed with the patient the risks of albendazole including but not limited to cytopenia, kidney damage, nausea/vomiting and severe allergy.  The patient understands that this medication is being used in an off-label manner.
Rinvoq Counseling: I discussed with the patient the risks of Rinvoq therapy including but not limited to upper respiratory tract infections, shingles, cold sores, bronchitis, nausea, cough, fever, acne, and headache. Live vaccines should be avoided.  This medication has been linked to serious infections; higher rate of mortality; malignancy and lymphoproliferative disorders; major adverse cardiovascular events; thrombosis; thrombocytopenia, anemia, and neutropenia; lipid elevations; liver enzyme elevations; and gastrointestinal perforations.
Solaraze Counseling:  I discussed with the patient the risks of Solaraze including but not limited to erythema, scaling, itching, weeping, crusting, and pain.
Propranolol Counseling:  I discussed with the patient the risks of propranolol including but not limited to low heart rate, low blood pressure, low blood sugar, restlessness and increased cold sensitivity. They should call the office if they experience any of these side effects.
Cyclosporine Pregnancy And Lactation Text: This medication is Pregnancy Category C and it isn't know if it is safe during pregnancy. This medication is excreted in breast milk.
Topical Metronidazole Counseling: Metronidazole is a topical antibiotic medication. You may experience burning, stinging, redness, or allergic reactions.  Please call our office if you develop any problems from using this medication.
Spironolactone Counseling: Patient advised regarding risks of diarrhea, abdominal pain, hyperkalemia, birth defects (for female patients), liver toxicity and renal toxicity. The patient may need blood work to monitor liver and kidney function and potassium levels while on therapy. The patient verbalized understanding of the proper use and possible adverse effects of spironolactone.  All of the patient's questions and concerns were addressed.
Cephalexin Counseling: I counseled the patient regarding use of cephalexin as an antibiotic for prophylactic and/or therapeutic purposes. Cephalexin (commonly prescribed under brand name Keflex) is a cephalosporin antibiotic which is active against numerous classes of bacteria, including most skin bacteria. Side effects may include nausea, diarrhea, gastrointestinal upset, rash, hives, yeast infections, and in rare cases, hepatitis, kidney disease, seizures, fever, confusion, neurologic symptoms, and others. Patients with severe allergies to penicillin medications are cautioned that there is about a 10% incidence of cross-reactivity with cephalosporins. When possible, patients with penicillin allergies should use alternatives to cephalosporins for antibiotic therapy.
Nsaids Pregnancy And Lactation Text: These medications are considered safe up to 30 weeks gestation. It is excreted in breast milk.
VTAMA Counseling: I discussed with the patient that VTAMA is not for use in the eyes, mouth or mouth. They should call the office if they develop any signs of allergic reactions to VTAMA. The patient verbalized understanding of the proper use and possible adverse effects of VTAMA.  All of the patient's questions and concerns were addressed.
Imiquimod Counseling:  I discussed with the patient the risks of imiquimod including but not limited to erythema, scaling, itching, weeping, crusting, and pain.  Patient understands that the inflammatory response to imiquimod is variable from person to person and was educated regarded proper titration schedule.  If flu-like symptoms develop, patient knows to discontinue the medication and contact us.
Arava Counseling:  Patient counseled regarding adverse effects of Arava including but not limited to nausea, vomiting, abnormalities in liver function tests. Patients may develop mouth sores, rash, diarrhea, and abnormalities in blood counts. The patient understands that monitoring is required including LFTs and blood counts.  There is a rare possibility of scarring of the liver and lung problems that can occur when taking methotrexate. Persistent nausea, loss of appetite, pale stools, dark urine, cough, and shortness of breath should be reported immediately. Patient advised to discontinue Arava treatment and consult with a physician prior to attempting conception. The patient will have to undergo a treatment to eliminate Arava from the body prior to conception.
Albendazole Pregnancy And Lactation Text: This medication is Pregnancy Category C and it isn't known if it is safe during pregnancy. It is also excreted in breast milk.
Solaraze Pregnancy And Lactation Text: This medication is Pregnancy Category B and is considered safe. There is some data to suggest avoiding during the third trimester. It is unknown if this medication is excreted in breast milk.
Aklief Pregnancy And Lactation Text: It is unknown if this medication is safe to use during pregnancy.  It is unknown if this medication is excreted in breast milk.  Breastfeeding women should use the topical cream on the smallest area of the skin for the shortest time needed while breastfeeding.  Do not apply to nipple and areola.
Taltz Pregnancy And Lactation Text: The risk during pregnancy and breastfeeding is uncertain with this medication.
Topical Ketoconazole Counseling: Patient counseled that this medication may cause skin irritation or allergic reactions.  In the event of skin irritation, the patient was advised to reduce the amount of the drug applied or use it less frequently.   The patient verbalized understanding of the proper use and possible adverse effects of ketoconazole.  All of the patient's questions and concerns were addressed.
Enbrel Counseling:  I discussed with the patient the risks of etanercept including but not limited to myelosuppression, immunosuppression, autoimmune hepatitis, demyelinating diseases, lymphoma, and infections.  The patient understands that monitoring is required including a PPD at baseline and must alert us or the primary physician if symptoms of infection or other concerning signs are noted.
Acitretin Pregnancy And Lactation Text: This medication is Pregnancy Category X and should not be given to women who are pregnant or may become pregnant in the future. This medication is excreted in breast milk.
Use Enhanced Medication Counseling?: No
Minocycline Counseling: Patient advised regarding possible photosensitivity and discoloration of the teeth, skin, lips, tongue and gums.  Patient instructed to avoid sunlight, if possible.  When exposed to sunlight, patients should wear protective clothing, sunglasses, and sunscreen.  The patient was instructed to call the office immediately if the following severe adverse effects occur:  hearing changes, easy bruising/bleeding, severe headache, or vision changes.  The patient verbalized understanding of the proper use and possible adverse effects of minocycline.  All of the patient's questions and concerns were addressed.
Terbinafine Pregnancy And Lactation Text: This medication is Pregnancy Category B and is considered safe during pregnancy. It is also excreted in breast milk and breast feeding isn't recommended.
Picato Counseling:  I discussed with the patient the risks of Picato including but not limited to erythema, scaling, itching, weeping, crusting, and pain.
Propranolol Pregnancy And Lactation Text: This medication is Pregnancy Category C and it isn't known if it is safe during pregnancy. It is excreted in breast milk.
Methotrexate Counseling:  Patient counseled regarding adverse effects of methotrexate including but not limited to nausea, vomiting, abnormalities in liver function tests. Patients may develop mouth sores, rash, diarrhea, and abnormalities in blood counts. The patient understands that monitoring is required including LFT's and blood counts.  There is a rare possibility of scarring of the liver and lung problems that can occur when taking methotrexate. Persistent nausea, loss of appetite, pale stools, dark urine, cough, and shortness of breath should be reported immediately. Patient advised to discontinue methotrexate treatment at least three months before attempting to become pregnant.  I discussed the need for folate supplements while taking methotrexate.  These supplements can decrease side effects during methotrexate treatment. The patient verbalized understanding of the proper use and possible adverse effects of methotrexate.  All of the patient's questions and concerns were addressed.
Spironolactone Pregnancy And Lactation Text: This medication can cause feminization of the male fetus and should be avoided during pregnancy. The active metabolite is also found in breast milk.
Cephalexin Pregnancy And Lactation Text: This medication is Pregnancy Category B and considered safe during pregnancy.  It is also excreted in breast milk but can be used safely for shorter doses.
Odomzo Counseling- I discussed with the patient the risks of Odomzo including but not limited to nausea, vomiting, diarrhea, constipation, weight loss, changes in the sense of taste, decreased appetite, muscle spasms, and hair loss.  The patient verbalized understanding of the proper use and possible adverse effects of Odomzo.  All of the patient's questions and concerns were addressed.
Glycopyrrolate Counseling:  I discussed with the patient the risks of glycopyrrolate including but not limited to skin rash, drowsiness, dry mouth, difficulty urinating, and blurred vision.
Topical Metronidazole Pregnancy And Lactation Text: This medication is Pregnancy Category B and considered safe during pregnancy.  It is also considered safe to use while breastfeeding.
Detail Level: Zone
Siliq Counseling:  I discussed with the patient the risks of Siliq including but not limited to new or worsening depression, suicidal thoughts and behavior, immunosuppression, malignancy, posterior leukoencephalopathy syndrome, and serious infections.  The patient understands that monitoring is required including a PPD at baseline and must alert us or the primary physician if symptoms of infection or other concerning signs are noted. There is also a special program designed to monitor depression which is required with Siliq.
Soolantra Counseling: I discussed with the patients the risks of topial Soolantra. This is a medicine which decreases the number of mites and inflammation in the skin. You experience burning, stinging, eye irritation or allergic reactions.  Please call our office if you develop any problems from using this medication.
Bexarotene Counseling:  I discussed with the patient the risks of bexarotene including but not limited to hair loss, dry lips/skin/eyes, liver abnormalities, hyperlipidemia, pancreatitis, depression/suicidal ideation, photosensitivity, drug rash/allergic reactions, hypothyroidism, anemia, leukopenia, infection, cataracts, and teratogenicity.  Patient understands that they will need regular blood tests to check lipid profile, liver function tests, white blood cell count, thyroid function tests and pregnancy test if applicable.
Olanzapine Counseling- I discussed with the patient the common side effects of olanzapine including but are not limited to: lack of energy, dry mouth, increased appetite, sleepiness, tremor, constipation, dizziness, changes in behavior, or restlessness.  Explained that teenagers are more likely to experience headaches, abdominal pain, pain in the arms or legs, tiredness, and sleepiness.  Serious side effects include but are not limited: increased risk of death in elderly patients who are confused, have memory loss, or dementia-related psychosis; hyperglycemia; increased cholesterol and triglycerides; and weight gain.
Ivermectin Counseling:  Patient instructed to take medication on an empty stomach with a full glass of water.  Patient informed of potential adverse effects including but not limited to nausea, diarrhea, dizziness, itching, and swelling of the extremities or lymph nodes.  The patient verbalized understanding of the proper use and possible adverse effects of ivermectin.  All of the patient's questions and concerns were addressed.
Azelaic Acid Counseling: Patient counseled that medicine may cause skin irritation and to avoid applying near the eyes.  In the event of skin irritation, the patient was advised to reduce the amount of the drug applied or use it less frequently.   The patient verbalized understanding of the proper use and possible adverse effects of azelaic acid.  All of the patient's questions and concerns were addressed.
Vtama Pregnancy And Lactation Text: It is unknown if this medication can cause problems during pregnancy and breastfeeding.
Methotrexate Pregnancy And Lactation Text: This medication is Pregnancy Category X and is known to cause fetal harm. This medication is excreted in breast milk.
Topical Steroids Counseling: I discussed with the patient that prolonged use of topical steroids can result in the increased appearance of superficial blood vessels (telangiectasias), lightening (hypopigmentation) and thinning of the skin (atrophy).  Patient understands to avoid using high potency steroids in skin folds, the groin or the face.  The patient verbalized understanding of the proper use and possible adverse effects of topical steroids.  All of the patient's questions and concerns were addressed.
Glycopyrrolate Pregnancy And Lactation Text: This medication is Pregnancy Category B and is considered safe during pregnancy. It is unknown if it is excreted breast milk.
Clindamycin Counseling: I counseled the patient regarding use of clindamycin as an antibiotic for prophylactic and/or therapeutic purposes. Clindamycin is active against numerous classes of bacteria, including skin bacteria. Side effects may include nausea, diarrhea, gastrointestinal upset, rash, hives, yeast infections, and in rare cases, colitis.
Fluconazole Counseling:  Patient counseled regarding adverse effects of fluconazole including but not limited to headache, diarrhea, nausea, upset stomach, liver function test abnormalities, taste disturbance, and stomach pain.  There is a rare possibility of liver failure that can occur when taking fluconazole.  The patient understands that monitoring of LFTs and kidney function test may be required, especially at baseline. The patient verbalized understanding of the proper use and possible adverse effects of fluconazole.  All of the patient's questions and concerns were addressed.
Imiquimod Pregnancy And Lactation Text: This medication is Pregnancy Category C. It is unknown if this medication is excreted in breast milk.
Tremfya Counseling: I discussed with the patient the risks of guselkumab including but not limited to immunosuppression, serious infections, worsening of inflammatory bowel disease and drug reactions.  The patient understands that monitoring is required including a PPD at baseline and must alert us or the primary physician if symptoms of infection or other concerning signs are noted.
Clofazimine Counseling:  I discussed with the patient the risks of clofazimine including but not limited to skin and eye pigmentation, liver damage, nausea/vomiting, gastrointestinal bleeding and allergy.
Quinolones Counseling:  I discussed with the patient the risks of fluoroquinolones including but not limited to GI upset, allergic reaction, drug rash, diarrhea, dizziness, photosensitivity, yeast infections, liver function test abnormalities, tendonitis/tendon rupture.
Fluconazole Pregnancy And Lactation Text: This medication is Pregnancy Category C and it isn't know if it is safe during pregnancy. It is also excreted in breast milk.
Klisyri Counseling:  I discussed with the patient the risks of Klisyri including but not limited to erythema, scaling, itching, weeping, crusting, and pain.
SSKI Counseling:  I discussed with the patient the risks of SSKI including but not limited to thyroid abnormalities, metallic taste, GI upset, fever, headache, acne, arthralgias, paraesthesias, lymphadenopathy, easy bleeding, arrhythmias, and allergic reaction.
Drysol Counseling:  I discussed with the patient the risks of drysol/aluminum chloride including but not limited to skin rash, itching, irritation, burning.
Humira Counseling:  I discussed with the patient the risks of adalimumab including but not limited to myelosuppression, immunosuppression, autoimmune hepatitis, demyelinating diseases, lymphoma, and serious infections.  The patient understands that monitoring is required including a PPD at baseline and must alert us or the primary physician if symptoms of infection or other concerning signs are noted.
Azathioprine Counseling:  I discussed with the patient the risks of azathioprine including but not limited to myelosuppression, immunosuppression, hepatotoxicity, lymphoma, and infections.  The patient understands that monitoring is required including baseline LFTs, Creatinine, possible TPMP genotyping and weekly CBCs for the first month and then every 2 weeks thereafter.  The patient verbalized understanding of the proper use and possible adverse effects of azathioprine.  All of the patient's questions and concerns were addressed.
Azelaic Acid Pregnancy And Lactation Text: This medication is considered safe during pregnancy and breast feeding.
Olanzapine Pregnancy And Lactation Text: This medication is pregnancy category C.   There are no adequate and well controlled trials with olanzapine in pregnant females.  Olanzapine should be used during pregnancy only if the potential benefit justifies the potential risk to the fetus.   In a study in lactating healthy women, olanzapine was excreted in breast milk.  It is recommended that women taking olanzapine should not breast feed.
Hydroxychloroquine Counseling:  I discussed with the patient that a baseline ophthalmologic exam is needed at the start of therapy and every year thereafter while on therapy. A CBC may also be warranted for monitoring.  The side effects of this medication were discussed with the patient, including but not limited to agranulocytosis, aplastic anemia, seizures, rashes, retinopathy, and liver toxicity. Patient instructed to call the office should any adverse effect occur.  The patient verbalized understanding of the proper use and possible adverse effects of Plaquenil.  All the patient's questions and concerns were addressed.
Clindamycin Pregnancy And Lactation Text: This medication can be used in pregnancy if certain situations. Clindamycin is also present in breast milk.
Zoryve Counseling:  I discussed with the patient that Zoryve is not for use in the eyes, mouth or vagina. The most commonly reported side effects include diarrhea, headache, insomnia, application site pain, upper respiratory tract infections, and urinary tract infections.  All of the patient's questions and concerns were addressed.
Protopic Counseling: Patient may experience a mild burning sensation during topical application. Protopic is not approved in children less than 2 years of age. There have been case reports of hematologic and skin malignancies in patients using topical calcineurin inhibitors although causality is questionable.
Bexarotene Pregnancy And Lactation Text: This medication is Pregnancy Category X and should not be given to women who are pregnant or may become pregnant. This medication should not be used if you are breast feeding.
Simponi Counseling:  I discussed with the patient the risks of golimumab including but not limited to myelosuppression, immunosuppression, autoimmune hepatitis, demyelinating diseases, lymphoma, and serious infections.  The patient understands that monitoring is required including a PPD at baseline and must alert us or the primary physician if symptoms of infection or other concerning signs are noted.
Cimetidine Counseling:  I discussed with the patient the risks of Cimetidine including but not limited to gynecomastia, headache, diarrhea, nausea, drowsiness, arrhythmias, pancreatitis, skin rashes, psychosis, bone marrow suppression and kidney toxicity.
Prednisone Counseling:  I discussed with the patient the risks of prolonged use of prednisone including but not limited to weight gain, insomnia, osteoporosis, mood changes, diabetes, susceptibility to infection, glaucoma and high blood pressure.  In cases where prednisone use is prolonged, patients should be monitored with blood pressure checks, serum glucose levels and an eye exam.  Additionally, the patient may need to be placed on GI prophylaxis, PCP prophylaxis, and calcium and vitamin D supplementation and/or a bisphosphonate.  The patient verbalized understanding of the proper use and the possible adverse effects of prednisone.  All of the patient's questions and concerns were addressed.
Soolantra Pregnancy And Lactation Text: This medication is Pregnancy Category C. This medication is considered safe during breast feeding.
Topical Steroids Applications Pregnancy And Lactation Text: Most topical steroids are considered safe to use during pregnancy and lactation.  Any topical steroid applied to the breast or nipple should be washed off before breastfeeding.
Sski Pregnancy And Lactation Text: This medication is Pregnancy Category D and isn't considered safe during pregnancy. It is excreted in breast milk.
Adbry Pregnancy And Lactation Text: It is unknown if this medication will adversely affect pregnancy or breast feeding.
Griseofulvin Counseling:  I discussed with the patient the risks of griseofulvin including but not limited to photosensitivity, cytopenia, liver damage, nausea/vomiting and severe allergy.  The patient understands that this medication is best absorbed when taken with a fatty meal (e.g., ice cream or french fries).
Klisyri Pregnancy And Lactation Text: It is unknown if this medication can harm a developing fetus or if it is excreted in breast milk.
Benzoyl Peroxide Counseling: Patient counseled that medicine may cause skin irritation and bleach clothing.  In the event of skin irritation, the patient was advised to reduce the amount of the drug applied or use it less frequently.   The patient verbalized understanding of the proper use and possible adverse effects of benzoyl peroxide.  All of the patient's questions and concerns were addressed.
Oral Minoxidil Counseling- I discussed with the patient the risks of oral minoxidil including but not limited to shortness of breath, swelling of the feet or ankles, dizziness, lightheadedness, unwanted hair growth and allergic reaction.  The patient verbalized understanding of the proper use and possible adverse effects of oral minoxidil.  All of the patient's questions and concerns were addressed.
Dutasteride Male Counseling: Dustasteride Counseling:  I discussed with the patient the risks of use of dutasteride including but not limited to decreased libido, decreased ejaculate volume, and gynecomastia. Women who can become pregnant should not handle medication.  All of the patient's questions and concerns were addressed.
Topical Retinoid counseling:  Patient advised to apply a pea-sized amount only at bedtime and wait 30 minutes after washing their face before applying.  If too drying, patient may add a non-comedogenic moisturizer. The patient verbalized understanding of the proper use and possible adverse effects of retinoids.  All of the patient's questions and concerns were addressed.
Xolair Counseling:  Patient informed of potential adverse effects including but not limited to fever, muscle aches, rash and allergic reactions.  The patient verbalized understanding of the proper use and possible adverse effects of Xolair.  All of the patient's questions and concerns were addressed.
Azathioprine Pregnancy And Lactation Text: This medication is Pregnancy Category D and isn't considered safe during pregnancy. It is unknown if this medication is excreted in breast milk.
Rinvoq Pregnancy And Lactation Text: Based on animal studies, Rinvoq may cause embryo-fetal harm when administered to pregnant women.  The medication should not be used in pregnancy.  Breastfeeding is not recommended during treatment and for 6 days after the last dose.
Cimzia Counseling:  I discussed with the patient the risks of Cimzia including but not limited to immunosuppression, allergic reactions and infections.  The patient understands that monitoring is required including a PPD at baseline and must alert us or the primary physician if symptoms of infection or other concerning signs are noted.
Protopic Pregnancy And Lactation Text: This medication is Pregnancy Category C. It is unknown if this medication is excreted in breast milk when applied topically.
Thalidomide Counseling: I discussed with the patient the risks of thalidomide including but not limited to birth defects, anxiety, weakness, chest pain, dizziness, cough and severe allergy.
Doxycycline Counseling:  Patient counseled regarding possible photosensitivity and increased risk for sunburn.  Patient instructed to avoid sunlight, if possible.  When exposed to sunlight, patients should wear protective clothing, sunglasses, and sunscreen.  The patient was instructed to call the office immediately if the following severe adverse effects occur:  hearing changes, easy bruising/bleeding, severe headache, or vision changes.  The patient verbalized understanding of the proper use and possible adverse effects of doxycycline.  All of the patient's questions and concerns were addressed.
Isotretinoin Counseling: Patient should get monthly blood tests, not donate blood, not drive at night if vision affected, not share medication, and not undergo elective surgery for 6 months after tx completed. Side effects reviewed, pt to contact office should one occur.
Hydroxychloroquine Pregnancy And Lactation Text: This medication has been shown to cause fetal harm but it isn't assigned a Pregnancy Risk Category. There are small amounts excreted in breast milk.
Colchicine Counseling:  Patient counseled regarding adverse effects including but not limited to stomach upset (nausea, vomiting, stomach pain, or diarrhea).  Patient instructed to limit alcohol consumption while taking this medication.  Colchicine may reduce blood counts especially with prolonged use.  The patient understands that monitoring of kidney function and blood counts may be required, especially at baseline. The patient verbalized understanding of the proper use and possible adverse effects of colchicine.  All of the patient's questions and concerns were addressed.
Elidel Counseling: Patient may experience a mild burning sensation during topical application. Elidel is not approved in children less than 2 years of age. There have been case reports of hematologic and skin malignancies in patients using topical calcineurin inhibitors although causality is questionable.
Topical Sulfur Applications Counseling: Topical Sulfur Counseling: Patient counseled that this medication may cause skin irritation or allergic reactions.  In the event of skin irritation, the patient was advised to reduce the amount of the drug applied or use it less frequently.   The patient verbalized understanding of the proper use and possible adverse effects of topical sulfur application.  All of the patient's questions and concerns were addressed.
Griseofulvin Pregnancy And Lactation Text: This medication is Pregnancy Category X and is known to cause serious birth defects. It is unknown if this medication is excreted in breast milk but breast feeding should be avoided.
Zyclara Counseling:  I discussed with the patient the risks of imiquimod including but not limited to erythema, scaling, itching, weeping, crusting, and pain.  Patient understands that the inflammatory response to imiquimod is variable from person to person and was educated regarded proper titration schedule.  If flu-like symptoms develop, patient knows to discontinue the medication and contact us.
Ilumya Counseling: I discussed with the patient the risks of tildrakizumab including but not limited to immunosuppression, malignancy, posterior leukoencephalopathy syndrome, and serious infections.  The patient understands that monitoring is required including a PPD at baseline and must alert us or the primary physician if symptoms of infection or other concerning signs are noted.
Cellcept Counseling:  I discussed with the patient the risks of mycophenolate mofetil including but not limited to infection/immunosuppression, GI upset, hypokalemia, hypercholesterolemia, bone marrow suppression, lymphoproliferative disorders, malignancy, GI ulceration/bleed/perforation, colitis, interstitial lung disease, kidney failure, progressive multifocal leukoencephalopathy, and birth defects.  The patient understands that monitoring is required including a baseline creatinine and regular CBC testing. In addition, patient must alert us immediately if symptoms of infection or other concerning signs are noted.
Doxepin Counseling:  Patient advised that the medication is sedating and not to drive a car after taking this medication. Patient informed of potential adverse effects including but not limited to dry mouth, urinary retention, and blurry vision.  The patient verbalized understanding of the proper use and possible adverse effects of doxepin.  All of the patient's questions and concerns were addressed.
Doxycycline Pregnancy And Lactation Text: This medication is Pregnancy Category D and not consider safe during pregnancy. It is also excreted in breast milk but is considered safe for shorter treatment courses.
Dutasteride Pregnancy And Lactation Text: This medication is absolutely contraindicated in women, especially during pregnancy and breast feeding. Feminization of male fetuses is possible if taking while pregnant.
Oral Minoxidil Pregnancy And Lactation Text: This medication should only be used when clearly needed if you are pregnant, attempting to become pregnant or breast feeding.
Benzoyl Peroxide Pregnancy And Lactation Text: This medication is Pregnancy Category C. It is unknown if benzoyl peroxide is excreted in breast milk.
Adbry Counseling: I discussed with the patient the risks of tralokinumab including but not limited to eye infection and irritation, cold sores, injection site reactions, worsening of asthma, allergic reactions and increased risk of parasitic infection.  Live vaccines should be avoided while taking tralokinumab. The patient understands that monitoring is required and they must alert us or the primary physician if symptoms of infection or other concerning signs are noted.
Rifampin Counseling: I discussed with the patient the risks of rifampin including but not limited to liver damage, kidney damage, red-orange body fluids, nausea/vomiting and severe allergy.
Cimzia Pregnancy And Lactation Text: This medication crosses the placenta but can be considered safe in certain situations. Cimzia may be excreted in breast milk.
Isotretinoin Pregnancy And Lactation Text: This medication is Pregnancy Category X and is considered extremely dangerous during pregnancy. It is unknown if it is excreted in breast milk.
Minoxidil Counseling: Minoxidil is a topical medication which can increase blood flow where it is applied. It is uncertain how this medication increases hair growth. Side effects are uncommon and include stinging and allergic reactions.
Low Dose Naltrexone Counseling- I discussed with the patient the potential risks and side effects of low dose naltrexone including but not limited to: more vivid dreams, headaches, nausea, vomiting, abdominal pain, fatigue, dizziness, and anxiety.
Xolair Pregnancy And Lactation Text: This medication is Pregnancy Category B and is considered safe during pregnancy. This medication is excreted in breast milk.
Sotyktu Counseling:  I discussed the most common side effects of Sotyktu including: common cold, sore throat, sinus infections, cold sores, canker sores, folliculitis, and acne.  I also discussed more serious side effects of Sotyktu including but not limited to: serious allergic reactions; increased risk for infections such as TB; cancers such as lymphomas; rhabdomyolysis and elevated CPK; and elevated triglycerides and liver enzymes. 
Azithromycin Counseling:  I discussed with the patient the risks of azithromycin including but not limited to GI upset, allergic reaction, drug rash, diarrhea, and yeast infections.
Rifampin Pregnancy And Lactation Text: This medication is Pregnancy Category C and it isn't know if it is safe during pregnancy. It is also excreted in breast milk and should not be used if you are breast feeding.
Topical Sulfur Applications Pregnancy And Lactation Text: This medication is Pregnancy Category C and has an unknown safety profile during pregnancy. It is unknown if this topical medication is excreted in breast milk.
Skyrizi Counseling: I discussed with the patient the risks of risankizumab-rzaa including but not limited to immunosuppression, and serious infections.  The patient understands that monitoring is required including a PPD at baseline and must alert us or the primary physician if symptoms of infection or other concerning signs are noted.
Finasteride Male Counseling: Finasteride Counseling:  I discussed with the patient the risks of use of finasteride including but not limited to decreased libido, decreased ejaculate volume, gynecomastia, and depression. Women should not handle medication.  All of the patient's questions and concerns were addressed.
Qbrexza Counseling:  I discussed with the patient the risks of Qbrexza including but not limited to headache, mydriasis, blurred vision, dry eyes, nasal dryness, dry mouth, dry throat, dry skin, urinary hesitation, and constipation.  Local skin reactions including erythema, burning, stinging, and itching can also occur.
Otezla Counseling: The side effects of Otezla were discussed with the patient, including but not limited to worsening or new depression, weight loss, diarrhea, nausea, upper respiratory tract infection, and headache. Patient instructed to call the office should any adverse effect occur.  The patient verbalized understanding of the proper use and possible adverse effects of Otezla.  All the patient's questions and concerns were addressed.
Carac Counseling:  I discussed with the patient the risks of Carac including but not limited to erythema, scaling, itching, weeping, crusting, and pain.
Low Dose Naltrexone Pregnancy And Lactation Text: Naltrexone is pregnancy category C.  There have been no adequate and well-controlled studies in pregnant women.  It should be used in pregnancy only if the potential benefit justifies the potential risk to the fetus.   Limited data indicates that naltrexone is minimally excreted into breastmilk.
High Dose Vitamin A Counseling: Side effects reviewed, pt to contact office should one occur.
Tazorac Counseling:  Patient advised that medication is irritating and drying.  Patient may need to apply sparingly and wash off after an hour before eventually leaving it on overnight.  The patient verbalized understanding of the proper use and possible adverse effects of tazorac.  All of the patient's questions and concerns were addressed.
Eucrisa Counseling: Patient may experience a mild burning sensation during topical application. Eucrisa is not approved in children less than 2 years of age.
Sotyktu Pregnancy And Lactation Text: There is insufficient data to evaluate whether or not Sotyktu is safe to use during pregnancy.   It is not known if Sotyktu passes into breast milk and whether or not it is safe to use when breastfeeding.  
Cibinqo Counseling: I discussed with the patient the risks of Cibinqo therapy including but not limited to common cold, nausea, headache, cold sores, increased blood CPK levels, dizziness, UTIs, fatigue, acne, and vomitting. Live vaccines should be avoided.  This medication has been linked to serious infections; higher rate of mortality; malignancy and lymphoproliferative disorders; major adverse cardiovascular events; thrombosis; thrombocytopenia and lymphopenia; lipid elevations; and retinal detachment.
Qbrexza Pregnancy And Lactation Text: There is no available data on Qbrexza use in pregnant women.  There is no available data on Qbrexza use in lactation.
Wartpeel Counseling:  I discussed with the patient the risks of Wartpeel including but not limited to erythema, scaling, itching, weeping, crusting, and pain.
Itraconazole Counseling:  I discussed with the patient the risks of itraconazole including but not limited to liver damage, nausea/vomiting, neuropathy, and severe allergy.  The patient understands that this medication is best absorbed when taken with acidic beverages such as non-diet cola or ginger ale.  The patient understands that monitoring is required including baseline LFTs and repeat LFTs at intervals.  The patient understands that they are to contact us or the primary physician if concerning signs are noted.
Doxepin Pregnancy And Lactation Text: This medication is Pregnancy Category C and it isn't known if it is safe during pregnancy. It is also excreted in breast milk and breast feeding isn't recommended.
Erythromycin Counseling:  I discussed with the patient the risks of erythromycin including but not limited to GI upset, allergic reaction, drug rash, diarrhea, increase in liver enzymes, and yeast infections.
Cosentyx Counseling:  I discussed with the patient the risks of Cosentyx including but not limited to worsening of Crohn's disease, immunosuppression, allergic reactions and infections.  The patient understands that monitoring is required including a PPD at baseline and must alert us or the primary physician if symptoms of infection or other concerning signs are noted.
Erivedge Counseling- I discussed with the patient the risks of Erivedge including but not limited to nausea, vomiting, diarrhea, constipation, weight loss, changes in the sense of taste, decreased appetite, muscle spasms, and hair loss.  The patient verbalized understanding of the proper use and possible adverse effects of Erivedge.  All of the patient's questions and concerns were addressed.
Mirvaso Counseling: Mirvaso is a topical medication which can decrease superficial blood flow where applied. Side effects are uncommon and include stinging, redness and allergic reactions.
Otezla Pregnancy And Lactation Text: This medication is Pregnancy Category C and it isn't known if it is safe during pregnancy. It is unknown if it is excreted in breast milk.
Sarecycline Counseling: Patient advised regarding possible photosensitivity and discoloration of the teeth, skin, lips, tongue and gums.  Patient instructed to avoid sunlight, if possible.  When exposed to sunlight, patients should wear protective clothing, sunglasses, and sunscreen.  The patient was instructed to call the office immediately if the following severe adverse effects occur:  hearing changes, easy bruising/bleeding, severe headache, or vision changes.  The patient verbalized understanding of the proper use and possible adverse effects of sarecycline.  All of the patient's questions and concerns were addressed.
Azithromycin Pregnancy And Lactation Text: This medication is considered safe during pregnancy and is also secreted in breast milk.
Tranexamic Acid Counseling:  Patient advised of the small risk of bleeding problems with tranexamic acid. They were also instructed to call if they developed any nausea, vomiting or diarrhea. All of the patient's questions and concerns were addressed.
Dapsone Counseling: I discussed with the patient the risks of dapsone including but not limited to hemolytic anemia, agranulocytosis, rashes, methemoglobinemia, kidney failure, peripheral neuropathy, headaches, GI upset, and liver toxicity.  Patients who start dapsone require monitoring including baseline LFTs and weekly CBCs for the first month, then every month thereafter.  The patient verbalized understanding of the proper use and possible adverse effects of dapsone.  All of the patient's questions and concerns were addressed.
Xeljanz Counseling: I discussed with the patient the risks of Xeljanz therapy including increased risk of infection, liver issues, headache, diarrhea, or cold symptoms. Live vaccines should be avoided. They were instructed to call if they have any problems.
Cibinqo Pregnancy And Lactation Text: It is unknown if this medication will adversely affect pregnancy or breast feeding.  You should not take this medication if you are currently pregnant or planning a pregnancy or while breastfeeding.
Cyclophosphamide Counseling:  I discussed with the patient the risks of cyclophosphamide including but not limited to hair loss, hormonal abnormalities, decreased fertility, abdominal pain, diarrhea, nausea and vomiting, bone marrow suppression and infection. The patient understands that monitoring is required while taking this medication.
Finasteride Pregnancy And Lactation Text: This medication is absolutely contraindicated during pregnancy. It is unknown if it is excreted in breast milk.
Infliximab Counseling:  I discussed with the patient the risks of infliximab including but not limited to myelosuppression, immunosuppression, autoimmune hepatitis, demyelinating diseases, lymphoma, and serious infections.  The patient understands that monitoring is required including a PPD at baseline and must alert us or the primary physician if symptoms of infection or other concerning signs are noted.
Bimzelx Counseling:  I discussed with the patient the risks of Bimzelx including but not limited to depression, immunosuppression, allergic reactions and infections.  The patient understands that monitoring is required including a PPD at baseline and must alert us or the primary physician if symptoms of infection or other concerning signs are noted.
Finasteride Female Counseling: Finasteride Counseling:  I discussed with the patient the risks of use of finasteride including but not limited to decreased libido and sexual dysfunction. Explained the teratogenic nature of the medication and stressed the importance of not getting pregnant during treatment. All of the patient's questions and concerns were addressed.
Hyrimoz Counseling:  I discussed with the patient the risks of adalimumab including but not limited to myelosuppression, immunosuppression, autoimmune hepatitis, demyelinating diseases, lymphoma, and serious infections.  The patient understands that monitoring is required including a PPD at baseline and must alert us or the primary physician if symptoms of infection or other concerning signs are noted.
Bimzelx Pregnancy And Lactation Text: This medication crosses the placenta and the safety is uncertain during pregnancy. It is unknown if this medication is present in breast milk.
Spevigo Counseling: I discussed with the patient the risks of Spevigo including but not limited to fatigue, nasuea, vomiting, headache, pruritus, urinary tract infection, an infusion related reactions.  The patient understands that monitoring is required including screening for tuberculosis at baseline and yearly screening thereafter while continuing Spevigo therapy. They should contact us if symptoms of infection or other concerning signs are noted.
Spevigo Pregnancy And Lactation Text: The risk during pregnancy and breastfeeding is uncertain with this medication. This medication does cross the placenta. It is unknown if this medication is found in breast milk.
Litfulo Counseling: I discussed with the patient the risks of Litfulo therapy including but not limited to upper respiratory tract infections, shingles, cold sores, and nausea. Live vaccines should be avoided.  This medication has been linked to serious infections; higher rate of mortality; malignancy and lymphoproliferative disorders; major adverse cardiovascular events; thrombosis; gastrointestinal perforations; neutropenia; lymphopenia; anemia; liver enzyme elevations; and lipid elevations.
Dutasteride Female Counseling: Dutasteride Counseling:  I discussed with the patient the risks of use of dutasteride including but not limited to decreased libido and sexual dysfunction. Explained the teratogenic nature of the medication and stressed the importance of not getting pregnant during treatment. All of the patient's questions and concerns were addressed.
Litfulo Pregnancy And Lactation Text: Based on animal studies, Lifulo may cause embryo-fetal harm when administered to pregnant women.  The medication should not be used in pregnancy.  Breastfeeding is not recommended during treatment.

## 2024-08-26 ENCOUNTER — OFFICE VISIT (OUTPATIENT)
Dept: MEDICAL GROUP | Facility: MEDICAL CENTER | Age: 74
End: 2024-08-26
Payer: MEDICARE

## 2024-08-26 VITALS
SYSTOLIC BLOOD PRESSURE: 110 MMHG | HEART RATE: 62 BPM | DIASTOLIC BLOOD PRESSURE: 70 MMHG | TEMPERATURE: 97.6 F | HEIGHT: 65 IN | WEIGHT: 183 LBS | RESPIRATION RATE: 16 BRPM | BODY MASS INDEX: 30.49 KG/M2 | OXYGEN SATURATION: 97 %

## 2024-08-26 DIAGNOSIS — R42 DIZZINESS: ICD-10-CM

## 2024-08-26 DIAGNOSIS — K80.20 CALCULUS OF GALLBLADDER WITHOUT CHOLECYSTITIS WITHOUT OBSTRUCTION: ICD-10-CM

## 2024-08-26 DIAGNOSIS — I48.91 ATRIAL FIBRILLATION, UNSPECIFIED TYPE (HCC): ICD-10-CM

## 2024-08-26 DIAGNOSIS — H93.19 TINNITUS, UNSPECIFIED LATERALITY: ICD-10-CM

## 2024-08-26 DIAGNOSIS — R03.0 ELEVATED BLOOD PRESSURE READING: ICD-10-CM

## 2024-08-26 PROCEDURE — 3078F DIAST BP <80 MM HG: CPT | Performed by: NURSE PRACTITIONER

## 2024-08-26 PROCEDURE — 3074F SYST BP LT 130 MM HG: CPT | Performed by: NURSE PRACTITIONER

## 2024-08-26 PROCEDURE — 99214 OFFICE O/P EST MOD 30 MIN: CPT | Performed by: NURSE PRACTITIONER

## 2024-08-26 ASSESSMENT — FIBROSIS 4 INDEX: FIB4 SCORE: 1.34

## 2024-08-26 NOTE — PROGRESS NOTES
Subjective:     Lucía Rodriguez is a 73 y.o. female presents to discuss:   Chief Complaint   Patient presents with    Other     Elevated blood pressure readings at home     Verbal consent was acquired by the patient to use [employer] ambient listening note generation during this visit Yes   History of Present Illness  The patient presents regarding concerns of elevated blood pressure reading at home.    She has been experiencing slightly elevated blood pressure at home over the last month and a half.  She does mention that her blood pressure did spike to 170s systolically at 1 time and then had hovered around the 150s.  She does monitor her blood pressure when she feels unwell.She did not bring her blood pressure monitor to the clinic.  She has been compliant with her metoprolol.  She has a history of having A-fib.  She reports no recent illness, dietary changes, or fever. She denies chest pain but experienced dizziness one morning, accompanied by warmth and clamminess.  Denies any unilateral weakness.  She also reports having a low-grade headache and intermittent ear whistling for the past month and a half.  She experiences some tightness in her neck and shoulders. She has limited vision in her right eye and occasionally experiences double vision, which her optometrist  attributes to dry eyes. The double vision does not improve with blinking but resolves within a few days with the use of eye drops.    She has a history of gallstones but has not undergone gallbladder removal. Surgeons do not believe her symptoms are related to the gallbladder. She has been diagnosed with fatty liver and admits to a poor diet. She used to experience mild discomfort in the area of her gallbladder, but this has lessened over the past year.    She is not sure if she has sinus issues, but she thinks it is probably allergies because her nose is always runny. She has had sinus infections in the past. She has a prescription for nasal  "spray, which does not seem to help with her runny nose.    She traveled to [place] in April 2024 and [place] in July 2024.    ROS: : see above      Current Outpatient Medications:     glycerin, adult, 2 GM suppository, Unwrap and insert one suppository rectally every day as needed for constipation., Disp: 12 Suppository, Rfl: 0    apixaban (ELIQUIS) 5mg Tab, Take 1 Tablet by mouth 2 times a day., Disp: 180 Tablet, Rfl: 3    flecainide (TAMBOCOR) 50 MG tablet, Take 1 Tablet by mouth every morning AND 2 Tablets every evening., Disp: 270 Tablet, Rfl: 3    metoprolol tartrate (LOPRESSOR) 25 MG Tab, Take 0.5 Tablets by mouth 2 times a day., Disp: 90 Tablet, Rfl: 3    atorvastatin (LIPITOR) 20 MG Tab, Take 1 Tablet by mouth every day., Disp: 90 Tablet, Rfl: 3    prednisoLONE acetate (PRED FORTE) 1 % Suspension, Administer 1 Drop into the right eye 4 times a day., Disp: , Rfl:     Ca Phosphate-Cholecalciferol ([employer] CALCIUM-VITAMIN D PO), Take 1 Tablet by mouth every day., Disp: , Rfl:     Psyllium (METAMUCIL PO), Take 1 Scoop by mouth every day., Disp: , Rfl:     Allergies   Allergen Reactions    Neosporin [Neomycin-Polymyxin B] Rash     Rxn = 6/2016    Tape Unspecified     Severe blister reaction to adhesive from tape on skin (TEGADERM if left >24 hours); Paper tape ok    Ofloxacin Swelling       Objective:     Vitals: /70   Pulse 62   Temp 36.4 °C (97.6 °F)   Resp 16   Ht 1.651 m (5' 5\")   Wt 83 kg (183 lb)   LMP 12/28/2003   SpO2 97%   BMI 30.45 kg/m²    General: Alert, pleasant, NAD  HEENT: Normocephalic.  Neck supple.   Respiratory: no distress, no audible wheezing, RR -WNL  Heart :heart rate regular, no murmur  Skin: Warm, dry, no rashes.  Extremities: No leg edema. No discoloration  Neurological: No tremors  Psych:  Affect/mood is normal, judgement is good, memory is intact, grooming is appropriate.      Assessment/Plan:      Assessment & Plan  Patient presents today with reports of having intermittent " elevated home blood pressure readings.  Blood pressure in the clinic today is 110/70.  Heart rate regular.  She does not appear in any acute distress.  No shortness of breath, no leg swelling.  Heart rate regular without any murmur or irregular rhythm noted.  She does have a history of having A-fib and is compliant with her Eliquis and her flecainide.  Recent travel to [place] in July.  She experiences a low-grade headache, fatigue, and has had a few episodes of occasional dizziness.  She is advised to recalibrate her home blood pressure monitor for accuracy. Recommendations include maintaining hydration, performing stretching exercises, and managing muscle tension as she did report some neck and shoulder tension. She should continue her current medication regimen of metoprolol 12.5 mg twice a day.  A carotid ultrasound will be ordered to assess for plaque buildup. If she experiences unilateral weakness or slurred speech, she should seek immediate medical attention at the emergency room.    2. Atrial Fibrillation.  She reports episodes of A. fib, which she manages by taking an extra dose of flecainide. She is currently on Eliquis and metoprolol. She should continue her current medication regimen and follow up with cardiology as scheduled. If symptoms worsen, she should contact her cardiologist.    3. Gallstones.  Her alkaline phosphatase levels were elevated in her last blood work, which could be associated with the known gallstones with review of her chart.  Denies any abdominal pain. She is advised to improve her diet.    4. Tinnitus.  She reports a high-pitched whistling in her ears that started about a month and a half ago.  We discussed possible differentials.  No worrisome features at this time.  No head injury, no sudden loss of hearing, no fevers.  Follow-up 2 weeks.   She is advised to monitor her symptoms and report any changes.    Follow-up  The patient will follow up in 2 weeks.  Have asked her to bring  her blood pressure monitor so we can calibrate.    1. Elevated blood pressure reading    2. Dizziness  - US-CAROTID DOPPLER BILAT; Future    3. Atrial fibrillation, unspecified type (HCC)    4. Tinnitus, unspecified laterality    5. Calculus of gallbladder without cholecystitis without obstruction       Return in about 2 weeks (around 9/9/2024) for Blood Pressure.      Amber CRUZ.

## 2024-08-30 ENCOUNTER — TELEPHONE (OUTPATIENT)
Dept: CARDIOLOGY | Facility: MEDICAL CENTER | Age: 74
End: 2024-08-30
Payer: MEDICARE

## 2024-08-30 DIAGNOSIS — I48.91 ATRIAL FIBRILLATION, UNSPECIFIED TYPE (HCC): ICD-10-CM

## 2024-08-30 RX ORDER — METOPROLOL TARTRATE 25 MG/1
12.5-25 TABLET, FILM COATED ORAL 2 TIMES DAILY
Qty: 180 TABLET | Refills: 3 | Status: SHIPPED | OUTPATIENT
Start: 2024-08-30

## 2024-08-30 NOTE — TELEPHONE ENCOUNTER
Phone Number Called: [phone]    Call outcome: Spoke to patient regarding message below.    Message: Called to follow up with patient. She took 25 MG of metoprolol lats night instead of 12.5. Her blood pressure today 129/73. After AM metoprolol 107/65.    To DS: Ok to continue 25 MG metoprolol PM and 12.5-25 MG AM based on blood pressure readings?

## 2024-08-30 NOTE — TELEPHONE ENCOUNTER
Phone Number Called: [phone]    Call outcome: Spoke to patient regarding message below.    Message: Called to inform patient that DS is ok with her updated dose. Sent updated prescription to [facility] per patient request. This is now the pharmacy she uses. Updated preferred pharmacy on chart

## 2024-08-30 NOTE — TELEPHONE ENCOUNTER
DS    Caller: Siena    Reported Symptoms:   Reporting High BP,- would like to know if she can increase her Metoprolol    Recent Blood Pressure/Heart Rate Readin - 170    Callback Number: [phone]    Thank you,   Genevieve SARGENT

## 2024-09-03 ENCOUNTER — TELEPHONE (OUTPATIENT)
Dept: CARDIOLOGY | Facility: MEDICAL CENTER | Age: 74
End: 2024-09-03
Payer: MEDICARE

## 2024-09-03 PROCEDURE — RXMED WILLOW AMBULATORY MEDICATION CHARGE: Performed by: INTERNAL MEDICINE

## 2024-09-03 NOTE — TELEPHONE ENCOUNTER
Miya- There was a tele encounter on 8/30/24. DS gave Sheila the okay to order the new metoprolol. New order was placed. Patient ordered 25mg of metoprolol 0.5-1 tablet BID. Patient was on 12.5mg BID previously.

## 2024-09-05 ENCOUNTER — PHARMACY VISIT (OUTPATIENT)
Dept: PHARMACY | Facility: MEDICAL CENTER | Age: 74
End: 2024-09-05
Payer: COMMERCIAL

## 2024-09-16 PROCEDURE — RXMED WILLOW AMBULATORY MEDICATION CHARGE

## 2024-09-16 PROCEDURE — RXMED WILLOW AMBULATORY MEDICATION CHARGE: Performed by: INTERNAL MEDICINE

## 2024-09-17 ENCOUNTER — PHARMACY VISIT (OUTPATIENT)
Dept: PHARMACY | Facility: MEDICAL CENTER | Age: 74
End: 2024-09-17
Payer: COMMERCIAL

## 2024-09-24 ENCOUNTER — HOSPITAL ENCOUNTER (OUTPATIENT)
Dept: RADIOLOGY | Facility: MEDICAL CENTER | Age: 74
End: 2024-09-24
Attending: NURSE PRACTITIONER
Payer: MEDICARE

## 2024-09-24 DIAGNOSIS — R42 DIZZINESS: ICD-10-CM

## 2024-09-24 PROCEDURE — 93880 EXTRACRANIAL BILAT STUDY: CPT

## 2024-09-25 ENCOUNTER — HOSPITAL ENCOUNTER (OUTPATIENT)
Dept: LAB | Facility: MEDICAL CENTER | Age: 74
End: 2024-09-25
Attending: NURSE PRACTITIONER
Payer: MEDICARE

## 2024-09-25 ENCOUNTER — OFFICE VISIT (OUTPATIENT)
Dept: CARDIOLOGY | Facility: MEDICAL CENTER | Age: 74
End: 2024-09-25
Attending: INTERNAL MEDICINE
Payer: MEDICARE

## 2024-09-25 VITALS
OXYGEN SATURATION: 92 % | HEART RATE: 67 BPM | RESPIRATION RATE: 15 BRPM | HEIGHT: 65 IN | WEIGHT: 185.6 LBS | DIASTOLIC BLOOD PRESSURE: 80 MMHG | BODY MASS INDEX: 30.92 KG/M2 | SYSTOLIC BLOOD PRESSURE: 122 MMHG

## 2024-09-25 DIAGNOSIS — I48.91 ATRIAL FIBRILLATION, UNSPECIFIED TYPE (HCC): ICD-10-CM

## 2024-09-25 DIAGNOSIS — R03.0 ELEVATED BLOOD PRESSURE READING: ICD-10-CM

## 2024-09-25 DIAGNOSIS — Z79.899 ENCOUNTER FOR MONITORING FLECAINIDE THERAPY: ICD-10-CM

## 2024-09-25 DIAGNOSIS — Z51.81 ENCOUNTER FOR MONITORING FLECAINIDE THERAPY: ICD-10-CM

## 2024-09-25 LAB
EKG IMPRESSION: NORMAL
MAGNESIUM SERPL-MCNC: 2.4 MG/DL (ref 1.5–2.5)

## 2024-09-25 PROCEDURE — 99212 OFFICE O/P EST SF 10 MIN: CPT | Performed by: NURSE PRACTITIONER

## 2024-09-25 PROCEDURE — 3079F DIAST BP 80-89 MM HG: CPT | Performed by: NURSE PRACTITIONER

## 2024-09-25 PROCEDURE — 83735 ASSAY OF MAGNESIUM: CPT

## 2024-09-25 PROCEDURE — 3074F SYST BP LT 130 MM HG: CPT | Performed by: NURSE PRACTITIONER

## 2024-09-25 PROCEDURE — 36415 COLL VENOUS BLD VENIPUNCTURE: CPT

## 2024-09-25 PROCEDURE — 99214 OFFICE O/P EST MOD 30 MIN: CPT | Performed by: NURSE PRACTITIONER

## 2024-09-25 PROCEDURE — 93005 ELECTROCARDIOGRAM TRACING: CPT | Performed by: NURSE PRACTITIONER

## 2024-09-25 ASSESSMENT — ENCOUNTER SYMPTOMS
SENSORY CHANGE: 0
ORTHOPNEA: 0
GASTROINTESTINAL NEGATIVE: 1
WHEEZING: 0
EYES NEGATIVE: 1
CONSTITUTIONAL NEGATIVE: 1
CLAUDICATION: 0
SHORTNESS OF BREATH: 0
NEUROLOGICAL NEGATIVE: 1
PALPITATIONS: 0
HALLUCINATIONS: 0
NAUSEA: 0
DIZZINESS: 0
PND: 0
BRUISES/BLEEDS EASILY: 0
DEPRESSION: 0
NERVOUS/ANXIOUS: 0
MUSCULOSKELETAL NEGATIVE: 1
RESPIRATORY NEGATIVE: 1

## 2024-09-25 ASSESSMENT — FIBROSIS 4 INDEX: FIB4 SCORE: 1.34

## 2024-09-25 NOTE — ASSESSMENT & PLAN NOTE
- home cuff not properly calibrated   - getting new one   - in office pressures good   - some low pressures correlating with lightheadedness   - if morning systolic 100 or less hold morning metoprolol dose

## 2024-09-25 NOTE — ASSESSMENT & PLAN NOTE
- continue Eliquis for stroke protection   - tolerating well   - flecainide for rhythm control   - metoprolol tartrate for rate control

## 2024-09-25 NOTE — PROGRESS NOTES
Atrial Fibrillation Follow up Note    DOS: 9/25/2024  [id number]  Lucía Haywood Michael    Chief complaint/Reason for consult: flecainide monitoring; blood pressure readings at home     HPI: Pt is a 73 y.o. female who presents to the clinic today in follow up for blood pressure readings elevated at home; flecainide monitoring. Patient has a past medical history significant for but not limited to: CKD3, paroxysmal atrial fibrillation, high risk medication flecainide, hyperlipidemia, hypertension. Patient doing well. Home cuff off and she has a newer one that is good. Blood pressures in office stable. She has  had a few low pressures and we discuss holding morning dose of metoprolol if systolic less than 100. Otherwise doing well. Check a magnesium level. Annual follow up       Past Medical History:   Diagnosis Date    Anesthesia     Delayed emergence; Hair loss    Arthritis     Throughout her body    Atrial fibrillation (HCC)     Atrial fibrillation    Blood clotting disorder (HCC) 2020    DVT (right)    Calculus of gallbladder without cholecystitis 01/09/2017    Cancer (HCC)     skin cancer basal cell    CATARACT     Bilateral IOL; loss of vision in Right eye starting in 2000.    Central stenosis of spinal canal 01/18/2021    Delayed emergence from general anesthesia     Fatty liver     Glaucoma     right eye    High cholesterol     Hyperlipidemia     IBS (irritable bowel syndrome)     Macular cyst, hole, or pseudohole of retina     Presence of IVC filter 2021    Pulmonary nodules/lesions, multiple 07/06/2016    stable on CTs, no further work up needed       Past Surgical History:   Procedure Laterality Date    PB MASTECTOMY, PARTIAL Right 1/26/2024    Procedure: RIGHT CHIQUIS LOCALIZED PARTIAL MASTECTOMY;  Surgeon: Shanna Fuller M.D.;  Location: SURGERY SAME DAY [facility];  Service: General    CERVICAL DISK AND FUSION ANTERIOR N/A 5/3/2021    Procedure: DISCECTOMY, SPINE, CERVICAL, ANTERIOR APPROACH, WITH FUSION - C4-7  WITH PLATE.;  Surgeon: Heri Greene M.D.;  Location: SURGERY [facility];  Service: Neurosurgery    OTHER  04/2021    IVC filter placement    GYN SURGERY      HYSTERECTOMY    MENISCUS REPAIR Right     OTHER      right eye cataract sx    OTHER      multiple surgeries to left eye       Social History     Socioeconomic History    Marital status:      Spouse name: Not on file    Number of children: Not on file    Years of education: Not on file    Highest education level: Not on file   Occupational History    Not on file   Tobacco Use    Smoking status: Never     Passive exposure: Past    Smokeless tobacco: Never   Vaping Use    Vaping status: Never Used   Substance and Sexual Activity    Alcohol use: No    Drug use: Never    Sexual activity: Yes     Partners: Male   Other Topics Concern    Not on file   Social History Narrative    Not on file     Social Determinants of Health     Financial Resource Strain: Not on file   Food Insecurity: Not on file   Transportation Needs: Not on file   Physical Activity: Not on file   Stress: Not on file   Social Connections: Not on file   Intimate Partner Violence: Not on file   Housing Stability: Not on file       Family History   Problem Relation Age of Onset    Heart Disease Father         triple bypass    Cancer Father     Cancer Mother     Clotting Disorder Neg Hx        Allergies   Allergen Reactions    Neosporin [Neomycin-Polymyxin B] Rash     Rxn = 6/2016    Tape Unspecified     Severe blister reaction to adhesive from tape on skin (TEGADERM if left >24 hours); Paper tape ok    Ofloxacin Swelling       Current Outpatient Medications   Medication Sig Dispense Refill    metoprolol tartrate (LOPRESSOR) 25 MG Tab Take 0.5-1 Tablets by mouth 2 times a day. 180 Tablet 3    apixaban (ELIQUIS) 5mg Tab Take 1 Tablet by mouth 2 times a day. 180 Tablet 3    flecainide (TAMBOCOR) 50 MG tablet Take 1 Tablet by mouth every morning AND 2 Tablets every evening. 270 Tablet 3     atorvastatin (LIPITOR) 20 MG Tab Take 1 Tablet by mouth every day. 90 Tablet 3    prednisoLONE acetate (PRED FORTE) 1 % Suspension Administer 1 Drop into the right eye 4 times a day.      Ca Phosphate-Cholecalciferol ([employer] CALCIUM-VITAMIN D PO) Take 1 Tablet by mouth every day.      Psyllium (METAMUCIL PO) Take 1 Scoop by mouth every day.      glycerin, adult, 2 GM suppository Unwrap and insert one suppository rectally every day as needed for constipation. 12 Suppository 0     No current facility-administered medications for this visit.       Vitals:    09/25/24 0849   BP: 122/80   Pulse: 67   Resp: 15   SpO2: 92%         Review of Systems   Constitutional: Negative.  Negative for malaise/fatigue.   HENT: Negative.     Eyes: Negative.    Respiratory: Negative.  Negative for shortness of breath and wheezing.    Cardiovascular:  Negative for chest pain, palpitations, orthopnea, claudication, leg swelling and PND.   Gastrointestinal: Negative.  Negative for nausea.   Genitourinary: Negative.    Musculoskeletal: Negative.    Skin: Negative.    Neurological: Negative.  Negative for dizziness and sensory change.   Endo/Heme/Allergies: Negative.  Does not bruise/bleed easily.   Psychiatric/Behavioral:  Negative for depression and hallucinations. The patient is not nervous/anxious.           EKG interpreted by me: Sinus QRS <100    Physical Exam  Constitutional:       Appearance: Normal appearance.   HENT:      Head: Normocephalic.   Eyes:      Pupils: Pupils are equal, round, and reactive to light.   Neck:      Vascular: No JVD.   Cardiovascular:      Rate and Rhythm: Normal rate and regular rhythm.      Pulses: Normal pulses.      Heart sounds: Normal heart sounds.   Pulmonary:      Effort: Pulmonary effort is normal.      Breath sounds: Normal breath sounds.   Abdominal:      General: Abdomen is flat.      Palpations: Abdomen is soft.   Musculoskeletal:      Cervical back: Normal range of motion.      Right lower leg: No  "edema.      Left lower leg: No edema.   Skin:     General: Skin is warm and dry.   Neurological:      Mental Status: She is alert and oriented to person, place, and time.   Psychiatric:         Mood and Affect: Mood normal.         Behavior: Behavior normal.          Data:  Lipids:   Lab Results   Component Value Date/Time    CHOLSTRLTOT 138 01/18/2024 02:53 PM    TRIGLYCERIDE 267 (H) 01/18/2024 02:53 PM    HDL 42 01/18/2024 02:53 PM    LDL 43 01/18/2024 02:53 PM        BMP:  Lab Results   Component Value Date/Time    SODIUM 140 01/18/2024 1453    POTASSIUM 4.4 01/18/2024 1453    CHLORIDE 103 01/18/2024 1453    CO2 24 01/18/2024 1453    GLUCOSE 93 01/18/2024 1453    BUN 20 01/18/2024 1453    CREATININE 0.93 01/18/2024 1453    CALCIUM 9.6 01/18/2024 1453    ANION 13.0 01/18/2024 1453       GFR:  Lab Results   Component Value Date/Time    IFAFRICA >60 03/11/2022 1208    IFNOTAFR >60 03/11/2022 1208        TSH:   Lab Results   Component Value Date/Time    TSHULTRASEN 1.700 01/18/2024 1453       MAGNESIUM:  Lab Results   Component Value Date/Time    MAGNESIUM 2.1 05/08/2021 0647    MAGNESIUM 2.3 05/05/2021 1720    MAGNESIUM 2.2 11/17/2018 0955        THYROXINE (T4):   No results found for: \"FREEDIR\"     CBC:   Lab Results   Component Value Date/Time    WBC 7.6 01/18/2024 02:53 PM    RBC 5.13 01/18/2024 02:53 PM    HEMOGLOBIN 15.2 01/18/2024 02:53 PM    HEMATOCRIT 48.7 (H) 01/18/2024 02:53 PM    MCV 94.9 01/18/2024 02:53 PM    MCH 29.6 01/18/2024 02:53 PM    MCHC 31.2 (L) 01/18/2024 02:53 PM    RDW 48.3 01/18/2024 02:53 PM    PLATELETCT 271 01/18/2024 02:53 PM    MPV 11.6 01/18/2024 02:53 PM    NEUTSPOLYS 61.60 01/09/2023 03:02 PM    LYMPHOCYTES 28.20 01/09/2023 03:02 PM    MONOCYTES 8.20 01/09/2023 03:02 PM    EOSINOPHILS 1.00 01/09/2023 03:02 PM    BASOPHILS 0.70 01/09/2023 03:02 PM    IMMGRAN 0.30 01/09/2023 03:02 PM    NRBC 0.00 01/09/2023 03:02 PM    NEUTS 5.52 01/09/2023 03:02 PM    LYMPHS 2.52 01/09/2023 03:02 PM "    MONOS 0.73 01/09/2023 03:02 PM    EOS 0.09 01/09/2023 03:02 PM    BASO 0.06 01/09/2023 03:02 PM    IMMGRANAB 0.03 01/09/2023 03:02 PM    NRBCAB 0.00 01/09/2023 03:02 PM        CBC w/o DIFF  Lab Results   Component Value Date/Time    WBC 7.6 01/18/2024 02:53 PM    RBC 5.13 01/18/2024 02:53 PM    HEMOGLOBIN 15.2 01/18/2024 02:53 PM    MCV 94.9 01/18/2024 02:53 PM    MCH 29.6 01/18/2024 02:53 PM    MCHC 31.2 (L) 01/18/2024 02:53 PM    RDW 48.3 01/18/2024 02:53 PM    MPV 11.6 01/18/2024 02:53 PM       LIVER:  Lab Results   Component Value Date/Time    ALKPHOSPHAT 150 (H) 01/18/2024 02:53 PM    ASTSGOT 25 01/18/2024 02:53 PM    ALTSGPT 26 01/18/2024 02:53 PM    TBILIRUBIN 0.5 01/18/2024 02:53 PM       BNP:  Lab Results   Component Value Date/Time    BNPBTYPENAT 62 01/14/2019 01:48 PM       PT/INR:  Lab Results   Component Value Date/Time    PROTHROMBTM 12.9 07/30/2021 10:28 AM    PROTHROMBTM 14.8 (H) 04/20/2021 11:35 AM    PROTHROMBTM 13.6 01/14/2019 01:48 PM    INR 1.00 07/30/2021 10:28 AM    INR 1.13 04/20/2021 11:35 AM    INR 1.03 01/14/2019 01:48 PM             Impression/Plan:  Atrial fibrillation (HCC)   - continue Eliquis for stroke protection   - tolerating well   - flecainide for rhythm control   - metoprolol tartrate for rate control     Elevated blood pressure reading   - home cuff not properly calibrated   - getting new one   - in office pressures good   - some low pressures correlating with lightheadedness   - if morning systolic 100 or less hold morning metoprolol dose            A total of 30 minutes of time was spent on day of encounter reviewing medical record, performing history and examination, counseling, ordering medication/test/consults, collaborating with referring service, and documentation.    Heri Rose AGACNP-EP  Cardiac Electrophysiology

## 2024-10-07 LAB — EKG IMPRESSION: NORMAL

## 2024-10-09 ENCOUNTER — APPOINTMENT (OUTPATIENT)
Dept: MEDICAL GROUP | Facility: MEDICAL CENTER | Age: 74
End: 2024-10-09
Payer: MEDICARE

## 2024-10-09 VITALS
OXYGEN SATURATION: 96 % | HEART RATE: 78 BPM | TEMPERATURE: 97.4 F | DIASTOLIC BLOOD PRESSURE: 72 MMHG | HEIGHT: 65 IN | WEIGHT: 186.2 LBS | SYSTOLIC BLOOD PRESSURE: 138 MMHG | BODY MASS INDEX: 31.02 KG/M2

## 2024-10-09 DIAGNOSIS — J31.0 CHRONIC RHINITIS: ICD-10-CM

## 2024-10-09 DIAGNOSIS — R03.0 ELEVATED BLOOD PRESSURE READING: ICD-10-CM

## 2024-10-09 DIAGNOSIS — Z02.89 ENCOUNTER FOR COMPLETION OF FORM WITH PATIENT: ICD-10-CM

## 2024-10-09 DIAGNOSIS — Z13.0 SCREENING FOR ENDOCRINE, METABOLIC AND IMMUNITY DISORDER: ICD-10-CM

## 2024-10-09 DIAGNOSIS — Z13.228 SCREENING FOR ENDOCRINE, METABOLIC AND IMMUNITY DISORDER: ICD-10-CM

## 2024-10-09 DIAGNOSIS — E78.2 MIXED HYPERLIPIDEMIA: ICD-10-CM

## 2024-10-09 DIAGNOSIS — Z13.29 SCREENING FOR ENDOCRINE, METABOLIC AND IMMUNITY DISORDER: ICD-10-CM

## 2024-10-09 DIAGNOSIS — N18.31 STAGE 3A CHRONIC KIDNEY DISEASE: ICD-10-CM

## 2024-10-09 PROCEDURE — 3075F SYST BP GE 130 - 139MM HG: CPT

## 2024-10-09 PROCEDURE — 99214 OFFICE O/P EST MOD 30 MIN: CPT

## 2024-10-09 PROCEDURE — 3078F DIAST BP <80 MM HG: CPT

## 2024-10-09 RX ORDER — FLUTICASONE PROPIONATE 50 MCG
1 SPRAY, SUSPENSION (ML) NASAL DAILY
Qty: 16 G | Refills: 5 | Status: SHIPPED | OUTPATIENT
Start: 2024-10-09 | End: 2025-10-09

## 2024-10-09 ASSESSMENT — ENCOUNTER SYMPTOMS
SHORTNESS OF BREATH: 0
CHILLS: 0
ORTHOPNEA: 0
COUGH: 0
PALPITATIONS: 0
FEVER: 0

## 2024-10-09 ASSESSMENT — FIBROSIS 4 INDEX: FIB4 SCORE: 1.34

## 2024-11-04 PROCEDURE — RXMED WILLOW AMBULATORY MEDICATION CHARGE: Performed by: INTERNAL MEDICINE

## 2024-11-07 ENCOUNTER — PHARMACY VISIT (OUTPATIENT)
Dept: PHARMACY | Facility: MEDICAL CENTER | Age: 74
End: 2024-11-07
Payer: COMMERCIAL

## 2024-11-12 ENCOUNTER — HOSPITAL ENCOUNTER (OUTPATIENT)
Dept: LAB | Facility: MEDICAL CENTER | Age: 74
End: 2024-11-12
Payer: MEDICARE

## 2024-11-12 DIAGNOSIS — Z13.29 SCREENING FOR ENDOCRINE, METABOLIC AND IMMUNITY DISORDER: ICD-10-CM

## 2024-11-12 DIAGNOSIS — N18.31 STAGE 3A CHRONIC KIDNEY DISEASE: ICD-10-CM

## 2024-11-12 DIAGNOSIS — Z13.228 SCREENING FOR ENDOCRINE, METABOLIC AND IMMUNITY DISORDER: ICD-10-CM

## 2024-11-12 DIAGNOSIS — E78.2 MIXED HYPERLIPIDEMIA: ICD-10-CM

## 2024-11-12 DIAGNOSIS — Z13.0 SCREENING FOR ENDOCRINE, METABOLIC AND IMMUNITY DISORDER: ICD-10-CM

## 2024-11-12 LAB
ERYTHROCYTE [DISTWIDTH] IN BLOOD BY AUTOMATED COUNT: 47.3 FL (ref 35.9–50)
HCT VFR BLD AUTO: 46.2 % (ref 37–47)
HGB BLD-MCNC: 14.9 G/DL (ref 12–16)
MCH RBC QN AUTO: 30.2 PG (ref 27–33)
MCHC RBC AUTO-ENTMCNC: 32.3 G/DL (ref 32.2–35.5)
MCV RBC AUTO: 93.5 FL (ref 81.4–97.8)
PLATELET # BLD AUTO: 254 K/UL (ref 164–446)
PMV BLD AUTO: 11.6 FL (ref 9–12.9)
RBC # BLD AUTO: 4.94 M/UL (ref 4.2–5.4)
WBC # BLD AUTO: 6.4 K/UL (ref 4.8–10.8)

## 2024-11-12 PROCEDURE — 80061 LIPID PANEL: CPT

## 2024-11-12 PROCEDURE — 85027 COMPLETE CBC AUTOMATED: CPT

## 2024-11-12 PROCEDURE — 84443 ASSAY THYROID STIM HORMONE: CPT

## 2024-11-12 PROCEDURE — 36415 COLL VENOUS BLD VENIPUNCTURE: CPT

## 2024-11-12 PROCEDURE — 80053 COMPREHEN METABOLIC PANEL: CPT

## 2024-11-13 LAB
ALBUMIN SERPL BCP-MCNC: 4.3 G/DL (ref 3.2–4.9)
ALBUMIN/GLOB SERPL: 1.7 G/DL
ALP SERPL-CCNC: 126 U/L (ref 30–99)
ALT SERPL-CCNC: 26 U/L (ref 2–50)
ANION GAP SERPL CALC-SCNC: 11 MMOL/L (ref 7–16)
AST SERPL-CCNC: 22 U/L (ref 12–45)
BILIRUB SERPL-MCNC: 0.5 MG/DL (ref 0.1–1.5)
BUN SERPL-MCNC: 20 MG/DL (ref 8–22)
CALCIUM ALBUM COR SERPL-MCNC: 9.6 MG/DL (ref 8.5–10.5)
CALCIUM SERPL-MCNC: 9.8 MG/DL (ref 8.5–10.5)
CHLORIDE SERPL-SCNC: 103 MMOL/L (ref 96–112)
CHOLEST SERPL-MCNC: 146 MG/DL (ref 100–199)
CO2 SERPL-SCNC: 25 MMOL/L (ref 20–33)
CREAT SERPL-MCNC: 0.98 MG/DL (ref 0.5–1.4)
FASTING STATUS PATIENT QL REPORTED: NORMAL
GFR SERPLBLD CREATININE-BSD FMLA CKD-EPI: 60 ML/MIN/1.73 M 2
GLOBULIN SER CALC-MCNC: 2.6 G/DL (ref 1.9–3.5)
GLUCOSE SERPL-MCNC: 100 MG/DL (ref 65–99)
HDLC SERPL-MCNC: 43 MG/DL
LDLC SERPL CALC-MCNC: 55 MG/DL
POTASSIUM SERPL-SCNC: 4.7 MMOL/L (ref 3.6–5.5)
PROT SERPL-MCNC: 6.9 G/DL (ref 6–8.2)
SODIUM SERPL-SCNC: 139 MMOL/L (ref 135–145)
TRIGL SERPL-MCNC: 238 MG/DL (ref 0–149)
TSH SERPL DL<=0.005 MIU/L-ACNC: 2.4 UIU/ML (ref 0.38–5.33)

## 2024-12-04 ENCOUNTER — APPOINTMENT (OUTPATIENT)
Dept: URBAN - METROPOLITAN AREA CLINIC 4 | Facility: CLINIC | Age: 74
Setting detail: DERMATOLOGY
End: 2024-12-04

## 2024-12-04 DIAGNOSIS — D18.0 HEMANGIOMA: ICD-10-CM

## 2024-12-04 DIAGNOSIS — L57.0 ACTINIC KERATOSIS: ICD-10-CM

## 2024-12-04 PROBLEM — D23.39 OTHER BENIGN NEOPLASM OF SKIN OF OTHER PARTS OF FACE: Status: ACTIVE | Noted: 2024-12-04

## 2024-12-04 PROBLEM — D18.01 HEMANGIOMA OF SKIN AND SUBCUTANEOUS TISSUE: Status: ACTIVE | Noted: 2024-12-04

## 2024-12-04 PROCEDURE — 17000 DESTRUCT PREMALG LESION: CPT

## 2024-12-04 PROCEDURE — ? LIQUID NITROGEN

## 2024-12-04 PROCEDURE — 99213 OFFICE O/P EST LOW 20 MIN: CPT | Mod: 25

## 2024-12-04 PROCEDURE — ? COUNSELING

## 2024-12-04 PROCEDURE — ? ADDITIONAL NOTES

## 2024-12-04 ASSESSMENT — LOCATION DETAILED DESCRIPTION DERM
LOCATION DETAILED: LEFT DORSAL SMALL FINGER METACARPOPHALANGEAL JOINT
LOCATION DETAILED: LEFT INFERIOR NASAL CHEEK

## 2024-12-04 ASSESSMENT — LOCATION SIMPLE DESCRIPTION DERM
LOCATION SIMPLE: LEFT CHEEK
LOCATION SIMPLE: LEFT HAND

## 2024-12-04 ASSESSMENT — LOCATION ZONE DERM
LOCATION ZONE: FACE
LOCATION ZONE: HAND

## 2024-12-04 NOTE — PROCEDURE: ADDITIONAL NOTES
Additional Notes: Lanced with 16g needle clear fluid was extracted.
Detail Level: Detailed
Render Risk Assessment In Note?: no

## 2024-12-05 DIAGNOSIS — E78.2 MIXED HYPERLIPIDEMIA: ICD-10-CM

## 2024-12-05 DIAGNOSIS — I48.91 ATRIAL FIBRILLATION, UNSPECIFIED TYPE (HCC): ICD-10-CM

## 2024-12-05 PROCEDURE — RXMED WILLOW AMBULATORY MEDICATION CHARGE

## 2024-12-05 RX ORDER — ATORVASTATIN CALCIUM 20 MG/1
20 TABLET, FILM COATED ORAL DAILY
Qty: 90 TABLET | Refills: 3 | Status: SHIPPED | OUTPATIENT
Start: 2024-12-05

## 2024-12-05 NOTE — TELEPHONE ENCOUNTER
Received request via: Pharmacy    Was the patient seen in the last year in this department? Yes    Does the patient have an active prescription (recently filled or refills available) for medication(s) requested? No    Pharmacy Name: [facility]    Does the patient have long-term Plus and need 100-day supply? (This applies to ALL medications) Patient does not have SCP

## 2024-12-06 PROCEDURE — RXMED WILLOW AMBULATORY MEDICATION CHARGE: Performed by: NURSE PRACTITIONER

## 2024-12-06 RX ORDER — FLECAINIDE ACETATE 50 MG/1
TABLET ORAL
Qty: 270 TABLET | Refills: 0 | Status: SHIPPED | OUTPATIENT
Start: 2024-12-06

## 2024-12-10 ENCOUNTER — PHARMACY VISIT (OUTPATIENT)
Dept: PHARMACY | Facility: MEDICAL CENTER | Age: 74
End: 2024-12-10
Payer: COMMERCIAL

## 2024-12-13 ENCOUNTER — PHARMACY VISIT (OUTPATIENT)
Dept: PHARMACY | Facility: MEDICAL CENTER | Age: 74
End: 2024-12-13
Payer: COMMERCIAL

## 2024-12-13 ENCOUNTER — TELEPHONE (OUTPATIENT)
Dept: MEDICAL GROUP | Facility: MEDICAL CENTER | Age: 74
End: 2024-12-13
Payer: MEDICARE

## 2024-12-13 DIAGNOSIS — B37.31 VAGINAL YEAST INFECTION: ICD-10-CM

## 2024-12-13 PROCEDURE — RXMED WILLOW AMBULATORY MEDICATION CHARGE

## 2024-12-13 RX ORDER — FLUCONAZOLE 100 MG/1
100 TABLET ORAL DAILY
Qty: 2 TABLET | Refills: 0 | Status: SHIPPED | OUTPATIENT
Start: 2024-12-13

## 2024-12-13 NOTE — TELEPHONE ENCOUNTER
VOICEMAIL  1. Caller Name: Lucía Rodriguez                        Call Back Number: [phone] (home)       2. Message: Patient called stating she thinks she has a yeast infection and she would like advice on how to proceed.    3. Patient approves office to leave a detailed voicemail/MyChart message: yes

## 2025-01-06 ENCOUNTER — TELEPHONE (OUTPATIENT)
Dept: HEALTH INFORMATION MANAGEMENT | Facility: OTHER | Age: 75
End: 2025-01-06
Payer: MEDICARE

## 2025-01-06 DIAGNOSIS — N89.8 VAGINAL LESION: ICD-10-CM

## 2025-01-06 NOTE — TELEPHONE ENCOUNTER
1. Caller Name: Siena Rodriguez                        Call Back Number: [phone]      How would the patient prefer to be contacted with a response: Frogdicehart message    2. SPECIFIC Action To Be Taken: Referral pending, please sign.    3. Diagnosis/Clinical Reason for Request: cyst, blisters    4. Specialty & Provider Name/Lab/Imaging Location: gynecology    5. Is appointment scheduled for requested order/referral: no    Patient was not informed they will receive a return phone call from the office ONLY if there are any questions before processing their request. Advised to call back if they haven't received a call from the referral department in 5 days.    Patient note:  Lucía Rodriguez would like to request a referral.  Reason: Lumps  Requested provider: Gynecologist  Comment:  Hi. I was hoping you could refer me to a gynecologist. I’ve not been for about 25 years. I have some lump's, blood blister type growths and a cyst, in the Vagina area. My Dermatologist said they were not cancerous, but I should get them checked. They are bothersome.  Thank you,  Siena Rodriguez  [phone]

## 2025-01-24 ENCOUNTER — PHARMACY VISIT (OUTPATIENT)
Dept: PHARMACY | Facility: MEDICAL CENTER | Age: 75
End: 2025-01-24
Payer: COMMERCIAL

## 2025-01-24 DIAGNOSIS — I48.91 ATRIAL FIBRILLATION, UNSPECIFIED TYPE (HCC): ICD-10-CM

## 2025-01-24 PROCEDURE — RXMED WILLOW AMBULATORY MEDICATION CHARGE: Performed by: INTERNAL MEDICINE

## 2025-01-24 RX ORDER — FLECAINIDE ACETATE 50 MG/1
TABLET ORAL
Qty: 270 TABLET | Refills: 0 | Status: SHIPPED | OUTPATIENT
Start: 2025-01-24

## 2025-02-12 PROCEDURE — RXMED WILLOW AMBULATORY MEDICATION CHARGE

## 2025-02-12 PROCEDURE — RXMED WILLOW AMBULATORY MEDICATION CHARGE: Performed by: NURSE PRACTITIONER

## 2025-02-13 ENCOUNTER — PHARMACY VISIT (OUTPATIENT)
Dept: PHARMACY | Facility: MEDICAL CENTER | Age: 75
End: 2025-02-13
Payer: COMMERCIAL

## 2025-02-19 ENCOUNTER — TELEPHONE (OUTPATIENT)
Dept: OBGYN | Facility: CLINIC | Age: 75
End: 2025-02-19

## 2025-02-19 ENCOUNTER — OFFICE VISIT (OUTPATIENT)
Dept: OBGYN | Facility: CLINIC | Age: 75
End: 2025-02-19
Payer: MEDICARE

## 2025-02-19 VITALS
WEIGHT: 183 LBS | SYSTOLIC BLOOD PRESSURE: 125 MMHG | BODY MASS INDEX: 30.49 KG/M2 | HEIGHT: 65 IN | DIASTOLIC BLOOD PRESSURE: 69 MMHG

## 2025-02-19 DIAGNOSIS — N90.89 VULVAR LESION: ICD-10-CM

## 2025-02-19 DIAGNOSIS — D18.00: ICD-10-CM

## 2025-02-19 RX ORDER — CLOBETASOL PROPIONATE 0.5 MG/G
1 OINTMENT TOPICAL
Qty: 30 G | Refills: 3 | Status: SHIPPED | OUTPATIENT
Start: 2025-02-19 | End: 2025-02-19 | Stop reason: SDUPTHER

## 2025-02-19 RX ORDER — CLOBETASOL PROPIONATE 0.5 MG/G
1 OINTMENT TOPICAL
Qty: 30 G | Refills: 3 | Status: SHIPPED | OUTPATIENT
Start: 2025-02-19

## 2025-02-19 ASSESSMENT — FIBROSIS 4 INDEX: FIB4 SCORE: 1.27

## 2025-02-19 NOTE — PROGRESS NOTES
New Gynecological Visit    Lucía Rodriguez    74 y.o.    Chief complaint    Chief Complaint   Patient presents with    New Patient       HPI    Patient is a 75 yo  with hx prior vaginal hysterectomy in  for prolapse presents as a referral from her PCP for vulvar lesions. Patient describes having these dark spots on her vulva for many years and only recently in last few months have they become irritated with contact especially when sexually active and they do bleed a bit. Vulva is also very pruritic at times. She did see her dermatologist who told her these were normal and no intervention was needed, however patient feels bothered by them and would like them removed if possible.   Denies any vaginal bleeding.   No pelvic pain.   No abnormal discharge.   No issues with bowel/bladder.   She is on eliquis anticoagulation for hx atrial fibrillation, hx DVT and has IVF filter placed.     Review of Systems:  Review of Systems   All other systems reviewed and are negative.       Past Obstetrical History:     x 2    Past Gynecological History:    Last pap:  2005  H/o abnormal pap: No  H/o STIs: No  DEXA:  6/15/2023 borderline osteoporosis.   Last Mammogram: 24 BIRADS 1 negative  LMP:   BCM: Hyst/postmenopause    Past Medical History    Past Medical History:   Diagnosis Date    Anesthesia     Delayed emergence; Hair loss    Arthritis     Throughout her body    Atrial fibrillation (HCC)     Atrial fibrillation    Blood clotting disorder (HCC)     DVT (right)    Calculus of gallbladder without cholecystitis 2017    Cancer (HCC)     skin cancer basal cell    CATARACT     Bilateral IOL; loss of vision in Right eye starting in .    Central stenosis of spinal canal 2021    Delayed emergence from general anesthesia     Fatty liver     Glaucoma     right eye    High cholesterol     Hyperlipidemia     IBS (irritable bowel syndrome)     Macular cyst, hole, or pseudohole of retina      Presence of IVC filter 2021    Pulmonary nodules/lesions, multiple 07/06/2016    stable on CTs, no further work up needed       Past Surgical History    Past Surgical History:   Procedure Laterality Date    PB MASTECTOMY, PARTIAL Right 1/26/2024    Procedure: RIGHT CHIQUIS LOCALIZED PARTIAL MASTECTOMY;  Surgeon: Shanna Fuller M.D.;  Location: SURGERY SAME DAY [facility];  Service: General    CERVICAL DISK AND FUSION ANTERIOR N/A 5/3/2021    Procedure: DISCECTOMY, SPINE, CERVICAL, ANTERIOR APPROACH, WITH FUSION - C4-7 WITH PLATE.;  Surgeon: Heri Greene M.D.;  Location: SURGERY [facility];  Service: Neurosurgery    OTHER  04/2021    IVC filter placement    GYN SURGERY      HYSTERECTOMY    MENISCUS REPAIR Right     OTHER      right eye cataract sx    OTHER      multiple surgeries to left eye       Family History   Problem Relation Age of Onset    Heart Disease Father         triple bypass    Cancer Father     Cancer Mother     Clotting Disorder Neg Hx        Allergies    Allergies   Allergen Reactions    Neosporin [Neomycin-Polymyxin B] Rash     Rxn = 6/2016    Tape Unspecified     Severe blister reaction to adhesive from tape on skin (TEGADERM if left >24 hours); Paper tape ok    Ofloxacin Swelling       Medications    Current Outpatient Medications   Medication Sig Dispense Refill    flecainide (TAMBOCOR) 50 MG tablet Take 1 tablet by mouth every morning and 2 tablets every evening. 270 Tablet 0    apixaban (ELIQUIS) 5mg Tab Take 1 Tablet by mouth 2 times a day. 180 Tablet 1    atorvastatin (LIPITOR) 20 MG Tab Take 1 Tablet by mouth every day. 90 Tablet 3    fluticasone (FLONASE) 50 MCG/ACT nasal spray Administer 1 Spray into affected nostril(S) every day. 16 g 5    metoprolol tartrate (LOPRESSOR) 25 MG Tab Take 0.5-1 Tablets by mouth 2 times a day. 180 Tablet 3    prednisoLONE acetate (PRED FORTE) 1 % Suspension Administer 1 Drop into the right eye 4 times a day.      Ca Phosphate-Cholecalciferol ([employer]S  "CALCIUM-VITAMIN D PO) Take 1 Tablet by mouth every day.      Psyllium (METAMUCIL PO) Take 1 Scoop by mouth every day.      fluconazole (DIFLUCAN) 100 MG Tab Take 1 Tablet by mouth every day. Repeat dose on day 2 if symptoms continue. (Patient not taking: Reported on 2/19/2025) 2 Tablet 0     No current facility-administered medications for this visit.       Social  Social History     Tobacco Use    Smoking status: Never     Passive exposure: Past    Smokeless tobacco: Never   Vaping Use    Vaping status: Never Used   Substance Use Topics    Alcohol use: No    Drug use: Never        OBJECTIVE:    Vitals    /69 (BP Location: Left arm, Patient Position: Sitting, BP Cuff Size: Adult)   Ht 5' 5\"   Wt 183 lb   LMP 12/28/2003   BMI 30.45 kg/m²     Physical Exam    GENERAL: Well developed, well nourished, female in no acute distress.    HEENT: NCAT, mucus membranes moist    Neck: Supple, nontender, no EBONI, no thyromegaly    Chest unlabored    Abdomen: Soft ND NT.    Ext: ASHRAF    : Normal Vulva, vagina. Bilateral labia majora with numerous scattered 1-2 mm dark blue-black raised smooth papules. Small 5 mm smooth nabothian cyst on lower left labia. No ulcerations or skin breakdown. No active bleeding from lesions.  No abnormal discharge. No blood.    Internal exam deferred.     Labs/Pathology:    None    A/P    Patient Active Problem List   Diagnosis    Atrial fibrillation (HCC)    Hyperlipidemia    Glaucoma    PATEL positive    Osteoarthritis of hands, bilateral    CKD (chronic kidney disease) stage 3, GFR 30-59 ml/min    Peripheral sensory-motor axonal polyneuropathy    Anticoagulated    History of DVT (deep vein thrombosis)    Hemorrhagic disorder due to circulating anticoagulants (HCC)    Obesity (BMI 30-39.9)    Calculus of gallbladder without cholecystitis without obstruction    Hepatic steatosis    Age-related osteoporosis without current pathological fracture    Basal cell carcinoma of skin    Retinal " disorder    Uterine prolapse    Irritable bowel syndrome with diarrhea    Intraductal papilloma with atypical ductal hyperplasia of breast    Elevated blood pressure reading       Lucía Rodriguez    74 y.o.  with likely vulvar cherry angiomas vs varicose veins.     1. Angioma, venous    2. Vulvar lesion      Discussed condition as benign and does not necessary need surgical intervention. Patient is symptomatic with occasional bleeding upon contact of lesions. Discussed best approach for management would be laser ablation by a dermatologist or vascular specialist.     Referral to dermatology placed.     RTC PRN.   Time spent: 30 minutes    Sergey Oreilly M.D.    Obstetrics and Gynecology    :25 AM

## 2025-02-19 NOTE — PROGRESS NOTES
Duplicate note opened in error.     Sergey Oreilly M.D.    Obstetrics and Gynecology    2/19/2025 3:53 PM

## 2025-02-19 NOTE — TELEPHONE ENCOUNTER
Caller Name: Lucía Rodriguez    Call Back Number: [phone] (home)       How would the patient prefer to be contacted with a response: Phone call do NOT leave a detailed message    Pt called regarding new Rx from today's visit. Pt is requesting Rx to be sent to [facility] off [address]    Pt stated that the  changed the pharmacy but was still unable to  at the correct pharmacy. I informed patient that the  cannot change the pharmacy on prescriptions but can change her preferred pharmacy overall. Pt verbalized understanding.     I informed patient I would send this encounter over to .    Pt verbalized understanding.     --------------------------------------------------    Received request via: Patient    Was the patient seen in the last year in this department? Yes    Does the patient have an active prescription (recently filled or refills available) for medication(s) requested? Yes. Rx sent to [place] instead of [facility] on [address]    Pharmacy Name: [facility] [address]    Does the patient have alf Plus and need 100-day supply? (This applies to ALL medications) Patient does not have SCP

## 2025-04-17 DIAGNOSIS — I48.91 ATRIAL FIBRILLATION, UNSPECIFIED TYPE (HCC): ICD-10-CM

## 2025-04-17 PROCEDURE — RXMED WILLOW AMBULATORY MEDICATION CHARGE: Performed by: NURSE PRACTITIONER

## 2025-04-17 RX ORDER — FLECAINIDE ACETATE 50 MG/1
TABLET ORAL
Qty: 270 TABLET | Refills: 0 | Status: SHIPPED | OUTPATIENT
Start: 2025-04-17

## 2025-04-21 ENCOUNTER — PHARMACY VISIT (OUTPATIENT)
Dept: PHARMACY | Facility: MEDICAL CENTER | Age: 75
End: 2025-04-21
Payer: COMMERCIAL

## 2025-04-21 PROCEDURE — RXMED WILLOW AMBULATORY MEDICATION CHARGE: Performed by: NURSE PRACTITIONER

## 2025-04-21 PROCEDURE — RXMED WILLOW AMBULATORY MEDICATION CHARGE

## 2025-04-23 ENCOUNTER — PHARMACY VISIT (OUTPATIENT)
Dept: PHARMACY | Facility: MEDICAL CENTER | Age: 75
End: 2025-04-23
Payer: COMMERCIAL

## 2025-05-05 ENCOUNTER — OFFICE VISIT (OUTPATIENT)
Dept: MEDICAL GROUP | Facility: MEDICAL CENTER | Age: 75
End: 2025-05-05
Payer: MEDICARE

## 2025-05-05 VITALS
HEIGHT: 65 IN | DIASTOLIC BLOOD PRESSURE: 72 MMHG | OXYGEN SATURATION: 97 % | SYSTOLIC BLOOD PRESSURE: 116 MMHG | RESPIRATION RATE: 12 BRPM | TEMPERATURE: 97.1 F | WEIGHT: 180 LBS | BODY MASS INDEX: 29.99 KG/M2 | HEART RATE: 64 BPM

## 2025-05-05 DIAGNOSIS — I48.91 ATRIAL FIBRILLATION, UNSPECIFIED TYPE (HCC): ICD-10-CM

## 2025-05-05 DIAGNOSIS — E78.2 MIXED HYPERLIPIDEMIA: ICD-10-CM

## 2025-05-05 DIAGNOSIS — I95.9 SYMPTOMATIC HYPOTENSION: ICD-10-CM

## 2025-05-05 DIAGNOSIS — R19.7 INTERMITTENT DIARRHEA: ICD-10-CM

## 2025-05-05 PROCEDURE — 99214 OFFICE O/P EST MOD 30 MIN: CPT | Performed by: NURSE PRACTITIONER

## 2025-05-05 PROCEDURE — 3074F SYST BP LT 130 MM HG: CPT | Performed by: NURSE PRACTITIONER

## 2025-05-05 PROCEDURE — 3078F DIAST BP <80 MM HG: CPT | Performed by: NURSE PRACTITIONER

## 2025-05-05 RX ORDER — METOPROLOL SUCCINATE 25 MG/1
12.5 TABLET, EXTENDED RELEASE ORAL DAILY
Qty: 45 TABLET | Refills: 0 | Status: SHIPPED | OUTPATIENT
Start: 2025-05-05

## 2025-05-05 RX ORDER — ROSUVASTATIN CALCIUM 10 MG/1
10 TABLET, COATED ORAL EVERY EVENING
Qty: 100 TABLET | Refills: 0 | Status: SHIPPED | OUTPATIENT
Start: 2025-05-05 | End: 2026-06-09

## 2025-05-05 ASSESSMENT — FIBROSIS 4 INDEX: FIB4 SCORE: 1.27

## 2025-05-05 ASSESSMENT — PATIENT HEALTH QUESTIONNAIRE - PHQ9: CLINICAL INTERPRETATION OF PHQ2 SCORE: 0

## 2025-05-05 NOTE — Clinical Note
Hi  Just saw Siena in the clinic.  She continues to be symptomatic when her BP <100- even with a 1/4 of a tablet of Metoprolol 25mg. I was going to see if changing her 12.5mg of the Metoprolol XL would be better tolerated. Let me know if any objections. Mirella MARTINEZ

## 2025-05-05 NOTE — PROGRESS NOTES
Subjective:     Lucía Rodriguez is a 74 y.o. female presents to discuss:     Chief Complaint   Patient presents with    Medication Follow-up     Verbal consent was acquired by the patient to use [employer] ambient listening note generation during this visit Yes   History of Present Illness  The patient presents for evaluation of blood pressure and diarrhea.    She has been monitoring her blood pressure at home, noting occasional low readings even without metoprolol administration. Her pulse rate tends to be elevated on days when she does not take metoprolol-she does not tolerate this feeling. She reports feeling well on these days, but experiences discomfort when her blood pressure drops below 100. She describes a sensation of heaviness in her shoulders and upper body, which interferes with her ability to perform household tasks. She does not experience syncope. She is currently on flecainide, Eliquis, and atorvastatin, in addition to metoprolol. She takes a quarter of a metoprolol pill nightly, but has not been taking it in the morning due to concerns about her blood pressure dropping into the 80s. She was previously advised to take metoprolol if her blood pressure was 100 or above, but found that even a quarter dose would cause her blood pressure to drop into the high 80s. She is on metoprolol for atrial fibrillation. She last saw cardiology in 11/2024 and is scheduled for a follow-up in a year. She does not report palpitations, but notes that her pulse feels like it is racing when it is high, which causes her anxiety (her BP log does reflect correlation -with elevated HR if she does not take her Metoprolol (even a quarter of a pill).  Her usual pulse rate is in the 60s, but can remain elevated for several hours. She maintains good hydration but does not engage in regular exercise. She has been experiencing these symptoms for the past year and is seeking a way to manage them without having to frequently check  her BP t/o the day. She typically checks her blood pressure 45 minutes after waking up to determine if she needs to take her metoprolol.    She has been on atorvastatin for several years and has experienced intermittent diarrhea throughout this period. She was previously diagnosed with irritable bowel syndrome (IBS), but over the past few years, she has noticed an increase in the frequency of her diarrhea episodes. She reports that these episodes occur within 1 to 1.5 hours of taking her medication, usually at bedtime, and require 3 to 4 bathroom visits before resolving. She is questioning the necessity of continuing this medication. She believes her cholesterol levels are normal, but her triglycerides are elevated. She is considering the addition of CoQ10 to her regimen.      ROS: : see above      Current Outpatient Medications:     metoprolol SR (TOPROL XL) 25 MG TABLET SR 24 HR, Take 0.5 Tablets by mouth every day., Disp: 45 Tablet, Rfl: 0    rosuvastatin (CRESTOR) 10 MG Tab, Take 1 Tablet by mouth every evening., Disp: 100 Tablet, Rfl: 0    flecainide (TAMBOCOR) 50 MG tablet, Take 1 tablet by mouth every morning and 2 tablets every evening., Disp: 270 Tablet, Rfl: 0    apixaban (ELIQUIS) 5mg Tab, Take 1 Tablet by mouth 2 times a day., Disp: 180 Tablet, Rfl: 1    clobetasol (TEMOVATE) 0.05 % Ointment, Apply 1 Tube topically 3 times a week., Disp: 30 g, Rfl: 3    fluconazole (DIFLUCAN) 100 MG Tab, Take 1 Tablet by mouth every day. Repeat dose on day 2 if symptoms continue. (Patient not taking: Reported on 5/8/2025), Disp: 2 Tablet, Rfl: 0    fluticasone (FLONASE) 50 MCG/ACT nasal spray, Administer 1 Spray into affected nostril(S) every day., Disp: 16 g, Rfl: 5    prednisoLONE acetate (PRED FORTE) 1 % Suspension, Administer 1 Drop into the right eye 4 times a day., Disp: , Rfl:     Ca Phosphate-Cholecalciferol ([employer] CALCIUM-VITAMIN D PO), Take 1 Tablet by mouth every day., Disp: , Rfl:     Psyllium (METAMUCIL PO),  "Take 1 Scoop by mouth every day., Disp: , Rfl:     Allergies   Allergen Reactions    Neosporin [Neomycin-Polymyxin B] Rash     Rxn = 6/2016    Tape Unspecified     Severe blister reaction to adhesive from tape on skin (TEGADERM if left >24 hours); Paper tape ok    Ofloxacin Swelling       Objective:   Vitals: /72   Pulse 64   Temp 36.2 °C (97.1 °F) (Temporal)   Resp 12   Ht 1.651 m (5' 5\")   Wt 81.6 kg (180 lb)   LMP 12/28/2003   SpO2 97%   BMI 29.95 kg/m²    General: Alert, pleasant, NAD  HEENT: Normocephalic.  Neck supple.   Respiratory: no distress, no audible wheezing, RR -WNL  Skin: Warm, dry, no rashes.  Extremities: No leg edema. No discoloration  Neurological: No tremors  Psych:  Affect/mood is normal, judgement is good, memory is intact, grooming is appropriate.  Assessment/Plan:      1. Symptomatic hypotension    2. Intermittent diarrhea    3. Atrial fibrillation, unspecified type (HCC)    4. Mixed hyperlipidemia    Other orders  - metoprolol SR (TOPROL XL) 25 MG TABLET SR 24 HR; Take 0.5 Tablets by mouth every day.  Dispense: 45 Tablet; Refill: 0  - rosuvastatin (CRESTOR) 10 MG Tab; Take 1 Tablet by mouth every evening.  Dispense: 100 Tablet; Refill: 0       Assessment & Plan  Blood pressure management  Blood pressure readings have been consistently low, leading to symptomatic episodes.  Treatment plan: Currently on immediate-release metoprolol, which peaks within 1 to 2 hours post-administration and lasts up to 24 hours. Recommend she  trial of metoprolol extended-release 12.5 mg once daily at night to assess if this regimen is better tolerated for her and less burden of multiple checks of her BP t/o the day and of course reducing her frequency of hypotension and subsequent increase in her HR when she omits the dose of Metoprolol for the day. She has not been taking the BID dosing for some time d/t intolerable side effects.  A message will be sent to Cardiology Heri MARTINEZ to " update on patient's current situation. Advised to maintain adequate hydration and exercise caution when rising from a seated position due to low blood pressure readings.    Diarrhea  Experiencing bouts of diarrhea, potentially attributed to current medication regimen.  Treatment plan: A switch from atorvastatin to rosuvastatin 10 mg will be made to see if it helps lessen the diarrhea. Informed that all statins can potentially cause joint pain. If constipation occurs as a side effect of rosuvastatin, it can be managed with stool softeners, fiber supplements, Metamucil, or MiraLAX.  Follow up one month with any available provider.     Follow-up: The patient will follow up in 1 month.    Return in about 4 weeks (around 6/2/2025).    {I have placed the above orders and discussed them with an approved delegating provider. The MA is performing the below orders under the direction of Dr. Dao LIMON    Health maintenance:    General Recommendations:   Smoking: recommend complete avoidance of all tobacco products  Alcohol: recommend limiting consumption  Physical Activity: goal is 30 min of moderate activity 5 times a week  Weight Management and Nutrition: high vegetable, high protein diet like the Mediterranean diet, portion control, avoid excessive sugars, Low Glycemic Index foods, adequate hydration, sleep.

## 2025-05-08 ENCOUNTER — OFFICE VISIT (OUTPATIENT)
Dept: CARDIOLOGY | Facility: MEDICAL CENTER | Age: 75
End: 2025-05-08
Attending: NURSE PRACTITIONER
Payer: MEDICARE

## 2025-05-08 VITALS
BODY MASS INDEX: 30.16 KG/M2 | RESPIRATION RATE: 14 BRPM | DIASTOLIC BLOOD PRESSURE: 62 MMHG | HEIGHT: 65 IN | WEIGHT: 181 LBS | SYSTOLIC BLOOD PRESSURE: 104 MMHG | HEART RATE: 65 BPM | OXYGEN SATURATION: 95 %

## 2025-05-08 DIAGNOSIS — E78.2 MIXED HYPERLIPIDEMIA: ICD-10-CM

## 2025-05-08 DIAGNOSIS — R03.0 ELEVATED BLOOD PRESSURE READING: ICD-10-CM

## 2025-05-08 DIAGNOSIS — I48.91 ATRIAL FIBRILLATION, UNSPECIFIED TYPE (HCC): ICD-10-CM

## 2025-05-08 LAB — EKG IMPRESSION: NORMAL

## 2025-05-08 PROCEDURE — 93010 ELECTROCARDIOGRAM REPORT: CPT | Performed by: INTERNAL MEDICINE

## 2025-05-08 PROCEDURE — 93005 ELECTROCARDIOGRAM TRACING: CPT | Mod: TC | Performed by: NURSE PRACTITIONER

## 2025-05-08 PROCEDURE — 99214 OFFICE O/P EST MOD 30 MIN: CPT | Performed by: NURSE PRACTITIONER

## 2025-05-08 PROCEDURE — 99212 OFFICE O/P EST SF 10 MIN: CPT | Performed by: NURSE PRACTITIONER

## 2025-05-08 ASSESSMENT — ENCOUNTER SYMPTOMS
MUSCULOSKELETAL NEGATIVE: 1
GASTROINTESTINAL NEGATIVE: 1
HALLUCINATIONS: 0
NERVOUS/ANXIOUS: 0
SHORTNESS OF BREATH: 0
ORTHOPNEA: 0
NEUROLOGICAL NEGATIVE: 1
EYES NEGATIVE: 1
WHEEZING: 0
RESPIRATORY NEGATIVE: 1
SENSORY CHANGE: 0
DIZZINESS: 0
PALPITATIONS: 0
DEPRESSION: 0
CONSTITUTIONAL NEGATIVE: 1
NAUSEA: 0
BRUISES/BLEEDS EASILY: 0
CLAUDICATION: 0
PND: 0

## 2025-05-08 ASSESSMENT — FIBROSIS 4 INDEX: FIB4 SCORE: 1.27

## 2025-05-08 NOTE — PROGRESS NOTES
Atrial Fibrillation Follow up Note    DOS: 5/8/2025   [id number]  Lucía Haywood Michael    Chief complaint/Reason for consult: flecainide monitoring; blood pressure readings at home     HPI: Pt is a 74 y.o. female who presents to the clinic today in follow up for blood pressure ; flecainide monitoring. Patient has a past medical history significant for but not limited to: CKD3, paroxysmal atrial fibrillation, high risk medication flecainide, hyperlipidemia, hypertension. Patient doing well. Recent change on metoprolol has given more stability to her blood pressure. Heart rates normal range but abnormal for her. Can get monitor for assessment, she has Kardia and encouraged her to renetta. Suspicion by PCP for diarrhea related to atorvastatin and switched to rosuvastatin. Will call and check how she is doing in 2 weeks. Historically problem has been triglycerides. Going on long trip. Annual follow up       Past Medical History:   Diagnosis Date    Anesthesia     Delayed emergence; Hair loss    Arrhythmia     Arthritis     Throughout her body    Atrial fibrillation (HCC)     Atrial fibrillation    Blood clotting disorder (HCC) 2020    DVT (right)    Calculus of gallbladder without cholecystitis 01/09/2017    Cancer (HCC)     skin cancer basal cell    CATARACT     Bilateral IOL; loss of vision in Right eye starting in 2000.    Central stenosis of spinal canal 01/18/2021    Chronic kidney disease     Deep vein thrombosis     Delayed emergence from general anesthesia     Fatty liver     Glaucoma     right eye    High cholesterol     Hyperlipidemia     IBS (irritable bowel syndrome)     Macular cyst, hole, or pseudohole of retina     Presence of IVC filter 2021    Pulmonary nodules/lesions, multiple 07/06/2016    stable on CTs, no further work up needed       Past Surgical History:   Procedure Laterality Date    PB MASTECTOMY, PARTIAL Right 01/26/2024    Procedure: RIGHT CHIQUIS LOCALIZED PARTIAL MASTECTOMY;  Surgeon: Shanna Fuller,  M.D.;  Location: SURGERY SAME DAY [facility];  Service: General    CERVICAL DISK AND FUSION ANTERIOR N/A 05/03/2021    Procedure: DISCECTOMY, SPINE, CERVICAL, ANTERIOR APPROACH, WITH FUSION - C4-7 WITH PLATE.;  Surgeon: Heri Greene M.D.;  Location: SURGERY [facility];  Service: Neurosurgery    OTHER  04/2021    IVC filter placement    GYN SURGERY      HYSTERECTOMY    MENISCUS REPAIR Right     ORIF, KNEE  ?    OTHER      right eye cataract sx    OTHER      multiple surgeries to left eye       Social History     Socioeconomic History    Marital status:      Spouse name: Not on file    Number of children: Not on file    Years of education: Not on file    Highest education level: Not on file   Occupational History    Not on file   Tobacco Use    Smoking status: Never     Passive exposure: Past    Smokeless tobacco: Never   Vaping Use    Vaping status: Never Used   Substance and Sexual Activity    Alcohol use: No    Drug use: Never    Sexual activity: Yes     Partners: Male     Birth control/protection: Post-Menopausal   Other Topics Concern    Not on file   Social History Narrative    Not on file     Social Drivers of Health     Financial Resource Strain: Not on file   Food Insecurity: Not on file   Transportation Needs: Not on file   Physical Activity: Not on file   Stress: Not on file   Social Connections: Not on file   Intimate Partner Violence: Not on file   Housing Stability: Not on file       Family History   Problem Relation Age of Onset    Heart Disease Father         triple bypass    Cancer Father     Cancer Mother     Clotting Disorder Neg Hx        Allergies   Allergen Reactions    Neosporin [Neomycin-Polymyxin B] Rash     Rxn = 6/2016    Tape Unspecified     Severe blister reaction to adhesive from tape on skin (TEGADERM if left >24 hours); Paper tape ok    Ofloxacin Swelling       Current Outpatient Medications   Medication Sig Dispense Refill    metoprolol SR (TOPROL XL) 25 MG TABLET SR 24 HR  Take 0.5 Tablets by mouth every day. 45 Tablet 0    rosuvastatin (CRESTOR) 10 MG Tab Take 1 Tablet by mouth every evening. 100 Tablet 0    flecainide (TAMBOCOR) 50 MG tablet Take 1 tablet by mouth every morning and 2 tablets every evening. 270 Tablet 0    apixaban (ELIQUIS) 5mg Tab Take 1 Tablet by mouth 2 times a day. 180 Tablet 1    fluticasone (FLONASE) 50 MCG/ACT nasal spray Administer 1 Spray into affected nostril(S) every day. 16 g 5    prednisoLONE acetate (PRED FORTE) 1 % Suspension Administer 1 Drop into the right eye 4 times a day.      Ca Phosphate-Cholecalciferol ([employer] CALCIUM-VITAMIN D PO) Take 1 Tablet by mouth every day.      Psyllium (METAMUCIL PO) Take 1 Scoop by mouth every day.      clobetasol (TEMOVATE) 0.05 % Ointment Apply 1 Tube topically 3 times a week. 30 g 3    fluconazole (DIFLUCAN) 100 MG Tab Take 1 Tablet by mouth every day. Repeat dose on day 2 if symptoms continue. (Patient not taking: Reported on 5/8/2025) 2 Tablet 0     No current facility-administered medications for this visit.       Vitals:    05/08/25 0833   BP: 104/62   Pulse: 65   Resp: 14   SpO2: 95%           Review of Systems   Constitutional: Negative.  Negative for malaise/fatigue.   HENT: Negative.     Eyes: Negative.    Respiratory: Negative.  Negative for shortness of breath and wheezing.    Cardiovascular:  Negative for chest pain, palpitations, orthopnea, claudication, leg swelling and PND.   Gastrointestinal: Negative.  Negative for nausea.   Genitourinary: Negative.    Musculoskeletal: Negative.    Skin: Negative.    Neurological: Negative.  Negative for dizziness and sensory change.   Endo/Heme/Allergies: Negative.  Does not bruise/bleed easily.   Psychiatric/Behavioral:  Negative for depression and hallucinations. The patient is not nervous/anxious.           EKG interpreted by me: Sinus QRS <100    Physical Exam  Constitutional:       Appearance: Normal appearance.   HENT:      Head: Normocephalic.   Eyes:       "Pupils: Pupils are equal, round, and reactive to light.   Neck:      Vascular: No JVD.   Cardiovascular:      Rate and Rhythm: Normal rate and regular rhythm.      Pulses: Normal pulses.      Heart sounds: Normal heart sounds.   Pulmonary:      Effort: Pulmonary effort is normal.      Breath sounds: Normal breath sounds.   Abdominal:      General: Abdomen is flat.      Palpations: Abdomen is soft.   Musculoskeletal:      Cervical back: Normal range of motion.      Right lower leg: No edema.      Left lower leg: No edema.   Skin:     General: Skin is warm and dry.   Neurological:      Mental Status: She is alert and oriented to person, place, and time.   Psychiatric:         Mood and Affect: Mood normal.         Behavior: Behavior normal.          Data:  Lipids:   Lab Results   Component Value Date/Time    CHOLSTRLTOT 146 11/12/2024 11:18 AM    TRIGLYCERIDE 238 (H) 11/12/2024 11:18 AM    HDL 43 11/12/2024 11:18 AM    LDL 55 11/12/2024 11:18 AM        BMP:  Lab Results   Component Value Date/Time    SODIUM 140 01/18/2024 1453    POTASSIUM 4.4 01/18/2024 1453    CHLORIDE 103 01/18/2024 1453    CO2 24 01/18/2024 1453    GLUCOSE 93 01/18/2024 1453    BUN 20 01/18/2024 1453    CREATININE 0.93 01/18/2024 1453    CALCIUM 9.6 01/18/2024 1453    ANION 13.0 01/18/2024 1453       GFR:  Lab Results   Component Value Date/Time    IFAFRICA >60 03/11/2022 1208    IFNOTAFR >60 03/11/2022 1208        TSH:   Lab Results   Component Value Date/Time    TSHULTRASEN 1.700 01/18/2024 1453       MAGNESIUM:  Lab Results   Component Value Date/Time    MAGNESIUM 2.1 05/08/2021 0647    MAGNESIUM 2.3 05/05/2021 1720    MAGNESIUM 2.2 11/17/2018 0955        THYROXINE (T4):   No results found for: \"FREEDIR\"     CBC:   Lab Results   Component Value Date/Time    WBC 6.4 11/12/2024 11:18 AM    RBC 4.94 11/12/2024 11:18 AM    HEMOGLOBIN 14.9 11/12/2024 11:18 AM    HEMATOCRIT 46.2 11/12/2024 11:18 AM    MCV 93.5 11/12/2024 11:18 AM    MCH 30.2 " 11/12/2024 11:18 AM    MCHC 32.3 11/12/2024 11:18 AM    RDW 47.3 11/12/2024 11:18 AM    PLATELETCT 254 11/12/2024 11:18 AM    MPV 11.6 11/12/2024 11:18 AM    NEUTSPOLYS 61.60 01/09/2023 03:02 PM    LYMPHOCYTES 28.20 01/09/2023 03:02 PM    MONOCYTES 8.20 01/09/2023 03:02 PM    EOSINOPHILS 1.00 01/09/2023 03:02 PM    BASOPHILS 0.70 01/09/2023 03:02 PM    IMMGRAN 0.30 01/09/2023 03:02 PM    NRBC 0.00 01/09/2023 03:02 PM    NEUTS 5.52 01/09/2023 03:02 PM    LYMPHS 2.52 01/09/2023 03:02 PM    MONOS 0.73 01/09/2023 03:02 PM    EOS 0.09 01/09/2023 03:02 PM    BASO 0.06 01/09/2023 03:02 PM    IMMGRANAB 0.03 01/09/2023 03:02 PM    NRBCAB 0.00 01/09/2023 03:02 PM        CBC w/o DIFF  Lab Results   Component Value Date/Time    WBC 6.4 11/12/2024 11:18 AM    RBC 4.94 11/12/2024 11:18 AM    HEMOGLOBIN 14.9 11/12/2024 11:18 AM    MCV 93.5 11/12/2024 11:18 AM    MCH 30.2 11/12/2024 11:18 AM    MCHC 32.3 11/12/2024 11:18 AM    RDW 47.3 11/12/2024 11:18 AM    MPV 11.6 11/12/2024 11:18 AM       LIVER:  Lab Results   Component Value Date/Time    ALKPHOSPHAT 126 (H) 11/12/2024 11:18 AM    ASTSGOT 22 11/12/2024 11:18 AM    ALTSGPT 26 11/12/2024 11:18 AM    TBILIRUBIN 0.5 11/12/2024 11:18 AM       BNP:  Lab Results   Component Value Date/Time    BNPBTYPENAT 62 01/14/2019 01:48 PM       PT/INR:  Lab Results   Component Value Date/Time    PROTHROMBTM 12.9 07/30/2021 10:28 AM    PROTHROMBTM 14.8 (H) 04/20/2021 11:35 AM    PROTHROMBTM 13.6 01/14/2019 01:48 PM    INR 1.00 07/30/2021 10:28 AM    INR 1.13 04/20/2021 11:35 AM    INR 1.03 01/14/2019 01:48 PM             Impression/Plan:  Atrial fibrillation (HCC)   - stable overall    -rhythm control with flecainide   - use Kardia for monitoring   - low dose half tablet metoprolol at night   - Eliquis for stroke protection     Hyperlipidemia   - switched to rosuvastatin   - atorvastatin suspicion for diarrhea   - if diarrhea persists try vascepa or fenofibrate as triglycerides are historically  the problem   - h/o angiograms with no CAD    Elevated blood pressure reading   - better with metoprolol change  -  if hypotension persists stop metoprolol                Heri Rose AGACNP-EP  Cardiac Electrophysiology

## 2025-05-08 NOTE — ASSESSMENT & PLAN NOTE
- stable overall    -rhythm control with flecainide   - use Kardia for monitoring   - low dose half tablet metoprolol at night   - Eliquis for stroke protection

## 2025-05-08 NOTE — ASSESSMENT & PLAN NOTE
- switched to rosuvastatin   - atorvastatin suspicion for diarrhea   - if diarrhea persists try vascepa or fenofibrate as triglycerides are historically the problem   - h/o angiograms with no CAD

## 2025-05-13 DIAGNOSIS — I48.91 ATRIAL FIBRILLATION, UNSPECIFIED TYPE (HCC): ICD-10-CM

## 2025-05-19 ENCOUNTER — PATIENT MESSAGE (OUTPATIENT)
Dept: CARDIOLOGY | Facility: MEDICAL CENTER | Age: 75
End: 2025-05-19
Payer: MEDICARE

## 2025-05-19 DIAGNOSIS — E78.2 MIXED HYPERLIPIDEMIA: Primary | ICD-10-CM

## 2025-05-21 RX ORDER — FENOFIBRATE 145 MG/1
145 TABLET, FILM COATED ORAL DAILY
Qty: 90 TABLET | Refills: 3 | Status: SHIPPED | OUTPATIENT
Start: 2025-05-21

## 2025-06-06 ENCOUNTER — TELEPHONE (OUTPATIENT)
Dept: CARDIOLOGY | Facility: MEDICAL CENTER | Age: 75
End: 2025-06-06
Payer: MEDICARE

## 2025-06-06 NOTE — TELEPHONE ENCOUNTER
MT    Caller: Lucía Rodriguez    Topic/issue: Calling in regards to medication, fenofibrate (TRICOR) 145 MG Tab. Patient believes she is having a skin reaction caused by this medication. She reports having a very itchy rash on her forearm, shin, and neck area. Over the last week she has tried several different creams, but nothing seems to help so she believes it is caused by this new medication. Please advise.     Callback Number: [phone]    Thank you,  Madalyn JAY

## 2025-06-06 NOTE — TELEPHONE ENCOUNTER
S/w patient regarding symptoms. Per patient she has been taking the fenofibrate from the past month. One week ago she started to develop a rash on her arms, legs, and face. No other symptoms or SOB. Patient not sure if this could be related to the Fenofibrate.      MT- Please review and advise, thank you.

## 2025-06-13 NOTE — TELEPHONE ENCOUNTER
S/w patient regarding MT recommendation and she verbalized understanding. Stated she will ask the pharmacist about the OTC Niacin.

## 2025-06-13 NOTE — TELEPHONE ENCOUNTER
S/w patient for f/u and patient stated her rash is mostly gone and she feels better.    MT- Please review, rash has mostly cleared up after stopping medication. Patient asking if she needs to switch to something different.

## 2025-07-05 DIAGNOSIS — I48.91 ATRIAL FIBRILLATION, UNSPECIFIED TYPE (HCC): Primary | ICD-10-CM

## 2025-07-07 DIAGNOSIS — I48.91 ATRIAL FIBRILLATION, UNSPECIFIED TYPE (HCC): ICD-10-CM

## 2025-07-07 PROCEDURE — RXMED WILLOW AMBULATORY MEDICATION CHARGE: Performed by: NURSE PRACTITIONER

## 2025-07-07 RX ORDER — FLECAINIDE ACETATE 50 MG/1
TABLET ORAL
Qty: 270 TABLET | Refills: 0 | Status: SHIPPED | OUTPATIENT
Start: 2025-07-07

## 2025-07-08 RX ORDER — METOPROLOL SUCCINATE 25 MG/1
12.5 TABLET, EXTENDED RELEASE ORAL DAILY
Qty: 45 TABLET | Refills: 3 | Status: SHIPPED | OUTPATIENT
Start: 2025-07-08

## 2025-07-10 ENCOUNTER — PHARMACY VISIT (OUTPATIENT)
Dept: PHARMACY | Facility: MEDICAL CENTER | Age: 75
End: 2025-07-10
Payer: COMMERCIAL

## 2025-07-10 ENCOUNTER — HOSPITAL ENCOUNTER (OUTPATIENT)
Facility: MEDICAL CENTER | Age: 75
End: 2025-07-10
Payer: MEDICARE

## 2025-07-10 ENCOUNTER — OFFICE VISIT (OUTPATIENT)
Dept: MEDICAL GROUP | Facility: MEDICAL CENTER | Age: 75
End: 2025-07-10
Payer: MEDICARE

## 2025-07-10 VITALS
TEMPERATURE: 97.9 F | HEART RATE: 65 BPM | BODY MASS INDEX: 30.69 KG/M2 | DIASTOLIC BLOOD PRESSURE: 68 MMHG | RESPIRATION RATE: 13 BRPM | OXYGEN SATURATION: 98 % | HEIGHT: 65 IN | WEIGHT: 184.2 LBS | SYSTOLIC BLOOD PRESSURE: 124 MMHG

## 2025-07-10 DIAGNOSIS — N89.8 VAGINAL ITCHING: ICD-10-CM

## 2025-07-10 DIAGNOSIS — K58.0 IRRITABLE BOWEL SYNDROME WITH DIARRHEA: ICD-10-CM

## 2025-07-10 DIAGNOSIS — R10.31 RLQ ABDOMINAL PAIN: Primary | ICD-10-CM

## 2025-07-10 PROCEDURE — 3074F SYST BP LT 130 MM HG: CPT

## 2025-07-10 PROCEDURE — 87510 GARDNER VAG DNA DIR PROBE: CPT

## 2025-07-10 PROCEDURE — 99214 OFFICE O/P EST MOD 30 MIN: CPT

## 2025-07-10 PROCEDURE — 87660 TRICHOMONAS VAGIN DIR PROBE: CPT

## 2025-07-10 PROCEDURE — 87480 CANDIDA DNA DIR PROBE: CPT

## 2025-07-10 PROCEDURE — 3078F DIAST BP <80 MM HG: CPT

## 2025-07-10 RX ORDER — CLOTRIMAZOLE 1 %
1 CREAM (GRAM) TOPICAL 2 TIMES DAILY
Qty: 42 G | Refills: 1 | Status: SHIPPED | OUTPATIENT
Start: 2025-07-10

## 2025-07-10 ASSESSMENT — ENCOUNTER SYMPTOMS
SHORTNESS OF BREATH: 0
ORTHOPNEA: 0
PALPITATIONS: 0
FEVER: 0
COUGH: 0
CHILLS: 0

## 2025-07-10 ASSESSMENT — FIBROSIS 4 INDEX: FIB4 SCORE: 1.27

## 2025-07-10 NOTE — PROGRESS NOTES
Subjective:     Verbal consent was acquired by the patient to use [employer] ambient listening note generation during this visit Yes    CC: Follow-Up (Pt states she thinks she has a yeast infection, pt states symptoms have been off and on for a couple of weeks.)      HPI:   Lucía is a 74 y.o. female who presents today for:    History of Present Illness  The patient presents for evaluation of pruritus vulvae and right lower quadrant abdominal pain.    Pruritus Vulvae  She suspects a candidiasis infection due to intense pruritus, extending from her legs to her abdomen. The pruritus is localized to the vulva, labia, inguinal region, and lower abdomen. She also reports mild erythema in the affected area. The symptoms are intermittent and typically resolve spontaneously. She does not experience any vaginal discharge or dysuria. She has been using Monistat Itch Relief, which provides some symptomatic relief. Additionally, she mentions the presence of blood blisters that occasionally bleed, both externally and internally. A dermatologist recommended a gynecological consultation, during which it was suggested that the blisters might be a form of varicosity. A topical treatment was prescribed for the pruritus, but it did not alleviate the symptoms.  - Onset: Suspects candidiasis infection.  - Location: Vulva, labia, inguinal region, and lower abdomen.  - Duration: Intermittent symptoms.  - Character: Intense pruritus, mild erythema, blood blisters.  - Alleviating Factors: Monistat Itch Relief provides some symptomatic relief.  - Severity: Symptoms typically resolve spontaneously; topical treatment did not alleviate symptoms.    Right Lower Quadrant Abdominal Pain  She has been experiencing a low-grade ache in her lower abdomen for the past few months. She reports no constipation, fever, or chills. She retains her gallbladder and has a history of cholelithiasis. An ultrasound was performed, and she was referred to a surgeon  who did not believe the gallstones were the etiology of her pain. She has not undergone cholecystectomy. She underwent a hysterectomy in 2005. She is planning a trip in September 2025 and is concerned about potential medical issues. The pain does not seem to be related to food intake or physical activity, but occasionally intensifies and feels like a stretching or pulling sensation.  - Onset: Past few months.  - Location: Lower abdomen.  - Duration: Persistent for the past few months.  - Character: Low-grade ache, stretching or pulling sensation.  - Severity: Occasionally intensifies.    Chronic Diarrhea    She had been experiencing chronic diarrhea for several years, which was attributed to irritable bowel syndrome (IBS). She was taking atorvastatin, which can cause diarrhea, and noticed a specific pattern of diarrhea occurring approximately one hour after taking the medication. Her cardiologist discontinued atorvastatin, which resolved the issue. She was switched to rosuvastatin but did not tolerate it well and is no longer taking it. Her gastrointestinal symptoms have been stable since discontinuing atorvastatin.  - Onset: Chronic diarrhea for several years.  - Duration: Several years.  - Character: Diarrhea occurring approximately one hour after taking atorvastatin.  - Alleviating Factors: Discontinuation of atorvastatin resolved the issue.  - Severity: Gastrointestinal symptoms stable since discontinuing atorvastatin.    She has been informed that she is due for a Tdap vaccination.    PAST SURGICAL HISTORY:  Hysterectomy: 2005         Allergies: Neosporin [neomycin-polymyxin b], Tape, and Ofloxacin     Medications: Current Medications[1]      ROS:  Review of Systems   Constitutional:  Negative for chills and fever.   Respiratory:  Negative for cough and shortness of breath.    Cardiovascular:  Negative for chest pain, palpitations, orthopnea and leg swelling.       Objective:     Exam:  /68   Pulse 65    "Temp 36.6 °C (97.9 °F) (Temporal)   Resp 13   Ht 1.651 m (5' 5\")   Wt 83.6 kg (184 lb 3.2 oz)   LMP 12/28/2003   SpO2 98%   BMI 30.65 kg/m²  Body mass index is 30.65 kg/m².    Physical Exam  Constitutional:       Appearance: Normal appearance.   Eyes:      Pupils: Pupils are equal, round, and reactive to light.   Cardiovascular:      Rate and Rhythm: Normal rate and regular rhythm.      Pulses: Normal pulses.      Heart sounds: Normal heart sounds.   Pulmonary:      Effort: Pulmonary effort is normal.      Breath sounds: Normal breath sounds.   Abdominal:      General: Bowel sounds are normal.      Palpations: Abdomen is soft.   Neurological:      Mental Status: She is alert and oriented to person, place, and time.   Psychiatric:         Mood and Affect: Mood normal.         Behavior: Behavior normal.           Assessment & Plan:     Lucía evans 74 y.o. female with the following -     Assessment & Plan    1. RLQ abdominal pain  Undiagnosed problem with uncertain prognosis  Persistent low-grade ache in the lower right abdomen for the past 2-3 months. No associated fever, chills, or constipation; no pain with urination.  Diagnostic plan: An abdominal ultrasound has been ordered to investigate potential causes such as intestinal issues, polyp growth, or appendicitis.   - US-ABDOMEN LTD (SOFT TISSUE); Future    2. Vaginal itching  Acute, uncomplicated  Symptoms include intense itching, redness, and irritation extending to the groin, lower abdomen, and legs. Physical examination reveals external redness without discharge; Monistat itch relief provides temporary relief.  Diagnostic plan: A swab test will be conducted to determine if the infection is yeast or bacterial.  Treatment plan: Clotrimazole cream 42 g with 1 refill prescribed for external application; if yeast infection is confirmed, oral medication will be prescribed.  - VAGINAL PATHOGENS DNA PANEL; Future  - clotrimazole (LOTRIMIN) 1 % Cream; Apply 1 " Application topically 2 times a day.  Dispense: 42 g; Refill: 1    3. Irritable bowel syndrome with diarrhea  Chronic, stable- resolved   Diarrhea resolved after discontinuing atorvastatin and switching to rosuvastatin and switching to fenofibrate.  Rosuvastatin was also discontinued.  No current gastrointestinal issues; colonoscopy in 2023 showed a few polyps.        Anticipatory guidance included the following: Patient counseled about skin care, diet, supplements, smoking, drugs/alcohol use, safe sex and exercise.     Return if symptoms worsen or fail to improve.    Please note that this dictation was created using voice recognition software. I have made every reasonable attempt to correct obvious errors, but I expect that there are errors of grammar and possibly content that I did not discover before finalizing the note.      I have placed the below orders and discussed them with an approved delegating provider.  The MA is performing the below orders under the direction of Dr Suazo.           [1]   Current Outpatient Medications:     clotrimazole (LOTRIMIN) 1 % Cream, Apply 1 Application topically 2 times a day., Disp: 42 g, Rfl: 1    metoprolol SR (TOPROL XL) 25 MG TABLET SR 24 HR, TAKE 1/2 TABLET BY MOUTH DAILY, Disp: 45 Tablet, Rfl: 3    flecainide (TAMBOCOR) 50 MG tablet, Take 1 tablet by mouth every morning and 2 tablets every evening., Disp: 270 Tablet, Rfl: 0    fenofibrate (TRICOR) 145 MG Tab, Take 1 Tablet by mouth every day., Disp: 90 Tablet, Rfl: 3    apixaban (ELIQUIS) 5mg Tab, Take 1 Tablet by mouth 2 times a day., Disp: 180 Tablet, Rfl: 1    fluticasone (FLONASE) 50 MCG/ACT nasal spray, Administer 1 Spray into affected nostril(S) every day., Disp: 16 g, Rfl: 5    prednisoLONE acetate (PRED FORTE) 1 % Suspension, Administer 1 Drop into the right eye 4 times a day., Disp: , Rfl:     Ca Phosphate-Cholecalciferol ([employer] CALCIUM-VITAMIN D PO), Take 1 Tablet by mouth every day., Disp: , Rfl:     Psyllium  (METAMUCIL PO), Take 1 Scoop by mouth every day., Disp: , Rfl:     clobetasol (TEMOVATE) 0.05 % Ointment, Apply 1 Tube topically 3 times a week., Disp: 30 g, Rfl: 3    fluconazole (DIFLUCAN) 100 MG Tab, Take 1 Tablet by mouth every day. Repeat dose on day 2 if symptoms continue. (Patient not taking: Reported on 7/10/2025), Disp: 2 Tablet, Rfl: 0

## 2025-07-11 LAB
CANDIDA DNA VAG QL PROBE+SIG AMP: NEGATIVE
G VAGINALIS DNA VAG QL PROBE+SIG AMP: NEGATIVE
T VAGINALIS DNA VAG QL PROBE+SIG AMP: NEGATIVE

## 2025-08-11 ENCOUNTER — HOSPITAL ENCOUNTER (OUTPATIENT)
Dept: RADIOLOGY | Facility: MEDICAL CENTER | Age: 75
End: 2025-08-11
Payer: MEDICARE

## 2025-08-11 DIAGNOSIS — R10.31 RLQ ABDOMINAL PAIN: ICD-10-CM

## 2025-08-11 PROCEDURE — 76705 ECHO EXAM OF ABDOMEN: CPT

## 2025-08-13 ENCOUNTER — APPOINTMENT (OUTPATIENT)
Dept: URBAN - METROPOLITAN AREA CLINIC 15 | Facility: CLINIC | Age: 75
Setting detail: DERMATOLOGY
End: 2025-08-13

## 2025-08-13 DIAGNOSIS — D18.0 HEMANGIOMA: ICD-10-CM

## 2025-08-13 DIAGNOSIS — L81.4 OTHER MELANIN HYPERPIGMENTATION: ICD-10-CM

## 2025-08-13 DIAGNOSIS — Z85.828 PERSONAL HISTORY OF OTHER MALIGNANT NEOPLASM OF SKIN: ICD-10-CM

## 2025-08-13 DIAGNOSIS — L82.1 OTHER SEBORRHEIC KERATOSIS: ICD-10-CM

## 2025-08-13 DIAGNOSIS — L57.8 OTHER SKIN CHANGES DUE TO CHRONIC EXPOSURE TO NONIONIZING RADIATION: ICD-10-CM

## 2025-08-13 DIAGNOSIS — D22 MELANOCYTIC NEVI: ICD-10-CM

## 2025-08-13 PROBLEM — D18.01 HEMANGIOMA OF SKIN AND SUBCUTANEOUS TISSUE: Status: ACTIVE | Noted: 2025-08-13

## 2025-08-13 PROBLEM — D22.5 MELANOCYTIC NEVI OF TRUNK: Status: ACTIVE | Noted: 2025-08-13

## 2025-08-13 PROCEDURE — ? COUNSELING

## 2025-08-13 ASSESSMENT — LOCATION SIMPLE DESCRIPTION DERM
LOCATION SIMPLE: RIGHT CHEEK
LOCATION SIMPLE: LEFT UPPER BACK
LOCATION SIMPLE: RIGHT ANTERIOR NECK
LOCATION SIMPLE: RIGHT HAND
LOCATION SIMPLE: RIGHT UPPER BACK
LOCATION SIMPLE: LEFT HAND

## 2025-08-13 ASSESSMENT — LOCATION ZONE DERM
LOCATION ZONE: FACE
LOCATION ZONE: HAND
LOCATION ZONE: TRUNK
LOCATION ZONE: NECK

## 2025-08-13 ASSESSMENT — LOCATION DETAILED DESCRIPTION DERM
LOCATION DETAILED: LEFT SUPERIOR UPPER BACK
LOCATION DETAILED: RIGHT INFERIOR MEDIAL UPPER BACK
LOCATION DETAILED: RIGHT INFERIOR CENTRAL MALAR CHEEK
LOCATION DETAILED: LEFT RADIAL DORSAL HAND
LOCATION DETAILED: RIGHT INFERIOR ANTERIOR NECK
LOCATION DETAILED: RIGHT RADIAL DORSAL HAND
LOCATION DETAILED: RIGHT SUPERIOR UPPER BACK

## (undated) DEVICE — SET LEADWIRE 5 LEAD BEDSIDE DISPOSABLE ECG (1SET OF 5/EA)

## (undated) DEVICE — SENSOR SPO2 NEO LNCS ADHESIVE (20/BX) SEE USER NOTES

## (undated) DEVICE — SODIUM CHL IRRIGATION 0.9% 1000ML (12EA/CA)

## (undated) DEVICE — DRILL BIT 14MM

## (undated) DEVICE — SUCTION INSTRUMENT YANKAUER BULBOUS TIP W/O VENT (50EA/CA)

## (undated) DEVICE — KIT SURGIFLO W/OUT THROMBIN - (6EA/CA)

## (undated) DEVICE — GOWN SURGEONS LARGE - (32/CA)

## (undated) DEVICE — SLEEVE VASO CALF MED - (10PR/CA)

## (undated) DEVICE — SUTURE 0 SILK TIES (36PK/BX)

## (undated) DEVICE — DERMABOND ADVANCED - (12EA/BX)

## (undated) DEVICE — SUTURE 4-0 VICRYL PLUS TF - 8 X 18 INCH (12/BX)

## (undated) DEVICE — SUTURE GENERAL

## (undated) DEVICE — NEPTUNE 4 PORT MANIFOLD - (20/PK)

## (undated) DEVICE — SURGIFOAM (12X7) - (12EA/CA)

## (undated) DEVICE — MASK OXYGEN VNYL ADLT MED CONC WITH 7 FOOT TUBING  - (50EA/CA)

## (undated) DEVICE — SUTURE 3-0 VICRYL PLUS RB-1 - 8 X 18 INCH (12/BX)

## (undated) DEVICE — SUTURE 4-0 MONOCRYL PLUS PS-1 - 27 INCH (36/BX)

## (undated) DEVICE — ELECTRODE DUAL RETURN W/ CORD - (50/PK)

## (undated) DEVICE — SENSOR OXIMETER ADULT SPO2 RD SET (20EA/BX)

## (undated) DEVICE — STERI STRIP COMPOUND BENZOIN - TINCTURE 0.6ML WITH APPLICATOR (40EA/BX)

## (undated) DEVICE — PACK NEURO - (2EA/CA)

## (undated) DEVICE — CANISTER SUCTION 3000ML MECHANICAL FILTER AUTO SHUTOFF MEDI-VAC NONSTERILE LF DISP  (40EA/CA)

## (undated) DEVICE — MASK AIRWAY FLEXIBLE SINGLE-USE SIZE 4 ADULTS (10EA/BX)

## (undated) DEVICE — CANNULA O2 COMFORT SOFT EAR ADULT 7 FT TUBING (50/CA)

## (undated) DEVICE — KIT  I.V. START (100EA/CA)

## (undated) DEVICE — TOWEL STOP TIMEOUT SAFETY FLAG (40EA/CA)

## (undated) DEVICE — GLOVE, BIOGEL ECLIPSE, SZ 7.0, PF LTX (50/BX)

## (undated) DEVICE — DISSECT TOOL MIDAS REX

## (undated) DEVICE — LACTATED RINGERS INJ. 500 ML - (24EA/CA)

## (undated) DEVICE — SHEET TRANSVERSE LAP - (12EA/CA)

## (undated) DEVICE — SYRINGE 30 ML LL (56/BX)

## (undated) DEVICE — LACTATED RINGERS INJ 1000 ML - (14EA/CA 60CA/PF)

## (undated) DEVICE — NEEDLE SPINAL NON-SAFETY 18 GA X 3 IN (25EA/BX)

## (undated) DEVICE — SCREW DISTRACTION 14MM YELLOW - STERILE (10EA/BX) (5TX4=20)

## (undated) DEVICE — TOOL DISSECT MATCH HEAD

## (undated) DEVICE — CLOSURE SKIN STRIP 1/2 X 4 IN - (STERI STRIP) (50/BX 4BX/CA)

## (undated) DEVICE — BOVIE BLADE COATED &INSULATED - 25/PK

## (undated) DEVICE — SPONGE GAUZESTER 4 X 4 4PLY - (128PK/CA)

## (undated) DEVICE — DRESSING TRANSPARENT FILM TEGADERM 4 X 4.75" (50EA/BX)"

## (undated) DEVICE — TUBING C&T SET FLYING LEADS DRAIN TUBING (10EA/BX)

## (undated) DEVICE — SLEEVE, VASO, THIGH, MED

## (undated) DEVICE — INTRAOP NEURO IN OR 1:1 PER 15 MIN

## (undated) DEVICE — HEAD HOLDER JUNIOR/ADULT

## (undated) DEVICE — TUBING CLEARLINK DUO-VENT - C-FLO (48EA/CA)

## (undated) DEVICE — Device

## (undated) DEVICE — SUTURE 3-0 VICRYL PLUS SH - 8X 18 INCH (12/BX)

## (undated) DEVICE — MASK ANESTHESIA ADULT  - (100/CA)

## (undated) DEVICE — GLOVE BIOGEL INDICATOR SZ 7SURGICAL PF LTX - (50/BX 4BX/CA)

## (undated) DEVICE — CHLORAPREP 26 ML APPLICATOR - ORANGE TINT(25/CA)

## (undated) DEVICE — SPONGE PEANUT - (5/PK 50PK/CA)

## (undated) DEVICE — GLOVE SZ 7.5 BIOGEL PI MICRO - PF LF (50PR/BX)

## (undated) DEVICE — ELECTRODE 850 FOAM ADHESIVE - HYDROGEL RADIOTRNSPRNT (50/PK)

## (undated) DEVICE — PROTECTOR ULNA NERVE - (36PR/CA)

## (undated) DEVICE — GLOVE BIOGEL PI INDICATOR SZ 7.0 SURGICAL PF LF - (50/BX 4BX/CA)

## (undated) DEVICE — CANISTER SUCTION RIGID RED 1500CC (40EA/CA)

## (undated) DEVICE — TUBE CONNECTING SUCTION - CLEAR PLASTIC STERILE 72 IN (50EA/CA)

## (undated) DEVICE — SET EXTENSION WITH 2 PORTS (48EA/CA) ***PART #2C8610 IS A SUBSTITUTE*****

## (undated) DEVICE — KIT EVACUATER 3 SPRING PVC LF 1/8 DRAIN SIZE (10EA/CA)"

## (undated) DEVICE — SHEET PEDIATRIC LAPAROTOMY - (10/CA)

## (undated) DEVICE — HEMOSTAT SURG ABSORBABLE - 2 X 3 IN SURGICEL (24EA/CA)

## (undated) DEVICE — RESTRAINTS LIMB DISP. - (12/BX 4BX/CA)

## (undated) DEVICE — GLOVE BIOGEL INDICATOR SZ 8.5 SURGICAL PF LTX - (50/BX 4BX/CA)

## (undated) DEVICE — GOWN WARMING STANDARD FLEX - (30/CA)

## (undated) DEVICE — MIDAS LUBRICATOR DIFFUSER PACK (4EA/CA)

## (undated) DEVICE — GLOVE BIOGEL SZ 8 SURGICAL PF LTX - (50PR/BX 4BX/CA)

## (undated) DEVICE — KIT ANESTHESIA W/CIRCUIT & 3/LT BAG W/FILTER (20EA/CA)

## (undated) DEVICE — GLOVE BIOGEL PI INDICATOR SZ 7.5 SURGICAL PF LF -(50/BX 4BX/CA)